# Patient Record
Sex: FEMALE | Race: WHITE | NOT HISPANIC OR LATINO | URBAN - METROPOLITAN AREA
[De-identification: names, ages, dates, MRNs, and addresses within clinical notes are randomized per-mention and may not be internally consistent; named-entity substitution may affect disease eponyms.]

---

## 2018-07-07 ENCOUNTER — ON CAMPUS - OUTPATIENT (AMBULATORY)
Dept: URBAN - METROPOLITAN AREA HOSPITAL 85 | Facility: HOSPITAL | Age: 33
End: 2018-07-07

## 2018-07-07 ENCOUNTER — ON CAMPUS - OUTPATIENT (AMBULATORY)
Dept: URBAN - METROPOLITAN AREA HOSPITAL 85 | Facility: HOSPITAL | Age: 33
End: 2018-07-07
Payer: COMMERCIAL

## 2018-07-07 DIAGNOSIS — R63.4 ABNORMAL WEIGHT LOSS: ICD-10-CM

## 2018-07-07 DIAGNOSIS — K29.70 GASTRITIS, UNSPECIFIED, WITHOUT BLEEDING: ICD-10-CM

## 2018-07-07 DIAGNOSIS — K92.0 HEMATEMESIS: ICD-10-CM

## 2018-07-07 DIAGNOSIS — R10.12 LEFT UPPER QUADRANT PAIN: ICD-10-CM

## 2018-07-07 DIAGNOSIS — R11.2 NAUSEA WITH VOMITING, UNSPECIFIED: ICD-10-CM

## 2018-07-07 DIAGNOSIS — D64.89 OTHER SPECIFIED ANEMIAS: ICD-10-CM

## 2018-07-07 PROCEDURE — 99204 OFFICE O/P NEW MOD 45 MIN: CPT | Performed by: NURSE PRACTITIONER

## 2018-07-07 PROCEDURE — 43239 EGD BIOPSY SINGLE/MULTIPLE: CPT | Performed by: INTERNAL MEDICINE

## 2018-07-08 PROCEDURE — 99213 OFFICE O/P EST LOW 20 MIN: CPT | Performed by: NURSE PRACTITIONER

## 2018-07-23 ENCOUNTER — OFFICE (AMBULATORY)
Dept: URBAN - METROPOLITAN AREA CLINIC 64 | Facility: CLINIC | Age: 33
End: 2018-07-23

## 2018-07-23 VITALS
HEART RATE: 78 BPM | WEIGHT: 181 LBS | SYSTOLIC BLOOD PRESSURE: 125 MMHG | HEIGHT: 70 IN | DIASTOLIC BLOOD PRESSURE: 76 MMHG

## 2018-07-23 DIAGNOSIS — R10.12 LEFT UPPER QUADRANT PAIN: ICD-10-CM

## 2018-07-23 DIAGNOSIS — R11.0 NAUSEA: ICD-10-CM

## 2018-07-23 DIAGNOSIS — R10.13 EPIGASTRIC PAIN: ICD-10-CM

## 2018-07-23 DIAGNOSIS — K21.9 GASTRO-ESOPHAGEAL REFLUX DISEASE WITHOUT ESOPHAGITIS: ICD-10-CM

## 2018-07-23 DIAGNOSIS — R07.81 PLEURODYNIA: ICD-10-CM

## 2018-07-23 PROCEDURE — 99214 OFFICE O/P EST MOD 30 MIN: CPT | Performed by: NURSE PRACTITIONER

## 2018-07-23 RX ORDER — DICLOFENAC 10 MG/G
GEL TOPICAL
Qty: 120 | Refills: 1 | Status: COMPLETED
Start: 2018-07-23 | End: 2018-08-28

## 2018-07-23 RX ORDER — ONDANSETRON HYDROCHLORIDE 4 MG/1
16 TABLET, ORALLY DISINTEGRATING ORAL
Qty: 60 | Refills: 0 | Status: ACTIVE
Start: 2018-07-23

## 2018-08-28 ENCOUNTER — OFFICE (AMBULATORY)
Dept: URBAN - METROPOLITAN AREA CLINIC 64 | Facility: CLINIC | Age: 33
End: 2018-08-28

## 2018-08-28 VITALS
SYSTOLIC BLOOD PRESSURE: 111 MMHG | HEIGHT: 70 IN | HEART RATE: 72 BPM | WEIGHT: 173 LBS | DIASTOLIC BLOOD PRESSURE: 70 MMHG

## 2018-08-28 DIAGNOSIS — R10.12 LEFT UPPER QUADRANT PAIN: ICD-10-CM

## 2018-08-28 PROCEDURE — 99214 OFFICE O/P EST MOD 30 MIN: CPT | Performed by: INTERNAL MEDICINE

## 2018-12-14 ENCOUNTER — OFFICE (AMBULATORY)
Dept: URBAN - METROPOLITAN AREA CLINIC 64 | Facility: CLINIC | Age: 33
End: 2018-12-14

## 2018-12-14 VITALS
SYSTOLIC BLOOD PRESSURE: 112 MMHG | HEIGHT: 70 IN | DIASTOLIC BLOOD PRESSURE: 71 MMHG | WEIGHT: 188 LBS | HEART RATE: 75 BPM

## 2018-12-14 DIAGNOSIS — R11.2 NAUSEA WITH VOMITING, UNSPECIFIED: ICD-10-CM

## 2018-12-14 DIAGNOSIS — R10.13 EPIGASTRIC PAIN: ICD-10-CM

## 2018-12-14 PROCEDURE — 99214 OFFICE O/P EST MOD 30 MIN: CPT | Performed by: INTERNAL MEDICINE

## 2019-01-09 ENCOUNTER — OFFICE (AMBULATORY)
Dept: URBAN - METROPOLITAN AREA CLINIC 64 | Facility: CLINIC | Age: 34
End: 2019-01-09

## 2019-01-09 VITALS
WEIGHT: 169 LBS | HEIGHT: 70 IN | HEART RATE: 88 BPM | DIASTOLIC BLOOD PRESSURE: 81 MMHG | SYSTOLIC BLOOD PRESSURE: 116 MMHG

## 2019-01-09 DIAGNOSIS — R11.2 NAUSEA WITH VOMITING, UNSPECIFIED: ICD-10-CM

## 2019-01-09 DIAGNOSIS — R10.12 LEFT UPPER QUADRANT PAIN: ICD-10-CM

## 2019-01-09 PROCEDURE — 99214 OFFICE O/P EST MOD 30 MIN: CPT | Performed by: INTERNAL MEDICINE

## 2019-01-09 RX ORDER — METOCLOPRAMIDE HYDROCHLORIDE 5 MG/1
10 TABLET ORAL
Qty: 60 | Refills: 2 | Status: COMPLETED
Start: 2019-01-09 | End: 2019-02-12

## 2019-02-12 ENCOUNTER — ON CAMPUS - OUTPATIENT (AMBULATORY)
Dept: URBAN - METROPOLITAN AREA HOSPITAL 2 | Facility: HOSPITAL | Age: 34
End: 2019-02-12

## 2019-02-12 VITALS
DIASTOLIC BLOOD PRESSURE: 58 MMHG | RESPIRATION RATE: 18 BRPM | DIASTOLIC BLOOD PRESSURE: 82 MMHG | SYSTOLIC BLOOD PRESSURE: 132 MMHG | HEIGHT: 70 IN | SYSTOLIC BLOOD PRESSURE: 105 MMHG | WEIGHT: 161.2 LBS | DIASTOLIC BLOOD PRESSURE: 67 MMHG | DIASTOLIC BLOOD PRESSURE: 76 MMHG | SYSTOLIC BLOOD PRESSURE: 112 MMHG | DIASTOLIC BLOOD PRESSURE: 66 MMHG | DIASTOLIC BLOOD PRESSURE: 73 MMHG | HEART RATE: 63 BPM | TEMPERATURE: 98 F | DIASTOLIC BLOOD PRESSURE: 62 MMHG | SYSTOLIC BLOOD PRESSURE: 113 MMHG | RESPIRATION RATE: 14 BRPM | RESPIRATION RATE: 16 BRPM | DIASTOLIC BLOOD PRESSURE: 68 MMHG | SYSTOLIC BLOOD PRESSURE: 117 MMHG | HEART RATE: 61 BPM | SYSTOLIC BLOOD PRESSURE: 107 MMHG | OXYGEN SATURATION: 99 % | HEART RATE: 60 BPM | SYSTOLIC BLOOD PRESSURE: 121 MMHG | OXYGEN SATURATION: 100 % | HEART RATE: 58 BPM

## 2019-02-12 DIAGNOSIS — R10.12 LEFT UPPER QUADRANT PAIN: ICD-10-CM

## 2019-02-12 DIAGNOSIS — R11.2 NAUSEA WITH VOMITING, UNSPECIFIED: ICD-10-CM

## 2019-02-12 DIAGNOSIS — R19.7 DIARRHEA, UNSPECIFIED: ICD-10-CM

## 2019-02-12 PROCEDURE — 45378 DIAGNOSTIC COLONOSCOPY: CPT | Performed by: INTERNAL MEDICINE

## 2019-02-21 ENCOUNTER — OFFICE (AMBULATORY)
Dept: URBAN - METROPOLITAN AREA CLINIC 64 | Facility: CLINIC | Age: 34
End: 2019-02-21

## 2019-02-21 VITALS
WEIGHT: 178 LBS | HEART RATE: 62 BPM | HEIGHT: 70 IN | SYSTOLIC BLOOD PRESSURE: 119 MMHG | DIASTOLIC BLOOD PRESSURE: 61 MMHG

## 2019-02-21 DIAGNOSIS — R11.2 NAUSEA WITH VOMITING, UNSPECIFIED: ICD-10-CM

## 2019-02-21 DIAGNOSIS — R10.12 LEFT UPPER QUADRANT PAIN: ICD-10-CM

## 2019-02-21 PROCEDURE — 99212 OFFICE O/P EST SF 10 MIN: CPT | Performed by: INTERNAL MEDICINE

## 2021-11-22 ENCOUNTER — HOSPITAL ENCOUNTER (OUTPATIENT)
Facility: SURGERY CENTER | Age: 36
Setting detail: HOSPITAL OUTPATIENT SURGERY
End: 2021-11-22
Attending: SPECIALIST | Admitting: SPECIALIST

## 2021-11-22 ENCOUNTER — TRANSCRIBE ORDERS (OUTPATIENT)
Dept: LAB | Facility: SURGERY CENTER | Age: 36
End: 2021-11-22

## 2021-11-22 DIAGNOSIS — Z01.818 OTHER SPECIFIED PRE-OPERATIVE EXAMINATION: Primary | ICD-10-CM

## 2021-11-24 ENCOUNTER — OFFICE VISIT (OUTPATIENT)
Dept: FAMILY MEDICINE CLINIC | Facility: CLINIC | Age: 36
End: 2021-11-24

## 2021-11-24 VITALS
TEMPERATURE: 97.8 F | OXYGEN SATURATION: 99 % | HEART RATE: 71 BPM | HEIGHT: 70 IN | RESPIRATION RATE: 16 BRPM | BODY MASS INDEX: 29.18 KG/M2 | WEIGHT: 203.8 LBS | DIASTOLIC BLOOD PRESSURE: 74 MMHG | SYSTOLIC BLOOD PRESSURE: 117 MMHG

## 2021-11-24 DIAGNOSIS — S46.012S TRAUMATIC INCOMPLETE TEAR OF LEFT ROTATOR CUFF, SEQUELA: ICD-10-CM

## 2021-11-24 DIAGNOSIS — F43.10 PTSD (POST-TRAUMATIC STRESS DISORDER): ICD-10-CM

## 2021-11-24 DIAGNOSIS — E53.8 B12 DEFICIENCY: ICD-10-CM

## 2021-11-24 DIAGNOSIS — E16.2 HYPOGLYCEMIA: Primary | ICD-10-CM

## 2021-11-24 DIAGNOSIS — N80.9 ENDOMETRIOSIS: ICD-10-CM

## 2021-11-24 DIAGNOSIS — F41.9 ANXIETY: ICD-10-CM

## 2021-11-24 DIAGNOSIS — K21.9 GASTROESOPHAGEAL REFLUX DISEASE, UNSPECIFIED WHETHER ESOPHAGITIS PRESENT: ICD-10-CM

## 2021-11-24 DIAGNOSIS — R23.3 EASY BRUISING: ICD-10-CM

## 2021-11-24 DIAGNOSIS — F41.8 MIXED ANXIETY DEPRESSIVE DISORDER: ICD-10-CM

## 2021-11-24 DIAGNOSIS — D50.8 OTHER IRON DEFICIENCY ANEMIA: ICD-10-CM

## 2021-11-24 DIAGNOSIS — M75.102 ROTATOR CUFF SYNDROME OF LEFT SHOULDER: ICD-10-CM

## 2021-11-24 DIAGNOSIS — G43.109 MIGRAINE WITH AURA AND WITHOUT STATUS MIGRAINOSUS, NOT INTRACTABLE: ICD-10-CM

## 2021-11-24 PROBLEM — G47.9 SLEEP DISTURBANCE: Status: ACTIVE | Noted: 2021-08-12

## 2021-11-24 PROBLEM — Z86.79 HISTORY OF CONGESTIVE HEART DISEASE: Status: ACTIVE | Noted: 2021-11-24

## 2021-11-24 PROBLEM — F41.1 GENERALIZED ANXIETY DISORDER: Status: ACTIVE | Noted: 2021-11-24

## 2021-11-24 PROBLEM — G47.11 IDIOPATHIC HYPERSOMNIA: Status: ACTIVE | Noted: 2021-08-12

## 2021-11-24 PROBLEM — R03.0 ELEVATED BLOOD PRESSURE READING: Status: ACTIVE | Noted: 2021-08-13

## 2021-11-24 PROBLEM — J45.909 ASTHMA: Status: ACTIVE | Noted: 2021-11-24

## 2021-11-24 PROBLEM — G44.40 MEDICATION OVERUSE HEADACHE: Status: ACTIVE | Noted: 2021-05-04

## 2021-11-24 PROCEDURE — 99214 OFFICE O/P EST MOD 30 MIN: CPT | Performed by: FAMILY MEDICINE

## 2021-11-24 RX ORDER — ARMODAFINIL 250 MG/1
250 TABLET ORAL DAILY PRN
Qty: 90 TABLET | Refills: 1 | Status: CANCELLED | OUTPATIENT
Start: 2021-11-24

## 2021-11-24 RX ORDER — RIZATRIPTAN BENZOATE 10 MG/1
TABLET ORAL
Qty: 27 TABLET | Refills: 1 | Status: CANCELLED | OUTPATIENT
Start: 2021-11-24

## 2021-11-24 RX ORDER — ONDANSETRON HYDROCHLORIDE 8 MG/1
1 TABLET, FILM COATED ORAL EVERY 8 HOURS PRN
COMMUNITY
End: 2021-12-02 | Stop reason: SDUPTHER

## 2021-11-24 RX ORDER — PHENYLEPHRINE HCL 10 MG/1
10 TABLET, FILM COATED ORAL EVERY MORNING
COMMUNITY
End: 2021-12-02 | Stop reason: SDUPTHER

## 2021-11-24 RX ORDER — ZONISAMIDE 100 MG/1
2 CAPSULE ORAL NIGHTLY
COMMUNITY
Start: 2021-11-22 | End: 2021-12-02 | Stop reason: SDUPTHER

## 2021-11-24 RX ORDER — LORAZEPAM 1 MG/1
1 TABLET ORAL 2 TIMES DAILY PRN
Qty: 60 TABLET | Refills: 2 | Status: CANCELLED | OUTPATIENT
Start: 2021-11-24

## 2021-11-24 RX ORDER — BUPROPION HYDROCHLORIDE 300 MG/1
300 TABLET ORAL EVERY MORNING
Qty: 90 TABLET | Refills: 1 | Status: SHIPPED | OUTPATIENT
Start: 2021-11-24 | End: 2021-12-02 | Stop reason: SDUPTHER

## 2021-11-24 RX ORDER — HYDROXYZINE 50 MG/1
50-100 TABLET, FILM COATED ORAL NIGHTLY PRN
Qty: 180 TABLET | Refills: 0 | Status: ON HOLD | OUTPATIENT
Start: 2021-11-24 | End: 2021-11-30

## 2021-11-24 RX ORDER — NICOTINE POLACRILEX 4 MG/1
1 GUM, CHEWING ORAL EVERY MORNING
COMMUNITY
Start: 2021-11-06 | End: 2021-11-24 | Stop reason: SDUPTHER

## 2021-11-24 RX ORDER — CETIRIZINE HYDROCHLORIDE 10 MG/1
10 TABLET ORAL DAILY
Qty: 90 TABLET | Refills: 1 | Status: CANCELLED | OUTPATIENT
Start: 2021-11-24

## 2021-11-24 RX ORDER — DOXYCYCLINE HYCLATE 50 MG/1
1 CAPSULE, GELATIN COATED ORAL
Qty: 90 TABLET | Refills: 1 | Status: SHIPPED | OUTPATIENT
Start: 2021-11-24 | End: 2021-12-02 | Stop reason: SDUPTHER

## 2021-11-24 RX ORDER — PHENYLEPHRINE HCL 10 MG/1
10 TABLET, FILM COATED ORAL EVERY MORNING
Qty: 90 TABLET | Refills: 1 | Status: CANCELLED | OUTPATIENT
Start: 2021-11-24

## 2021-11-24 RX ORDER — ONDANSETRON HYDROCHLORIDE 8 MG/1
8 TABLET, FILM COATED ORAL EVERY 8 HOURS PRN
Qty: 90 TABLET | Refills: 0 | Status: CANCELLED | OUTPATIENT
Start: 2021-11-24

## 2021-11-24 RX ORDER — LOSARTAN POTASSIUM 50 MG/1
1 TABLET ORAL EVERY MORNING
COMMUNITY
Start: 2021-11-10 | End: 2021-11-24 | Stop reason: SDUPTHER

## 2021-11-24 RX ORDER — LAMOTRIGINE 200 MG/1
200 TABLET ORAL 2 TIMES DAILY
Qty: 180 TABLET | Refills: 1 | Status: SHIPPED | OUTPATIENT
Start: 2021-11-24 | End: 2021-12-02 | Stop reason: SDUPTHER

## 2021-11-24 RX ORDER — LEVOMEFOLATE/ALGAL OIL 7.5-90.314
1 CAPSULE ORAL DAILY
Qty: 90 CAPSULE | Refills: 1 | Status: CANCELLED | OUTPATIENT
Start: 2021-11-24

## 2021-11-24 RX ORDER — HYDROXYZINE 50 MG/1
1-2 TABLET, FILM COATED ORAL NIGHTLY PRN
COMMUNITY
End: 2021-11-24 | Stop reason: SDUPTHER

## 2021-11-24 RX ORDER — CLOTRIMAZOLE 1 %
1 CREAM (GRAM) TOPICAL 2 TIMES DAILY PRN
COMMUNITY
Start: 2021-10-14 | End: 2022-06-15

## 2021-11-24 RX ORDER — ERENUMAB-AOOE 140 MG/ML
1 INJECTION, SOLUTION SUBCUTANEOUS
COMMUNITY
Start: 2021-11-06 | End: 2021-12-02 | Stop reason: SDUPTHER

## 2021-11-24 RX ORDER — ERENUMAB-AOOE 140 MG/ML
1 INJECTION, SOLUTION SUBCUTANEOUS
Qty: 3 ML | Refills: 1 | Status: CANCELLED | OUTPATIENT
Start: 2021-11-24

## 2021-11-24 RX ORDER — RIZATRIPTAN BENZOATE 10 MG/1
TABLET ORAL
COMMUNITY
Start: 2021-11-21 | End: 2021-12-02 | Stop reason: SDUPTHER

## 2021-11-24 RX ORDER — ARMODAFINIL 250 MG/1
1 TABLET ORAL DAILY PRN
COMMUNITY
Start: 2021-09-10 | End: 2021-12-02 | Stop reason: SDUPTHER

## 2021-11-24 RX ORDER — SUCRALFATE 1 G/1
1 TABLET ORAL 3 TIMES DAILY
COMMUNITY
Start: 2021-11-21 | End: 2021-12-02 | Stop reason: SDUPTHER

## 2021-11-24 RX ORDER — MULTIPLE VITAMINS W/ MINERALS TAB 9MG-400MCG
TAB ORAL
COMMUNITY

## 2021-11-24 RX ORDER — CETIRIZINE HYDROCHLORIDE 10 MG/1
10 TABLET ORAL DAILY
COMMUNITY
End: 2022-06-15

## 2021-11-24 RX ORDER — LOSARTAN POTASSIUM 50 MG/1
50 TABLET ORAL EVERY MORNING
Qty: 90 TABLET | Refills: 1 | Status: SHIPPED | OUTPATIENT
Start: 2021-11-24 | End: 2021-12-02 | Stop reason: SDUPTHER

## 2021-11-24 RX ORDER — LAMOTRIGINE 200 MG/1
1 TABLET ORAL 2 TIMES DAILY
COMMUNITY
Start: 2021-11-07 | End: 2021-11-24 | Stop reason: SDUPTHER

## 2021-11-24 RX ORDER — ZONISAMIDE 100 MG/1
200 CAPSULE ORAL NIGHTLY
Qty: 60 CAPSULE | Refills: 2 | Status: CANCELLED | OUTPATIENT
Start: 2021-11-24

## 2021-11-24 RX ORDER — SUCRALFATE 1 G/1
1 TABLET ORAL 3 TIMES DAILY
Qty: 270 TABLET | Refills: 1 | Status: CANCELLED | OUTPATIENT
Start: 2021-11-24

## 2021-11-24 RX ORDER — BUPROPION HYDROCHLORIDE 300 MG/1
1 TABLET ORAL EVERY MORNING
COMMUNITY
Start: 2021-11-07 | End: 2021-11-24 | Stop reason: SDUPTHER

## 2021-11-24 RX ORDER — DOXYCYCLINE HYCLATE 50 MG/1
1 CAPSULE, GELATIN COATED ORAL
COMMUNITY
Start: 2021-11-10 | End: 2021-11-24 | Stop reason: SDUPTHER

## 2021-11-24 RX ORDER — CLOTRIMAZOLE 1 %
1 CREAM (GRAM) TOPICAL 2 TIMES DAILY PRN
Qty: 14 G | Refills: 1 | Status: CANCELLED | OUTPATIENT
Start: 2021-11-24

## 2021-11-24 RX ORDER — NICOTINE POLACRILEX 4 MG/1
20 GUM, CHEWING ORAL EVERY MORNING
Qty: 90 EACH | Refills: 1 | Status: SHIPPED | OUTPATIENT
Start: 2021-11-24 | End: 2021-12-02 | Stop reason: SDUPTHER

## 2021-11-24 RX ORDER — LORAZEPAM 1 MG/1
TABLET ORAL
Status: ON HOLD | COMMUNITY
Start: 2021-07-13 | End: 2021-11-30

## 2021-11-24 RX ORDER — NAPROXEN SODIUM 220 MG
2 TABLET ORAL 2 TIMES DAILY
COMMUNITY
End: 2022-02-09 | Stop reason: ALTCHOICE

## 2021-11-27 ENCOUNTER — LAB (OUTPATIENT)
Dept: LAB | Facility: SURGERY CENTER | Age: 36
End: 2021-11-27

## 2021-11-27 ENCOUNTER — APPOINTMENT (OUTPATIENT)
Dept: LAB | Facility: SURGERY CENTER | Age: 36
End: 2021-11-27

## 2021-11-27 DIAGNOSIS — Z01.818 OTHER SPECIFIED PRE-OPERATIVE EXAMINATION: ICD-10-CM

## 2021-11-27 LAB — SARS-COV-2 ORF1AB RESP QL NAA+PROBE: NOT DETECTED

## 2021-11-27 PROCEDURE — C9803 HOPD COVID-19 SPEC COLLECT: HCPCS

## 2021-11-27 PROCEDURE — U0004 COV-19 TEST NON-CDC HGH THRU: HCPCS | Performed by: SPECIALIST

## 2021-11-29 ENCOUNTER — ANESTHESIA EVENT (OUTPATIENT)
Dept: SURGERY | Facility: SURGERY CENTER | Age: 36
End: 2021-11-29

## 2021-11-30 ENCOUNTER — HOSPITAL ENCOUNTER (OUTPATIENT)
Facility: SURGERY CENTER | Age: 36
Setting detail: HOSPITAL OUTPATIENT SURGERY
Discharge: HOME | End: 2021-11-30
Attending: SPECIALIST | Admitting: SPECIALIST

## 2021-11-30 ENCOUNTER — ANESTHESIA (OUTPATIENT)
Dept: SURGERY | Facility: SURGERY CENTER | Age: 36
End: 2021-11-30

## 2021-11-30 VITALS
OXYGEN SATURATION: 95 % | HEIGHT: 70 IN | SYSTOLIC BLOOD PRESSURE: 117 MMHG | HEART RATE: 61 BPM | TEMPERATURE: 98 F | RESPIRATION RATE: 16 BRPM | BODY MASS INDEX: 28.95 KG/M2 | WEIGHT: 202.2 LBS | DIASTOLIC BLOOD PRESSURE: 73 MMHG

## 2021-11-30 LAB
B-HCG UR QL: NEGATIVE
EXPIRATION DATE: NORMAL
INTERNAL NEGATIVE CONTROL: NORMAL
INTERNAL POSITIVE CONTROL: NORMAL
Lab: NORMAL

## 2021-11-30 PROCEDURE — C1713 ANCHOR/SCREW BN/BN,TIS/BN: HCPCS | Performed by: SPECIALIST

## 2021-11-30 PROCEDURE — 81025 URINE PREGNANCY TEST: CPT | Performed by: SPECIALIST

## 2021-11-30 PROCEDURE — 0 CEFAZOLIN SODIUM-DEXTROSE 2-3 GM-%(50ML) RECONSTITUTED SOLUTION: Performed by: ANESTHESIOLOGY

## 2021-11-30 PROCEDURE — 25010000002 SUCCINYLCHOLINE PER 20 MG: Performed by: ANESTHESIOLOGY

## 2021-11-30 PROCEDURE — 25010000002 ONDANSETRON PER 1 MG: Performed by: ANESTHESIOLOGY

## 2021-11-30 PROCEDURE — 25010000002 EPINEPHRINE PER 0.1 MG: Performed by: SPECIALIST

## 2021-11-30 PROCEDURE — 0RNK4ZZ RELEASE LEFT SHOULDER JOINT, PERCUTANEOUS ENDOSCOPIC APPROACH: ICD-10-PCS | Performed by: SPECIALIST

## 2021-11-30 PROCEDURE — 0LM24ZZ REATTACHMENT OF LEFT SHOULDER TENDON, PERCUTANEOUS ENDOSCOPIC APPROACH: ICD-10-PCS | Performed by: SPECIALIST

## 2021-11-30 PROCEDURE — 25010000002 FENTANYL CITRATE (PF) 50 MCG/ML SOLUTION: Performed by: ANESTHESIOLOGY

## 2021-11-30 PROCEDURE — 29827 SHO ARTHRS SRG RT8TR CUF RPR: CPT | Performed by: SPECIALIST

## 2021-11-30 PROCEDURE — 25010000002 FENTANYL CITRATE (PF) 250 MCG/5ML SOLUTION: Performed by: ANESTHESIOLOGY

## 2021-11-30 PROCEDURE — 25010000002 PROPOFOL 10 MG/ML EMULSION: Performed by: ANESTHESIOLOGY

## 2021-11-30 PROCEDURE — 25010000002 ROPIVACAINE PER 1 MG

## 2021-11-30 PROCEDURE — 25010000002 MIDAZOLAM PER 1 MG: Performed by: ANESTHESIOLOGY

## 2021-11-30 PROCEDURE — 25010000002 DEXAMETHASONE SODIUM PHOSPHATE 20 MG/5ML SOLUTION: Performed by: ANESTHESIOLOGY

## 2021-11-30 PROCEDURE — 25010000002 PROMETHAZINE PER 50 MG: Performed by: ANESTHESIOLOGY

## 2021-11-30 PROCEDURE — 29826 SHO ARTHRS SRG DECOMPRESSION: CPT | Performed by: SPECIALIST

## 2021-11-30 DEVICE — SUT/ANCH HEALIX ADV BR W/DYNACORD 4.5MM: Type: IMPLANTABLE DEVICE | Site: SHOULDER | Status: FUNCTIONAL

## 2021-11-30 DEVICE — HEALIX ADVANCE SP BIOCOMPOSITE ANCHOR TCP/PLLA ABSORBABLE ANCHOR 4.9MM
Type: IMPLANTABLE DEVICE | Site: SHOULDER | Status: FUNCTIONAL
Brand: HEALIX ADVANCE

## 2021-11-30 RX ORDER — BUPIVACAINE HYDROCHLORIDE 5 MG/ML
INJECTION, SOLUTION EPIDURAL; INTRACAUDAL
Status: COMPLETED | OUTPATIENT
Start: 2021-11-30 | End: 2021-11-30

## 2021-11-30 RX ORDER — MIDAZOLAM HYDROCHLORIDE 1 MG/ML
1 INJECTION INTRAMUSCULAR; INTRAVENOUS
Status: COMPLETED | OUTPATIENT
Start: 2021-11-30 | End: 2021-11-30

## 2021-11-30 RX ORDER — DIPHENHYDRAMINE HCL 25 MG
25 TABLET ORAL
Status: DISCONTINUED | OUTPATIENT
Start: 2021-11-30 | End: 2021-11-30 | Stop reason: HOSPADM

## 2021-11-30 RX ORDER — CEFAZOLIN SODIUM 2 G/50ML
SOLUTION INTRAVENOUS AS NEEDED
Status: DISCONTINUED | OUTPATIENT
Start: 2021-11-30 | End: 2021-11-30 | Stop reason: SURG

## 2021-11-30 RX ORDER — LIDOCAINE HYDROCHLORIDE 10 MG/ML
0.5 INJECTION, SOLUTION INFILTRATION; PERINEURAL ONCE AS NEEDED
Status: DISCONTINUED | OUTPATIENT
Start: 2021-11-30 | End: 2021-11-30 | Stop reason: HOSPADM

## 2021-11-30 RX ORDER — DIPHENHYDRAMINE HYDROCHLORIDE 50 MG/ML
12.5 INJECTION INTRAMUSCULAR; INTRAVENOUS
Status: DISCONTINUED | OUTPATIENT
Start: 2021-11-30 | End: 2021-11-30 | Stop reason: HOSPADM

## 2021-11-30 RX ORDER — EPINEPHRINE 1 MG/ML
INJECTION, SOLUTION, CONCENTRATE INTRAVENOUS AS NEEDED
Status: DISCONTINUED | OUTPATIENT
Start: 2021-11-30 | End: 2021-11-30 | Stop reason: HOSPADM

## 2021-11-30 RX ORDER — DEXAMETHASONE SODIUM PHOSPHATE 4 MG/ML
INJECTION, SOLUTION INTRA-ARTICULAR; INTRALESIONAL; INTRAMUSCULAR; INTRAVENOUS; SOFT TISSUE
Status: COMPLETED | OUTPATIENT
Start: 2021-11-30 | End: 2021-11-30

## 2021-11-30 RX ORDER — ONDANSETRON 2 MG/ML
INJECTION INTRAMUSCULAR; INTRAVENOUS AS NEEDED
Status: DISCONTINUED | OUTPATIENT
Start: 2021-11-30 | End: 2021-11-30 | Stop reason: SURG

## 2021-11-30 RX ORDER — FENTANYL CITRATE 50 UG/ML
50 INJECTION, SOLUTION INTRAMUSCULAR; INTRAVENOUS
Status: DISCONTINUED | OUTPATIENT
Start: 2021-11-30 | End: 2021-11-30 | Stop reason: HOSPADM

## 2021-11-30 RX ORDER — NALOXONE HCL 0.4 MG/ML
0.2 VIAL (ML) INJECTION AS NEEDED
Status: DISCONTINUED | OUTPATIENT
Start: 2021-11-30 | End: 2021-11-30 | Stop reason: HOSPADM

## 2021-11-30 RX ORDER — SODIUM CHLORIDE, SODIUM LACTATE, POTASSIUM CHLORIDE, CALCIUM CHLORIDE 600; 310; 30; 20 MG/100ML; MG/100ML; MG/100ML; MG/100ML
9 INJECTION, SOLUTION INTRAVENOUS CONTINUOUS
Status: DISCONTINUED | OUTPATIENT
Start: 2021-11-30 | End: 2021-11-30 | Stop reason: HOSPADM

## 2021-11-30 RX ORDER — PROPOFOL 10 MG/ML
VIAL (ML) INTRAVENOUS AS NEEDED
Status: DISCONTINUED | OUTPATIENT
Start: 2021-11-30 | End: 2021-11-30 | Stop reason: SURG

## 2021-11-30 RX ORDER — SODIUM CHLORIDE 0.9 % (FLUSH) 0.9 %
10 SYRINGE (ML) INJECTION AS NEEDED
Status: DISCONTINUED | OUTPATIENT
Start: 2021-11-30 | End: 2021-11-30 | Stop reason: HOSPADM

## 2021-11-30 RX ORDER — SUCCINYLCHOLINE CHLORIDE 20 MG/ML
INJECTION INTRAMUSCULAR; INTRAVENOUS AS NEEDED
Status: DISCONTINUED | OUTPATIENT
Start: 2021-11-30 | End: 2021-11-30 | Stop reason: SURG

## 2021-11-30 RX ORDER — FLUMAZENIL 0.1 MG/ML
0.2 INJECTION INTRAVENOUS AS NEEDED
Status: DISCONTINUED | OUTPATIENT
Start: 2021-11-30 | End: 2021-11-30 | Stop reason: HOSPADM

## 2021-11-30 RX ORDER — ROPIVACAINE HYDROCHLORIDE 2 MG/ML
INJECTION, SOLUTION EPIDURAL; INFILTRATION; PERINEURAL
Status: COMPLETED | OUTPATIENT
Start: 2021-11-30 | End: 2021-11-30

## 2021-11-30 RX ORDER — SODIUM CHLORIDE, SODIUM LACTATE, POTASSIUM CHLORIDE, CALCIUM CHLORIDE 600; 310; 30; 20 MG/100ML; MG/100ML; MG/100ML; MG/100ML
1000 INJECTION, SOLUTION INTRAVENOUS CONTINUOUS
Status: DISCONTINUED | OUTPATIENT
Start: 2021-11-30 | End: 2021-11-30 | Stop reason: HOSPADM

## 2021-11-30 RX ORDER — FENTANYL CITRATE 50 UG/ML
INJECTION, SOLUTION INTRAMUSCULAR; INTRAVENOUS AS NEEDED
Status: DISCONTINUED | OUTPATIENT
Start: 2021-11-30 | End: 2021-11-30 | Stop reason: SURG

## 2021-11-30 RX ORDER — SODIUM CHLORIDE, SODIUM LACTATE, POTASSIUM CHLORIDE, AND CALCIUM CHLORIDE .6; .31; .03; .02 G/100ML; G/100ML; G/100ML; G/100ML
IRRIGANT IRRIGATION AS NEEDED
Status: DISCONTINUED | OUTPATIENT
Start: 2021-11-30 | End: 2021-11-30 | Stop reason: HOSPADM

## 2021-11-30 RX ORDER — PROMETHAZINE HYDROCHLORIDE 25 MG/ML
INJECTION, SOLUTION INTRAMUSCULAR; INTRAVENOUS AS NEEDED
Status: DISCONTINUED | OUTPATIENT
Start: 2021-11-30 | End: 2021-11-30 | Stop reason: SURG

## 2021-11-30 RX ADMIN — PROMETHAZINE HYDROCHLORIDE 7.5 MG: 25 INJECTION INTRAMUSCULAR; INTRAVENOUS at 07:57

## 2021-11-30 RX ADMIN — MIDAZOLAM 1 MG: 1 INJECTION INTRAMUSCULAR; INTRAVENOUS at 07:08

## 2021-11-30 RX ADMIN — ONDANSETRON 4 MG: 2 INJECTION INTRAMUSCULAR; INTRAVENOUS at 09:10

## 2021-11-30 RX ADMIN — DEXAMETHASONE SODIUM PHOSPHATE 6 MG: 4 INJECTION, SOLUTION INTRA-ARTICULAR; INTRALESIONAL; INTRAMUSCULAR; INTRAVENOUS; SOFT TISSUE at 08:13

## 2021-11-30 RX ADMIN — CEFAZOLIN SODIUM 2 G: 2 SOLUTION INTRAVENOUS at 08:03

## 2021-11-30 RX ADMIN — PROPOFOL 40 MG: 10 INJECTION, EMULSION INTRAVENOUS at 07:57

## 2021-11-30 RX ADMIN — FENTANYL CITRATE 50 MCG: 50 INJECTION, SOLUTION INTRAMUSCULAR; INTRAVENOUS at 07:54

## 2021-11-30 RX ADMIN — PROPOFOL 160 MG: 10 INJECTION, EMULSION INTRAVENOUS at 07:56

## 2021-11-30 RX ADMIN — SUCCINYLCHOLINE CHLORIDE 100 MG: 20 INJECTION, SOLUTION INTRAMUSCULAR; INTRAVENOUS at 07:57

## 2021-11-30 RX ADMIN — SODIUM CHLORIDE, SODIUM LACTATE, POTASSIUM CHLORIDE, AND CALCIUM CHLORIDE: .6; .31; .03; .02 INJECTION, SOLUTION INTRAVENOUS at 07:51

## 2021-11-30 RX ADMIN — ROPIVACAINE HYDROCHLORIDE 15 ML: 2 INJECTION, SOLUTION EPIDURAL; INFILTRATION; PERINEURAL at 07:13

## 2021-11-30 RX ADMIN — FENTANYL CITRATE 50 MCG: 50 INJECTION, SOLUTION INTRAMUSCULAR; INTRAVENOUS at 09:11

## 2021-11-30 RX ADMIN — FAMOTIDINE 20 MG: 10 INJECTION INTRAVENOUS at 07:08

## 2021-11-30 RX ADMIN — DEXAMETHASONE SODIUM PHOSPHATE 2 MG: 4 INJECTION, SOLUTION INTRA-ARTICULAR; INTRALESIONAL; INTRAMUSCULAR; INTRAVENOUS; SOFT TISSUE at 07:13

## 2021-11-30 RX ADMIN — PROPOFOL 50 MG: 10 INJECTION, EMULSION INTRAVENOUS at 09:13

## 2021-11-30 RX ADMIN — BUPIVACAINE HYDROCHLORIDE 15 ML: 5 INJECTION, SOLUTION EPIDURAL; INTRACAUDAL; PERINEURAL at 07:13

## 2021-11-30 RX ADMIN — MIDAZOLAM 1 MG: 1 INJECTION INTRAMUSCULAR; INTRAVENOUS at 07:07

## 2021-11-30 RX ADMIN — FENTANYL CITRATE 50 MCG: 50 INJECTION, SOLUTION INTRAMUSCULAR; INTRAVENOUS at 07:07

## 2021-11-30 NOTE — ANESTHESIA PROCEDURE NOTES
Peripheral Block    Pre-sedation assessment completed: 11/30/2021 7:08 AM    Patient reassessed immediately prior to procedure    Patient location during procedure: holding area  Start time: 11/30/2021 7:10 AM  Stop time: 11/30/2021 7:13 AM  Reason for block: at surgeon's request and post-op pain management  Performed by  Anesthesiologist: Rohan Hinton DO  Preanesthetic Checklist  Completed: patient identified, IV checked, site marked, risks and benefits discussed, surgical consent, monitors and equipment checked, pre-op evaluation and timeout performed  Prep:  Pt Position: sitting  Sterile barriers:gloves, mask, cap and washed/disinfected hands  Prep: ChloraPrep  Patient monitoring: blood pressure monitoring, continuous pulse oximetry and EKG  Procedure    Sedation: yes  Performed under: local infiltration  Guidance:ultrasound guided    ULTRASOUND INTERPRETATION. Using ultrasound guidance a gauge needle was placed in close proximity to the brachial plexus nerve, at which point, under ultrasound guidance anesthetic was injected in the area of the nerve and spread of the anesthesia was seen on ultrasound in close proximity thereto.  There were no abnormalities seen on ultrasound; a digital image was taken; and the patient tolerated the procedure with no complications. Needle gauge: 30. Images:still images obtained, printed/placed on chart    Laterality:left  Block Type:interscalene  Injection Technique:single-shot  Needle Type:echogenic  Needle Gauge:22 G  Resistance on Injection: none    Medications Used: ropivacaine (NAROPIN) 0.2% injection, 15 mL  bupivacaine PF (MARCAINE) injection 0.5%, 15 mL  dexamethasone sodium phosphate injection 20 mg/5 mL, 2 mg  Med administered at 11/30/2021 7:13 AM      Medications  Comment:Ultrasound Interpretation:  Using ultrasound guidance the needle was placed in close proximity to the brachial plexus and anesthetic was injected in the area of the nerve and spread of the  anesthetic was seen on ultrasound in close proximity thereto.  There were no abnormalities seen on ultrasound; a digital image was taken; and the patient tolerated the procedure with no complications.      Post Assessment  Injection Assessment: negative aspiration for heme, no paresthesia on injection and incremental injection  Patient Tolerance:comfortable throughout block  Complications:no  Additional Notes  Ultrasound guidance was used to both view and verify local anesthetic placement and disbursement. In addition, it was used to evaluate the respective anatomy to determine needle approach and placement.

## 2021-11-30 NOTE — ANESTHESIA POSTPROCEDURE EVALUATION
"Patient: Amelie Miranda    Procedure Summary     Date: 11/30/21 Room / Location: [facility]    Anesthesia Start: 0751 Anesthesia Stop: 0933    Procedure: LEFT SHOULDER ARTHROSCOPY WITH ROTATOR CUFF REPAIR AND SUBACROMIAL DECOMPRESSION- SLAP (Left Shoulder) Diagnosis:       Rotator cuff syndrome of left shoulder      (Rotator cuff syndrome of left shoulder [M75.102])    Surgeons: Rianna Jarvis MD Provider: Rohan Hinton DO    Anesthesia Type: general with block ASA Status: 2          Anesthesia Type: general with block    Vitals  Vitals Value Taken Time   /87 11/30/21 1000   Temp 36.7 °C (98 °F) 11/30/21 1000   Pulse 66 11/30/21 1000   Resp 16 11/30/21 1000   SpO2 95 % 11/30/21 1000           Post Anesthesia Care and Evaluation    Patient location during evaluation: bedside  Patient participation: complete - patient participated  Level of consciousness: awake and alert  Pain management: adequate  Airway patency: patent  Anesthetic complications: No anesthetic complications    Cardiovascular status: acceptable  Respiratory status: acceptable  Hydration status: acceptable    Comments: /75 (BP Location: Right arm, Patient Position: Lying)   Pulse 59   Temp 36.7 °C (98 °F) (Temporal)   Resp 16   Ht 177.8 cm (70\")   Wt 91.7 kg (202 lb 3.2 oz)   LMP  (Within Weeks)   SpO2 95%   BMI 29.01 kg/m²           "

## 2021-11-30 NOTE — ANESTHESIA PREPROCEDURE EVALUATION
Anesthesia Evaluation     Patient summary reviewed   no history of anesthetic complications:  NPO Solid Status: > 8 hours  NPO Liquid Status: > 2 hours           Airway   Mallampati: II  TM distance: >3 FB  Neck ROM: full  No difficulty expected  Dental - normal exam     Pulmonary     breath sounds clear to auscultation  (+) a smoker Former, asthma,  (-) shortness of breath, recent URISleep apnea: STOPBANG 2.  Cardiovascular   Exercise tolerance: good (4-7 METS)    Rhythm: regular  Rate: normal    (+) hypertension,   (-) past MI, dysrhythmias, angina      Neuro/Psych  (+) headaches (migraines), psychiatric history Anxiety and PTSD,     (-) seizures, CVA  GI/Hepatic/Renal/Endo    (+)  GERD,    (-) no renal disease, diabetes    Musculoskeletal     (-) neck stiffness  Abdominal    Substance History      OB/GYN          Other        ROS/Med Hx Other: COVID hx x2                  Anesthesia Plan    ASA 2     general with block   (+ISB USGSS for POPC)  intravenous induction     Anesthetic plan, all risks, benefits, and alternatives have been provided, discussed and informed consent has been obtained with: patient.    Plan discussed with CRNA.

## 2021-11-30 NOTE — ANESTHESIA PROCEDURE NOTES
Airway  Urgency: elective    Date/Time: 11/30/2021 7:58 AM  End Time:11/30/2021 8:00 AM  Airway not difficult    General Information and Staff    Patient location during procedure: OR  Anesthesiologist: Rohan Hinton DO    Indications and Patient Condition  Indications for airway management: airway protection    Preoxygenated: yes  MILS maintained throughout  Mask difficulty assessment: 1 - vent by mask    Final Airway Details  Final airway type: endotracheal airway      Successful airway: ETT  Cuffed: yes   Successful intubation technique: direct laryngoscopy  Endotracheal tube insertion site: oral  Blade: Josue  Blade size: 2  ETT size (mm): 7.0  Cormack-Lehane Classification: grade I - full view of glottis  Placement verified by: chest auscultation and capnometry   Cuff volume (mL): 8  Measured from: lips  Number of attempts at approach: 1  Assessment: lips, teeth, and gum same as pre-op and atraumatic intubation    Additional Comments  Cuff to seal.

## 2021-12-02 DIAGNOSIS — G47.26 SHIFT WORK SLEEP DISORDER: Primary | ICD-10-CM

## 2021-12-02 RX ORDER — BUPROPION HYDROCHLORIDE 300 MG/1
300 TABLET ORAL EVERY MORNING
Qty: 90 TABLET | Refills: 1 | Status: SHIPPED | OUTPATIENT
Start: 2021-12-02 | End: 2022-08-09 | Stop reason: SDUPTHER

## 2021-12-02 RX ORDER — PHENYLEPHRINE HCL 10 MG/1
10 TABLET, FILM COATED ORAL EVERY MORNING
Qty: 90 TABLET | Refills: 1 | Status: SHIPPED | OUTPATIENT
Start: 2021-12-02 | End: 2022-03-23

## 2021-12-02 RX ORDER — ZONISAMIDE 100 MG/1
200 CAPSULE ORAL NIGHTLY
Qty: 180 CAPSULE | Refills: 0 | Status: SHIPPED | OUTPATIENT
Start: 2021-12-02

## 2021-12-02 RX ORDER — LAMOTRIGINE 200 MG/1
200 TABLET ORAL 2 TIMES DAILY
Qty: 180 TABLET | Refills: 1 | Status: SHIPPED | OUTPATIENT
Start: 2021-12-02 | End: 2022-10-05 | Stop reason: SDUPTHER

## 2021-12-02 RX ORDER — SUCRALFATE 1 G/1
1 TABLET ORAL 3 TIMES DAILY
Qty: 270 TABLET | Refills: 0 | Status: SHIPPED | OUTPATIENT
Start: 2021-12-02 | End: 2022-06-15

## 2021-12-02 RX ORDER — LEVOMEFOLATE/ALGAL OIL 7.5-90.314
1 CAPSULE ORAL DAILY
Qty: 90 CAPSULE | Refills: 0 | Status: SHIPPED | OUTPATIENT
Start: 2021-12-02 | End: 2023-02-24

## 2021-12-02 RX ORDER — ONDANSETRON HYDROCHLORIDE 8 MG/1
8 TABLET, FILM COATED ORAL EVERY 8 HOURS PRN
Qty: 90 TABLET | Refills: 1 | Status: SHIPPED | OUTPATIENT
Start: 2021-12-02 | End: 2022-10-16

## 2021-12-02 RX ORDER — LOSARTAN POTASSIUM 50 MG/1
50 TABLET ORAL EVERY MORNING
Qty: 90 TABLET | Refills: 1 | Status: SHIPPED | OUTPATIENT
Start: 2021-12-02 | End: 2022-07-10

## 2021-12-02 RX ORDER — ARMODAFINIL 250 MG/1
250 TABLET ORAL DAILY PRN
Qty: 90 TABLET | Refills: 1 | Status: SHIPPED | OUTPATIENT
Start: 2021-12-02 | End: 2022-10-05

## 2021-12-02 RX ORDER — ERENUMAB-AOOE 140 MG/ML
1 INJECTION, SOLUTION SUBCUTANEOUS
Qty: 3 ML | Refills: 0 | Status: SHIPPED | OUTPATIENT
Start: 2021-12-02 | End: 2022-10-05 | Stop reason: SDUPTHER

## 2021-12-02 RX ORDER — RIZATRIPTAN BENZOATE 10 MG/1
TABLET ORAL
Qty: 12 TABLET | Refills: 0 | Status: SHIPPED | OUTPATIENT
Start: 2021-12-02 | End: 2022-10-05 | Stop reason: SDUPTHER

## 2021-12-02 RX ORDER — DOXYCYCLINE HYCLATE 50 MG/1
1 CAPSULE, GELATIN COATED ORAL
Qty: 90 TABLET | Refills: 1 | Status: SHIPPED | OUTPATIENT
Start: 2021-12-02 | End: 2022-08-09 | Stop reason: SDUPTHER

## 2021-12-02 RX ORDER — NICOTINE POLACRILEX 4 MG/1
20 GUM, CHEWING ORAL EVERY MORNING
Qty: 90 EACH | Refills: 0 | Status: SHIPPED | OUTPATIENT
Start: 2021-12-02 | End: 2022-03-23

## 2021-12-02 NOTE — TELEPHONE ENCOUNTER
MEDICATIONS NOW HAVE TO BE SENT TO [facility] IN [place] AND NOT [facility]IL ORDER [facility].

## 2021-12-02 NOTE — TELEPHONE ENCOUNTER
Rx Refill Note  Requested Prescriptions     Pending Prescriptions Disp Refills   • Aimovig 140 MG/ML prefilled syringe 3 mL 1     Sig: Inject 1 mL under the skin into the appropriate area as directed Every 30 (Thirty) Days.   • Armodafinil 250 MG tablet 90 tablet 1     Sig: Take 1 tablet by mouth Daily As Needed (SHIFT WORK DISORDER).   • buPROPion XL (WELLBUTRIN XL) 300 MG 24 hr tablet 90 tablet 1     Sig: Take 1 tablet by mouth Every Morning.   • ferrous gluconate (FERGON) 324 MG tablet 90 tablet 1     Sig: Take 1 tablet by mouth Daily With Breakfast.   • lamoTRIgine (LaMICtal) 200 MG tablet 180 tablet 1     Sig: Take 1 tablet by mouth 2 (Two) Times a Day.   • l-methylfolate-algae (DEPLIN) 7.5-90.314 MG capsule capsule 90 capsule 0     Sig: Take 1 capsule by mouth Daily.   • losartan (COZAAR) 50 MG tablet 90 tablet 1     Sig: Take 1 tablet by mouth Every Morning.   • metoprolol tartrate (LOPRESSOR) 25 MG tablet 180 tablet 1     Sig: Take 1 tablet by mouth 2 (Two) Times a Day.   • Omeprazole 20 MG tablet delayed-release 90 each 1     Sig: Take 20 mg by mouth Every Morning.   • ondansetron (ZOFRAN) 8 MG tablet 90 tablet 1     Sig: Take 1 tablet by mouth Every 8 (Eight) Hours As Needed for Nausea or Vomiting.   • phenylephrine (SUDAFED PE) 10 MG tablet 90 tablet 1     Sig: Take 1 tablet by mouth Every Morning.   • rizatriptan (MAXALT) 10 MG tablet 12 tablet 3     Si tab po at onset of headache. If not resolved in 2 hours may repeat. Do not exceed 2 tabs in 24 hours.   • sucralfate (CARAFATE) 1 g tablet 270 tablet 1     Sig: Take 1 tablet by mouth 3 (Three) Times a Day.   • zonisamide (ZONEGRAN) 100 MG capsule 180 capsule 1     Sig: Take 2 capsules by mouth Every Night.      Last office visit with prescribing clinician: 2021      Next office visit with prescribing clinician: 3/23/2022     Office Visit with Fadia Tovar MD (2021)  POC Urine Pregnancy (2021 07:04)  COVID PRE-OP / PRE-PROCEDURE  SCREENING ORDER (NO ISOLATION) - Swab, Nasopharynx (11/27/2021 08:19)  POC Glycosylated Hemoglobin (Hb A1C) (11/24/2021 11:34)  COMPREHENSIVE METABOLIC PANEL (10/11/2021 13:01)  LIPID PANEL (10/11/2021 13:01)  CBC AND DIFFERENTIAL (10/11/2021 13:01)  VITAMIN B12 (10/11/2021 13:01)         Jefferson Mccollum CMA  12/02/21, 13:03 EST

## 2021-12-03 ENCOUNTER — TELEPHONE (OUTPATIENT)
Dept: ONCOLOGY | Facility: CLINIC | Age: 36
End: 2021-12-03

## 2022-01-05 NOTE — PROGRESS NOTES
HEMATOLOGY ONCOLOGY OUTPATIENT CONSULTATION       Patient name: Amelie Miranda  : 1985  MRN: [id number]  Primary Care Physician: Fadia Tovar MD  Referring Physician: Fadia Tovar MD  Reason For Consult:     Chief Complaint   Patient presents with   • Appointment     Easy bruising         HPI:   History of Present Illness:  Amelie Miranda is 36 y.o. female who presented to our office on 22 for consultation regarding easy bruising, B12 deficiency.    Patient has a known history of endometriosis, GERD, hypertension hyperglycemia who was seen by primary care physician and referred to us for easy bruisability, B12 deficiency.  Patient has had extensive bruising with even minor trauma or spontaneous bruising.  Patient has history of endometriosis and has had heavy menstrual bleeding secondary to that which has improved since her procedure.  She has started to have worsening menstruation now.  Patient has had a rotator cuff surgery.  No known problems with hemorrhage post procedure.  No known postpartum hemorrhage.  She has had a  section.    10/11/2021 -CBC with hemoglobin 12.4, hematocrit 37.8, WC 5.8, platelets 223.  Coagulation parameters checked prior to surgery were normal with PT and INR PTT.  Vitamin B12 level above 1000.            Subjective:  • 22.  Patient presents for initial consultation .  She has not had any big bruise recently but has had some bruises that are now healing.  She denies any constitutional symptoms no fever, fatigue or weight loss.    The following portions of the patient's history were reviewed and updated as appropriate: allergies, current medications, past family history, past medical history, past social history, past surgical history and problem list.    Past Medical History:   Diagnosis Date   • Allergic    • Anemia    • Anxiety    • B12 deficiency    • COVID     x 2    • Depression    • Dercum's disease    • Endometriosis    • Gastritis    • GERD  (gastroesophageal reflux disease)    • Hypertension    • Hypoglycemia    • Migraines    • PTSD (post-traumatic stress disorder)        Past Surgical History:   Procedure Laterality Date   • CARPAL TUNNEL RELEASE Right    •  SECTION      x 2    • CHOLECYSTECTOMY     • KNEE SURGERY Right     x 3    • KNEE SURGERY Left     x 1    • SHOULDER ACROMIOPLASTY WITH ROTATOR CUFF REPAIR Left 2021    Procedure: LEFT SHOULDER ARTHROSCOPY WITH ROTATOR CUFF REPAIR AND SUBACROMIAL DECOMPRESSION- SLAP;  Surgeon: Rianna Jarvis MD;  [place] [facility] [place]  Service: Orthopedics;  Laterality: Left;         Current Outpatient Medications:   •  Aimovig 140 MG/ML prefilled syringe, Inject 1 mL under the skin into the appropriate area as directed Every 30 (Thirty) Days., Disp: 3 mL, Rfl: 0  •  Armodafinil 250 MG tablet, Take 1 tablet by mouth Daily As Needed (SHIFT WORK DISORDER)., Disp: 90 tablet, Rfl: 1  •  buPROPion XL (WELLBUTRIN XL) 300 MG 24 hr tablet, Take 1 tablet by mouth Every Morning., Disp: 90 tablet, Rfl: 1  •  cetirizine (zyrTEC) 10 MG tablet, Take 10 mg by mouth Daily., Disp: , Rfl:   •  Cholecalciferol 125 MCG (5000 UT) tablet, Take 1 tablet by mouth Daily., Disp: 90 tablet, Rfl: 1  •  clotrimazole (LOTRIMIN) 1 % cream, Apply 1 application topically to the appropriate area as directed 2 (Two) Times a Day As Needed., Disp: , Rfl:   •  cyanocobalamin (VITAMIN B-12) 1000 MCG tablet, Take 1 tablet by mouth Daily., Disp: 90 tablet, Rfl: 1  •  ferrous gluconate (FERGON) 324 MG tablet, Take 1 tablet by mouth Daily With Breakfast., Disp: 90 tablet, Rfl: 1  •  l-methylfolate-algae (DEPLIN) 7.5-90.314 MG capsule capsule, Take 1 capsule by mouth Daily., Disp: 90 capsule, Rfl: 0  •  lamoTRIgine (LaMICtal) 200 MG tablet, Take 1 tablet by mouth 2 (Two) Times a Day., Disp: 180 tablet, Rfl: 1  •  losartan (COZAAR) 50 MG tablet, Take 1 tablet by mouth Every Morning., Disp: 90 tablet, Rfl: 1  •  metoprolol tartrate  (LOPRESSOR) 25 MG tablet, Take 1 tablet by mouth 2 (Two) Times a Day., Disp: 180 tablet, Rfl: 0  •  multivitamin with minerals (MULTIVITAMIN ADULT PO), 2 gummies po q am - smarty pants , Disp: , Rfl:   •  naproxen sodium (ALEVE) 220 MG tablet, Take 2 tablets by mouth 2 (Two) Times a Day., Disp: , Rfl:   •  Omeprazole 20 MG tablet delayed-release, Take 20 mg by mouth Every Morning., Disp: 90 each, Rfl: 0  •  ondansetron (ZOFRAN) 8 MG tablet, Take 1 tablet by mouth Every 8 (Eight) Hours As Needed for Nausea or Vomiting., Disp: 90 tablet, Rfl: 1  •  phenylephrine (SUDAFED PE) 10 MG tablet, Take 1 tablet by mouth Every Morning., Disp: 90 tablet, Rfl: 1  •  Probiotic Product (FORTIFY PROBIOTIC WOMENS EX ST PO), Take 1 capsule by mouth Daily., Disp: , Rfl:   •  rizatriptan (MAXALT) 10 MG tablet, 1 tab po at onset of headache. If not resolved in 2 hours may repeat. Do not exceed 2 tabs in 24 hours., Disp: 12 tablet, Rfl: 0  •  sucralfate (CARAFATE) 1 g tablet, Take 1 tablet by mouth 3 (Three) Times a Day., Disp: 270 tablet, Rfl: 0  •  zonisamide (ZONEGRAN) 100 MG capsule, Take 2 capsules by mouth Every Night., Disp: 180 capsule, Rfl: 0    Allergies   Allergen Reactions   • Codeine Hives and Itching   • Latex Rash     Skin gets red and burns, skin starts peeling     • Meloxicam Other (See Comments)     Gastritis         Family History   Problem Relation Age of Onset   • Thyroid disease Mother    • Glaucoma Mother    • Hypertension Mother    • Transient ischemic attack Mother    • Bell's palsy Mother    • Alcohol abuse Father    • ADD / ADHD Sister    • Depression Son    • Pyloric stenosis Son    • Hypertension Maternal Grandmother    • Heart failure Maternal Grandmother    • Stroke Maternal Grandmother    • Diabetes Paternal Grandfather    • Malig Hyperthermia Neg Hx        Cancer-related family history is not on file.    Social History     Tobacco Use   • Smoking status: Former Smoker     Packs/day: 0.50     Years: 17.00  "    Pack years: 8.50     Types: Cigarettes     Start date:      Quit date: 2016     Years since quittin.0   • Smokeless tobacco: Never Used   Vaping Use   • Vaping Use: Never used   Substance Use Topics   • Alcohol use: Yes   • Drug use: Never     Social History     Social History Narrative   • Not on file        ROS:     Review of Systems   Constitutional: Negative for fatigue, fever and unexpected weight change.   HENT: Negative for congestion and nosebleeds.    Eyes: Negative for pain.   Respiratory: Negative for cough and shortness of breath.    Cardiovascular: Negative for chest pain.   Gastrointestinal: Negative for abdominal pain, blood in stool, diarrhea, nausea and vomiting.   Endocrine: Negative for cold intolerance and heat intolerance.   Genitourinary: Negative for difficulty urinating.   Musculoskeletal: Negative for arthralgias.   Skin: Negative for rash.   Neurological: Negative for dizziness and headaches.   Hematological: Bruises/bleeds easily.   Psychiatric/Behavioral: Negative for behavioral problems.               Objective:    Vitals:    22 0903   BP: 117/75   Pulse: 85   Resp: 18   Temp: 96.3 °F (35.7 °C)   Weight: 91.4 kg (201 lb 6.4 oz)   Height: 177.8 cm (70\")   PainSc:   6   PainLoc: Shoulder  Comment: Recent surgery     Body mass index is 28.9 kg/m².  ECOG  (0) Fully active, able to carry on all predisease performance without restriction    Physical Exam:     Physical Exam  Constitutional:       Appearance: Normal appearance. She is obese.   HENT:      Head: Normocephalic and atraumatic.   Eyes:      Pupils: Pupils are equal, round, and reactive to light.   Cardiovascular:      Rate and Rhythm: Normal rate and regular rhythm.      Pulses: Normal pulses.      Heart sounds: No murmur heard.      Pulmonary:      Effort: Pulmonary effort is normal.      Breath sounds: Normal breath sounds.   Abdominal:      General: There is no distension.      Palpations: Abdomen is soft. There " is no mass.      Tenderness: There is no abdominal tenderness.   Musculoskeletal:         General: Normal range of motion.      Cervical back: Normal range of motion.   Skin:     General: Skin is warm.      Findings: Bruising present.   Neurological:      General: No focal deficit present.      Mental Status: She is alert.   Psychiatric:         Mood and Affect: Mood normal.               Lab Results - Last 18 Months   Lab Units 01/06/22  1025   WBC 10*3/mm3 6.30   HEMOGLOBIN g/dL 12.9   HEMATOCRIT % 36.9   PLATELETS 10*3/mm3 278   MCV fL 86.7     No results for input(s): NA, K, CL, CO2, BUN, CREATININE, CALCIUM, BILITOT, ALKPHOS, ALT, AST, GLUCOSE in the last 95312 hours.    Invalid input(s): LABALBU, PROT    Lab Results   Component Value Date    GLUCOSE 93 07/08/2018    BUN 7 12/30/2018    CREATININE 0.7 12/30/2018    BCR 10.0 12/30/2018    K 3.1 (L) 12/30/2018    CO2 25 12/30/2018    CALCIUM 9.0 12/30/2018    ALBUMIN 4.4 12/30/2018    LABIL2 1.8 12/30/2018    AST 20 12/30/2018    ALT 35 12/30/2018       Lab Results - Last 18 Months   Lab Units 01/06/22  1025   INR  <0.93*   APTT seconds 23.6*       Lab Results   Component Value Date    IRON 132 01/06/2022    TIBC 331 01/06/2022    FERRITIN 135.10 01/06/2022       No results found for: FOLATE    No results found for: OCCULTBLD    No results found for: RETICCTPCT    Lab Results   Component Value Date    HKMFTVHZ52 1,054 (H) 01/06/2022     No results found for: SPEP, UPEP  No results found for: LDH, URICACID  No results found for: DENNIS, RF, SEDRATE  No results found for: FIBRINOGEN, HAPTOGLOBIN  Lab Results   Component Value Date    PTT 23.6 (L) 01/06/2022    INR <0.93 (L) 01/06/2022     No results found for:   No results found for: CEA  No components found for: CA-19-9  No results found for: PSA  No results found for: AFPTM, VW8633, HCGTM, SPEP, BEN         Assessment/Plan     Patient is a 36-year-old female with history of vitamin B12 deficiency, easy  bruising    Easy bruising  With having an extensive hemorrhage post procedure, no extensive bleeding history I do not think there is any underlying bleeding disorder   I would like to rule out underlying bleeding disorder however by obtaining PT PTT INR test.  I will also check for platelet function assay with a PFA-100.  If above work-up is negative this rules out underlying bleeding disorder.    Vitamin B12 deficiency  Patient does not have anemia has history of vitamin B12 deficiency.  She continues on replacement.  Recent CBC with normal hemoglobin.      If above workup is negative, will recheck CBC, iron studies B12 week prior to follow up.         Thank you very much for providing the opportunity to participate in this patient’s care. Please do not hesitate to call if there are any other questions    I have reviewed and confirmed the accuracy of the patient's history: Chief complaint, HPI, ROS, Subjective and Past Family Social History as entered by the MA/LESLIE/RN.     Yudith Smith MD 01/09/22

## 2022-01-06 ENCOUNTER — CONSULT (OUTPATIENT)
Dept: ONCOLOGY | Facility: CLINIC | Age: 37
End: 2022-01-06

## 2022-01-06 VITALS
BODY MASS INDEX: 28.83 KG/M2 | HEART RATE: 85 BPM | DIASTOLIC BLOOD PRESSURE: 75 MMHG | TEMPERATURE: 96.3 F | HEIGHT: 70 IN | RESPIRATION RATE: 18 BRPM | WEIGHT: 201.4 LBS | SYSTOLIC BLOOD PRESSURE: 117 MMHG

## 2022-01-06 DIAGNOSIS — R23.3 EASY BRUISING: Primary | ICD-10-CM

## 2022-01-06 DIAGNOSIS — E53.8 B12 DEFICIENCY: ICD-10-CM

## 2022-01-06 LAB
APTT PPP: 23.6 SECONDS (ref 24–31)
BASOPHILS # BLD AUTO: 0.1 10*3/MM3 (ref 0–0.2)
BASOPHILS NFR BLD AUTO: 1.9 % (ref 0–1.5)
CLOSURE TME COLL+ADP BLD: NORMAL S
CLOSURE TME COLL+EPINEP BLD: 118 SECONDS (ref 64–160)
DEPRECATED RDW RBC AUTO: 42 FL (ref 37–54)
EOSINOPHIL # BLD AUTO: 0.2 10*3/MM3 (ref 0–0.4)
EOSINOPHIL NFR BLD AUTO: 2.5 % (ref 0.3–6.2)
ERYTHROCYTE [DISTWIDTH] IN BLOOD BY AUTOMATED COUNT: 13.3 % (ref 12.3–15.4)
FERRITIN SERPL-MCNC: 135.1 NG/ML (ref 13–150)
HCT VFR BLD AUTO: 36.9 % (ref 34–46.6)
HGB BLD-MCNC: 12.9 G/DL (ref 12–15.9)
INR PPP: <0.93 (ref 0.93–1.1)
IRON 24H UR-MRATE: 132 MCG/DL (ref 37–145)
IRON SATN MFR SERPL: 40 % (ref 20–50)
LYMPHOCYTES # BLD AUTO: 1.9 10*3/MM3 (ref 0.7–3.1)
LYMPHOCYTES NFR BLD AUTO: 29.5 % (ref 19.6–45.3)
MCH RBC QN AUTO: 30.2 PG (ref 26.6–33)
MCHC RBC AUTO-ENTMCNC: 34.9 G/DL (ref 31.5–35.7)
MCV RBC AUTO: 86.7 FL (ref 79–97)
MONOCYTES # BLD AUTO: 0.3 10*3/MM3 (ref 0.1–0.9)
MONOCYTES NFR BLD AUTO: 4.6 % (ref 5–12)
NEUTROPHILS NFR BLD AUTO: 3.9 10*3/MM3 (ref 1.7–7)
NEUTROPHILS NFR BLD AUTO: 61.5 % (ref 42.7–76)
NRBC BLD AUTO-RTO: 0 /100 WBC (ref 0–0.2)
PLATELET # BLD AUTO: 278 10*3/MM3 (ref 140–450)
PMV BLD AUTO: 7.6 FL (ref 6–12)
PROTHROMBIN TIME: 10.3 SECONDS (ref 9.6–11.7)
RBC # BLD AUTO: 4.26 10*6/MM3 (ref 3.77–5.28)
TIBC SERPL-MCNC: 331 MCG/DL (ref 298–536)
TRANSFERRIN SERPL-MCNC: 222 MG/DL (ref 200–360)
VIT B12 BLD-MCNC: 1054 PG/ML (ref 211–946)
WBC NRBC COR # BLD: 6.3 10*3/MM3 (ref 3.4–10.8)

## 2022-01-06 PROCEDURE — 82728 ASSAY OF FERRITIN: CPT | Performed by: INTERNAL MEDICINE

## 2022-01-06 PROCEDURE — 82607 VITAMIN B-12: CPT | Performed by: INTERNAL MEDICINE

## 2022-01-06 PROCEDURE — 85576 BLOOD PLATELET AGGREGATION: CPT | Performed by: INTERNAL MEDICINE

## 2022-01-06 PROCEDURE — 85730 THROMBOPLASTIN TIME PARTIAL: CPT | Performed by: INTERNAL MEDICINE

## 2022-01-06 PROCEDURE — 99204 OFFICE O/P NEW MOD 45 MIN: CPT | Performed by: INTERNAL MEDICINE

## 2022-01-06 PROCEDURE — 85610 PROTHROMBIN TIME: CPT | Performed by: INTERNAL MEDICINE

## 2022-01-06 PROCEDURE — 84466 ASSAY OF TRANSFERRIN: CPT | Performed by: INTERNAL MEDICINE

## 2022-01-06 PROCEDURE — 83540 ASSAY OF IRON: CPT | Performed by: INTERNAL MEDICINE

## 2022-01-06 PROCEDURE — 85025 COMPLETE CBC W/AUTO DIFF WBC: CPT | Performed by: INTERNAL MEDICINE

## 2022-02-09 ENCOUNTER — OFFICE VISIT (OUTPATIENT)
Dept: FAMILY MEDICINE CLINIC | Facility: CLINIC | Age: 37
End: 2022-02-09

## 2022-02-09 VITALS
HEART RATE: 70 BPM | DIASTOLIC BLOOD PRESSURE: 82 MMHG | TEMPERATURE: 97.5 F | HEIGHT: 70 IN | SYSTOLIC BLOOD PRESSURE: 121 MMHG | OXYGEN SATURATION: 100 % | RESPIRATION RATE: 16 BRPM | BODY MASS INDEX: 28.81 KG/M2 | WEIGHT: 201.21 LBS

## 2022-02-09 DIAGNOSIS — L98.9 SKIN LESION OF RIGHT LEG: Primary | ICD-10-CM
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
## 2022-02-09 DIAGNOSIS — M25.512 ACUTE PAIN OF LEFT SHOULDER: ICD-10-CM

## 2022-02-09 DIAGNOSIS — M25.552 LEFT HIP PAIN: ICD-10-CM

## 2022-02-09 DIAGNOSIS — M54.50 ACUTE MIDLINE LOW BACK PAIN WITHOUT SCIATICA: ICD-10-CM
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
## 2022-02-09 PROCEDURE — 99214 OFFICE O/P EST MOD 30 MIN: CPT | Performed by: FAMILY MEDICINE

## 2022-02-09 RX ORDER — NAPROXEN 500 MG/1
1 TABLET ORAL 2 TIMES DAILY WITH MEALS
COMMUNITY
Start: 2022-01-08 | End: 2022-03-23

## 2022-02-09 NOTE — PROGRESS NOTES
"    Subjective   Amelie Miranda is a 36 y.o. female.     Chief Complaint   Patient presents with   • Skin Lesion on (R) Anterior Shin     Been present for quite a bit of time; Patient states that it is itchy and is growing. Does look \"waxy\".    • Fall     Fell on ice yesterday. She is hurting from her neck (sore) down to her sciatica area on her left side.        History of Present Illness     Pt is blonde, fairskinned, mother has hx skin cancer  Pt reports right Ant tibial lesion which she oticed inlast summer and has gotten bigger    C/o left  Neck , left shoulder ,left flank , left hip pain after falling on ice at her home yesterday.  She just had rototor cuff sugery in November per dr. Palacios  She has been going to physical therapy  Post rotator cuff repair  Pt c/o left shoulder pain, flank pain and hip pain    The following portions of the patient's history were reviewed and updated as appropriate: allergies, current medications, past family history, past medical history, past social history, past surgical history and problem list.    Past Medical History:   Diagnosis Date   • Allergic    • Anemia    • Anxiety    • B12 deficiency    • COVID     x 2    • Depression    • Dercum's disease    • Endometriosis    • Gastritis    • GERD (gastroesophageal reflux disease)    • Hypertension    • Hypoglycemia    • Migraines    • PTSD (post-traumatic stress disorder)        Past Surgical History:   Procedure Laterality Date   • CARPAL TUNNEL RELEASE Right    •  SECTION      x 2    • CHOLECYSTECTOMY     • KNEE SURGERY Right     x 3    • KNEE SURGERY Left     x 1    • SHOULDER ACROMIOPLASTY WITH ROTATOR CUFF REPAIR Left 2021    Procedure: LEFT SHOULDER ARTHROSCOPY WITH ROTATOR CUFF REPAIR AND SUBACROMIAL DECOMPRESSION- SLAP;  Surgeon: Rianna Jarvis MD;  [place] [facility] [place];  Service: Orthopedics;  Laterality: Left;       Family History   Problem Relation Age of Onset   • Thyroid disease Mother    • " Glaucoma Mother    • Hypertension Mother    • Transient ischemic attack Mother    • Bell's palsy Mother    • Alcohol abuse Father    • ADD / ADHD Sister    • Depression Son    • Pyloric stenosis Son    • Hypertension Maternal Grandmother    • Heart failure Maternal Grandmother    • Stroke Maternal Grandmother    • Diabetes Paternal Grandfather    • Malig Hyperthermia Neg Hx        Review of Systems   Constitutional: Negative for fatigue, unexpected weight gain and unexpected weight loss.   HENT: Negative for congestion, sinus pressure and sore throat.         Normal smell/taste   Eyes: Negative for blurred vision and visual disturbance.   Respiratory: Negative for cough, shortness of breath and wheezing.    Cardiovascular: Negative for chest pain, palpitations and leg swelling.   Gastrointestinal: Negative for abdominal pain, constipation, diarrhea, nausea, vomiting, GERD and indigestion.   Musculoskeletal: Positive for arthralgias (left shoulder, left hip , lower back) and back pain. Negative for gait problem, joint swelling and neck pain.   Skin: Positive for skin lesions (right leg). Negative for rash and wound.   Allergic/Immunologic: Negative for environmental allergies.   Neurological: Negative for dizziness, tremors, syncope, weakness, light-headedness, headache and memory problem.   Psychiatric/Behavioral: Negative for sleep disturbance, depressed mood and stress. The patient is not nervous/anxious.        Objective   Physical Exam  Vitals and nursing note reviewed.   Constitutional:       General: She is not in acute distress.     Appearance: She is well-developed. She is obese. She is not diaphoretic.   HENT:      Head: Normocephalic and atraumatic.      Right Ear: External ear normal.      Left Ear: External ear normal.      Nose: Nose normal.   Eyes:      Conjunctiva/sclera: Conjunctivae normal.      Pupils: Pupils are equal, round, and reactive to light.   Neck:      Thyroid: No thyromegaly.    Cardiovascular:      Rate and Rhythm: Normal rate and regular rhythm.      Heart sounds: Normal heart sounds. No murmur heard.  No friction rub. No gallop.    Pulmonary:      Effort: Pulmonary effort is normal.      Breath sounds: Normal breath sounds. No wheezing.   Abdominal:      General: Bowel sounds are normal.      Palpations: Abdomen is soft.      Tenderness: There is no abdominal tenderness.   Musculoskeletal:         General: No tenderness (left shoulder, lower back) or deformity. Normal range of motion.      Cervical back: Normal range of motion and neck supple.   Lymphadenopathy:      Cervical: No cervical adenopathy.   Skin:     General: Skin is warm and dry.      Capillary Refill: Capillary refill takes less than 2 seconds.      Findings: No rash.   Neurological:      Mental Status: She is alert and oriented to person, place, and time.      Cranial Nerves: No cranial nerve deficit.   Psychiatric:         Behavior: Behavior normal.         Thought Content: Thought content normal.         Judgment: Judgment normal.         Vitals:    02/09/22 0956   BP: 121/82   Pulse: 70   Resp: 16   Temp: 97.5 °F (36.4 °C)   SpO2: 100%     Body mass index is 28.87 kg/m².    No results found for: HGBA1C, POCGLU, LDL, CBC, CMP, TSH  Results for orders placed or performed in visit on 01/06/22   CBC Auto Differential    Specimen: Arm, Right; Blood   Result Value Ref Range    WBC 6.30 3.40 - 10.80 10*3/mm3    RBC 4.26 3.77 - 5.28 10*6/mm3    Hemoglobin 12.9 12.0 - 15.9 g/dL    Hematocrit 36.9 34.0 - 46.6 %    MCV 86.7 79.0 - 97.0 fL    MCH 30.2 26.6 - 33.0 pg    MCHC 34.9 31.5 - 35.7 g/dL    RDW 13.3 12.3 - 15.4 %    RDW-SD 42.0 37.0 - 54.0 fl    MPV 7.6 6.0 - 12.0 fL    Platelets 278 140 - 450 10*3/mm3    Neutrophil % 61.5 42.7 - 76.0 %    Lymphocyte % 29.5 19.6 - 45.3 %    Monocyte % 4.6 (L) 5.0 - 12.0 %    Eosinophil % 2.5 0.3 - 6.2 %    Basophil % 1.9 (H) 0.0 - 1.5 %    Neutrophils, Absolute 3.90 1.70 - 7.00 10*3/mm3     Lymphocytes, Absolute 1.90 0.70 - 3.10 10*3/mm3    Monocytes, Absolute 0.30 0.10 - 0.90 10*3/mm3    Eosinophils, Absolute 0.20 0.00 - 0.40 10*3/mm3    Basophils, Absolute 0.10 0.00 - 0.20 10*3/mm3    nRBC 0.0 0.0 - 0.2 /100 WBC   Iron Profile    Specimen: Arm, Right; Blood   Result Value Ref Range    Iron 132 37 - 145 mcg/dL    Iron Saturation 40 20 - 50 %    Transferrin 222 200 - 360 mg/dL    TIBC 331 298 - 536 mcg/dL   aPTT    Specimen: Arm, Right; Blood   Result Value Ref Range    PTT 23.6 (L) 24.0 - 31.0 seconds   Protime-INR    Specimen: Arm, Right; Blood   Result Value Ref Range    Protime 10.3 9.6 - 11.7 Seconds    INR <0.93 (L) 0.93 - 1.10   Platelet Function Test    Specimen: Arm, Right; Blood   Result Value Ref Range    Collagen/Epinephrine 118 64 - 160 Seconds    Collagen/ADP     Ferritin    Specimen: Arm, Right; Blood   Result Value Ref Range    Ferritin 135.10 13.00 - 150.00 ng/mL   Vitamin B12    Specimen: Arm, Right; Blood   Result Value Ref Range    Vitamin B-12 1,054 (H) 211 - 946 pg/mL   Results for orders placed or performed during the hospital encounter of 11/30/21   POC Urine Pregnancy    Specimen: Urine   Result Value Ref Range    HCG, Urine, QL Negative Negative    Lot Number [id number]     Internal Positive Control Passed Positive, Passed    Internal Negative Control Passed Negative, Passed    Expiration Date 1/31/23    Results for orders placed or performed in visit on 11/27/21   COVID-19,APTIMA PANTHER(HITESH),[facility], NP/OP SWAB IN UTM/VTM/[facility] [employer] MEDIA,24 HR TAT - Swab, Nasopharynx    Specimen: Nasopharynx; Swab   Result Value Ref Range    COVID19 Not Detected Not Detected - Ref. Range       Assessment/Plan   Diagnoses and all orders for this visit:    1. Skin lesion of right leg (Primary)  -     Ambulatory Referral to Dermatology    2. Acute pain of left shoulder  -     XR Shoulder 1 View Left; Future    3. Fall in home, initial encounter  -     XR Shoulder 1 View Left; Future  -      Ambulatory Referral to Physical Therapy Evaluate and treat    4. Acute midline low back pain without sciatica  -     Ambulatory Referral to Physical Therapy Evaluate and treat    5. Left hip pain  -     XR Hip With or Without Pelvis 2 - 3 View Left; Future      Fu xrays  Refer to PT   Refer dermatology  Call ortho about left shoulder  cok to use topical meds and tylemol for pain  Apply ice  To tender areas

## 2022-02-25 ENCOUNTER — TELEPHONE (OUTPATIENT)
Dept: FAMILY MEDICINE CLINIC | Facility: CLINIC | Age: 37
End: 2022-02-25

## 2022-02-25 NOTE — TELEPHONE ENCOUNTER
Spoke with [facility] pharmacy in [place],  they informed me that PT picked up an old script with the wrong dosage from a different pharmacy for once daily dosing. Pt was advised and notified. They are going to fill BID dosing for her to . She will only receive 30 day supply due to dose increase. She will be able to receive 90 days after this script is complete.

## 2022-03-14 ENCOUNTER — OFFICE (AMBULATORY)
Dept: URBAN - METROPOLITAN AREA CLINIC 64 | Facility: CLINIC | Age: 37
End: 2022-03-14

## 2022-03-14 VITALS — HEIGHT: 70 IN | WEIGHT: 201 LBS

## 2022-03-14 DIAGNOSIS — K21.9 GASTRO-ESOPHAGEAL REFLUX DISEASE WITHOUT ESOPHAGITIS: ICD-10-CM

## 2022-03-14 PROCEDURE — 99213 OFFICE O/P EST LOW 20 MIN: CPT | Performed by: INTERNAL MEDICINE

## 2022-03-14 RX ORDER — OMEPRAZOLE 20 MG/1
CAPSULE, DELAYED RELEASE ORAL
Qty: 180 | Refills: 4 | Status: ACTIVE

## 2022-03-23 ENCOUNTER — OFFICE VISIT (OUTPATIENT)
Dept: FAMILY MEDICINE CLINIC | Facility: CLINIC | Age: 37
End: 2022-03-23

## 2022-03-23 ENCOUNTER — TELEPHONE (OUTPATIENT)
Dept: FAMILY MEDICINE CLINIC | Facility: CLINIC | Age: 37
End: 2022-03-23

## 2022-03-23 VITALS
HEART RATE: 86 BPM | HEIGHT: 70 IN | WEIGHT: 206 LBS | RESPIRATION RATE: 18 BRPM | DIASTOLIC BLOOD PRESSURE: 83 MMHG | BODY MASS INDEX: 29.49 KG/M2 | TEMPERATURE: 97.5 F | OXYGEN SATURATION: 100 % | SYSTOLIC BLOOD PRESSURE: 133 MMHG

## 2022-03-23 DIAGNOSIS — M26.629 TMJ SYNDROME: ICD-10-CM

## 2022-03-23 DIAGNOSIS — F43.10 PTSD (POST-TRAUMATIC STRESS DISORDER): ICD-10-CM

## 2022-03-23 DIAGNOSIS — F32.89 OTHER DEPRESSION: ICD-10-CM

## 2022-03-23 DIAGNOSIS — F41.9 ANXIETY: Primary | ICD-10-CM

## 2022-03-23 DIAGNOSIS — E88.2 DERCUM'S DISEASE: ICD-10-CM

## 2022-03-23 DIAGNOSIS — G43.109 MIGRAINE WITH AURA AND WITHOUT STATUS MIGRAINOSUS, NOT INTRACTABLE: ICD-10-CM

## 2022-03-23 DIAGNOSIS — F43.9 STRESS AT HOME: ICD-10-CM

## 2022-03-23 DIAGNOSIS — R23.3 EASY BRUISING: ICD-10-CM

## 2022-03-23 PROCEDURE — 99214 OFFICE O/P EST MOD 30 MIN: CPT | Performed by: FAMILY MEDICINE

## 2022-03-23 RX ORDER — PRAZOSIN HYDROCHLORIDE 1 MG/1
1 CAPSULE ORAL NIGHTLY
Qty: 30 CAPSULE | Refills: 1 | Status: SHIPPED | OUTPATIENT
Start: 2022-03-23 | End: 2022-04-19

## 2022-03-23 RX ORDER — LORAZEPAM 1 MG/1
1 TABLET ORAL EVERY 8 HOURS PRN
Qty: 60 TABLET | Refills: 0 | Status: SHIPPED | OUTPATIENT
Start: 2022-03-23 | End: 2022-05-23 | Stop reason: SDUPTHER

## 2022-03-23 RX ORDER — HYDROXYZINE HYDROCHLORIDE 10 MG/1
10 TABLET, FILM COATED ORAL EVERY 6 HOURS PRN
Qty: 90 TABLET | Refills: 0 | Status: SHIPPED | OUTPATIENT
Start: 2022-03-23 | End: 2022-08-09 | Stop reason: SDUPTHER

## 2022-03-23 RX ORDER — HYDROXYZINE HYDROCHLORIDE 10 MG/1
10 TABLET, FILM COATED ORAL EVERY 6 HOURS PRN
Qty: 90 TABLET | Refills: 0 | Status: SHIPPED | OUTPATIENT
Start: 2022-03-23 | End: 2022-03-23 | Stop reason: SDUPTHER

## 2022-03-23 NOTE — TELEPHONE ENCOUNTER
Caller: Amelie Miranda    Relationship to patient: Self    Best call back number: [phone]    Patient is needing: PATIENT STATES THAT SHE SPOKE TO HER THERAPIST AND THE THERAPIST SAID THE MEDICATION THAT SHE AND DR. AC TALKED ABOUT THIS MORNING DURING PATIENTS APPOINTMENT.    [facility]/pharmacy #[id number] - [place], [place] - [address] [place] [address] AT [place] [address] - [phone] [place] [phone] [phone]

## 2022-04-19 RX ORDER — PRAZOSIN HYDROCHLORIDE 1 MG/1
CAPSULE ORAL
Qty: 90 CAPSULE | Refills: 0 | Status: SHIPPED | OUTPATIENT
Start: 2022-04-19 | End: 2022-08-09 | Stop reason: SDUPTHER

## 2022-04-19 NOTE — TELEPHONE ENCOUNTER
Rx Refill Note    PATIENT SEES DR. AC.     Requested Prescriptions     Pending Prescriptions Disp Refills   • prazosin (MINIPRESS) 1 MG capsule [Pharmacy Med Name: PRAZOSIN 1 MG CAPSULE] 30 capsule 1     Sig: TAKE 1 CAPSULE BY MOUTH EVERY DAY AT NIGHT      Last office visit with prescribing clinician: 3/23/2022      Next office visit with prescribing clinician: 5/4/2022     CBC & Differential (01/06/2022 10:25)  COMPREHENSIVE METABOLIC PANEL (10/11/2021 13:01)         Aissatou Solorzano LPN  04/19/22, 10:53 EDT

## 2022-04-25 NOTE — TELEPHONE ENCOUNTER
SPOKE WITH PATIENT.  SHE RECENTLY HAD ROTATOR CUFF SURGERY.  REQUESTING APPT BE SCHEDULED OUT.  PATIENT V/U OF DATE AND TIME.   negative

## 2022-05-06 ENCOUNTER — OFFICE VISIT (OUTPATIENT)
Dept: FAMILY MEDICINE CLINIC | Facility: CLINIC | Age: 37
End: 2022-05-06

## 2022-05-06 VITALS
BODY MASS INDEX: 29.35 KG/M2 | TEMPERATURE: 98 F | OXYGEN SATURATION: 100 % | SYSTOLIC BLOOD PRESSURE: 120 MMHG | HEIGHT: 70 IN | WEIGHT: 205 LBS | HEART RATE: 73 BPM | RESPIRATION RATE: 18 BRPM | DIASTOLIC BLOOD PRESSURE: 81 MMHG

## 2022-05-06 DIAGNOSIS — F43.10 PTSD (POST-TRAUMATIC STRESS DISORDER): ICD-10-CM

## 2022-05-06 DIAGNOSIS — Z91.09 ENVIRONMENTAL ALLERGIES: ICD-10-CM

## 2022-05-06 DIAGNOSIS — F41.9 ANXIETY: Primary | ICD-10-CM

## 2022-05-06 DIAGNOSIS — F32.89 OTHER DEPRESSION: ICD-10-CM

## 2022-05-06 PROCEDURE — 99214 OFFICE O/P EST MOD 30 MIN: CPT | Performed by: REGISTERED NURSE

## 2022-05-06 RX ORDER — FLUTICASONE PROPIONATE 50 MCG
2 SPRAY, SUSPENSION (ML) NASAL DAILY
Qty: 16 G | Refills: 3 | Status: SHIPPED | OUTPATIENT
Start: 2022-05-06 | End: 2022-06-02
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
## 2022-05-10 ENCOUNTER — OFFICE (AMBULATORY)
Dept: URBAN - METROPOLITAN AREA CLINIC 64 | Facility: CLINIC | Age: 37
End: 2022-05-10

## 2022-05-10 VITALS
WEIGHT: 204 LBS | HEIGHT: 70 IN | HEART RATE: 90 BPM | SYSTOLIC BLOOD PRESSURE: 140 MMHG | DIASTOLIC BLOOD PRESSURE: 90 MMHG

## 2022-05-10 DIAGNOSIS — K21.9 GASTRO-ESOPHAGEAL REFLUX DISEASE WITHOUT ESOPHAGITIS: ICD-10-CM

## 2022-05-10 DIAGNOSIS — K30 FUNCTIONAL DYSPEPSIA: ICD-10-CM

## 2022-05-10 PROCEDURE — 99213 OFFICE O/P EST LOW 20 MIN: CPT | Performed by: INTERNAL MEDICINE

## 2022-05-20 NOTE — ADDENDUM NOTE
Addended by: JAME POWELL on: 5/20/2022 03:56 PM     Modules accepted: Level of Service     Include Z78.9 (Other Specified Conditions Influencing Health Status) As An Associated Diagnosis?: No

## 2022-05-23 ENCOUNTER — OFFICE VISIT (OUTPATIENT)
Dept: FAMILY MEDICINE CLINIC | Facility: CLINIC | Age: 37
End: 2022-05-23

## 2022-05-23 VITALS
WEIGHT: 203 LBS | HEART RATE: 65 BPM | HEIGHT: 70 IN | RESPIRATION RATE: 18 BRPM | TEMPERATURE: 98.4 F | BODY MASS INDEX: 29.06 KG/M2 | DIASTOLIC BLOOD PRESSURE: 86 MMHG | SYSTOLIC BLOOD PRESSURE: 144 MMHG | OXYGEN SATURATION: 95 %

## 2022-05-23 DIAGNOSIS — M32.9 EXACERBATION OF SYSTEMIC LUPUS: ICD-10-CM

## 2022-05-23 DIAGNOSIS — E88.2 DERCUM'S DISEASE: ICD-10-CM

## 2022-05-23 DIAGNOSIS — F43.10 PTSD (POST-TRAUMATIC STRESS DISORDER): Primary | ICD-10-CM

## 2022-05-23 DIAGNOSIS — F41.9 ANXIETY: ICD-10-CM

## 2022-05-23 PROCEDURE — 99213 OFFICE O/P EST LOW 20 MIN: CPT | Performed by: REGISTERED NURSE

## 2022-05-23 RX ORDER — LORAZEPAM 1 MG/1
1 TABLET ORAL EVERY 8 HOURS PRN
Qty: 60 TABLET | Refills: 0 | Status: SHIPPED | OUTPATIENT
Start: 2022-05-23 | End: 2022-08-09 | Stop reason: SDUPTHER

## 2022-05-23 RX ORDER — LORAZEPAM 1 MG/1
1 TABLET ORAL EVERY 8 HOURS PRN
Qty: 60 TABLET | Refills: 0 | Status: SHIPPED | OUTPATIENT
Start: 2022-05-23 | End: 2022-05-23

## 2022-05-23 RX ORDER — METHYLPREDNISOLONE 4 MG/1
TABLET ORAL
Qty: 21 TABLET | Refills: 0 | Status: SHIPPED | OUTPATIENT
Start: 2022-05-23 | End: 2022-05-29

## 2022-05-23 NOTE — PROGRESS NOTES
"Chief Complaint  Nausea, Vomiting, Joint Pain, and Fatigue    Subjective    History of Present Illness {CC  Problem List  Visit  Diagnosis   Encounters  Notes  Medications  Labs  Result Review Imaging  Media :23}     Amelie Miranda presents to [facility] for Nausea, Vomiting, Joint Pain, and Fatigue.  Patient is a 37-year-old female who presents to the clinic today with concerns of nausea, vomiting, joint pain, and fatigue x1 week.  Patient has a history of anxiety, PTSD, lupus, and Dercum's disease.  Patient denies any chest pain, shortness of breath, or any fevers.  Patient denies any exposure to COVID, flu, or any other contagious illnesses.    Regarding nausea, vomiting, joint pain, and fatigue, patient shares that this feels like an exacerbation of her lupus.  She has \"butterfly rash\" on her face.  She shares that her exacerbations typically only last a couple days but this has lasted for a week.  Patient has requested steroids to help off that side effects of this exacerbation.  Patient shares that she does need a refill of her anxiety medication today and I will happily send that over for her.  Patient has Zofran at home that she is taken for the nausea and vomiting.    Patient has no other concerns at this time.  Patient will call with any further issues or concerns that she has she shares.     Review of Systems   Constitutional: Positive for fatigue. Negative for activity change, chills and fever.   HENT: Negative.  Negative for congestion, dental problem, ear pain, hearing loss, rhinorrhea, sinus pain, sore throat, tinnitus and trouble swallowing.    Eyes: Negative.  Negative for pain and visual disturbance.   Respiratory: Negative.  Negative for cough, chest tightness, shortness of breath and wheezing.    Cardiovascular: Negative.  Negative for chest pain, palpitations and leg swelling.   Gastrointestinal: Positive for nausea and vomiting. Negative for abdominal " "pain and diarrhea.   Endocrine: Negative.  Negative for polydipsia, polyphagia and polyuria.   Genitourinary: Negative.  Negative for difficulty urinating, dysuria, frequency and urgency.   Musculoskeletal: Positive for arthralgias. Negative for back pain and myalgias.   Skin: Negative.  Negative for color change, pallor, rash and wound.   Allergic/Immunologic: Negative.  Negative for environmental allergies.   Neurological: Negative.  Negative for dizziness, speech difficulty, weakness, light-headedness, numbness and headaches.   Hematological: Negative.    Psychiatric/Behavioral: Negative.  Negative for confusion, decreased concentration, self-injury and suicidal ideas. The patient is not nervous/anxious.    All other systems reviewed and are negative.       Objective     Vital Signs:   /86   Pulse 65   Temp 98.4 °F (36.9 °C)   Resp 18   Ht 177.8 cm (70\")   Wt 92.1 kg (203 lb)   SpO2 95%   BMI 29.13 kg/m²     Past Medical History:   Diagnosis Date   • Allergic    • Anemia    • Anxiety    • B12 deficiency    • COVID     x 2    • Depression    • Dercum's disease    • Endometriosis    • Gastritis    • GERD (gastroesophageal reflux disease)    • Hypertension    • Hypoglycemia    • Migraines    • PTSD (post-traumatic stress disorder)       Past Surgical History:   Procedure Laterality Date   • CARPAL TUNNEL RELEASE Right    •  SECTION      x 2    • CHOLECYSTECTOMY     • KNEE SURGERY Right     x 3    • KNEE SURGERY Left     x 1    • SHOULDER ACROMIOPLASTY WITH ROTATOR CUFF REPAIR Left 2021    Procedure: LEFT SHOULDER ARTHROSCOPY WITH ROTATOR CUFF REPAIR AND SUBACROMIAL DECOMPRESSION- SLAP;  Surgeon: Rianna Jarvis MD;  Location: [facility] [facility] OR;  Service: Orthopedics;  Laterality: Left;      Family History   Problem Relation Age of Onset   • Thyroid disease Mother    • Glaucoma Mother    • Hypertension Mother    • Transient ischemic attack Mother    • Bell's palsy Mother    • Alcohol abuse " Father    • ADD / ADHD Sister    • Depression Son    • Pyloric stenosis Son    • Hypertension Maternal Grandmother    • Heart failure Maternal Grandmother    • Stroke Maternal Grandmother    • Diabetes Paternal Grandfather    • Malig Hyperthermia Neg Hx         Physical Exam  Vitals and nursing note reviewed.   Constitutional:       Appearance: Normal appearance. She is normal weight.   HENT:      Head: Normocephalic and atraumatic.   Cardiovascular:      Rate and Rhythm: Normal rate and regular rhythm.      Pulses: Normal pulses.      Heart sounds: Normal heart sounds. No murmur heard.    No friction rub. No gallop.   Pulmonary:      Effort: Pulmonary effort is normal. No respiratory distress.      Breath sounds: Normal breath sounds. No stridor. No wheezing, rhonchi or rales.   Chest:      Chest wall: No tenderness.   Abdominal:      General: Abdomen is flat. Bowel sounds are normal. There is no distension.      Palpations: Abdomen is soft. There is no mass.      Tenderness: There is no abdominal tenderness. There is no right CVA tenderness, left CVA tenderness, guarding or rebound.      Hernia: No hernia is present.   Skin:     General: Skin is warm and dry.      Capillary Refill: Capillary refill takes less than 2 seconds.      Coloration: Skin is not jaundiced or pale.   Neurological:      General: No focal deficit present.      Mental Status: She is alert and oriented to person, place, and time. Mental status is at baseline.      Motor: No weakness.      Coordination: Coordination normal.      Gait: Gait normal.   Psychiatric:         Mood and Affect: Mood normal.         Behavior: Behavior normal.         Thought Content: Thought content normal.         Judgment: Judgment normal.          Result Review  Data Reviewed:{ Labs  Result Review  Imaging  Med Tab  Media :23}   I have reviewed this patient's chart.  I have reviewed previous labs, medications, and previous encounters.           Assessment and Plan  {CC Problem List  Visit Diagnosis  ROS  Review (Popup)  [facility]  BestPractice  Medications  SmartSets  SnapShot Encounters  Media :23}   Diagnoses and all orders for this visit:    1. PTSD (post-traumatic stress disorder) (Primary)  -     Discontinue: LORazepam (ATIVAN) 1 MG tablet; Take 1 tablet by mouth Every 8 (Eight) Hours As Needed for Anxiety.  Dispense: 60 tablet; Refill: 0  -     LORazepam (ATIVAN) 1 MG tablet; Take 1 tablet by mouth Every 8 (Eight) Hours As Needed for Anxiety.  Dispense: 60 tablet; Refill: 0    2. Anxiety  -     Discontinue: LORazepam (ATIVAN) 1 MG tablet; Take 1 tablet by mouth Every 8 (Eight) Hours As Needed for Anxiety.  Dispense: 60 tablet; Refill: 0  -     LORazepam (ATIVAN) 1 MG tablet; Take 1 tablet by mouth Every 8 (Eight) Hours As Needed for Anxiety.  Dispense: 60 tablet; Refill: 0    3. Dercum's disease  -     methylPREDNISolone (MEDROL) 4 MG dose pack; Take 6 tablets by mouth Daily for 1 day, THEN 5 tablets Daily for 1 day, THEN 4 tablets Daily for 1 day, THEN 3 tablets Daily for 1 day, THEN 2 tablets Daily for 1 day, THEN 1 tablet Daily for 1 day. Take as directed on package instructions.  Dispense: 21 tablet; Refill: 0    4. Exacerbation of systemic lupus (HCC)  -     methylPREDNISolone (MEDROL) 4 MG dose pack; Take 6 tablets by mouth Daily for 1 day, THEN 5 tablets Daily for 1 day, THEN 4 tablets Daily for 1 day, THEN 3 tablets Daily for 1 day, THEN 2 tablets Daily for 1 day, THEN 1 tablet Daily for 1 day. Take as directed on package instructions.  Dispense: 21 tablet; Refill: 0    -Medrol Dosepak sent to pharmacy.  -Patient will call if no improvement is seen.  -Patient to follow-up at next routinely scheduled visit.      I spent 20 minutes caring for Amelie on this date of service. This time includes time spent by me in the following activities:preparing for the visit, reviewing tests, performing a medically appropriate examination and/or  evaluation , counseling and educating the patient/family/caregiver, ordering medications, tests, or procedures, documenting information in the medical record and care coordination.    Follow Up {Instructions Charge Capture  Follow-up Communications :23}     Patient was given instructions and counseling regarding her condition or for health maintenance advice. Please see specific information pulled into the AVS (placed there by myself) if appropriate.    Return for Next scheduled follow up.    MDM  Number of Diagnoses or Management Options  Anxiety: established, worsening  Dercum's disease: established, worsening  Exacerbation of systemic lupus (HCC): established, worsening  PTSD (post-traumatic stress disorder): established, worsening     Amount and/or Complexity of Data Reviewed  Clinical lab tests: reviewed  Tests in the radiology section of CPT®: reviewed  Tests in the medicine section of CPT®: reviewed  Discussion of test results with the performing providers: no  Decide to obtain previous medical records or to obtain history from someone other than the patient: no  Obtain history from someone other than the patient: no  Review and summarize past medical records: yes  Discuss the patient with other providers: no  Independent visualization of images, tracings, or specimens: no    Risk of Complications, Morbidity, and/or Mortality  Presenting problems: high  Diagnostic procedures: low  Management options: high    Critical Care  Total time providing critical care: < 30 minutes    Patient Progress  Patient progress: stable       EDSON Cali, FNP-BC          Answers for HPI/ROS submitted by the patient on 5/21/2022  Please describe your symptoms.: Believe stemming from a lupus flare: nausea, vomiting, joint pain, neuropathy, fatigue  Have you had these symptoms before?: Yes  How long have you been having these symptoms?: 5-7 days  Please list any medications you are currently taking for this  condition.: Zofran, aleve, Phenergan  Please describe any probable cause for these symptoms. : Stress and anxiety  What is the primary reason for your visit?: Other

## 2022-06-02 DIAGNOSIS — Z91.09 ENVIRONMENTAL ALLERGIES: ICD-10-CM

## 2022-06-02 RX ORDER — FLUTICASONE PROPIONATE 50 MCG
SPRAY, SUSPENSION (ML) NASAL
Qty: 16 ML | Refills: 3 | Status: SHIPPED | OUTPATIENT
Start: 2022-06-02 | End: 2022-10-07

## 2022-06-09 RX ORDER — NICOTINE POLACRILEX 4 MG/1
1 GUM, CHEWING ORAL 2 TIMES DAILY
COMMUNITY
Start: 2022-05-16 | End: 2023-01-24

## 2022-06-09 RX ORDER — CYCLOSPORINE 0.5 MG/ML
EMULSION OPHTHALMIC EVERY 12 HOURS
COMMUNITY
Start: 2022-02-21 | End: 2022-06-15

## 2022-06-09 RX ORDER — NAPROXEN SODIUM 220 MG
TABLET ORAL
COMMUNITY
End: 2023-01-24

## 2022-06-09 RX ORDER — OMEPRAZOLE 20 MG/1
20 CAPSULE, DELAYED RELEASE ORAL 2 TIMES DAILY
COMMUNITY
Start: 2022-04-19 | End: 2022-06-15

## 2022-06-09 RX ORDER — CETIRIZINE HYDROCHLORIDE 10 MG/1
1 TABLET ORAL DAILY
COMMUNITY
End: 2022-06-15

## 2022-06-09 RX ORDER — BUPROPION HYDROCHLORIDE 300 MG/1
1 TABLET ORAL DAILY
COMMUNITY
End: 2022-06-15 | Stop reason: SDUPTHER

## 2022-06-09 RX ORDER — LORAZEPAM 1 MG/1
1 CAPSULE, EXTENDED RELEASE ORAL DAILY
COMMUNITY
End: 2022-06-15 | Stop reason: SDUPTHER

## 2022-06-09 RX ORDER — LAMOTRIGINE 200 MG/1
1 TABLET ORAL EVERY 12 HOURS
COMMUNITY
End: 2022-06-15 | Stop reason: SDUPTHER

## 2022-06-09 RX ORDER — CLOBETASOL PROPIONATE 0.5 MG/G
CREAM TOPICAL
COMMUNITY
Start: 2022-03-25 | End: 2022-06-15

## 2022-06-09 RX ORDER — ZONISAMIDE 100 MG/1
2 CAPSULE ORAL EVERY 24 HOURS
COMMUNITY
End: 2022-06-15 | Stop reason: SDUPTHER

## 2022-06-09 RX ORDER — LOSARTAN POTASSIUM 50 MG/1
2 TABLET ORAL
COMMUNITY
End: 2022-06-15 | Stop reason: SDUPTHER

## 2022-06-09 RX ORDER — ONDANSETRON 8 MG/1
1 TABLET, ORALLY DISINTEGRATING ORAL EVERY 8 HOURS
COMMUNITY
End: 2022-06-15 | Stop reason: SDUPTHER

## 2022-06-15 ENCOUNTER — OFFICE VISIT (OUTPATIENT)
Dept: ENDOCRINOLOGY | Facility: CLINIC | Age: 37
End: 2022-06-15

## 2022-06-15 VITALS
SYSTOLIC BLOOD PRESSURE: 110 MMHG | BODY MASS INDEX: 28.2 KG/M2 | HEIGHT: 70 IN | WEIGHT: 197 LBS | DIASTOLIC BLOOD PRESSURE: 70 MMHG

## 2022-06-15 DIAGNOSIS — E55.9 VITAMIN D DEFICIENCY: ICD-10-CM

## 2022-06-15 DIAGNOSIS — E16.2 HYPOGLYCEMIA: Primary | ICD-10-CM

## 2022-06-15 PROCEDURE — 99203 OFFICE O/P NEW LOW 30 MIN: CPT | Performed by: INTERNAL MEDICINE

## 2022-06-15 RX ORDER — MIGLITOL 25 MG/1
TABLET, COATED ORAL
Qty: 90 TABLET | Refills: 1 | Status: SHIPPED | OUTPATIENT
Start: 2022-06-15 | End: 2022-07-08

## 2022-06-26 NOTE — PROGRESS NOTES
[facility]    Referring Provider: Fadia Tovar MD  Reason for Consultation: Hypoglycemia evaluation & management.    Patient Care Team:  Damon Valera APRN as PCP - General (Nurse Practitioner)    Chief complaint Hypoglycemia (New Patient)      Subjective .     History of present illness:    This is a  37 y.o. female with reactive hypoglycemia since 2019.  Evaluated by Dr. Peacock @ [facility] in the past.  Episodes of near syncope ~ 3x/week, mid morning.  Walks @ work. Also does yard work.  Menarche @ agge 14y. Regular menses. LMP 3weeks ago. [id number].No gestational DM.  Taking vit D.    Review of Systems  Review of Systems   Constitutional: Negative for unexpected weight gain.   HENT: Negative for trouble swallowing.    Eyes: Negative for blurred vision.   Respiratory: Negative for shortness of breath.         Snoring   Cardiovascular: Negative for leg swelling.   Gastrointestinal: Positive for nausea. Negative for constipation and diarrhea.   Endocrine: Positive for polyuria.   Neurological: Positive for tremors, numbness and headache. Negative for dizziness.   Psychiatric/Behavioral:        Irritability       History  Past Medical History:   Diagnosis Date   • Allergic    • Anemia    • Anxiety    • B12 deficiency    • COVID     x 2    • Depression    • Dercum's disease    • Endometriosis    • Gastritis    • GERD (gastroesophageal reflux disease)    • Hypertension    • Hypoglycemia    • Migraines    • PTSD (post-traumatic stress disorder)      Past Surgical History:   Procedure Laterality Date   • CARPAL TUNNEL RELEASE Right    •  SECTION      x 2    • CHOLECYSTECTOMY     • KNEE SURGERY Right     x 3    • KNEE SURGERY Left     x 1    • SHOULDER ACROMIOPLASTY WITH ROTATOR CUFF REPAIR Left 2021    Procedure: LEFT SHOULDER ARTHROSCOPY WITH ROTATOR CUFF REPAIR AND SUBACROMIAL DECOMPRESSION- SLAP;  Surgeon: Rianna Jarvis MD;  Location: [facility] [place] [facility];  Service:  Orthopedics;  Laterality: Left;     Family History   Problem Relation Age of Onset   • Thyroid disease Mother    • Glaucoma Mother    • Hypertension Mother    • Transient ischemic attack Mother    • Bell's palsy Mother    • Alcohol abuse Father    • ADD / ADHD Sister    • Depression Son    • Pyloric stenosis Son    • Hypertension Maternal Grandmother    • Heart failure Maternal Grandmother    • Stroke Maternal Grandmother    • Diabetes Paternal Grandfather    • Malig Hyperthermia Neg Hx      Social History     Tobacco Use   • Smoking status: Former Smoker     Packs/day: 0.50     Years: 17.00     Pack years: 8.50     Types: Cigarettes     Start date:      Quit date: 2016     Years since quittin.4   • Smokeless tobacco: Never Used   Vaping Use   • Vaping Use: Never used   Substance Use Topics   • Alcohol use: Yes   • Drug use: Never        Allergies:  Codeine, Latex, and Meloxicam    Objective     Vital Signs       Vitals:    06/15/22 1359   BP: 110/70         Physical Exam:     General Appearance:    Alert, cooperative, in no acute distress   Head:    Normocephalic, without obvious abnormality, atraumatic   Eyes:            Lids and lashes normal, conjunctivae and sclerae normal, no   icterus, no pallor, corneas clear, PERRLA   Throat:   No oral lesions,  oral mucosa moist   Neck:   No adenopathy, supple,  no thyromegaly, no carotid bruit   Lungs:     Clear     Heart:    Regular rhythm and normal rate   Chest Wall:    No abnormalities observed   Abdomen:     Normal bowel sounds, soft                 Extremities:   Moves all extremities well, no edema               Pulses:   Pulses palpable and equal bilaterally   Skin:   Dry   Neurologic:  DTR 1+       Results Review  I have reviewed the patient's new clinical results, labs & imaging.    Lab Results (last 24 hours)     ** No results found for the last 24 hours. **        TSH 1.28; cr 1.0, K 4.3, gl 88, ALT 18 on 10/11/2021.    Assessment & Plan    1. Reactive  hypoglycemia  2. Vitamin D Deficiency    Continue exercise & diet.  Continue vit D supplements.  Avoid simple sugars. Use protein for snacks.  Start miglitol 25 mg one tab as you start eating breakfast.   Info on vit D given to pt.    I discussed the patients findings and my recommendations with patient    Mary yCr MD  06/26/22  15:45 EDT

## 2022-06-30 ENCOUNTER — CLINICAL SUPPORT (OUTPATIENT)
Dept: FAMILY MEDICINE CLINIC | Facility: CLINIC | Age: 37
End: 2022-06-30

## 2022-06-30 DIAGNOSIS — E16.2 HYPOGLYCEMIA: ICD-10-CM

## 2022-06-30 DIAGNOSIS — R23.3 EASY BRUISING: ICD-10-CM

## 2022-06-30 DIAGNOSIS — E55.9 VITAMIN D DEFICIENCY: ICD-10-CM

## 2022-06-30 DIAGNOSIS — E53.8 B12 DEFICIENCY: ICD-10-CM

## 2022-06-30 LAB
DEPRECATED RDW RBC AUTO: 39.8 FL (ref 37–54)
EOSINOPHIL # BLD MANUAL: 0.13 10*3/MM3 (ref 0–0.4)
EOSINOPHIL NFR BLD MANUAL: 2 % (ref 0.3–6.2)
ERYTHROCYTE [DISTWIDTH] IN BLOOD BY AUTOMATED COUNT: 13.2 % (ref 12.3–15.4)
FERRITIN SERPL-MCNC: 79.6 NG/ML (ref 13–150)
HCT VFR BLD AUTO: 36.3 % (ref 34–46.6)
HGB BLD-MCNC: 12.4 G/DL (ref 12–15.9)
IRON 24H UR-MRATE: 87 MCG/DL (ref 37–145)
IRON SATN MFR SERPL: 31 % (ref 20–50)
LYMPHOCYTES # BLD MANUAL: 2.44 10*3/MM3 (ref 0.7–3.1)
LYMPHOCYTES NFR BLD MANUAL: 3 % (ref 5–12)
MCH RBC QN AUTO: 29.7 PG (ref 26.6–33)
MCHC RBC AUTO-ENTMCNC: 34 G/DL (ref 31.5–35.7)
MCV RBC AUTO: 87.1 FL (ref 79–97)
MONOCYTES # BLD: 0.2 10*3/MM3 (ref 0.1–0.9)
NEUTROPHILS # BLD AUTO: 3.83 10*3/MM3 (ref 1.7–7)
NEUTROPHILS NFR BLD MANUAL: 58 % (ref 42.7–76)
PLAT MORPH BLD: NORMAL
PLATELET # BLD AUTO: 282 10*3/MM3 (ref 140–450)
PMV BLD AUTO: 8.3 FL (ref 6–12)
RBC # BLD AUTO: 4.17 10*6/MM3 (ref 3.77–5.28)
RBC MORPH BLD: NORMAL
SCAN SLIDE: NORMAL
TIBC SERPL-MCNC: 279 MCG/DL (ref 298–536)
TRANSFERRIN SERPL-MCNC: 187 MG/DL (ref 200–360)
VARIANT LYMPHS NFR BLD MANUAL: 37 % (ref 19.6–45.3)
WBC MORPH BLD: NORMAL
WBC NRBC COR # BLD: 6.6 10*3/MM3 (ref 3.4–10.8)

## 2022-06-30 PROCEDURE — 85025 COMPLETE CBC W/AUTO DIFF WBC: CPT | Performed by: INTERNAL MEDICINE

## 2022-06-30 PROCEDURE — 84466 ASSAY OF TRANSFERRIN: CPT | Performed by: INTERNAL MEDICINE

## 2022-06-30 PROCEDURE — 82728 ASSAY OF FERRITIN: CPT | Performed by: INTERNAL MEDICINE

## 2022-06-30 PROCEDURE — 82306 VITAMIN D 25 HYDROXY: CPT | Performed by: INTERNAL MEDICINE

## 2022-06-30 PROCEDURE — 83540 ASSAY OF IRON: CPT | Performed by: INTERNAL MEDICINE

## 2022-06-30 PROCEDURE — 36415 COLL VENOUS BLD VENIPUNCTURE: CPT | Performed by: NURSE PRACTITIONER

## 2022-06-30 PROCEDURE — 85007 BL SMEAR W/DIFF WBC COUNT: CPT | Performed by: INTERNAL MEDICINE

## 2022-06-30 PROCEDURE — 82607 VITAMIN B-12: CPT | Performed by: INTERNAL MEDICINE

## 2022-07-01 LAB
25(OH)D3 SERPL-MCNC: 59.5 NG/ML (ref 30–100)
VIT B12 BLD-MCNC: 720 PG/ML (ref 211–946)

## 2022-07-01 NOTE — PROGRESS NOTES
HEMATOLOGY ONCOLOGY OUTPATIENT FOLLOW UP       Patient name: Amelie Miranda  : 1985  MRN: [id number]  Primary Care Physician: Damon Valera APRN  Referring Physician: Damon Valera, *  Reason For Consult:     Chief Complaint   Patient presents with   • Follow-up     Vitamin B12 deficiency       HPI:   History of Present Illness:  Amelie Miranda is 37 y.o. female who presented to our office on 22 for consultation regarding easy bruising, B12 deficiency.    Patient has a known history of endometriosis, GERD, hypertension hyperglycemia who was seen by primary care physician and referred to us for easy bruisability, B12 deficiency.  Patient has had extensive bruising with even minor trauma or spontaneous bruising.  Patient has history of endometriosis and has had heavy menstrual bleeding secondary to that which has improved since her procedure.  She has started to have worsening menstruation now.  Patient has had a rotator cuff surgery.  No known problems with hemorrhage post procedure.  No known postpartum hemorrhage.  She has had a  section.    10/11/2021 -CBC with hemoglobin 12.4, hematocrit 37.8, WC 5.8, platelets 223.  Coagulation parameters checked prior to surgery were normal with PT and INR PTT.  Vitamin B12 level above 1000.     was normal.         Subjective:  • Continues to have easy bruising, no other new symptoms.    The following portions of the patient's history were reviewed and updated as appropriate: allergies, current medications, past family history, past medical history, past social history, past surgical history and problem list.    Past Medical History:   Diagnosis Date   • Allergic    • Anemia    • Anxiety    • B12 deficiency    • COVID     x 2    • Depression    • Dercum's disease    • Endometriosis    • Gastritis    • GERD (gastroesophageal reflux disease)    • Hypertension    • Hypoglycemia    • Migraines    • PTSD (post-traumatic stress  disorder)        Past Surgical History:   Procedure Laterality Date   • CARPAL TUNNEL RELEASE Right    •  SECTION      x 2    • CHOLECYSTECTOMY     • KNEE SURGERY Right     x 3    • KNEE SURGERY Left     x 1    • SHOULDER ACROMIOPLASTY WITH ROTATOR CUFF REPAIR Left 2021    Procedure: LEFT SHOULDER ARTHROSCOPY WITH ROTATOR CUFF REPAIR AND SUBACROMIAL DECOMPRESSION- SLAP;  Surgeon: Rianna Jarvis MD;  [place] [facility] [place];  Service: Orthopedics;  Laterality: Left;         Current Outpatient Medications:   •  Aimovig 140 MG/ML prefilled syringe, Inject 1 mL under the skin into the appropriate area as directed Every 30 (Thirty) Days., Disp: 3 mL, Rfl: 0  •  Armodafinil 250 MG tablet, Take 1 tablet by mouth Daily As Needed (SHIFT WORK DISORDER)., Disp: 90 tablet, Rfl: 1  •  buPROPion XL (WELLBUTRIN XL) 300 MG 24 hr tablet, Take 1 tablet by mouth Every Morning., Disp: 90 tablet, Rfl: 1  •  Cholecalciferol 125 MCG (5000 UT) tablet, Take 1 tablet by mouth Daily., Disp: 90 tablet, Rfl: 1  •  cyanocobalamin (VITAMIN B-12) 1000 MCG tablet, Take 1 tablet by mouth Daily., Disp: 90 tablet, Rfl: 1  •  ferrous gluconate (FERGON) 324 MG tablet, Take 1 tablet by mouth Daily With Breakfast., Disp: 90 tablet, Rfl: 1  •  fluticasone (FLONASE) 50 MCG/ACT nasal spray, SPRAY 2 SPRAYS INTO THE NOSTRIL AS DIRECTED BY PROVIDER DAILY., Disp: 16 mL, Rfl: 3  •  hydrOXYzine (ATARAX) 10 MG tablet, Take 1 tablet by mouth Every 6 (Six) Hours As Needed for Itching or Anxiety., Disp: 90 tablet, Rfl: 0  •  l-methylfolate-algae (DEPLIN) 7.5-90.314 MG capsule capsule, Take 1 capsule by mouth Daily., Disp: 90 capsule, Rfl: 0  •  lamoTRIgine (LaMICtal) 200 MG tablet, Take 1 tablet by mouth 2 (Two) Times a Day., Disp: 180 tablet, Rfl: 1  •  LORazepam (ATIVAN) 1 MG tablet, Take 1 tablet by mouth Every 8 (Eight) Hours As Needed for Anxiety., Disp: 60 tablet, Rfl: 0  •  losartan (COZAAR) 50 MG tablet, Take 1 tablet by mouth Every Morning.,  Disp: 90 tablet, Rfl: 1  •  metoprolol tartrate (LOPRESSOR) 25 MG tablet, TAKE 1 TABLET BY MOUTH TWICE A DAY, Disp: 180 tablet, Rfl: 1  •  miglitol (GLYSET) 25 MG tablet, Take one tab as you start eating breakfast, Disp: 90 tablet, Rfl: 1  •  multivitamin with minerals tablet tablet, 2 gummies po q am - smarty pants , Disp: , Rfl:   •  naproxen sodium (ALEVE) 220 MG tablet, , Disp: , Rfl:   •  Omega-3 Fatty Acids (Fish Oil) 600 MG capsule, , Disp: , Rfl:   •  Omeprazole 20 MG tablet delayed-release, Take 1 tablet by mouth Daily., Disp: , Rfl:   •  ondansetron (ZOFRAN) 8 MG tablet, Take 1 tablet by mouth Every 8 (Eight) Hours As Needed for Nausea or Vomiting., Disp: 90 tablet, Rfl: 1  •  prazosin (MINIPRESS) 1 MG capsule, TAKE 1 CAPSULE BY MOUTH EVERY DAY AT NIGHT, Disp: 90 capsule, Rfl: 0  •  Probiotic Product (FORTIFY PROBIOTIC WOMENS EX ST PO), Take 1 capsule by mouth Daily., Disp: , Rfl:   •  rizatriptan (MAXALT) 10 MG tablet, 1 tab po at onset of headache. If not resolved in 2 hours may repeat. Do not exceed 2 tabs in 24 hours., Disp: 12 tablet, Rfl: 0  •  zonisamide (ZONEGRAN) 100 MG capsule, Take 2 capsules by mouth Every Night., Disp: 180 capsule, Rfl: 0    Allergies   Allergen Reactions   • Codeine Hives and Itching   • Latex Rash     Skin gets red and burns, skin starts peeling     • Meloxicam Other (See Comments)     Gastritis         Family History   Problem Relation Age of Onset   • Thyroid disease Mother    • Glaucoma Mother    • Hypertension Mother    • Transient ischemic attack Mother    • Bell's palsy Mother    • Alcohol abuse Father    • ADD / ADHD Sister    • Depression Son    • Pyloric stenosis Son    • Hypertension Maternal Grandmother    • Heart failure Maternal Grandmother    • Stroke Maternal Grandmother    • Diabetes Paternal Grandfather    • Malig Hyperthermia Neg Hx        Cancer-related family history is not on file.    Social History     Tobacco Use   • Smoking status: Former Smoker      "Packs/day: 0.50     Years: 17.00     Pack years: 8.50     Types: Cigarettes     Start date:      Quit date: 2016     Years since quittin.5   • Smokeless tobacco: Never Used   Vaping Use   • Vaping Use: Never used   Substance Use Topics   • Alcohol use: Yes   • Drug use: Never     Social History     Social History Narrative   • Not on file        Objective:    Vitals:    22 0951   BP: 104/70   Pulse: 77   Resp: 18   Temp: 97.8 °F (36.6 °C)   SpO2: 96%   Weight: 90.6 kg (199 lb 12.8 oz)   Height: 177.8 cm (70\")   PainSc:   3   PainLoc: Generalized     Body mass index is 28.67 kg/m².  ECOG  (0) Fully active, able to carry on all predisease performance without restriction    Physical Exam:     Physical Exam  Constitutional:       Appearance: Normal appearance. She is obese.   HENT:      Head: Normocephalic and atraumatic.   Eyes:      Pupils: Pupils are equal, round, and reactive to light.   Cardiovascular:      Rate and Rhythm: Normal rate and regular rhythm.      Pulses: Normal pulses.      Heart sounds: No murmur heard.  Pulmonary:      Effort: Pulmonary effort is normal.      Breath sounds: Normal breath sounds.   Abdominal:      General: There is no distension.      Palpations: Abdomen is soft. There is no mass.      Tenderness: There is no abdominal tenderness.   Musculoskeletal:         General: Normal range of motion.      Cervical back: Normal range of motion.   Skin:     General: Skin is warm.      Findings: Bruising present.   Neurological:      General: No focal deficit present.      Mental Status: She is alert.   Psychiatric:         Mood and Affect: Mood normal.           Lab Results - Last 18 Months   Lab Units 22  0855 22  1025   WBC 10*3/mm3 6.60 6.30   HEMOGLOBIN g/dL 12.4 12.9   HEMATOCRIT % 36.3 36.9   PLATELETS 10*3/mm3 282 278   MCV fL 87.1 86.7     No results for input(s): NA, K, CL, CO2, BUN, CREATININE, CALCIUM, BILITOT, ALKPHOS, ALT, AST, GLUCOSE in the last 81742 " hours.    Invalid input(s): LABALBU, PROT    Lab Results   Component Value Date    GLUCOSE 93 07/08/2018    BUN 7 12/30/2018    CREATININE 0.7 12/30/2018    BCR 10.0 12/30/2018    K 3.1 (L) 12/30/2018    CO2 25 12/30/2018    CALCIUM 9.0 12/30/2018    ALBUMIN 4.4 12/30/2018    LABIL2 1.8 12/30/2018    AST 20 12/30/2018    ALT 35 12/30/2018       Lab Results - Last 18 Months   Lab Units 01/06/22  1025   INR  <0.93*   APTT seconds 23.6*       Lab Results   Component Value Date    IRON 87 06/30/2022    TIBC 279 (L) 06/30/2022    FERRITIN 79.60 06/30/2022       No results found for: FOLATE    No results found for: OCCULTBLD    No results found for: RETICCTPCT    Lab Results   Component Value Date    WCYYWWOU76 720 06/30/2022     No results found for: SPEP, UPEP  No results found for: LDH, URICACID  No results found for: DENNIS, RF, SEDRATE  No results found for: FIBRINOGEN, HAPTOGLOBIN  Lab Results   Component Value Date    PTT 23.6 (L) 01/06/2022    INR <0.93 (L) 01/06/2022     No results found for:   No results found for: CEA  No components found for: CA-19-9  No results found for: PSA  No results found for: AFPTM, JB1302, HCGTM, SPEP, BEN         Assessment & Plan     Patient is a 37-year-old female with history of vitamin B12 deficiency, easy bruising    Easy bruising  With having an extensive hemorrhage post procedure, no extensive bleeding history I do not think there is any underlying bleeding disorder   ruled out underlying bleeding disorder PT PTT INR  was unremarkable.    Vitamin B12 deficiency  Patient does not have anemia has history of vitamin B12 deficiency.  She continues on replacement.  Recent CBC with normal hemoglobin.      Follow up PRN no active hematology monitoring needed       Thank you very much for providing the opportunity to participate in this patient’s care. Please do not hesitate to call if there are any other questions    I have reviewed and confirmed the accuracy of the patient's  history: Chief complaint, HPI, ROS, Subjective and Past Family Social History as entered by the MA/LPN/RN.     Yudith Smith MD 07/07/22

## 2022-07-07 ENCOUNTER — OFFICE VISIT (OUTPATIENT)
Dept: ONCOLOGY | Facility: CLINIC | Age: 37
End: 2022-07-07

## 2022-07-07 VITALS
RESPIRATION RATE: 18 BRPM | HEART RATE: 77 BPM | SYSTOLIC BLOOD PRESSURE: 104 MMHG | BODY MASS INDEX: 28.6 KG/M2 | WEIGHT: 199.8 LBS | DIASTOLIC BLOOD PRESSURE: 70 MMHG | TEMPERATURE: 97.8 F | HEIGHT: 70 IN | OXYGEN SATURATION: 96 %

## 2022-07-07 DIAGNOSIS — E53.8 B12 DEFICIENCY: Primary | ICD-10-CM

## 2022-07-07 PROCEDURE — 99213 OFFICE O/P EST LOW 20 MIN: CPT | Performed by: INTERNAL MEDICINE

## 2022-07-08 DIAGNOSIS — E16.2 HYPOGLYCEMIA: ICD-10-CM

## 2022-07-08 RX ORDER — MIGLITOL 25 MG/1
TABLET, COATED ORAL
Qty: 30 TABLET | Refills: 5 | Status: SHIPPED | OUTPATIENT
Start: 2022-07-08 | End: 2023-01-16

## 2022-07-08 NOTE — TELEPHONE ENCOUNTER
Rx Refill Note    PROTOCOLS NOT MET     Requested Prescriptions     Pending Prescriptions Disp Refills   • losartan (COZAAR) 50 MG tablet [Pharmacy Med Name: LOSARTAN POTASSIUM 50 MG TAB] 90 tablet 1     Sig: TAKE 1 TABLET BY MOUTH EVERY DAY IN THE MORNING      Last office visit with prescribing clinician: 3/23/2022      Next office visit with prescribing clinician: Visit date not found            Aissatou Solorzano LPN  07/08/22, 17:21 EDT

## 2022-07-10 RX ORDER — LOSARTAN POTASSIUM 50 MG/1
TABLET ORAL
Qty: 90 TABLET | Refills: 1 | Status: SHIPPED | OUTPATIENT
Start: 2022-07-10 | End: 2023-01-04

## 2022-08-09 ENCOUNTER — OFFICE VISIT (OUTPATIENT)
Dept: FAMILY MEDICINE CLINIC | Facility: CLINIC | Age: 37
End: 2022-08-09

## 2022-08-09 VITALS
WEIGHT: 199.4 LBS | BODY MASS INDEX: 28.55 KG/M2 | HEART RATE: 58 BPM | TEMPERATURE: 98.5 F | DIASTOLIC BLOOD PRESSURE: 69 MMHG | OXYGEN SATURATION: 100 % | SYSTOLIC BLOOD PRESSURE: 107 MMHG | HEIGHT: 70 IN

## 2022-08-09 DIAGNOSIS — R53.83 FATIGUE, UNSPECIFIED TYPE: ICD-10-CM

## 2022-08-09 DIAGNOSIS — D50.9 IRON DEFICIENCY ANEMIA, UNSPECIFIED IRON DEFICIENCY ANEMIA TYPE: ICD-10-CM

## 2022-08-09 DIAGNOSIS — R40.0 DAYTIME SOMNOLENCE: ICD-10-CM

## 2022-08-09 DIAGNOSIS — R41.840 DIFFICULTY CONCENTRATING: ICD-10-CM

## 2022-08-09 DIAGNOSIS — L29.9 ITCHING: ICD-10-CM

## 2022-08-09 DIAGNOSIS — F43.10 PTSD (POST-TRAUMATIC STRESS DISORDER): ICD-10-CM

## 2022-08-09 DIAGNOSIS — F41.9 ANXIETY: Primary | ICD-10-CM

## 2022-08-09 PROCEDURE — 99214 OFFICE O/P EST MOD 30 MIN: CPT | Performed by: REGISTERED NURSE

## 2022-08-09 RX ORDER — HYDROXYZINE HYDROCHLORIDE 10 MG/1
10 TABLET, FILM COATED ORAL EVERY 6 HOURS PRN
Qty: 90 TABLET | Refills: 2 | Status: SHIPPED | OUTPATIENT
Start: 2022-08-09 | End: 2022-10-10 | Stop reason: DRUGHIGH

## 2022-08-09 RX ORDER — DOXYCYCLINE HYCLATE 50 MG/1
1 CAPSULE, GELATIN COATED ORAL
Qty: 90 TABLET | Refills: 1 | Status: SHIPPED | OUTPATIENT
Start: 2022-08-09 | End: 2023-01-30

## 2022-08-09 RX ORDER — BUPROPION HYDROCHLORIDE 300 MG/1
300 TABLET ORAL EVERY MORNING
Qty: 90 TABLET | Refills: 1 | Status: SHIPPED | OUTPATIENT
Start: 2022-08-09 | End: 2022-10-05 | Stop reason: SDUPTHER

## 2022-08-09 RX ORDER — ARMODAFINIL 150 MG/1
150 TABLET ORAL DAILY
Qty: 30 TABLET | Refills: 0 | Status: SHIPPED | OUTPATIENT
Start: 2022-08-09 | End: 2022-10-05 | Stop reason: SDUPTHER

## 2022-08-09 RX ORDER — PRAZOSIN HYDROCHLORIDE 1 MG/1
1 CAPSULE ORAL NIGHTLY
Qty: 90 CAPSULE | Refills: 1 | Status: SHIPPED | OUTPATIENT
Start: 2022-08-09 | End: 2022-10-05 | Stop reason: SDUPTHER

## 2022-08-09 RX ORDER — LORAZEPAM 1 MG/1
1 TABLET ORAL EVERY 8 HOURS PRN
Qty: 60 TABLET | Refills: 0 | Status: SHIPPED | OUTPATIENT
Start: 2022-08-09 | End: 2022-10-05 | Stop reason: SDUPTHER

## 2022-08-09 NOTE — PROGRESS NOTES
"Chief Complaint  Fatigue, Anxiety, PTSD, Memory Loss (Having difficulty focusing), and Follow-up    Subjective    History of Present Illness {CC  Problem List  Visit  Diagnosis   Encounters  Notes  Medications  Labs  Result Review Imaging  Media :23}     Amelie Miranda presents to [facility] for Fatigue, Anxiety, PTSD, Memory Loss (Having difficulty focusing), and Follow-up.    Patient is a 37-year-old female who presents to the clinic today to follow-up on her anxiety, PTSD, fatigue, and concentration difficulties x6 months.  Patient denies any chest pain, shortness of breath, or any fevers.  Patient denies any known exposure to COVID, flu, or any other contagious illnesses.    Patient is following up again today in regards to her anxiety and PTSD.  Patient shares that her anxiety is decently controlled with lorazepam but she had run out of her lorazepam and experienced several episodes of really intense anxiety.  Patient is also due for refill of her prazosin to help with her PTSD.  Patient takes Atarax as needed to help with anxiety as well and increased itching with her anxiety.    Patient shares that she has been having some issues with focusing difficulty and \"brain fog\".  We have checked her vitamin levels and blood work to see if there is anything that could account for this brain fog.  It appears to be related to her anxiety and PTSD.  Patient is working on coping skills and hopefully will be able to work through brain fog with her anxiety and PTSD.  Patient has requested to restart Nuvigil which she had previously taken when working abnormal shifts.  Patient has started working swing shifts where she will work a day shift and evening shift and sometimes the night shift at her new employment with the [employer].  Patient would like to get to her normal routine schedule were no visual would not be needed.  But due to brain fog and difficulty with " "schedules she is requesting Nuvigil at this time.       Review of Systems   Constitutional: Positive for fatigue. Negative for activity change, chills and fever.   HENT: Negative.  Negative for congestion, dental problem, ear pain, hearing loss, rhinorrhea, sinus pain, sore throat, tinnitus and trouble swallowing.    Eyes: Negative.  Negative for pain and visual disturbance.   Respiratory: Negative.  Negative for cough, chest tightness, shortness of breath and wheezing.    Cardiovascular: Negative.  Negative for chest pain, palpitations and leg swelling.   Gastrointestinal: Negative.  Negative for abdominal pain, diarrhea, nausea and vomiting.   Endocrine: Negative.  Negative for polydipsia, polyphagia and polyuria.   Genitourinary: Negative.  Negative for difficulty urinating, dysuria, frequency and urgency.   Musculoskeletal: Negative.  Negative for arthralgias, back pain and myalgias.   Skin: Negative.  Negative for color change, pallor, rash and wound.   Allergic/Immunologic: Negative.  Negative for environmental allergies.   Neurological: Negative.  Negative for dizziness, speech difficulty, weakness, light-headedness, numbness and headaches.   Hematological: Negative.    Psychiatric/Behavioral: Negative for confusion, decreased concentration, self-injury and suicidal ideas. The patient is nervous/anxious.    All other systems reviewed and are negative.       Objective     Vital Signs:   /69 (BP Location: Right arm, Patient Position: Sitting, Cuff Size: Large Adult)   Pulse 58   Temp 98.5 °F (36.9 °C) (Temporal)   Ht 177.8 cm (70\")   Wt 90.4 kg (199 lb 6.4 oz)   SpO2 100%   BMI 28.61 kg/m²   Current Outpatient Medications on File Prior to Visit   Medication Sig Dispense Refill   • Aimovig 140 MG/ML prefilled syringe Inject 1 mL under the skin into the appropriate area as directed Every 30 (Thirty) Days. 3 mL 0   • Armodafinil 250 MG tablet Take 1 tablet by mouth Daily As Needed (SHIFT WORK " DISORDER). 90 tablet 1   • Cholecalciferol 125 MCG (5000 UT) tablet Take 1 tablet by mouth Daily. 90 tablet 1   • cyanocobalamin (VITAMIN B-12) 1000 MCG tablet Take 1 tablet by mouth Daily. 90 tablet 1   • fluticasone (FLONASE) 50 MCG/ACT nasal spray SPRAY 2 SPRAYS INTO THE NOSTRIL AS DIRECTED BY PROVIDER DAILY. 16 mL 3   • l-methylfolate-algae (DEPLIN) 7.5-90.314 MG capsule capsule Take 1 capsule by mouth Daily. 90 capsule 0   • lamoTRIgine (LaMICtal) 200 MG tablet Take 1 tablet by mouth 2 (Two) Times a Day. 180 tablet 1   • losartan (COZAAR) 50 MG tablet TAKE 1 TABLET BY MOUTH EVERY DAY IN THE MORNING 90 tablet 1   • metoprolol tartrate (LOPRESSOR) 25 MG tablet TAKE 1 TABLET BY MOUTH TWICE A  tablet 1   • miglitol (GLYSET) 25 MG tablet TAKE 1 TABLET BY MOUTH EVERY DAY AS YOU START EATING BREAKFAST 30 tablet 5   • multivitamin with minerals tablet tablet 2 gummies po q am - smarty pants      • naproxen sodium (ALEVE) 220 MG tablet      • Omega-3 Fatty Acids (Fish Oil) 600 MG capsule      • Omeprazole 20 MG tablet delayed-release Take 1 tablet by mouth Daily.     • ondansetron (ZOFRAN) 8 MG tablet Take 1 tablet by mouth Every 8 (Eight) Hours As Needed for Nausea or Vomiting. 90 tablet 1   • Probiotic Product (FORTIFY PROBIOTIC WOMENS EX ST PO) Take 1 capsule by mouth Daily.     • rizatriptan (MAXALT) 10 MG tablet 1 tab po at onset of headache. If not resolved in 2 hours may repeat. Do not exceed 2 tabs in 24 hours. 12 tablet 0   • zonisamide (ZONEGRAN) 100 MG capsule Take 2 capsules by mouth Every Night. 180 capsule 0   • [DISCONTINUED] buPROPion XL (WELLBUTRIN XL) 300 MG 24 hr tablet Take 1 tablet by mouth Every Morning. 90 tablet 1   • [DISCONTINUED] ferrous gluconate (FERGON) 324 MG tablet Take 1 tablet by mouth Daily With Breakfast. 90 tablet 1   • [DISCONTINUED] hydrOXYzine (ATARAX) 10 MG tablet Take 1 tablet by mouth Every 6 (Six) Hours As Needed for Itching or Anxiety. 90 tablet 0   • [DISCONTINUED]  LORazepam (ATIVAN) 1 MG tablet Take 1 tablet by mouth Every 8 (Eight) Hours As Needed for Anxiety. 60 tablet 0   • [DISCONTINUED] prazosin (MINIPRESS) 1 MG capsule TAKE 1 CAPSULE BY MOUTH EVERY DAY AT NIGHT 90 capsule 0     No current facility-administered medications on file prior to visit.        Past Medical History:   Diagnosis Date   • Allergic    • Anemia    • Anxiety    • B12 deficiency    • COVID     x 2    • Depression    • Dercum's disease    • Endometriosis    • Gastritis    • GERD (gastroesophageal reflux disease)    • Hypertension    • Hypoglycemia    • Migraines    • PTSD (post-traumatic stress disorder)       Past Surgical History:   Procedure Laterality Date   • CARPAL TUNNEL RELEASE Right    •  SECTION      x 2    • CHOLECYSTECTOMY     • KNEE SURGERY Right     x 3    • KNEE SURGERY Left     x 1    • SHOULDER ACROMIOPLASTY WITH ROTATOR CUFF REPAIR Left 2021    Procedure: LEFT SHOULDER ARTHROSCOPY WITH ROTATOR CUFF REPAIR AND SUBACROMIAL DECOMPRESSION- SLAP;  Surgeon: Rianna Jarvis MD;  Location: [facility] MAIN OR;  Service: Orthopedics;  Laterality: Left;      Family History   Problem Relation Age of Onset   • Thyroid disease Mother    • Glaucoma Mother    • Hypertension Mother    • Transient ischemic attack Mother    • Bell's palsy Mother    • Alcohol abuse Father    • ADD / ADHD Sister    • Depression Son    • Pyloric stenosis Son    • Hypertension Maternal Grandmother    • Heart failure Maternal Grandmother    • Stroke Maternal Grandmother    • Diabetes Paternal Grandfather    • Malig Hyperthermia Neg Hx       Social History     Socioeconomic History   • Marital status:    Tobacco Use   • Smoking status: Former Smoker     Packs/day: 0.50     Years: 17.00     Pack years: 8.50     Types: Cigarettes     Start date:      Quit date: 2016     Years since quittin.6   • Smokeless tobacco: Never Used   Vaping Use   • Vaping Use: Never used   Substance and Sexual Activity   •  Alcohol use: Yes   • Drug use: Never   • Sexual activity: Yes     Partners: Male         Clinical Support on 06/30/2022   Component Date Value Ref Range Status   • Ferritin 06/30/2022 79.60  13.00 - 150.00 ng/mL Final   • Iron 06/30/2022 87  37 - 145 mcg/dL Final   • Iron Saturation 06/30/2022 31  20 - 50 % Final   • Transferrin 06/30/2022 187 (A) 200 - 360 mg/dL Final   • TIBC 06/30/2022 279 (A) 298 - 536 mcg/dL Final   • Vitamin B-12 06/30/2022 720  211 - 946 pg/mL Final   • 25 Hydroxy, Vitamin D 06/30/2022 59.5  30.0 - 100.0 ng/ml Final   • WBC 06/30/2022 6.60  3.40 - 10.80 10*3/mm3 Final   • RBC 06/30/2022 4.17  3.77 - 5.28 10*6/mm3 Final   • Hemoglobin 06/30/2022 12.4  12.0 - 15.9 g/dL Final   • Hematocrit 06/30/2022 36.3  34.0 - 46.6 % Final   • MCV 06/30/2022 87.1  79.0 - 97.0 fL Final   • MCH 06/30/2022 29.7  26.6 - 33.0 pg Final   • MCHC 06/30/2022 34.0  31.5 - 35.7 g/dL Final   • RDW 06/30/2022 13.2  12.3 - 15.4 % Final   • RDW-SD 06/30/2022 39.8  37.0 - 54.0 fl Final   • MPV 06/30/2022 8.3  6.0 - 12.0 fL Final   • Platelets 06/30/2022 282  140 - 450 10*3/mm3 Final   • Scan Slide 06/30/2022    Final    See Manual Differential Results   • Neutrophil % 06/30/2022 58.0  42.7 - 76.0 % Final   • Lymphocyte % 06/30/2022 37.0  19.6 - 45.3 % Final   • Monocyte % 06/30/2022 3.0 (A) 5.0 - 12.0 % Final   • Eosinophil % 06/30/2022 2.0  0.3 - 6.2 % Final   • Neutrophils Absolute 06/30/2022 3.83  1.70 - 7.00 10*3/mm3 Final   • Lymphocytes Absolute 06/30/2022 2.44  0.70 - 3.10 10*3/mm3 Final   • Monocytes Absolute 06/30/2022 0.20  0.10 - 0.90 10*3/mm3 Final   • Eosinophils Absolute 06/30/2022 0.13  0.00 - 0.40 10*3/mm3 Final   • RBC Morphology 06/30/2022 Normal  Normal Final   • WBC Morphology 06/30/2022 Normal  Normal Final   • Platelet Morphology 06/30/2022 Normal  Normal Final         Physical Exam  Vitals and nursing note reviewed.   Constitutional:       Appearance: Normal appearance. She is normal weight.   HENT:       Head: Normocephalic and atraumatic.   Cardiovascular:      Rate and Rhythm: Normal rate and regular rhythm.      Pulses: Normal pulses.      Heart sounds: Normal heart sounds. No murmur heard.    No friction rub. No gallop.   Pulmonary:      Effort: Pulmonary effort is normal. No respiratory distress.      Breath sounds: Normal breath sounds. No stridor. No wheezing, rhonchi or rales.   Chest:      Chest wall: No tenderness.   Abdominal:      General: Abdomen is flat. Bowel sounds are normal. There is no distension.      Palpations: Abdomen is soft. There is no mass.      Tenderness: There is no abdominal tenderness. There is no right CVA tenderness, left CVA tenderness, guarding or rebound.      Hernia: No hernia is present.   Skin:     General: Skin is warm and dry.      Capillary Refill: Capillary refill takes less than 2 seconds.      Coloration: Skin is not jaundiced or pale.   Neurological:      General: No focal deficit present.      Mental Status: She is alert and oriented to person, place, and time. Mental status is at baseline.      Motor: No weakness.      Coordination: Coordination normal.      Gait: Gait normal.   Psychiatric:         Mood and Affect: Mood normal.         Behavior: Behavior normal.         Thought Content: Thought content normal.         Judgment: Judgment normal.          Result Review  Data Reviewed:{ Labs  Result Review  Imaging  Med Tab  Media :23}   I have reviewed this patient's chart.  I have reviewed previous labs, previous imaging, previous medications, and previous encounters with notes that were available in this patient's chart.           Assessment and Plan {CC Problem List  Visit Diagnosis  ROS  Review (Popup)  [facility] [place]  Quality  BestPractice  Medications  SmartSets  SnapShot Encounters  Media :23}   Diagnoses and all orders for this visit:    1. Anxiety (Primary)  -     LORazepam (ATIVAN) 1 MG tablet; Take 1 tablet by mouth Every 8 (Eight)  Hours As Needed for Anxiety.  Dispense: 60 tablet; Refill: 0  -     hydrOXYzine (ATARAX) 10 MG tablet; Take 1 tablet by mouth Every 6 (Six) Hours As Needed for Itching or Anxiety.  Dispense: 90 tablet; Refill: 2    2. Fatigue, unspecified type    3. Difficulty concentrating    4. PTSD (post-traumatic stress disorder)  -     LORazepam (ATIVAN) 1 MG tablet; Take 1 tablet by mouth Every 8 (Eight) Hours As Needed for Anxiety.  Dispense: 60 tablet; Refill: 0  -     prazosin (MINIPRESS) 1 MG capsule; Take 1 capsule by mouth Every Night.  Dispense: 90 capsule; Refill: 1    5. Daytime somnolence  -     armodafinil (Nuvigil) 150 MG tablet; Take 1 tablet by mouth Daily.  Dispense: 30 tablet; Refill: 0    6. Iron deficiency anemia, unspecified iron deficiency anemia type  -     ferrous gluconate (FERGON) 324 MG tablet; Take 1 tablet by mouth Daily With Breakfast.  Dispense: 90 tablet; Refill: 1    7. Itching  -     hydrOXYzine (ATARAX) 10 MG tablet; Take 1 tablet by mouth Every 6 (Six) Hours As Needed for Itching or Anxiety.  Dispense: 90 tablet; Refill: 2    Other orders  -     buPROPion XL (WELLBUTRIN XL) 300 MG 24 hr tablet; Take 1 tablet by mouth Every Morning.  Dispense: 90 tablet; Refill: 1      -Refilled medications at patient request.  Restart Nuvigil at patient request for daytime somnolence.  -Follow-up in 3 months or sooner if needed.  -Continue following up with therapy.      I spent 30 minutes caring for Amelie on this date of service. This time includes time spent by me in the following activities:preparing for the visit, reviewing tests, obtaining and/or reviewing a separately obtained history, performing a medically appropriate examination and/or evaluation , counseling and educating the patient/family/caregiver, ordering medications, tests, or procedures, referring and communicating with other health care professionals , documenting information in the medical record, independently interpreting results and  communicating that information with the patient/family/caregiver and care coordination.    Follow Up {Instructions Charge Capture  Follow-up Communications :23}     Patient was given instructions and counseling regarding her condition or for health maintenance advice. Please see specific information pulled into the AVS (placed there by myself) if appropriate.    Return in about 3 months (around 11/9/2022) for Recheck.    MDM  Number of Diagnoses or Management Options  Anxiety: established, improving  Daytime somnolence: established, worsening  Difficulty concentrating: established, worsening  Fatigue, unspecified type: established, worsening  Iron deficiency anemia, unspecified iron deficiency anemia type: established, improving  Itching: established, worsening  PTSD (post-traumatic stress disorder): established, worsening     Amount and/or Complexity of Data Reviewed  Clinical lab tests: reviewed  Tests in the radiology section of CPT®: reviewed  Tests in the medicine section of CPT®: reviewed  Discussion of test results with the performing providers: no  Decide to obtain previous medical records or to obtain history from someone other than the patient: no  Obtain history from someone other than the patient: no  Review and summarize past medical records: yes  Discuss the patient with other providers: no  Independent visualization of images, tracings, or specimens: no    Risk of Complications, Morbidity, and/or Mortality  Presenting problems: high  Diagnostic procedures: low  Management options: high    Critical Care  Total time providing critical care: 30-74 minutes    Patient Progress  Patient progress: stable       EDSON Cali, FNP-BC

## 2022-10-05 ENCOUNTER — TELEPHONE (OUTPATIENT)
Dept: FAMILY MEDICINE CLINIC | Facility: CLINIC | Age: 37
End: 2022-10-05

## 2022-10-05 DIAGNOSIS — G47.26 SHIFT WORK SLEEP DISORDER: ICD-10-CM

## 2022-10-05 DIAGNOSIS — F41.9 ANXIETY: ICD-10-CM

## 2022-10-05 DIAGNOSIS — F43.10 PTSD (POST-TRAUMATIC STRESS DISORDER): ICD-10-CM

## 2022-10-05 DIAGNOSIS — R40.0 DAYTIME SOMNOLENCE: ICD-10-CM

## 2022-10-05 DIAGNOSIS — G43.109 MIGRAINE WITH AURA AND WITHOUT STATUS MIGRAINOSUS, NOT INTRACTABLE: Primary | ICD-10-CM

## 2022-10-05 DIAGNOSIS — F41.8 MIXED ANXIETY DEPRESSIVE DISORDER: ICD-10-CM

## 2022-10-05 RX ORDER — ARMODAFINIL 150 MG/1
150 TABLET ORAL DAILY
Qty: 30 TABLET | Refills: 2 | Status: SHIPPED | OUTPATIENT
Start: 2022-10-05

## 2022-10-07 DIAGNOSIS — Z91.09 ENVIRONMENTAL ALLERGIES: ICD-10-CM

## 2022-10-07 RX ORDER — RIZATRIPTAN BENZOATE 10 MG/1
TABLET ORAL
Qty: 9 TABLET | Refills: 3 | Status: SHIPPED | OUTPATIENT
Start: 2022-10-07 | End: 2023-02-24

## 2022-10-07 RX ORDER — PRAZOSIN HYDROCHLORIDE 1 MG/1
1 CAPSULE ORAL NIGHTLY
Qty: 90 CAPSULE | Refills: 1 | Status: SHIPPED | OUTPATIENT
Start: 2022-10-07 | End: 2023-02-24

## 2022-10-07 RX ORDER — FLUTICASONE PROPIONATE 50 MCG
SPRAY, SUSPENSION (ML) NASAL
Qty: 16 ML | Refills: 3 | Status: SHIPPED | OUTPATIENT
Start: 2022-10-07

## 2022-10-07 RX ORDER — ERENUMAB-AOOE 140 MG/ML
1 INJECTION, SOLUTION SUBCUTANEOUS
Qty: 3 ML | Refills: 0 | Status: SHIPPED | OUTPATIENT
Start: 2022-10-07 | End: 2023-01-03

## 2022-10-07 RX ORDER — LAMOTRIGINE 200 MG/1
200 TABLET ORAL 2 TIMES DAILY
Qty: 180 TABLET | Refills: 1 | Status: SHIPPED | OUTPATIENT
Start: 2022-10-07 | End: 2022-12-06

## 2022-10-07 RX ORDER — LORAZEPAM 1 MG/1
TABLET ORAL
Qty: 60 TABLET | Refills: 0 | OUTPATIENT
Start: 2022-10-07

## 2022-10-07 RX ORDER — LORAZEPAM 1 MG/1
1 TABLET ORAL EVERY 8 HOURS PRN
Qty: 60 TABLET | Refills: 0 | Status: SHIPPED | OUTPATIENT
Start: 2022-10-07

## 2022-10-07 RX ORDER — BUPROPION HYDROCHLORIDE 300 MG/1
300 TABLET ORAL EVERY MORNING
Qty: 90 TABLET | Refills: 1 | Status: SHIPPED | OUTPATIENT
Start: 2022-10-07 | End: 2022-10-10

## 2022-10-07 NOTE — TELEPHONE ENCOUNTER
Rx Refill Note  Requested Prescriptions     Pending Prescriptions Disp Refills   • fluticasone (FLONASE) 50 MCG/ACT nasal spray [Pharmacy Med Name: FLUTICASONE PROP 50 MCG SPRAY] 16 mL 3     Sig: SPRAY 2 SPRAYS INTO THE NOSTRIL AS DIRECTED BY PROVIDER DAILY.      Last office visit with prescribing clinician: 8/9/2022      Next office visit with prescribing clinician: 10/10/2022            Shara Mathews MA  10/07/22, 15:18 EDT

## 2022-10-10 ENCOUNTER — OFFICE VISIT (OUTPATIENT)
Dept: FAMILY MEDICINE CLINIC | Facility: CLINIC | Age: 37
End: 2022-10-10

## 2022-10-10 VITALS
OXYGEN SATURATION: 100 % | HEIGHT: 70 IN | WEIGHT: 189 LBS | RESPIRATION RATE: 18 BRPM | TEMPERATURE: 97.5 F | SYSTOLIC BLOOD PRESSURE: 130 MMHG | DIASTOLIC BLOOD PRESSURE: 90 MMHG | BODY MASS INDEX: 27.06 KG/M2 | HEART RATE: 60 BPM

## 2022-10-10 DIAGNOSIS — R11.2 NAUSEA AND VOMITING, UNSPECIFIED VOMITING TYPE: ICD-10-CM

## 2022-10-10 DIAGNOSIS — F41.1 GENERALIZED ANXIETY DISORDER: Primary | ICD-10-CM

## 2022-10-10 DIAGNOSIS — F32.89 OTHER DEPRESSION: ICD-10-CM

## 2022-10-10 DIAGNOSIS — M25.551 ACUTE RIGHT HIP PAIN: ICD-10-CM

## 2022-10-10 DIAGNOSIS — R10.31 RIGHT GROIN PAIN: ICD-10-CM

## 2022-10-10 DIAGNOSIS — M62.838 MUSCLE SPASM: ICD-10-CM

## 2022-10-10 PROCEDURE — 99214 OFFICE O/P EST MOD 30 MIN: CPT | Performed by: REGISTERED NURSE

## 2022-10-10 RX ORDER — DULOXETIN HYDROCHLORIDE 30 MG/1
CAPSULE, DELAYED RELEASE ORAL
Qty: 49 CAPSULE | Refills: 0 | Status: SHIPPED | OUTPATIENT
Start: 2022-10-10 | End: 2022-10-31

## 2022-10-10 RX ORDER — TIZANIDINE 4 MG/1
4 TABLET ORAL NIGHTLY PRN
Qty: 30 TABLET | Refills: 5 | Status: SHIPPED | OUTPATIENT
Start: 2022-10-10 | End: 2022-11-01

## 2022-10-10 RX ORDER — HYDROXYZINE HYDROCHLORIDE 25 MG/1
25 TABLET, FILM COATED ORAL 3 TIMES DAILY PRN
Qty: 60 TABLET | Refills: 5 | Status: SHIPPED | OUTPATIENT
Start: 2022-10-10

## 2022-10-10 NOTE — PROGRESS NOTES
Chief Complaint  Follow-up and Anxiety (Patient is here to go over medications, and also states she is in pain, in the groin area. )    Subjective    History of Present Illness {CC  Problem List  Visit  Diagnosis   Encounters  Notes  Medications  Labs  Result Review Imaging  Media :23}     Amelie Henderson presents to [facility] for Follow-up and Anxiety (Patient is here to go over medications, and also states she is in pain, in the groin area. ).    History of Present Illness  Patient is a 37-year-old female who presents to the clinic today to follow-up on generalized anxiety that is worsening, depression, and new acute right hip pain that is radiating into her right groin and is causing nausea and vomiting x1 week.  Patient denies any chest pain, shortness of breath, or any fevers.  Patient denies any known exposure to COVID, flu, or any other contagious illnesses.    Patient shares that her anxiety has been worsening.  We discussed doing a GeneSight swab.  Patient is agreeable to this.  We discussed trialing Cymbalta to help with her depression and anxiety and to also help with some pain.    Patient voices new and acute pain in her right hip that is radiating to her right groin.  She shares that this pain has gotten so severe that she is having difficulty walking and is leading to nausea and vomiting.  Imaging ordered for patient today.       Review of Systems   Constitutional: Negative.  Negative for activity change, chills, fatigue and fever.   HENT: Negative.  Negative for congestion, dental problem, ear pain, hearing loss, rhinorrhea, sinus pain, sore throat, tinnitus and trouble swallowing.    Eyes: Negative.  Negative for pain and visual disturbance.   Respiratory: Negative.  Negative for cough, chest tightness, shortness of breath and wheezing.    Cardiovascular: Negative.  Negative for chest pain, palpitations and leg swelling.   Gastrointestinal: Positive for nausea.  "Negative for abdominal pain, diarrhea and vomiting.   Endocrine: Negative.  Negative for polydipsia, polyphagia and polyuria.   Genitourinary: Negative.  Negative for difficulty urinating, dysuria, frequency and urgency.   Musculoskeletal: Positive for arthralgias (right hip). Negative for back pain and myalgias.   Skin: Negative.  Negative for color change, pallor, rash and wound.   Allergic/Immunologic: Negative.  Negative for environmental allergies.   Neurological: Negative.  Negative for dizziness, speech difficulty, weakness, light-headedness, numbness and headaches.   Hematological: Negative.    Psychiatric/Behavioral: Negative for confusion, decreased concentration, self-injury and suicidal ideas. The patient is nervous/anxious.    All other systems reviewed and are negative.       Objective     Vital Signs:   /90 (BP Location: Right arm, Patient Position: Sitting, Cuff Size: Adult)   Pulse 60   Temp 97.5 °F (36.4 °C) (Temporal)   Resp 18   Ht 177.8 cm (70\")   Wt 85.7 kg (189 lb)   SpO2 100%   BMI 27.12 kg/m²   Current Outpatient Medications on File Prior to Visit   Medication Sig Dispense Refill   • Aimovig 140 MG/ML prefilled syringe Inject 1 mL under the skin into the appropriate area as directed Every 30 (Thirty) Days. 3 mL 0   • armodafinil (Nuvigil) 150 MG tablet Take 1 tablet by mouth Daily. 30 tablet 2   • Cholecalciferol 125 MCG (5000 UT) tablet Take 1 tablet by mouth Daily. 90 tablet 1   • cyanocobalamin (VITAMIN B-12) 1000 MCG tablet Take 1 tablet by mouth Daily. 90 tablet 1   • ferrous gluconate (FERGON) 324 MG tablet Take 1 tablet by mouth Daily With Breakfast. 90 tablet 1   • fluticasone (FLONASE) 50 MCG/ACT nasal spray SPRAY 2 SPRAYS INTO THE NOSTRIL AS DIRECTED BY PROVIDER DAILY. 16 mL 3   • l-methylfolate-algae (DEPLIN) 7.5-90.314 MG capsule capsule Take 1 capsule by mouth Daily. 90 capsule 0   • lamoTRIgine (LaMICtal) 200 MG tablet Take 1 tablet by mouth 2 (Two) Times a Day. " 180 tablet 1   • LORazepam (ATIVAN) 1 MG tablet Take 1 tablet by mouth Every 8 (Eight) Hours As Needed for Anxiety. 60 tablet 0   • losartan (COZAAR) 50 MG tablet TAKE 1 TABLET BY MOUTH EVERY DAY IN THE MORNING 90 tablet 1   • metoprolol tartrate (LOPRESSOR) 25 MG tablet TAKE 1 TABLET BY MOUTH TWICE A  tablet 1   • miglitol (GLYSET) 25 MG tablet TAKE 1 TABLET BY MOUTH EVERY DAY AS YOU START EATING BREAKFAST 30 tablet 5   • multivitamin with minerals tablet tablet 2 gummies po q am - smarty pants      • naproxen sodium (ALEVE) 220 MG tablet      • Omeprazole 20 MG tablet delayed-release Take 1 tablet by mouth Daily.     • prazosin (MINIPRESS) 1 MG capsule Take 1 capsule by mouth Every Night. 90 capsule 1   • Probiotic Product (FORTIFY PROBIOTIC WOMENS EX ST PO) Take 1 capsule by mouth Daily.     • rizatriptan (MAXALT) 10 MG tablet 1 tab po at onset of headache. If not resolved in 2 hours may repeat. Do not exceed 2 tabs in 24 hours. 9 tablet 3   • zonisamide (ZONEGRAN) 100 MG capsule Take 2 capsules by mouth Every Night. 180 capsule 0   • [DISCONTINUED] ondansetron (ZOFRAN) 8 MG tablet Take 1 tablet by mouth Every 8 (Eight) Hours As Needed for Nausea or Vomiting. 90 tablet 1     No current facility-administered medications on file prior to visit.        Past Medical History:   Diagnosis Date   • Allergic    • Anemia    • Anxiety    • B12 deficiency    • COVID     x 2    • Depression    • Dercum's disease    • Endometriosis    • Gastritis    • GERD (gastroesophageal reflux disease)    • Hypertension    • Hypoglycemia    • Migraines    • PTSD (post-traumatic stress disorder)       Past Surgical History:   Procedure Laterality Date   • CARPAL TUNNEL RELEASE Right    •  SECTION      x 2    • CHOLECYSTECTOMY     • KNEE SURGERY Right     x 3    • KNEE SURGERY Left     x 1    • SHOULDER ACROMIOPLASTY WITH ROTATOR CUFF REPAIR Left 2021    Procedure: LEFT SHOULDER ARTHROSCOPY WITH ROTATOR CUFF REPAIR AND  SUBACROMIAL DECOMPRESSION- SLAP;  Surgeon: Rianna Jarvis MD;  Location: [facility] [place];  Service: Orthopedics;  Laterality: Left;      Family History   Problem Relation Age of Onset   • Thyroid disease Mother    • Glaucoma Mother    • Hypertension Mother    • Transient ischemic attack Mother    • Bell's palsy Mother    • Alcohol abuse Father    • ADD / ADHD Sister    • Depression Son    • Pyloric stenosis Son    • Hypertension Maternal Grandmother    • Heart failure Maternal Grandmother    • Stroke Maternal Grandmother    • Diabetes Paternal Grandfather    • Malig Hyperthermia Neg Hx       Social History     Socioeconomic History   • Marital status:    Tobacco Use   • Smoking status: Former     Packs/day: 0.50     Years: 17.00     Pack years: 8.50     Types: Cigarettes     Start date:      Quit date:      Years since quittin.7   • Smokeless tobacco: Never   Vaping Use   • Vaping Use: Never used   Substance and Sexual Activity   • Alcohol use: Yes   • Drug use: Never   • Sexual activity: Yes     Partners: Male         No visits with results within 3 Month(s) from this visit.   Latest known visit with results is:   Clinical Support on 2022   Component Date Value Ref Range Status   • Ferritin 2022 79.60  13.00 - 150.00 ng/mL Final   • Iron 2022 87  37 - 145 mcg/dL Final   • Iron Saturation 2022 31  20 - 50 % Final   • Transferrin 2022 187 (L)  200 - 360 mg/dL Final   • TIBC 2022 279 (L)  298 - 536 mcg/dL Final   • Vitamin B-12 2022 720  211 - 946 pg/mL Final   • 25 Hydroxy, Vitamin D 2022 59.5  30.0 - 100.0 ng/ml Final   • WBC 2022 6.60  3.40 - 10.80 10*3/mm3 Final   • RBC 2022 4.17  3.77 - 5.28 10*6/mm3 Final   • Hemoglobin 2022 12.4  12.0 - 15.9 g/dL Final   • Hematocrit 2022 36.3  34.0 - 46.6 % Final   • MCV 2022 87.1  79.0 - 97.0 fL Final   • MCH 2022 29.7  26.6 - 33.0 pg Final   • MCHC 2022 34.0  31.5 -  35.7 g/dL Final   • RDW 06/30/2022 13.2  12.3 - 15.4 % Final   • RDW-SD 06/30/2022 39.8  37.0 - 54.0 fl Final   • MPV 06/30/2022 8.3  6.0 - 12.0 fL Final   • Platelets 06/30/2022 282  140 - 450 10*3/mm3 Final   • Scan Slide 06/30/2022    Final    See Manual Differential Results   • Neutrophil % 06/30/2022 58.0  42.7 - 76.0 % Final   • Lymphocyte % 06/30/2022 37.0  19.6 - 45.3 % Final   • Monocyte % 06/30/2022 3.0 (L)  5.0 - 12.0 % Final   • Eosinophil % 06/30/2022 2.0  0.3 - 6.2 % Final   • Neutrophils Absolute 06/30/2022 3.83  1.70 - 7.00 10*3/mm3 Final   • Lymphocytes Absolute 06/30/2022 2.44  0.70 - 3.10 10*3/mm3 Final   • Monocytes Absolute 06/30/2022 0.20  0.10 - 0.90 10*3/mm3 Final   • Eosinophils Absolute 06/30/2022 0.13  0.00 - 0.40 10*3/mm3 Final   • RBC Morphology 06/30/2022 Normal  Normal Final   • WBC Morphology 06/30/2022 Normal  Normal Final   • Platelet Morphology 06/30/2022 Normal  Normal Final         Physical Exam  Vitals and nursing note reviewed.   Constitutional:       Appearance: Normal appearance. She is normal weight.   HENT:      Head: Normocephalic and atraumatic.   Cardiovascular:      Rate and Rhythm: Normal rate and regular rhythm.      Pulses: Normal pulses.      Heart sounds: Normal heart sounds. No murmur heard.    No friction rub. No gallop.   Pulmonary:      Effort: Pulmonary effort is normal. No respiratory distress.      Breath sounds: Normal breath sounds. No stridor. No wheezing, rhonchi or rales.   Chest:      Chest wall: No tenderness.   Abdominal:      General: Abdomen is flat. Bowel sounds are normal. There is no distension.      Palpations: Abdomen is soft. There is no mass.      Tenderness: There is no abdominal tenderness. There is no right CVA tenderness, left CVA tenderness, guarding or rebound.      Hernia: No hernia is present.   Musculoskeletal:      Right hip: Tenderness present. Decreased range of motion. Decreased strength.      Left hip: Normal.   Skin:      General: Skin is warm and dry.      Capillary Refill: Capillary refill takes less than 2 seconds.      Coloration: Skin is not jaundiced or pale.   Neurological:      General: No focal deficit present.      Mental Status: She is alert and oriented to person, place, and time. Mental status is at baseline.      Motor: No weakness.      Coordination: Coordination normal.      Gait: Gait normal.   Psychiatric:         Mood and Affect: Mood normal.         Behavior: Behavior normal.         Thought Content: Thought content normal.         Judgment: Judgment normal.          Result Review  Data Reviewed:{ Labs  Result Review  Imaging  Med Tab  Media :23}                     Assessment and Plan {CC Problem List  Visit Diagnosis  ROS  Review (Popup)  Health Maintenance  Quality  BestPractice  Medications  SmartSets  SnapShot Encounters  Media :23}   Diagnoses and all orders for this visit:    1. Generalized anxiety disorder (Primary)  -     hydrOXYzine (ATARAX) 25 MG tablet; Take 1 tablet by mouth 3 (Three) Times a Day As Needed for Itching.  Dispense: 60 tablet; Refill: 5  -     DULoxetine (CYMBALTA) 30 MG capsule; Take 1 capsule by mouth Daily for 7 days, THEN 2 capsules Daily for 21 days.  Dispense: 49 capsule; Refill: 0  -     GeneSight - Swab,    2. Other depression  -     DULoxetine (CYMBALTA) 30 MG capsule; Take 1 capsule by mouth Daily for 7 days, THEN 2 capsules Daily for 21 days.  Dispense: 49 capsule; Refill: 0  -     GeneSight - Swab,    3. Muscle spasm  -     tiZANidine (ZANAFLEX) 4 MG tablet; Take 1 tablet by mouth At Night As Needed for Muscle Spasms.  Dispense: 30 tablet; Refill: 5  -     Ambulatory Referral to Orthopedic Surgery  -     diclofenac (VOLTAREN) 50 MG EC tablet; Take 1 tablet by mouth 2 (Two) Times a Day As Needed (pain).  Dispense: 60 tablet; Refill: 3  -     XR Hip With or Without Pelvis 2 - 3 View Right; Future  -     MRI Hip Right Without Contrast; Future    4. Nausea and  vomiting, unspecified vomiting type  -     ondansetron (Zofran) 4 MG tablet; Take 1 tablet by mouth Every 6 (Six) Hours As Needed for Nausea or Vomiting.  Dispense: 60 tablet; Refill: 1    5. Acute right hip pain  -     Ambulatory Referral to Orthopedic Surgery  -     diclofenac (VOLTAREN) 50 MG EC tablet; Take 1 tablet by mouth 2 (Two) Times a Day As Needed (pain).  Dispense: 60 tablet; Refill: 3  -     XR Hip With or Without Pelvis 2 - 3 View Right; Future  -     MRI Hip Right Without Contrast; Future    6. Right groin pain  -     Ambulatory Referral to Orthopedic Surgery  -     diclofenac (VOLTAREN) 50 MG EC tablet; Take 1 tablet by mouth 2 (Two) Times a Day As Needed (pain).  Dispense: 60 tablet; Refill: 3  -     XR Hip With or Without Pelvis 2 - 3 View Right; Future  -     MRI Hip Right Without Contrast; Future      -Right hip x-rays soon as possible.  -Right hip MRI after x-rays done.  -Cymbalta to help with depression.  -GeneSight testing for specific medications that would work best for patient's anxiety and depression.  -Atarax to help with patient's anxiety at night.  -Follow-up in 2 weeks if not improving.  ER red flags discussed with patient.    I spent 30 minutes caring for Amelie on this date of service. This time includes time spent by me in the following activities:preparing for the visit, reviewing tests, obtaining and/or reviewing a separately obtained history, performing a medically appropriate examination and/or evaluation , counseling and educating the patient/family/caregiver, ordering medications, tests, or procedures, referring and communicating with other health care professionals , documenting information in the medical record, independently interpreting results and communicating that information with the patient/family/caregiver and care coordination.    Follow Up {Instructions Charge Capture  Follow-up Communications :23}     Patient was given instructions and counseling regarding her  condition or for health maintenance advice. Please see specific information pulled into the AVS (placed there by myself) if appropriate.    Return in about 2 weeks (around 10/24/2022) for Recheck.    MDM  Number of Diagnoses or Management Options  Acute right hip pain: new, needed workup  Generalized anxiety disorder: established, worsening  Muscle spasm: new, needed workup  Nausea and vomiting, unspecified vomiting type: new, needed workup  Other depression: established, worsening  Right groin pain: new, needed workup     Amount and/or Complexity of Data Reviewed  Clinical lab tests: reviewed  Tests in the radiology section of CPT®: ordered and reviewed  Tests in the medicine section of CPT®: reviewed  Discussion of test results with the performing providers: no  Decide to obtain previous medical records or to obtain history from someone other than the patient: no  Obtain history from someone other than the patient: no  Review and summarize past medical records: yes  Discuss the patient with other providers: no  Independent visualization of images, tracings, or specimens: no    Risk of Complications, Morbidity, and/or Mortality  Presenting problems: high  Diagnostic procedures: low  Management options: high    Critical Care  Total time providing critical care: 30-74 minutes    Patient Progress  Patient progress: stable       BMI is >= 25 and <30. (Overweight) The following options were offered after discussion;: nutrition counseling/recommendations       EDSON Cali, FNP-BC

## 2022-10-14 ENCOUNTER — TELEPHONE (OUTPATIENT)
Dept: FAMILY MEDICINE CLINIC | Facility: CLINIC | Age: 37
End: 2022-10-14

## 2022-10-14 NOTE — TELEPHONE ENCOUNTER
Caller: Amelie Henderson    Relationship: Self    Best call back number: [phone]      What was the call regarding: PATIENT IS CALLING TO LET JAME POWELL KNOW HER GROIN PAIN IS NOT GETTING ANY BETTER. PATIENT STATES SHE IS CONSTANTLY FEELING NAUSEOUS AND SHE IS HAVING PAIN AND CRAMPING. PATIENT WAS TOLD TO CALL BACK IF THIS WERE TO HAPPEN SO SHE IS WANTING TO KNOW WHAT HER NEXT STEPS ARE.    PLEASE ADVISE       Do you require a callback: YES

## 2022-10-16 RX ORDER — ONDANSETRON 4 MG/1
4 TABLET, FILM COATED ORAL EVERY 6 HOURS PRN
Qty: 60 TABLET | Refills: 1 | Status: SHIPPED | OUTPATIENT
Start: 2022-10-16 | End: 2022-12-06 | Stop reason: SDUPTHER

## 2022-10-17 ENCOUNTER — HOSPITAL ENCOUNTER (EMERGENCY)
Facility: HOSPITAL | Age: 37
Discharge: HOME | End: 2022-10-17
Attending: EMERGENCY MEDICINE | Admitting: EMERGENCY MEDICINE

## 2022-10-17 ENCOUNTER — APPOINTMENT (OUTPATIENT)
Dept: GENERAL RADIOLOGY | Facility: HOSPITAL | Age: 37
End: 2022-10-17

## 2022-10-17 VITALS
HEART RATE: 68 BPM | WEIGHT: 188.05 LBS | SYSTOLIC BLOOD PRESSURE: 128 MMHG | OXYGEN SATURATION: 98 % | BODY MASS INDEX: 26.92 KG/M2 | HEIGHT: 70 IN | DIASTOLIC BLOOD PRESSURE: 70 MMHG | RESPIRATION RATE: 16 BRPM | TEMPERATURE: 98.2 F

## 2022-10-17 DIAGNOSIS — S76.211A INGUINAL STRAIN, RIGHT, INITIAL ENCOUNTER: Primary | ICD-10-CM

## 2022-10-17 PROCEDURE — 99283 EMERGENCY DEPT VISIT LOW MDM: CPT

## 2022-10-17 PROCEDURE — 72170 X-RAY EXAM OF PELVIS: CPT

## 2022-10-17 PROCEDURE — 63710000001 ONDANSETRON ODT 4 MG TABLET DISPERSIBLE: Performed by: EMERGENCY MEDICINE

## 2022-10-17 PROCEDURE — 25010000002 MORPHINE PER 10 MG: Performed by: EMERGENCY MEDICINE

## 2022-10-17 PROCEDURE — 96372 THER/PROPH/DIAG INJ SC/IM: CPT

## 2022-10-17 RX ORDER — ONDANSETRON 4 MG/1
4 TABLET, ORALLY DISINTEGRATING ORAL ONCE
Status: COMPLETED | OUTPATIENT
Start: 2022-10-17 | End: 2022-10-17

## 2022-10-17 RX ORDER — MORPHINE SULFATE 2 MG/ML
2 INJECTION, SOLUTION INTRAMUSCULAR; INTRAVENOUS ONCE
Status: COMPLETED | OUTPATIENT
Start: 2022-10-17 | End: 2022-10-17

## 2022-10-17 RX ORDER — TRAMADOL HYDROCHLORIDE 50 MG/1
50 TABLET ORAL EVERY 6 HOURS PRN
Qty: 12 TABLET | Refills: 0 | Status: SHIPPED | OUTPATIENT
Start: 2022-10-17 | End: 2023-01-24

## 2022-10-17 RX ADMIN — MORPHINE SULFATE 2 MG: 2 INJECTION, SOLUTION INTRAMUSCULAR; INTRAVENOUS at 17:56

## 2022-10-17 RX ADMIN — ONDANSETRON 4 MG: 4 TABLET, ORALLY DISINTEGRATING ORAL at 17:56

## 2022-10-17 NOTE — ED NOTES
Pt reports a fall the 5th October and has had groin pain since. Pt reports she's seen her PCP and he scheduled an MRI for the end of October. Pt reports the pain has gotten much worse as time has gone on.

## 2022-10-17 NOTE — ED PROVIDER NOTES
Subjective   History of Present Illness  Patient is a 37-year-old female who states she slipped and fell approximately 3 weeks ago.  She has pain in her right groin since that time.  She states she is scheduled for an MRI scan and the next 2 weeks but has continued pain at this time.  The pain is moderate and constant.        Review of Systems  Negative chest pain shortness of breath abdominal pain back pain numbness tingling weakness bowel or bladder abnormality  Past Medical History:   Diagnosis Date   • Allergic    • Anemia    • Anxiety    • B12 deficiency    • COVID     x 2    • Depression    • Dercum's disease    • Endometriosis    • Gastritis    • GERD (gastroesophageal reflux disease)    • Hypertension    • Hypoglycemia    • Migraines    • PTSD (post-traumatic stress disorder)        Allergies   Allergen Reactions   • Codeine Hives and Itching   • Latex Rash     Skin gets red and burns, skin starts peeling     • Meloxicam Other (See Comments)     Gastritis         Past Surgical History:   Procedure Laterality Date   • CARPAL TUNNEL RELEASE Right    •  SECTION      x 2    • CHOLECYSTECTOMY     • KNEE SURGERY Right     x 3    • KNEE SURGERY Left     x 1    • SHOULDER ACROMIOPLASTY WITH ROTATOR CUFF REPAIR Left 2021    Procedure: LEFT SHOULDER ARTHROSCOPY WITH ROTATOR CUFF REPAIR AND SUBACROMIAL DECOMPRESSION- SLAP;  Surgeon: Rianna Jarvis MD;  [place] [facility] [place]  Service: Orthopedics;  Laterality: Left;       Family History   Problem Relation Age of Onset   • Thyroid disease Mother    • Glaucoma Mother    • Hypertension Mother    • Transient ischemic attack Mother    • Bell's palsy Mother    • Alcohol abuse Father    • ADD / ADHD Sister    • Depression Son    • Pyloric stenosis Son    • Hypertension Maternal Grandmother    • Heart failure Maternal Grandmother    • Stroke Maternal Grandmother    • Diabetes Paternal Grandfather    • Malig Hyperthermia Neg Hx        Social History      Socioeconomic History   • Marital status:    Tobacco Use   • Smoking status: Former     Packs/day: 0.50     Years: 17.00     Pack years: 8.50     Types: Cigarettes     Start date:      Quit date: 2016     Years since quittin.7   • Smokeless tobacco: Never   Vaping Use   • Vaping Use: Never used   Substance and Sexual Activity   • Alcohol use: Yes   • Drug use: Never   • Sexual activity: Yes     Partners: Male           Objective   Physical Exam  Lungs are clear.  Heart has regular rhythm.  Chest nontender.  Abdomen soft nontender.  Back has no spinous tenderness.  Pelvic region shows pain to palpation of her symphysis pubis and bilateral anterior hips.  She has range of motion with pain.  Has 2+ pulses and is neurovascular intact.  Procedures           ED Course      XR Pelvis 1 or 2 View    Result Date: 10/17/2022  No definitive radiographic evidence of acute displaced fracture.  Electronically Signed By-Jamel Pollard MD On:10/17/2022 5:14 PM This report was finalized on [id number] by  Jamel Pollard MD.                                         MDM  Number of Diagnoses or Management Options  Diagnosis management comments: Patient has findings consistent with groin strain.  She will be given morphine IM.  She will be discharged with a prescription for Ultram.  She will see MD for recheck.       Amount and/or Complexity of Data Reviewed  Tests in the radiology section of CPT®: reviewed    Risk of Complications, Morbidity, and/or Mortality  Presenting problems: moderate  Diagnostic procedures: moderate  Management options: moderate    Patient Progress  Patient progress: stable      Final diagnoses:   Inguinal strain, right, initial encounter       ED Disposition  ED Disposition     ED Disposition   Discharge    Condition   Stable    Comment   --             No follow-up provider specified.       Medication List      New Prescriptions    traMADol 50 MG tablet  Commonly known as: ULTRAM  Take 1  tablet by mouth Every 6 (Six) Hours As Needed for Severe Pain.           Where to Get Your Medications      These medications were sent to [facility]/pharmacy #[id number] - [place], [place] - [address] AT [place] [address] - [phone]  - [phone] [address] [place] [address]    Phone: [phone]   · traMADol 50 MG tablet          Glen Richter MD  10/17/22 0846

## 2022-10-20 ENCOUNTER — APPOINTMENT (OUTPATIENT)
Dept: CT IMAGING | Facility: HOSPITAL | Age: 37
End: 2022-10-20

## 2022-10-20 ENCOUNTER — HOSPITAL ENCOUNTER (EMERGENCY)
Facility: HOSPITAL | Age: 37
Discharge: HOME | End: 2022-10-20
Attending: EMERGENCY MEDICINE | Admitting: EMERGENCY MEDICINE

## 2022-10-20 VITALS
OXYGEN SATURATION: 99 % | WEIGHT: 189.15 LBS | HEART RATE: 62 BPM | RESPIRATION RATE: 18 BRPM | BODY MASS INDEX: 26.48 KG/M2 | DIASTOLIC BLOOD PRESSURE: 74 MMHG | HEIGHT: 71 IN | TEMPERATURE: 98.5 F | SYSTOLIC BLOOD PRESSURE: 122 MMHG

## 2022-10-20 DIAGNOSIS — R10.31 RIGHT LOWER QUADRANT PAIN: Primary | ICD-10-CM

## 2022-10-20 LAB
ALBUMIN SERPL-MCNC: 4.9 G/DL (ref 3.5–5.2)
ALBUMIN/GLOB SERPL: 2 G/DL
ALP SERPL-CCNC: 93 U/L (ref 39–117)
ALT SERPL W P-5'-P-CCNC: 15 U/L (ref 1–33)
ANION GAP SERPL CALCULATED.3IONS-SCNC: 12 MMOL/L (ref 5–15)
AST SERPL-CCNC: 16 U/L (ref 1–32)
B-HCG UR QL: NEGATIVE
BASOPHILS # BLD AUTO: 0 10*3/MM3 (ref 0–0.2)
BASOPHILS NFR BLD AUTO: 0.5 % (ref 0–1.5)
BILIRUB SERPL-MCNC: 0.2 MG/DL (ref 0–1.2)
BILIRUB UR QL STRIP: NEGATIVE
BUN SERPL-MCNC: 12 MG/DL (ref 6–20)
BUN/CREAT SERPL: 13 (ref 7–25)
CALCIUM SPEC-SCNC: 9.3 MG/DL (ref 8.6–10.5)
CHLORIDE SERPL-SCNC: 102 MMOL/L (ref 98–107)
CLARITY UR: CLEAR
CO2 SERPL-SCNC: 23 MMOL/L (ref 22–29)
COLOR UR: YELLOW
CREAT SERPL-MCNC: 0.92 MG/DL (ref 0.57–1)
CRP SERPL-MCNC: 0.4 MG/DL (ref 0–0.5)
DEPRECATED RDW RBC AUTO: 45.5 FL (ref 37–54)
EGFRCR SERPLBLD CKD-EPI 2021: 81.9 ML/MIN/1.73
EOSINOPHIL # BLD AUTO: 0.2 10*3/MM3 (ref 0–0.4)
EOSINOPHIL NFR BLD AUTO: 2.3 % (ref 0.3–6.2)
ERYTHROCYTE [DISTWIDTH] IN BLOOD BY AUTOMATED COUNT: 14.6 % (ref 12.3–15.4)
GLOBULIN UR ELPH-MCNC: 2.5 GM/DL
GLUCOSE SERPL-MCNC: 99 MG/DL (ref 65–99)
GLUCOSE UR STRIP-MCNC: NEGATIVE MG/DL
HCT VFR BLD AUTO: 42.6 % (ref 34–46.6)
HGB BLD-MCNC: 13.9 G/DL (ref 12–15.9)
HGB UR QL STRIP.AUTO: NEGATIVE
HOLD SPECIMEN: NORMAL
KETONES UR QL STRIP: NEGATIVE
LEUKOCYTE ESTERASE UR QL STRIP.AUTO: NEGATIVE
LYMPHOCYTES # BLD AUTO: 2 10*3/MM3 (ref 0.7–3.1)
LYMPHOCYTES NFR BLD AUTO: 23.7 % (ref 19.6–45.3)
MCH RBC QN AUTO: 29.8 PG (ref 26.6–33)
MCHC RBC AUTO-ENTMCNC: 32.8 G/DL (ref 31.5–35.7)
MCV RBC AUTO: 90.9 FL (ref 79–97)
MONOCYTES # BLD AUTO: 0.5 10*3/MM3 (ref 0.1–0.9)
MONOCYTES NFR BLD AUTO: 5.9 % (ref 5–12)
NEUTROPHILS NFR BLD AUTO: 5.6 10*3/MM3 (ref 1.7–7)
NEUTROPHILS NFR BLD AUTO: 67.6 % (ref 42.7–76)
NITRITE UR QL STRIP: NEGATIVE
NRBC BLD AUTO-RTO: 0 /100 WBC (ref 0–0.2)
PH UR STRIP.AUTO: 8 [PH] (ref 5–8)
PLATELET # BLD AUTO: 289 10*3/MM3 (ref 140–450)
PMV BLD AUTO: 7.3 FL (ref 6–12)
POTASSIUM SERPL-SCNC: 4 MMOL/L (ref 3.5–5.2)
PROT SERPL-MCNC: 7.4 G/DL (ref 6–8.5)
PROT UR QL STRIP: NEGATIVE
RBC # BLD AUTO: 4.68 10*6/MM3 (ref 3.77–5.28)
SODIUM SERPL-SCNC: 137 MMOL/L (ref 136–145)
SP GR UR STRIP: 1.01 (ref 1–1.03)
UROBILINOGEN UR QL STRIP: NORMAL
WBC NRBC COR # BLD: 8.4 10*3/MM3 (ref 3.4–10.8)

## 2022-10-20 PROCEDURE — 74176 CT ABD & PELVIS W/O CONTRAST: CPT

## 2022-10-20 PROCEDURE — 99283 EMERGENCY DEPT VISIT LOW MDM: CPT

## 2022-10-20 PROCEDURE — 25010000002 ONDANSETRON PER 1 MG: Performed by: EMERGENCY MEDICINE

## 2022-10-20 PROCEDURE — 80053 COMPREHEN METABOLIC PANEL: CPT | Performed by: EMERGENCY MEDICINE

## 2022-10-20 PROCEDURE — 85025 COMPLETE CBC W/AUTO DIFF WBC: CPT | Performed by: EMERGENCY MEDICINE

## 2022-10-20 PROCEDURE — 96374 THER/PROPH/DIAG INJ IV PUSH: CPT

## 2022-10-20 PROCEDURE — 86140 C-REACTIVE PROTEIN: CPT | Performed by: EMERGENCY MEDICINE

## 2022-10-20 PROCEDURE — 81003 URINALYSIS AUTO W/O SCOPE: CPT | Performed by: EMERGENCY MEDICINE

## 2022-10-20 PROCEDURE — 25010000002 HYDROMORPHONE 1 MG/ML SOLUTION: Performed by: EMERGENCY MEDICINE

## 2022-10-20 PROCEDURE — 96376 TX/PRO/DX INJ SAME DRUG ADON: CPT

## 2022-10-20 PROCEDURE — 81025 URINE PREGNANCY TEST: CPT | Performed by: EMERGENCY MEDICINE

## 2022-10-20 PROCEDURE — 96375 TX/PRO/DX INJ NEW DRUG ADDON: CPT

## 2022-10-20 RX ORDER — HYDROCODONE BITARTRATE AND ACETAMINOPHEN 5; 325 MG/1; MG/1
1 TABLET ORAL EVERY 6 HOURS PRN
Qty: 12 TABLET | Refills: 0 | Status: SHIPPED | OUTPATIENT
Start: 2022-10-20 | End: 2022-10-24

## 2022-10-20 RX ORDER — PROMETHAZINE HYDROCHLORIDE 25 MG/1
25 TABLET ORAL EVERY 6 HOURS PRN
Qty: 12 TABLET | Refills: 0 | Status: SHIPPED | OUTPATIENT
Start: 2022-10-20

## 2022-10-20 RX ORDER — ONDANSETRON 2 MG/ML
4 INJECTION INTRAMUSCULAR; INTRAVENOUS ONCE
Status: COMPLETED | OUTPATIENT
Start: 2022-10-20 | End: 2022-10-20

## 2022-10-20 RX ORDER — DICLOFENAC SODIUM AND MISOPROSTOL 50; 200 MG/1; UG/1
1 TABLET, DELAYED RELEASE ORAL 2 TIMES DAILY
COMMUNITY
End: 2023-02-24

## 2022-10-20 RX ORDER — MULTIVIT WITH MINERALS/LUTEIN
250 TABLET ORAL DAILY
COMMUNITY

## 2022-10-20 RX ORDER — B-COMPLEX WITH VITAMIN C
TABLET ORAL
COMMUNITY

## 2022-10-20 RX ADMIN — HYDROMORPHONE HYDROCHLORIDE 0.5 MG: 1 INJECTION, SOLUTION INTRAMUSCULAR; INTRAVENOUS; SUBCUTANEOUS at 16:17

## 2022-10-20 RX ADMIN — ONDANSETRON 4 MG: 2 INJECTION INTRAMUSCULAR; INTRAVENOUS at 18:22

## 2022-10-20 RX ADMIN — ONDANSETRON 4 MG: 2 INJECTION INTRAMUSCULAR; INTRAVENOUS at 16:17

## 2022-10-20 RX ADMIN — HYDROMORPHONE HYDROCHLORIDE 0.5 MG: 1 INJECTION, SOLUTION INTRAMUSCULAR; INTRAVENOUS; SUBCUTANEOUS at 18:22

## 2022-10-20 NOTE — DISCHARGE INSTRUCTIONS
Follow-up with primary provider, may use ice or heat to sore area.  Return new or worsening symptoms

## 2022-10-20 NOTE — ED PROVIDER NOTES
Subjective   History of Present Illness  2-year-old female presents with right lower abdominal pain.  She has some pain that raise towards her leg.  She states she initially had slipped kicking out her right leg.  She states she was caught and did not hit the ground.  She states that she fell again 2 days later.  This occurred early this month.  She states she has had some low-grade fever for the last week and a half.  She has had nausea vomiting she states she vomited a couple times yesterday she started to feel bloated.  She reports he has had about a 10 pound weight loss with decreased appetite.  She has used different nonsteroidal anti-inflammatory most recently was switched to diclofenac.  She states she is not getting relief.  She reports no dysuria.  She has had no diarrhea.  She states the pain is constant moderately severe feels like constant endometritis.  Review of Systems   Constitutional: Positive for appetite change, fever and unexpected weight change.   HENT: Negative for congestion and sore throat.    Eyes: Negative for pain.   Respiratory: Negative for cough and shortness of breath.    Cardiovascular: Negative for chest pain and leg swelling.   Gastrointestinal: Positive for abdominal pain, nausea and vomiting. Negative for diarrhea.   Genitourinary: Negative for dysuria and urgency.   Musculoskeletal: Positive for back pain.   Skin: Negative for rash.   Neurological: Negative for dizziness, weakness and headaches.   Psychiatric/Behavioral: Negative for confusion.       Past Medical History:   Diagnosis Date   • Allergic    • Anemia    • Anxiety    • B12 deficiency    • COVID     x 2    • Depression    • Dercum's disease    • Endometriosis    • Gastritis    • GERD (gastroesophageal reflux disease)    • Hypertension    • Hypoglycemia    • Migraines    • PTSD (post-traumatic stress disorder)        Allergies   Allergen Reactions   • Codeine Hives and Itching   • Latex Rash     Skin gets red and burns,  skin starts peeling     • Meloxicam Other (See Comments)     Gastritis         Past Surgical History:   Procedure Laterality Date   • CARPAL TUNNEL RELEASE Right    •  SECTION      x 2    • CHOLECYSTECTOMY     • KNEE SURGERY Right     x 3    • KNEE SURGERY Left     x 1    • SHOULDER ACROMIOPLASTY WITH ROTATOR CUFF REPAIR Left 2021    Procedure: LEFT SHOULDER ARTHROSCOPY WITH ROTATOR CUFF REPAIR AND SUBACROMIAL DECOMPRESSION- SLAP;  Surgeon: Rianna Jarvis MD;  Location: [facility] [place] MAIN OR;  Service: Orthopedics;  Laterality: Left;       Family History   Problem Relation Age of Onset   • Thyroid disease Mother    • Glaucoma Mother    • Hypertension Mother    • Transient ischemic attack Mother    • Bell's palsy Mother    • Alcohol abuse Father    • ADD / ADHD Sister    • Depression Son    • Pyloric stenosis Son    • Hypertension Maternal Grandmother    • Heart failure Maternal Grandmother    • Stroke Maternal Grandmother    • Diabetes Paternal Grandfather    • Malig Hyperthermia Neg Hx        Social History     Socioeconomic History   • Marital status:    Tobacco Use   • Smoking status: Former     Packs/day: 0.50     Years: 17.00     Pack years: 8.50     Types: Cigarettes     Start date:      Quit date: 2016     Years since quittin.8   • Smokeless tobacco: Never   Vaping Use   • Vaping Use: Never used   Substance and Sexual Activity   • Alcohol use: Yes   • Drug use: Never   • Sexual activity: Yes     Partners: Male     Prior to Admission medications    Medication Sig Start Date End Date Taking? Authorizing Provider   Aimovig 140 MG/ML prefilled syringe Inject 1 mL under the skin into the appropriate area as directed Every 30 (Thirty) Days. 10/7/22  Yes Damon Valera APRN   armodafinil (Nuvigil) 150 MG tablet Take 1 tablet by mouth Daily.  Patient taking differently: Take 250 mg by mouth Daily. 10/5/22  Yes Damon Valera APRN   Cholecalciferol 125 MCG (5000 UT) tablet Take  1 tablet by mouth Daily. 11/24/21  Yes Fadia Tovar MD   diclofenac (VOLTAREN) 50 MG EC tablet Take 1 tablet by mouth 2 (Two) Times a Day As Needed (pain). 10/16/22  Yes Damon Valera APRN   diclofenac-miSOPROStol (ARTHROTEC 50) 50-0.2 MG EC tablet Take 1 tablet by mouth 2 (Two) Times a Day.   Yes Provider, MD Ethel   DULoxetine (CYMBALTA) 30 MG capsule Take 1 capsule by mouth Daily for 7 days, THEN 2 capsules Daily for 21 days. 10/10/22 11/7/22 Yes Damon Valera APRN   ferrous gluconate (FERGON) 324 MG tablet Take 1 tablet by mouth Daily With Breakfast. 8/9/22  Yes Damon Valera APRN   fluticasone (FLONASE) 50 MCG/ACT nasal spray SPRAY 2 SPRAYS INTO THE NOSTRIL AS DIRECTED BY PROVIDER DAILY. 10/7/22  Yes Damon Valera APRN   hydrOXYzine (ATARAX) 25 MG tablet Take 1 tablet by mouth 3 (Three) Times a Day As Needed for Itching. 10/10/22  Yes Damon Valera APRN   l-methylfolate-algae (DEPLIN) 7.5-90.314 MG capsule capsule Take 1 capsule by mouth Daily. 12/2/21  Yes Fadia Tovar MD   lamoTRIgine (LaMICtal) 200 MG tablet Take 1 tablet by mouth 2 (Two) Times a Day. 10/7/22  Yes Damon Valera APRN   LORazepam (ATIVAN) 1 MG tablet Take 1 tablet by mouth Every 8 (Eight) Hours As Needed for Anxiety. 10/7/22  Yes Damon Valera APRN   losartan (COZAAR) 50 MG tablet TAKE 1 TABLET BY MOUTH EVERY DAY IN THE MORNING 7/10/22  Yes Damon Valera APRN   metoprolol tartrate (LOPRESSOR) 25 MG tablet TAKE 1 TABLET BY MOUTH TWICE A DAY 6/6/22  Yes Damon Valera APRN   miglitol (GLYSET) 25 MG tablet TAKE 1 TABLET BY MOUTH EVERY DAY AS YOU START EATING BREAKFAST 7/8/22  Yes Mary Cyr MD   multivitamin with minerals tablet tablet 2 gummies po q am - smarty pants    Yes Provider, Ethel, MD   naproxen sodium (ALEVE) 220 MG tablet    Yes Provider, Historical, MD   Omeprazole 20 MG tablet delayed-release Take 20 mg by mouth 2 (Two) Times a  Day. 5/16/22  Yes Ethel Rey MD   ondansetron (Zofran) 4 MG tablet Take 1 tablet by mouth Every 6 (Six) Hours As Needed for Nausea or Vomiting.  Patient taking differently: Take 2 tablets by mouth Every 6 (Six) Hours As Needed for Nausea or Vomiting. 10/16/22  Yes Damon Valera APRN   prazosin (MINIPRESS) 1 MG capsule Take 1 capsule by mouth Every Night. 10/7/22  Yes Damon Valera APRN   Probiotic Product (FORTIFY PROBIOTIC WOMENS EX ST PO) Take 1 capsule by mouth Daily.   Yes Ethel Rey MD   Rimegepant Sulfate (NURTEC PO) Take  by mouth.   Yes Ethel Rey MD   rizatriptan (MAXALT) 10 MG tablet 1 tab po at onset of headache. If not resolved in 2 hours may repeat. Do not exceed 2 tabs in 24 hours. 10/7/22  Yes Damon Valera APRN   Vitamin B Complex-C capsule Take  by mouth.   Yes Ethel Rey MD   vitamin C (ASCORBIC ACID) 250 MG tablet Take 1 tablet by mouth Daily.   Yes ProviderEthel MD   zonisamide (ZONEGRAN) 100 MG capsule Take 2 capsules by mouth Every Night. 12/2/21  Yes Fadia Tovar MD   cyanocobalamin (VITAMIN B-12) 1000 MCG tablet Take 1 tablet by mouth Daily. 11/24/21   Fadia Tovar MD   tiZANidine (ZANAFLEX) 4 MG tablet Take 1 tablet by mouth At Night As Needed for Muscle Spasms. 10/10/22   Damon Valera APRN   traMADol (ULTRAM) 50 MG tablet Take 1 tablet by mouth Every 6 (Six) Hours As Needed for Severe Pain. 10/17/22   Glen Richter MD           Objective   Physical Exam  37-year-old female awake alert.  Generally well-developed well-nourished.  Pupils equal round at light.  Neck supple chest clear equal breath sounds.  Cardiovascular regular rhythm abdomen is soft with right lower abdominal tenderness.  Extremities without tenderness edema.  Legs are neurovascular intact.  Skin without rash noted.  Neurologic Eris without focal findings noted.  Procedures           ED Course            Results for orders placed  or performed during the hospital encounter of 10/20/22   Comprehensive Metabolic Panel    Specimen: Blood   Result Value Ref Range    Glucose 99 65 - 99 mg/dL    BUN 12 6 - 20 mg/dL    Creatinine 0.92 0.57 - 1.00 mg/dL    Sodium 137 136 - 145 mmol/L    Potassium 4.0 3.5 - 5.2 mmol/L    Chloride 102 98 - 107 mmol/L    CO2 23.0 22.0 - 29.0 mmol/L    Calcium 9.3 8.6 - 10.5 mg/dL    Total Protein 7.4 6.0 - 8.5 g/dL    Albumin 4.90 3.50 - 5.20 g/dL    ALT (SGPT) 15 1 - 33 U/L    AST (SGOT) 16 1 - 32 U/L    Alkaline Phosphatase 93 39 - 117 U/L    Total Bilirubin 0.2 0.0 - 1.2 mg/dL    Globulin 2.5 gm/dL    A/G Ratio 2.0 g/dL    BUN/Creatinine Ratio 13.0 7.0 - 25.0    Anion Gap 12.0 5.0 - 15.0 mmol/L    eGFR 81.9 >60.0 mL/min/1.73   Pregnancy, Urine - Urine, Clean Catch    Specimen: Urine, Clean Catch   Result Value Ref Range    HCG, Urine QL Negative Negative   Urinalysis With Microscopic If Indicated (No Culture) - Urine, Clean Catch    Specimen: Urine, Clean Catch   Result Value Ref Range    Color, UA Yellow Yellow, Straw    Appearance, UA Clear Clear    pH, UA 8.0 5.0 - 8.0    Specific Gravity, UA 1.010 1.005 - 1.030    Glucose, UA Negative Negative    Ketones, UA Negative Negative    Bilirubin, UA Negative Negative    Blood, UA Negative Negative    Protein, UA Negative Negative    Leuk Esterase, UA Negative Negative    Nitrite, UA Negative Negative    Urobilinogen, UA 0.2 E.U./dL 0.2 - 1.0 E.U./dL   CBC Auto Differential    Specimen: Blood   Result Value Ref Range    WBC 8.40 3.40 - 10.80 10*3/mm3    RBC 4.68 3.77 - 5.28 10*6/mm3    Hemoglobin 13.9 12.0 - 15.9 g/dL    Hematocrit 42.6 34.0 - 46.6 %    MCV 90.9 79.0 - 97.0 fL    MCH 29.8 26.6 - 33.0 pg    MCHC 32.8 31.5 - 35.7 g/dL    RDW 14.6 12.3 - 15.4 %    RDW-SD 45.5 37.0 - 54.0 fl    MPV 7.3 6.0 - 12.0 fL    Platelets 289 140 - 450 10*3/mm3    Neutrophil % 67.6 42.7 - 76.0 %    Lymphocyte % 23.7 19.6 - 45.3 %    Monocyte % 5.9 5.0 - 12.0 %    Eosinophil % 2.3 0.3  "- 6.2 %    Basophil % 0.5 0.0 - 1.5 %    Neutrophils, Absolute 5.60 1.70 - 7.00 10*3/mm3    Lymphocytes, Absolute 2.00 0.70 - 3.10 10*3/mm3    Monocytes, Absolute 0.50 0.10 - 0.90 10*3/mm3    Eosinophils, Absolute 0.20 0.00 - 0.40 10*3/mm3    Basophils, Absolute 0.00 0.00 - 0.20 10*3/mm3    nRBC 0.0 0.0 - 0.2 /100 WBC   C-reactive Protein    Specimen: Blood   Result Value Ref Range    C-Reactive Protein 0.40 0.00 - 0.50 mg/dL   Gold Top - SST   Result Value Ref Range    Extra Tube Hold for add-ons.      CT Abdomen Pelvis Without Contrast    Result Date: 10/20/2022  1.     Diffuse bladder wall thickening which may indicate cystitis. 2.     Fluid distended small bowel loops without definite bowel dilatation. This is nonspecific but could be seen with enteritis. 3.     Appendix appears within normal limits.    Electronically Signed By-Jamel Pollard MD On:10/20/2022 6:22 PM This report was finalized on [id number] by  Jamel Pollard MD.    Medications   HYDROmorphone (DILAUDID) injection 0.5 mg (0.5 mg Intravenous Given 10/20/22 1617)   ondansetron (ZOFRAN) injection 4 mg (4 mg Intravenous Given 10/20/22 1617)   HYDROmorphone (DILAUDID) injection 0.5 mg (0.5 mg Intravenous Given 10/20/22 1822)   ondansetron (ZOFRAN) injection 4 mg (4 mg Intravenous Given 10/20/22 1822)     /79   Pulse 60   Temp 98.4 °F (36.9 °C) (Oral)   Resp 17   Ht 179.1 cm (70.5\")   Wt 85.8 kg (189 lb 2.5 oz)   LMP 09/28/2022 (Exact Date)   SpO2 100%   BMI 26.76 kg/m²                                     [facility]  Chart review: Patient has been seen by primary provider for generalized anxiety disorder muscle spasm.  She had x-ray of pelvis which was negative  Comorbidity: As per past history  Differential: Musculoskeletal pain, appendicitis, UTI, ureterolithiasis, pregnancy, ovarian pathology,  My EKG interpretation:   Lab: Negative urine hCG normal CBC comprehensive metabolic panel urinalysis and C-reactive protein  Radiology: I reviewed " CT scan discussed with radiologist.  There appears to be some diffuse bladder wall thickening.  There were some fluid distended small bowel loops without evidence of bowel dilation nonspecific.  The appendix is noted to be normal.  There is no evidence of inflammation.  Discussion/treatment: Patient had IV placed.  Was given Dilaudid and Zofran for pain and nausea.  She reported significant improvement in her pain but then later started to have some increased pain and nausea again.  She was given additional medication.  Patient's findings were discussed with her.  Her results were discussed.  At this time etiology of her pain is not entirely clear however her findings were all reviewed.  Her inspect report was reviewed she was discharged given prescription for Norco and Phenergan.  She was given urology follow-up.  She has follow-up with her prime provider scheduled for Monday.  Advised could use ice or heat to the sore area whichever felt better return new or worsening symptoms  Patient was evaluated using appropriate PPE      Final diagnoses:   Right lower quadrant pain       ED Disposition  ED Disposition     ED Disposition   Discharge    Condition   Stable    Comment   --             Damon Valera, APRN  [address] [id number] [phone]          [facility] UROLOGY - [address] [address]  [phone]             Medication List      New Prescriptions    HYDROcodone-acetaminophen 5-325 MG per tablet  Commonly known as: Norco  Take 1 tablet by mouth Every 6 (Six) Hours As Needed for Moderate Pain.     promethazine 25 MG tablet  Commonly known as: PHENERGAN  Take 1 tablet by mouth Every 6 (Six) Hours As Needed for Nausea or Vomiting.        Changed    armodafinil 150 MG tablet  Commonly known as: Nuvigil  Take 1 tablet by mouth Daily.  What changed: how much to take     ondansetron 4 MG tablet  Commonly known as: Zofran  Take 1 tablet by mouth Every 6 (Six)  Hours As Needed for Nausea or Vomiting.  What changed: how much to take           Where to Get Your Medications      These medications were sent to [facility]/pharmacy #[id number] - [place], [place] - [address] [address] - [phone]  - [phone] [address] [place] [address]    Phone: [phone]   · HYDROcodone-acetaminophen 5-325 MG per tablet  · promethazine 25 MG tablet          Gilberto Graff MD  10/20/22 [id number]

## 2022-10-24 ENCOUNTER — OFFICE VISIT (OUTPATIENT)
Dept: FAMILY MEDICINE CLINIC | Facility: CLINIC | Age: 37
End: 2022-10-24

## 2022-10-24 VITALS
WEIGHT: 188.8 LBS | OXYGEN SATURATION: 96 % | HEART RATE: 93 BPM | SYSTOLIC BLOOD PRESSURE: 141 MMHG | TEMPERATURE: 98.7 F | BODY MASS INDEX: 26.43 KG/M2 | HEIGHT: 71 IN | DIASTOLIC BLOOD PRESSURE: 96 MMHG

## 2022-10-24 DIAGNOSIS — N30.90 CYSTITIS: Primary | ICD-10-CM

## 2022-10-24 DIAGNOSIS — Z87.42 HISTORY OF ENDOMETRIOSIS: ICD-10-CM

## 2022-10-24 DIAGNOSIS — R10.9 ABDOMINAL PAIN, UNSPECIFIED ABDOMINAL LOCATION: ICD-10-CM

## 2022-10-24 PROCEDURE — 99214 OFFICE O/P EST MOD 30 MIN: CPT | Performed by: REGISTERED NURSE

## 2022-10-24 RX ORDER — HYDROCODONE BITARTRATE AND ACETAMINOPHEN 5; 325 MG/1; MG/1
1 TABLET ORAL EVERY 6 HOURS PRN
Qty: 28 TABLET | Refills: 0 | Status: SHIPPED | OUTPATIENT
Start: 2022-10-24 | End: 2022-11-07 | Stop reason: SDUPTHER

## 2022-10-24 RX ORDER — CIPROFLOXACIN 500 MG/1
500 TABLET, FILM COATED ORAL 2 TIMES DAILY
Qty: 20 TABLET | Refills: 0 | Status: SHIPPED | OUTPATIENT
Start: 2022-10-24 | End: 2022-11-03

## 2022-10-30 DIAGNOSIS — F41.1 GENERALIZED ANXIETY DISORDER: ICD-10-CM

## 2022-10-30 DIAGNOSIS — F32.89 OTHER DEPRESSION: ICD-10-CM

## 2022-10-31 ENCOUNTER — HOSPITAL ENCOUNTER (OUTPATIENT)
Dept: MRI IMAGING | Facility: HOSPITAL | Age: 37
Discharge: HOME | End: 2022-10-31
Admitting: REGISTERED NURSE

## 2022-10-31 DIAGNOSIS — M62.838 MUSCLE SPASM: ICD-10-CM

## 2022-10-31 DIAGNOSIS — M25.551 ACUTE RIGHT HIP PAIN: ICD-10-CM

## 2022-10-31 DIAGNOSIS — R10.31 RIGHT GROIN PAIN: ICD-10-CM

## 2022-10-31 PROCEDURE — 73721 MRI JNT OF LWR EXTRE W/O DYE: CPT

## 2022-10-31 RX ORDER — DULOXETIN HYDROCHLORIDE 30 MG/1
CAPSULE, DELAYED RELEASE ORAL
Qty: 49 CAPSULE | Refills: 0 | Status: SHIPPED | OUTPATIENT
Start: 2022-10-31 | End: 2022-11-28

## 2022-11-01 DIAGNOSIS — M62.838 MUSCLE SPASM: ICD-10-CM

## 2022-11-01 RX ORDER — TIZANIDINE 4 MG/1
4 TABLET ORAL NIGHTLY PRN
Qty: 30 TABLET | Refills: 5 | Status: SHIPPED | OUTPATIENT
Start: 2022-11-01

## 2022-11-04 ENCOUNTER — TELEPHONE (OUTPATIENT)
Dept: FAMILY MEDICINE CLINIC | Facility: CLINIC | Age: 37
End: 2022-11-04

## 2022-11-04 NOTE — TELEPHONE ENCOUNTER
HUB TO READ MESSAGE----- Message from EDSON Cali sent at 11/3/2022 11:22 AM EDT -----  Can we please reach out to patient and notify her of the following results:    Her MRI of the right hip does show some irregularities.  He says that she has a bit of impingement which is worse on the right and tendinopathy with some mild interstitial tearing.  We already have the referral into orthopedics and I have sent the  a message hoping to get her scheduled soon.    Thank you.

## 2022-11-07 ENCOUNTER — OFFICE VISIT (OUTPATIENT)
Dept: FAMILY MEDICINE CLINIC | Facility: CLINIC | Age: 37
End: 2022-11-07

## 2022-11-07 ENCOUNTER — APPOINTMENT (OUTPATIENT)
Dept: MRI IMAGING | Facility: HOSPITAL | Age: 37
End: 2022-11-07

## 2022-11-07 VITALS
TEMPERATURE: 98.6 F | DIASTOLIC BLOOD PRESSURE: 73 MMHG | BODY MASS INDEX: 26.96 KG/M2 | SYSTOLIC BLOOD PRESSURE: 120 MMHG | OXYGEN SATURATION: 100 % | HEIGHT: 71 IN | WEIGHT: 192.6 LBS | HEART RATE: 94 BPM

## 2022-11-07 DIAGNOSIS — R10.31 RIGHT GROIN PAIN: ICD-10-CM

## 2022-11-07 DIAGNOSIS — N32.89 BLADDER WALL THICKENING: ICD-10-CM

## 2022-11-07 DIAGNOSIS — M25.551 ACUTE RIGHT HIP PAIN: ICD-10-CM

## 2022-11-07 DIAGNOSIS — N32.89 BLADDER SPASM: ICD-10-CM

## 2022-11-07 DIAGNOSIS — R10.9 ABDOMINAL PAIN, UNSPECIFIED ABDOMINAL LOCATION: ICD-10-CM

## 2022-11-07 DIAGNOSIS — R39.15 URGENCY OF URINATION: Primary | ICD-10-CM

## 2022-11-07 PROCEDURE — 99214 OFFICE O/P EST MOD 30 MIN: CPT | Performed by: REGISTERED NURSE

## 2022-11-07 RX ORDER — OXYBUTYNIN CHLORIDE 5 MG/1
5 TABLET ORAL 3 TIMES DAILY
Qty: 30 TABLET | Refills: 3 | Status: SHIPPED | OUTPATIENT
Start: 2022-11-07 | End: 2022-12-06 | Stop reason: SDUPTHER

## 2022-11-07 RX ORDER — HYDROCODONE BITARTRATE AND ACETAMINOPHEN 5; 325 MG/1; MG/1
1 TABLET ORAL EVERY 6 HOURS PRN
Qty: 28 TABLET | Refills: 0 | Status: SHIPPED | OUTPATIENT
Start: 2022-11-07 | End: 2022-12-06 | Stop reason: SDUPTHER

## 2022-11-07 NOTE — PROGRESS NOTES
Chief Complaint  Imaging Only (FOLLOW UP MRI)    Subjective    History of Present Illness {CC  Problem List  Visit  Diagnosis   Encounters  Notes  Medications  Labs  Result Review Imaging  Media :23}     Amelie Henderson presents to [facility] for Imaging Only (FOLLOW UP MRI).    History of Present Illness  Patient is a 37-year-old female who presents to the clinic today to follow-up on right hip and groin pain greater than 4 weeks.  Patient denies any chest pain, shortness of breath, or any fevers.  Patient denies any known exposure to COVID, flu, or any other contagious illnesses.    In regards to the hip pain, patient shares that it is consistently occurring.  Patient got MRI and significant findings were on the imaging.  Referral was placed last visit to orthopedics.  Patient is already seen established with an orthopedic specialist so I consider referral to outgoing and fax that over to them.    Patient does share that her bladder spasms and frequency and urgency at night have gotten worse.  She did find that she has bladder wall thickening on this most recent scan.  Patient would like to referral to urology to further investigate this.       Review of Systems   Constitutional: Negative.  Negative for activity change, chills, fatigue and fever.   HENT: Negative.  Negative for congestion, dental problem, ear pain, hearing loss, rhinorrhea, sinus pain, sore throat, tinnitus and trouble swallowing.    Eyes: Negative.  Negative for pain and visual disturbance.   Respiratory: Negative.  Negative for cough, chest tightness, shortness of breath and wheezing.    Cardiovascular: Negative.  Negative for chest pain, palpitations and leg swelling.   Gastrointestinal: Negative.  Negative for abdominal pain, diarrhea, nausea and vomiting.   Endocrine: Negative.  Negative for polydipsia, polyphagia and polyuria.   Genitourinary: Positive for difficulty urinating, frequency and urgency.  "Negative for dysuria.   Musculoskeletal: Positive for arthralgias and back pain. Negative for myalgias.   Skin: Negative.  Negative for color change, pallor, rash and wound.   Allergic/Immunologic: Negative.  Negative for environmental allergies.   Neurological: Negative.  Negative for dizziness, speech difficulty, weakness, light-headedness, numbness and headaches.   Hematological: Negative.    Psychiatric/Behavioral: Negative.  Negative for confusion, decreased concentration, self-injury and suicidal ideas. The patient is not nervous/anxious.    All other systems reviewed and are negative.       Objective     Vital Signs:   /73 (BP Location: Right arm, Patient Position: Sitting, Cuff Size: Adult)   Pulse 94   Temp 98.6 °F (37 °C) (Temporal)   Ht 179.1 cm (70.5\")   Wt 87.4 kg (192 lb 9.6 oz)   SpO2 100%   BMI 27.24 kg/m²   Current Outpatient Medications on File Prior to Visit   Medication Sig Dispense Refill   • Aimovig 140 MG/ML prefilled syringe Inject 1 mL under the skin into the appropriate area as directed Every 30 (Thirty) Days. 3 mL 0   • armodafinil (Nuvigil) 150 MG tablet Take 1 tablet by mouth Daily. (Patient taking differently: Take 250 mg by mouth Daily.) 30 tablet 2   • Cholecalciferol 125 MCG (5000 UT) tablet Take 1 tablet by mouth Daily. 90 tablet 1   • cyanocobalamin (VITAMIN B-12) 1000 MCG tablet Take 1 tablet by mouth Daily. 90 tablet 1   • diclofenac (VOLTAREN) 50 MG EC tablet Take 1 tablet by mouth 2 (Two) Times a Day As Needed (pain). 60 tablet 3   • diclofenac-miSOPROStol (ARTHROTEC 50) 50-0.2 MG EC tablet Take 1 tablet by mouth 2 (Two) Times a Day.     • DULoxetine (CYMBALTA) 30 MG capsule TAKE 1 CAPSULE BY MOUTH DAILY FOR 7 DAYS, THEN 2 CAPSULES DAILY FOR 21 DAYS. 49 capsule 0   • ferrous gluconate (FERGON) 324 MG tablet Take 1 tablet by mouth Daily With Breakfast. 90 tablet 1   • fluticasone (FLONASE) 50 MCG/ACT nasal spray SPRAY 2 SPRAYS INTO THE NOSTRIL AS DIRECTED BY PROVIDER " DAILY. 16 mL 3   • hydrOXYzine (ATARAX) 25 MG tablet Take 1 tablet by mouth 3 (Three) Times a Day As Needed for Itching. 60 tablet 5   • l-methylfolate-algae (DEPLIN) 7.5-90.314 MG capsule capsule Take 1 capsule by mouth Daily. 90 capsule 0   • lamoTRIgine (LaMICtal) 200 MG tablet Take 1 tablet by mouth 2 (Two) Times a Day. 180 tablet 1   • LORazepam (ATIVAN) 1 MG tablet Take 1 tablet by mouth Every 8 (Eight) Hours As Needed for Anxiety. 60 tablet 0   • losartan (COZAAR) 50 MG tablet TAKE 1 TABLET BY MOUTH EVERY DAY IN THE MORNING 90 tablet 1   • metoprolol tartrate (LOPRESSOR) 25 MG tablet TAKE 1 TABLET BY MOUTH TWICE A  tablet 1   • miglitol (GLYSET) 25 MG tablet TAKE 1 TABLET BY MOUTH EVERY DAY AS YOU START EATING BREAKFAST 30 tablet 5   • multivitamin with minerals tablet tablet 2 gummies po q am - smarty pants      • naproxen sodium (ALEVE) 220 MG tablet      • Omeprazole 20 MG tablet delayed-release Take 20 mg by mouth 2 (Two) Times a Day.     • ondansetron (Zofran) 4 MG tablet Take 1 tablet by mouth Every 6 (Six) Hours As Needed for Nausea or Vomiting. (Patient taking differently: Take 2 tablets by mouth Every 6 (Six) Hours As Needed for Nausea or Vomiting.) 60 tablet 1   • prazosin (MINIPRESS) 1 MG capsule Take 1 capsule by mouth Every Night. 90 capsule 1   • Probiotic Product (FORTIFY PROBIOTIC WOMENS EX ST PO) Take 1 capsule by mouth Daily.     • promethazine (PHENERGAN) 25 MG tablet Take 1 tablet by mouth Every 6 (Six) Hours As Needed for Nausea or Vomiting. 12 tablet 0   • Rimegepant Sulfate (NURTEC PO) Take  by mouth.     • rizatriptan (MAXALT) 10 MG tablet 1 tab po at onset of headache. If not resolved in 2 hours may repeat. Do not exceed 2 tabs in 24 hours. 9 tablet 3   • tiZANidine (ZANAFLEX) 4 MG tablet TAKE 1 TABLET BY MOUTH AT NIGHT AS NEEDED FOR MUSCLE SPASMS. 30 tablet 5   • traMADol (ULTRAM) 50 MG tablet Take 1 tablet by mouth Every 6 (Six) Hours As Needed for Severe Pain. 12 tablet 0    • Vitamin B Complex-C capsule Take  by mouth.     • vitamin C (ASCORBIC ACID) 250 MG tablet Take 1 tablet by mouth Daily.     • zonisamide (ZONEGRAN) 100 MG capsule Take 2 capsules by mouth Every Night. 180 capsule 0   • [DISCONTINUED] HYDROcodone-acetaminophen (NORCO) 5-325 MG per tablet Take 1 tablet by mouth Every 6 (Six) Hours As Needed for Severe Pain. 28 tablet 0     No current facility-administered medications on file prior to visit.        Past Medical History:   Diagnosis Date   • Allergic    • Anemia    • Anxiety    • B12 deficiency    • COVID     x 2    • Depression    • Dercum's disease    • Endometriosis    • Gastritis    • GERD (gastroesophageal reflux disease)    • Hypertension    • Hypoglycemia    • Migraines    • PTSD (post-traumatic stress disorder)       Past Surgical History:   Procedure Laterality Date   • CARPAL TUNNEL RELEASE Right    •  SECTION      x 2    • CHOLECYSTECTOMY     • KNEE SURGERY Right     x 3    • KNEE SURGERY Left     x 1    • SHOULDER ACROMIOPLASTY WITH ROTATOR CUFF REPAIR Left 2021    Procedure: LEFT SHOULDER ARTHROSCOPY WITH ROTATOR CUFF REPAIR AND SUBACROMIAL DECOMPRESSION- SLAP;  Surgeon: Rianna Jarvis MD;  Location: [facility] [place];  Service: Orthopedics;  Laterality: Left;      Family History   Problem Relation Age of Onset   • Thyroid disease Mother    • Glaucoma Mother    • Hypertension Mother    • Transient ischemic attack Mother    • Bell's palsy Mother    • Alcohol abuse Father    • ADD / ADHD Sister    • Depression Son    • Pyloric stenosis Son    • Hypertension Maternal Grandmother    • Heart failure Maternal Grandmother    • Stroke Maternal Grandmother    • Diabetes Paternal Grandfather    • Malig Hyperthermia Neg Hx       Social History     Socioeconomic History   • Marital status:    Tobacco Use   • Smoking status: Former     Packs/day: 0.50     Years: 17.00     Pack years: 8.50     Types: Cigarettes     Start date:      Quit date:  2016     Years since quittin.8   • Smokeless tobacco: Never   Vaping Use   • Vaping Use: Never used   Substance and Sexual Activity   • Alcohol use: Yes   • Drug use: Never   • Sexual activity: Yes     Partners: Male         Admission on 10/20/2022, Discharged on 10/20/2022   Component Date Value Ref Range Status   • Glucose 10/20/2022 99  65 - 99 mg/dL Final   • BUN 10/20/2022 12  6 - 20 mg/dL Final   • Creatinine 10/20/2022 0.92  0.57 - 1.00 mg/dL Final   • Sodium 10/20/2022 137  136 - 145 mmol/L Final   • Potassium 10/20/2022 4.0  3.5 - 5.2 mmol/L Final   • Chloride 10/20/2022 102  98 - 107 mmol/L Final   • CO2 10/20/2022 23.0  22.0 - 29.0 mmol/L Final   • Calcium 10/20/2022 9.3  8.6 - 10.5 mg/dL Final   • Total Protein 10/20/2022 7.4  6.0 - 8.5 g/dL Final   • Albumin 10/20/2022 4.90  3.50 - 5.20 g/dL Final   • ALT (SGPT) 10/20/2022 15  1 - 33 U/L Final   • AST (SGOT) 10/20/2022 16  1 - 32 U/L Final   • Alkaline Phosphatase 10/20/2022 93  39 - 117 U/L Final   • Total Bilirubin 10/20/2022 0.2  0.0 - 1.2 mg/dL Final   • Globulin 10/20/2022 2.5  gm/dL Final   • A/G Ratio 10/20/2022 2.0  g/dL Final   • BUN/Creatinine Ratio 10/20/2022 13.0  7.0 - 25.0 Final   • Anion Gap 10/20/2022 12.0  5.0 - 15.0 mmol/L Final   • eGFR 10/20/2022 81.9  >60.0 mL/min/1.73 Final    National Kidney Foundation and American Society of Nephrology (ASN) Task Force recommended calculation based on the Chronic Kidney Disease Epidemiology Collaboration (CKD-EPI) equation refit without adjustment for race.   • HCG, Urine QL 10/20/2022 Negative  Negative Final   • Color, UA 10/20/2022 Yellow  Yellow, Straw Final   • Appearance, UA 10/20/2022 Clear  Clear Final   • pH, UA 10/20/2022 8.0  5.0 - 8.0 Final   • Specific Gravity, UA 10/20/2022 1.010  1.005 - 1.030 Final   • Glucose, UA 10/20/2022 Negative  Negative Final   • Ketones, UA 10/20/2022 Negative  Negative Final   • Bilirubin, UA 10/20/2022 Negative  Negative Final   • Blood, UA  10/20/2022 Negative  Negative Final   • Protein, UA 10/20/2022 Negative  Negative Final   • Leuk Esterase, UA 10/20/2022 Negative  Negative Final   • Nitrite, UA 10/20/2022 Negative  Negative Final   • Urobilinogen, UA 10/20/2022 0.2 E.U./dL  0.2 - 1.0 E.U./dL Final   • WBC 10/20/2022 8.40  3.40 - 10.80 10*3/mm3 Final   • RBC 10/20/2022 4.68  3.77 - 5.28 10*6/mm3 Final   • Hemoglobin 10/20/2022 13.9  12.0 - 15.9 g/dL Final   • Hematocrit 10/20/2022 42.6  34.0 - 46.6 % Final   • MCV 10/20/2022 90.9  79.0 - 97.0 fL Final   • MCH 10/20/2022 29.8  26.6 - 33.0 pg Final   • MCHC 10/20/2022 32.8  31.5 - 35.7 g/dL Final   • RDW 10/20/2022 14.6  12.3 - 15.4 % Final   • RDW-SD 10/20/2022 45.5  37.0 - 54.0 fl Final   • MPV 10/20/2022 7.3  6.0 - 12.0 fL Final   • Platelets 10/20/2022 289  140 - 450 10*3/mm3 Final   • Neutrophil % 10/20/2022 67.6  42.7 - 76.0 % Final   • Lymphocyte % 10/20/2022 23.7  19.6 - 45.3 % Final   • Monocyte % 10/20/2022 5.9  5.0 - 12.0 % Final   • Eosinophil % 10/20/2022 2.3  0.3 - 6.2 % Final   • Basophil % 10/20/2022 0.5  0.0 - 1.5 % Final   • Neutrophils, Absolute 10/20/2022 5.60  1.70 - 7.00 10*3/mm3 Final   • Lymphocytes, Absolute 10/20/2022 2.00  0.70 - 3.10 10*3/mm3 Final   • Monocytes, Absolute 10/20/2022 0.50  0.10 - 0.90 10*3/mm3 Final   • Eosinophils, Absolute 10/20/2022 0.20  0.00 - 0.40 10*3/mm3 Final   • Basophils, Absolute 10/20/2022 0.00  0.00 - 0.20 10*3/mm3 Final   • nRBC 10/20/2022 0.0  0.0 - 0.2 /100 WBC Final   • C-Reactive Protein 10/20/2022 0.40  0.00 - 0.50 mg/dL Final   • Extra Tube 10/20/2022 Hold for add-ons.   Final    Auto resulted.         Physical Exam  Vitals and nursing note reviewed.   Constitutional:       Appearance: Normal appearance. She is normal weight.   HENT:      Head: Normocephalic and atraumatic.   Cardiovascular:      Rate and Rhythm: Normal rate.   Pulmonary:      Effort: Pulmonary effort is normal.   Abdominal:      General: Abdomen is flat. Bowel sounds  are normal. There is no distension.      Palpations: Abdomen is soft. There is no mass.      Tenderness: There is no abdominal tenderness. There is no right CVA tenderness, left CVA tenderness, guarding or rebound.      Hernia: No hernia is present.   Musculoskeletal:      Lumbar back: Spasms present. Decreased range of motion.      Right hip: Tenderness present. Decreased range of motion. Decreased strength.   Skin:     General: Skin is warm and dry.      Capillary Refill: Capillary refill takes less than 2 seconds.      Coloration: Skin is not jaundiced or pale.   Neurological:      General: No focal deficit present.      Mental Status: She is alert and oriented to person, place, and time. Mental status is at baseline.      Motor: No weakness.      Coordination: Coordination normal.      Gait: Gait normal.   Psychiatric:         Mood and Affect: Mood normal.         Behavior: Behavior normal.         Thought Content: Thought content normal.         Judgment: Judgment normal.          Result Review  Data Reviewed:{ Labs  Result Review  Imaging  Med Tab  Media :23}   I have reviewed this patient's chart.  I have reviewed previous labs, previous imaging, previous medications, and previous encounters with notes that were available in this patient's chart.             Assessment and Plan {CC Problem List  Visit Diagnosis  ROS  Review (Popup)  [facility] [place]  Quality  BestPractice  Medications  SmartSets  SnapShot Encounters  Media :23}   Diagnoses and all orders for this visit:    1. Urgency of urination (Primary)  -     Ambulatory Referral to Urology  -     oxybutynin (DITROPAN) 5 MG tablet; Take 1 tablet by mouth 3 (Three) Times a Day.  Dispense: 30 tablet; Refill: 3    2. Bladder wall thickening  -     Ambulatory Referral to Urology    3. Bladder spasm  -     oxybutynin (DITROPAN) 5 MG tablet; Take 1 tablet by mouth 3 (Three) Times a Day.  Dispense: 30 tablet; Refill: 3    4. Abdominal pain,  unspecified abdominal location  -     HYDROcodone-acetaminophen (NORCO) 5-325 MG per tablet; Take 1 tablet by mouth Every 6 (Six) Hours As Needed for Severe Pain.  Dispense: 28 tablet; Refill: 0    5. Acute right hip pain    6. Right groin pain      -Oxybutynin sent to the pharmacy to help with bladder spasms and frequency urgency.  -Referral made to urology for worsening urgency, worsening frequency, and bladder wall thickening.  -Follow-up in 4 weeks or sooner if needed.    I spent 30 minutes caring for Amelie on this date of service. This time includes time spent by me in the following activities:preparing for the visit, reviewing tests, obtaining and/or reviewing a separately obtained history, performing a medically appropriate examination and/or evaluation , counseling and educating the patient/family/caregiver, ordering medications, tests, or procedures, referring and communicating with other health care professionals , documenting information in the medical record, independently interpreting results and communicating that information with the patient/family/caregiver and care coordination.    Follow Up {Instructions Charge Capture  Follow-up Communications :23}     Patient was given instructions and counseling regarding her condition or for health maintenance advice. Please see specific information pulled into the AVS (placed there by myself) if appropriate.    Return in about 4 weeks (around 12/5/2022).    MDM  Number of Diagnoses or Management Options  Abdominal pain, unspecified abdominal location: established, worsening  Acute right hip pain: established, worsening  Bladder spasm: established, worsening  Bladder wall thickening: established, worsening  Right groin pain: established, worsening  Urgency of urination: established, worsening     Amount and/or Complexity of Data Reviewed  Clinical lab tests: reviewed  Tests in the radiology section of CPT®: reviewed  Tests in the medicine section of CPT®:  reviewed  Discussion of test results with the performing providers: no  Decide to obtain previous medical records or to obtain history from someone other than the patient: no  Obtain history from someone other than the patient: no  Review and summarize past medical records: yes  Discuss the patient with other providers: no  Independent visualization of images, tracings, or specimens: no    Risk of Complications, Morbidity, and/or Mortality  Presenting problems: high  Diagnostic procedures: low  Management options: high    Critical Care  Total time providing critical care: 30-74 minutes    Patient Progress  Patient progress: stable       BMI is >= 25 and <30. (Overweight) The following options were offered after discussion;: nutrition counseling/recommendations       EDSON Cali, FNP-BC

## 2022-11-25 DIAGNOSIS — F32.89 OTHER DEPRESSION: ICD-10-CM

## 2022-11-25 DIAGNOSIS — F41.1 GENERALIZED ANXIETY DISORDER: ICD-10-CM

## 2022-11-28 RX ORDER — DULOXETIN HYDROCHLORIDE 30 MG/1
CAPSULE, DELAYED RELEASE ORAL
Qty: 49 CAPSULE | Refills: 0 | Status: SHIPPED | OUTPATIENT
Start: 2022-11-28 | End: 2022-12-19

## 2022-12-06 ENCOUNTER — TELEMEDICINE (OUTPATIENT)
Dept: FAMILY MEDICINE CLINIC | Facility: CLINIC | Age: 37
End: 2022-12-06
Payer: COMMERCIAL

## 2022-12-06 ENCOUNTER — TELEPHONE (OUTPATIENT)
Dept: FAMILY MEDICINE CLINIC | Facility: CLINIC | Age: 37
End: 2022-12-06

## 2022-12-06 DIAGNOSIS — F41.8 MIXED ANXIETY DEPRESSIVE DISORDER: ICD-10-CM

## 2022-12-06 DIAGNOSIS — F31.9 BIPOLAR DEPRESSION: Primary | ICD-10-CM

## 2022-12-06 DIAGNOSIS — R11.2 NAUSEA AND VOMITING, UNSPECIFIED VOMITING TYPE: ICD-10-CM

## 2022-12-06 DIAGNOSIS — R39.15 URGENCY OF URINATION: ICD-10-CM

## 2022-12-06 DIAGNOSIS — R10.9 ABDOMINAL PAIN, UNSPECIFIED ABDOMINAL LOCATION: ICD-10-CM

## 2022-12-06 DIAGNOSIS — N32.89 BLADDER SPASM: ICD-10-CM

## 2022-12-06 PROCEDURE — 99214 OFFICE O/P EST MOD 30 MIN: CPT | Performed by: REGISTERED NURSE

## 2022-12-06 RX ORDER — HYDROCODONE BITARTRATE AND ACETAMINOPHEN 5; 325 MG/1; MG/1
1 TABLET ORAL EVERY 6 HOURS PRN
Qty: 28 TABLET | Refills: 0 | Status: SHIPPED | OUTPATIENT
Start: 2022-12-06 | End: 2023-01-16 | Stop reason: SDUPTHER

## 2022-12-06 RX ORDER — CARBAMAZEPINE 100 MG/1
TABLET, EXTENDED RELEASE ORAL
Qty: 60 TABLET | Refills: 3 | Status: SHIPPED | OUTPATIENT
Start: 2022-12-13 | End: 2030-12-31

## 2022-12-06 RX ORDER — OXYBUTYNIN CHLORIDE 5 MG/1
10 TABLET ORAL 2 TIMES DAILY
Qty: 120 TABLET | Refills: 3 | Status: SHIPPED | OUTPATIENT
Start: 2022-12-06 | End: 2023-01-24

## 2022-12-06 RX ORDER — ONDANSETRON 4 MG/1
4 TABLET, FILM COATED ORAL EVERY 6 HOURS PRN
Qty: 120 TABLET | Refills: 1 | Status: SHIPPED | OUTPATIENT
Start: 2022-12-06

## 2022-12-06 RX ORDER — LAMOTRIGINE 200 MG/1
TABLET ORAL
Qty: 180 TABLET | Refills: 1 | Status: SHIPPED | OUTPATIENT
Start: 2022-12-06 | End: 2023-01-24

## 2022-12-06 RX ORDER — CARBAMAZEPINE 100 MG/1
100 TABLET, EXTENDED RELEASE ORAL 2 TIMES DAILY
Qty: 60 TABLET | Refills: 3 | Status: SHIPPED | OUTPATIENT
Start: 2022-12-06 | End: 2022-12-06

## 2022-12-06 NOTE — PATIENT INSTRUCTIONS
Decrease lamictal to 100mg BID for 7 days,  On day eight take lamictal 100mg daily while starting Tegretol 100mg daily x 7 days.  On day 21, stop lamictal. Increase Tegretol to 100mg BID.

## 2022-12-06 NOTE — PROGRESS NOTES
[facility] PRIMARY CARE  Patient: Amelie Henderson   Age: 37 y.o.  Sex: female  :  1985  MRN: [id number]    Amelie Henderson was located at home and I was located at my office for this telemedicine/telephone encounter. We utilized the telephone [employer] video option for the encounter and Amelie Henderson and I were able to hear and see each other simultaneously in real time. I introduced myself and verified Amelie Henderson identity. I explained how the telemedicine visit will occur. I advised Amelie Henderson that technology-related delays and breaches of privacy are potential risks associated with conducting the encounter via telemedicine.    I also advised Amelie Henderson that at any point she may terminate the telemedicine encounter and withdraw her consent for receiving care via telemedicine without affecting her ability to receive future care from us, and that I may also terminate the telemedicine encounter if I determine that an in-person visit is more appropriate for the condition[s] for which treatment is sought.    Having covered these considerations, Amelie Henderson verbally acknowledged them and gave consent for the use of telemedicine in her care.    Start time: 1415  End time 1445    CHIEF COMPLAINT:  Chief Complaint   Patient presents with   • Depression   • Groin Pain        The following portions of the chart were reviewed this encounter and updated as appropriate:    Patient is a 37-year-old female who presents to the clinic today via telehealth for 1 month follow-up on depression, anxiety, and overactive bladder x6 months.  Patient denies any chest pain, shortness of breath, or any fevers.  Patient denies any known exposure to COVID, flu, or any other contagious illnesses.    In regards to depression and anxiety, patient has tried multiple drugs in the past to help with anxiety and depression.  Patient shares that she was started on Lamictal while in the hospital.  She shares that the Lamictal has helped  some but she is interested in switching this to a different medication.  We discussed risk versus benefit and patient will keep on Lamictal and slowly transition to Tegretol.  I have advised patient how to safely and slowly switch over.  I have also included this in the education section of the after visit summary.    Patient also brought up concerns of overactive bladder issues patient shares this been going on longer than 6 months patient has never tried medication for this yet.  We discussed starting oxybutynin and seeing if this is helpful.       There were no vitals taken for this visit.   Allergies   Allergen Reactions   • Codeine Hives and Itching   • Latex Rash     Skin gets red and burns, skin starts peeling     • Meloxicam Other (See Comments)     Gastritis       Past Medical History:   Diagnosis Date   • Allergic    • Anemia    • Anxiety    • B12 deficiency    • COVID     x 2    • Depression    • Dercum's disease    • Endometriosis    • Gastritis    • GERD (gastroesophageal reflux disease)    • Hyperlipidemia 2021    Borderline   • Hypertension    • Hypoglycemia    • Migraines    • PTSD (post-traumatic stress disorder)    • Vitamin D deficiency 2020       REVIEW OF SYSTEMS  Review of Systems   Constitutional: Negative.  Negative for activity change, chills, fatigue and fever.   HENT: Negative.  Negative for congestion, dental problem, ear pain, hearing loss, rhinorrhea, sinus pain, sore throat, tinnitus and trouble swallowing.    Eyes: Negative.  Negative for pain and visual disturbance.   Respiratory: Negative.  Negative for cough, chest tightness, shortness of breath and wheezing.    Cardiovascular: Negative.  Negative for chest pain, palpitations and leg swelling.   Gastrointestinal: Negative.  Negative for abdominal pain, diarrhea, nausea and vomiting.   Endocrine: Negative.  Negative for polydipsia, polyphagia and polyuria.   Genitourinary: Positive for difficulty urinating and pelvic pain. Negative  for dysuria, frequency and urgency.   Musculoskeletal: Negative.  Negative for arthralgias, back pain and myalgias.   Skin: Negative.  Negative for color change, pallor, rash and wound.   Allergic/Immunologic: Negative.  Negative for environmental allergies.   Neurological: Negative.  Negative for dizziness, speech difficulty, weakness, light-headedness, numbness and headaches.   Hematological: Negative.    Psychiatric/Behavioral: Negative for confusion, decreased concentration, self-injury and suicidal ideas. The patient is nervous/anxious.    All other systems reviewed and are negative.             LAB REVIEW  Admission on 10/20/2022, Discharged on 10/20/2022   Component Date Value Ref Range Status   • Glucose 10/20/2022 99  65 - 99 mg/dL Final   • BUN 10/20/2022 12  6 - 20 mg/dL Final   • Creatinine 10/20/2022 0.92  0.57 - 1.00 mg/dL Final   • Sodium 10/20/2022 137  136 - 145 mmol/L Final   • Potassium 10/20/2022 4.0  3.5 - 5.2 mmol/L Final   • Chloride 10/20/2022 102  98 - 107 mmol/L Final   • CO2 10/20/2022 23.0  22.0 - 29.0 mmol/L Final   • Calcium 10/20/2022 9.3  8.6 - 10.5 mg/dL Final   • Total Protein 10/20/2022 7.4  6.0 - 8.5 g/dL Final   • Albumin 10/20/2022 4.90  3.50 - 5.20 g/dL Final   • ALT (SGPT) 10/20/2022 15  1 - 33 U/L Final   • AST (SGOT) 10/20/2022 16  1 - 32 U/L Final   • Alkaline Phosphatase 10/20/2022 93  39 - 117 U/L Final   • Total Bilirubin 10/20/2022 0.2  0.0 - 1.2 mg/dL Final   • Globulin 10/20/2022 2.5  gm/dL Final   • A/G Ratio 10/20/2022 2.0  g/dL Final   • BUN/Creatinine Ratio 10/20/2022 13.0  7.0 - 25.0 Final   • Anion Gap 10/20/2022 12.0  5.0 - 15.0 mmol/L Final   • eGFR 10/20/2022 81.9  >60.0 mL/min/1.73 Final    National Kidney Foundation and American Society of Nephrology (ASN) Task Force recommended calculation based on the Chronic Kidney Disease Epidemiology Collaboration (CKD-EPI) equation refit without adjustment for race.   • HCG, Urine QL 10/20/2022 Negative  Negative  Final   • Color, UA 10/20/2022 Yellow  Yellow, Straw Final   • Appearance, UA 10/20/2022 Clear  Clear Final   • pH, UA 10/20/2022 8.0  5.0 - 8.0 Final   • Specific Gravity, UA 10/20/2022 1.010  1.005 - 1.030 Final   • Glucose, UA 10/20/2022 Negative  Negative Final   • Ketones, UA 10/20/2022 Negative  Negative Final   • Bilirubin, UA 10/20/2022 Negative  Negative Final   • Blood, UA 10/20/2022 Negative  Negative Final   • Protein, UA 10/20/2022 Negative  Negative Final   • Leuk Esterase, UA 10/20/2022 Negative  Negative Final   • Nitrite, UA 10/20/2022 Negative  Negative Final   • Urobilinogen, UA 10/20/2022 0.2 E.U./dL  0.2 - 1.0 E.U./dL Final   • WBC 10/20/2022 8.40  3.40 - 10.80 10*3/mm3 Final   • RBC 10/20/2022 4.68  3.77 - 5.28 10*6/mm3 Final   • Hemoglobin 10/20/2022 13.9  12.0 - 15.9 g/dL Final   • Hematocrit 10/20/2022 42.6  34.0 - 46.6 % Final   • MCV 10/20/2022 90.9  79.0 - 97.0 fL Final   • MCH 10/20/2022 29.8  26.6 - 33.0 pg Final   • MCHC 10/20/2022 32.8  31.5 - 35.7 g/dL Final   • RDW 10/20/2022 14.6  12.3 - 15.4 % Final   • RDW-SD 10/20/2022 45.5  37.0 - 54.0 fl Final   • MPV 10/20/2022 7.3  6.0 - 12.0 fL Final   • Platelets 10/20/2022 289  140 - 450 10*3/mm3 Final   • Neutrophil % 10/20/2022 67.6  42.7 - 76.0 % Final   • Lymphocyte % 10/20/2022 23.7  19.6 - 45.3 % Final   • Monocyte % 10/20/2022 5.9  5.0 - 12.0 % Final   • Eosinophil % 10/20/2022 2.3  0.3 - 6.2 % Final   • Basophil % 10/20/2022 0.5  0.0 - 1.5 % Final   • Neutrophils, Absolute 10/20/2022 5.60  1.70 - 7.00 10*3/mm3 Final   • Lymphocytes, Absolute 10/20/2022 2.00  0.70 - 3.10 10*3/mm3 Final   • Monocytes, Absolute 10/20/2022 0.50  0.10 - 0.90 10*3/mm3 Final   • Eosinophils, Absolute 10/20/2022 0.20  0.00 - 0.40 10*3/mm3 Final   • Basophils, Absolute 10/20/2022 0.00  0.00 - 0.20 10*3/mm3 Final   • nRBC 10/20/2022 0.0  0.0 - 0.2 /100 WBC Final   • C-Reactive Protein 10/20/2022 0.40  0.00 - 0.50 mg/dL Final   • Extra Tube 10/20/2022 Hold  for add-ons.   Final    Auto resulted.           Diagnoses and all orders for this visit:    1. Bipolar depression (HCC) (Primary)  -     Discontinue: carBAMazepine XR (TEGretol-XR) 100 MG 12 hr tablet; Take 1 tablet by mouth 2 (Two) Times a Day.  Dispense: 60 tablet; Refill: 3  -     Carbamazepine level, total; Future  -     carBAMazepine XR (TEGretol-XR) 100 MG 12 hr tablet; Take 1 tablet by mouth Daily for 7 days, THEN 2 tablets Daily for 30 days.  Dispense: 60 tablet; Refill: 3    2. Urgency of urination  -     Discontinue: oxybutynin (DITROPAN) 5 MG tablet; Take 2 tablets by mouth 2 (Two) Times a Day.  Dispense: 120 tablet; Refill: 3    3. Bladder spasm  -     Discontinue: oxybutynin (DITROPAN) 5 MG tablet; Take 2 tablets by mouth 2 (Two) Times a Day.  Dispense: 120 tablet; Refill: 3    4. Abdominal pain, unspecified abdominal location  -     Discontinue: HYDROcodone-acetaminophen (NORCO) 5-325 MG per tablet; Take 1 tablet by mouth Every 6 (Six) Hours As Needed for Severe Pain.  Dispense: 28 tablet; Refill: 0    5. Nausea and vomiting, unspecified vomiting type  -     ondansetron (Zofran) 4 MG tablet; Take 1 tablet by mouth Every 6 (Six) Hours As Needed for Nausea or Vomiting.  Dispense: 120 tablet; Refill: 1    6. Mixed anxiety depressive disorder  -     Discontinue: lamoTRIgine (LaMICtal) 200 MG tablet; Take 0.5 tablets by mouth 2 (Two) Times a Day for 7 days, THEN 0.5 tablets Daily for 7 days.  Dispense: 180 tablet; Refill: 1        -Patient has requested to switch from lamictal to tegretol for mood stabilizer for bipolar depression after receiving [employer] testing results.   -The following approach to changing medication is recommended after investigating on UpToDate and Lexicomp:  Decrease lamictal to 100mg BID for 7 days,  On day eight take lamictal 100mg daily while starting Tegretol 100mg daily x 7 days.  On day 21, stop lamictal. Increase Tegretol to 100mg BID.  -ER red flags discussed. Risks and  benefits discussed.   -CMP and CBC every month to monitor safety. Tegretol level if any adverse event occurs.  -Follow up in 2 weeks.    1. Bipolar depression (HCC)    2. Urgency of urination    3. Bladder spasm    4. Abdominal pain, unspecified abdominal location    5. Nausea and vomiting, unspecified vomiting type    6. Mixed anxiety depressive disorder         MDM  Number of Diagnoses or Management Options  Abdominal pain, unspecified abdominal location: established, improving  Bipolar depression (HCC): established, improving  Bladder spasm: established, worsening  Mixed anxiety depressive disorder: established, improving  Nausea and vomiting, unspecified vomiting type: established, improving  Urgency of urination: established, worsening     Amount and/or Complexity of Data Reviewed  Clinical lab tests: reviewed and ordered  Tests in the radiology section of CPT®: reviewed  Tests in the medicine section of CPT®: reviewed  Discussion of test results with the performing providers: no  Decide to obtain previous medical records or to obtain history from someone other than the patient: no  Obtain history from someone other than the patient: no  Review and summarize past medical records: yes  Discuss the patient with other providers: no  Independent visualization of images, tracings, or specimens: no    Risk of Complications, Morbidity, and/or Mortality  Presenting problems: moderate  Diagnostic procedures: low  Management options: moderate    Patient Progress  Patient progress: stable      EDSON Cali, FNP-BC  1/31/2023 13:16 EST

## 2022-12-06 NOTE — TELEPHONE ENCOUNTER
Script clarification needed for [facility] on the Lamotrigine 200mg. Is patient weaning off medication?  Going from BID TO QD but with a qty of 180?     Please advise. Thank you!

## 2022-12-07 NOTE — TELEPHONE ENCOUNTER
Script is not needing to be filled. I meant to sign order as a no print so that it was documented that she was weaning off.

## 2022-12-19 DIAGNOSIS — F41.1 GENERALIZED ANXIETY DISORDER: ICD-10-CM

## 2022-12-19 DIAGNOSIS — F32.89 OTHER DEPRESSION: ICD-10-CM

## 2022-12-19 RX ORDER — DULOXETIN HYDROCHLORIDE 30 MG/1
CAPSULE, DELAYED RELEASE ORAL
Qty: 49 CAPSULE | Refills: 0 | Status: SHIPPED | OUTPATIENT
Start: 2022-12-19 | End: 2023-01-20

## 2022-12-20 ENCOUNTER — OFFICE (AMBULATORY)
Dept: URBAN - METROPOLITAN AREA CLINIC 64 | Facility: CLINIC | Age: 37
End: 2022-12-20
Payer: OTHER GOVERNMENT

## 2022-12-20 ENCOUNTER — TRANSCRIBE ORDERS (OUTPATIENT)
Dept: ADMINISTRATIVE | Facility: HOSPITAL | Age: 37
End: 2022-12-20

## 2022-12-20 VITALS
DIASTOLIC BLOOD PRESSURE: 81 MMHG | WEIGHT: 196 LBS | HEART RATE: 71 BPM | SYSTOLIC BLOOD PRESSURE: 131 MMHG | HEIGHT: 70 IN

## 2022-12-20 DIAGNOSIS — R93.3 IMAGING OF GASTROINTESTINAL TRACT ABNORMAL: Primary | ICD-10-CM

## 2022-12-20 DIAGNOSIS — R11.2 NAUSEA AND VOMITING, UNSPECIFIED VOMITING TYPE: ICD-10-CM

## 2022-12-20 DIAGNOSIS — R11.2 NAUSEA WITH VOMITING, UNSPECIFIED: ICD-10-CM

## 2022-12-20 DIAGNOSIS — R93.3 ABNORMAL FINDINGS ON DIAGNOSTIC IMAGING OF OTHER PARTS OF DI: ICD-10-CM

## 2022-12-20 PROCEDURE — 99214 OFFICE O/P EST MOD 30 MIN: CPT | Performed by: INTERNAL MEDICINE

## 2022-12-30 ENCOUNTER — HOSPITAL ENCOUNTER (OUTPATIENT)
Dept: GENERAL RADIOLOGY | Facility: HOSPITAL | Age: 37
Discharge: HOME | End: 2022-12-30
Admitting: INTERNAL MEDICINE

## 2022-12-30 DIAGNOSIS — R93.3 IMAGING OF GASTROINTESTINAL TRACT ABNORMAL: ICD-10-CM

## 2022-12-30 DIAGNOSIS — R11.2 NAUSEA AND VOMITING, UNSPECIFIED VOMITING TYPE: ICD-10-CM

## 2022-12-30 PROCEDURE — 74250 X-RAY XM SM INT 1CNTRST STD: CPT

## 2022-12-30 RX ADMIN — BARIUM SULFATE 240 ML: 960 POWDER, FOR SUSPENSION ORAL at 10:11

## 2022-12-31 DIAGNOSIS — R39.15 URGENCY OF URINATION: ICD-10-CM

## 2022-12-31 DIAGNOSIS — F31.9 BIPOLAR DEPRESSION: ICD-10-CM

## 2022-12-31 DIAGNOSIS — N32.89 BLADDER SPASM: ICD-10-CM

## 2022-12-31 DIAGNOSIS — G43.109 MIGRAINE WITH AURA AND WITHOUT STATUS MIGRAINOSUS, NOT INTRACTABLE: ICD-10-CM

## 2023-01-03 RX ORDER — OXYBUTYNIN CHLORIDE 5 MG/1
TABLET ORAL
Qty: 120 TABLET | Refills: 3 | OUTPATIENT
Start: 2023-01-03

## 2023-01-03 RX ORDER — CARBAMAZEPINE 100 MG/1
TABLET, EXTENDED RELEASE ORAL
Qty: 60 TABLET | Refills: 3 | OUTPATIENT
Start: 2023-01-03

## 2023-01-03 RX ORDER — ERENUMAB-AOOE 140 MG/ML
INJECTION, SOLUTION SUBCUTANEOUS
Qty: 3 ML | Refills: 3 | Status: SHIPPED | OUTPATIENT
Start: 2023-01-03

## 2023-01-03 NOTE — TELEPHONE ENCOUNTER
Hub is instructed to read the documentation below to patient'    Rx request for Oxybutynin 5 mg was last sent to [facility] pharmacy in [place] on 12/6/22 for 120 tablets with 3 refills. Pt needs to contact pharmacy to get prescription refilled.    Rx request for carBAMazepine  mg was last sent to [facility] pharmacy in [place] on 12/6/22 for 60 tablets 3 refill. Pt needs to contact pharmacy to get prescription refilled.    Rx Refill Note    PROTOCOLS NOT MET     Requested Prescriptions     Pending Prescriptions Disp Refills   • Aimovig 140 MG/ML auto-injector [Pharmacy Med Name: AIMOVIG 140 MG/ML AUTOINJECTOR] 3 mL 0     Sig: INJECT 1ML SUB-Q EVERY 30 DAYS     Refused Prescriptions Disp Refills   • carBAMazepine XR (TEGretol  XR) 100 MG 12 hr tablet [Pharmacy Med Name: CARBAMAZEPINE  MG TABLET] 60 tablet 3     Sig: TAKE 1 TABLET BY MOUTH TWICE A DAY     Refused By: SHARA VÁSQUEZ     Reason for Refusal: Patient has requested refill too soon   • oxybutynin (DITROPAN) 5 MG tablet [Pharmacy Med Name: OXYBUTYNIN 5 MG TABLET] 120 tablet 3     Sig: TAKE 2 TABLETS BY MOUTH TWICE A DAY     Refused By: SHARA VÁSQUEZ     Reason for Refusal: Patient has requested refill too soon      Last office visit with prescribing clinician: 11/7/2022      Next office visit with prescribing clinician: Visit date not found            Shara Vásquez MA  01/03/23, 14:09 EST

## 2023-01-04 RX ORDER — LOSARTAN POTASSIUM 50 MG/1
TABLET ORAL
Qty: 90 TABLET | Refills: 1 | Status: SHIPPED | OUTPATIENT
Start: 2023-01-04

## 2023-01-06 ENCOUNTER — HOSPITAL ENCOUNTER (OUTPATIENT)
Dept: NUCLEAR MEDICINE | Facility: HOSPITAL | Age: 38
Discharge: HOME | End: 2023-01-06
Payer: OTHER GOVERNMENT

## 2023-01-06 DIAGNOSIS — R93.3 IMAGING OF GASTROINTESTINAL TRACT ABNORMAL: ICD-10-CM

## 2023-01-06 DIAGNOSIS — R11.2 NAUSEA AND VOMITING, UNSPECIFIED VOMITING TYPE: ICD-10-CM

## 2023-01-06 PROCEDURE — 0 TECHNETIUM SULFUR COLLOID: Performed by: INTERNAL MEDICINE

## 2023-01-06 PROCEDURE — A9541 TC99M SULFUR COLLOID: HCPCS | Performed by: INTERNAL MEDICINE

## 2023-01-06 PROCEDURE — 78264 GASTRIC EMPTYING IMG STUDY: CPT

## 2023-01-06 RX ADMIN — TECHNETIUM TC 99M SULFUR COLLOID 1 DOSE: KIT at 07:47

## 2023-01-15 DIAGNOSIS — E16.2 HYPOGLYCEMIA: ICD-10-CM

## 2023-01-16 DIAGNOSIS — R10.9 ABDOMINAL PAIN, UNSPECIFIED ABDOMINAL LOCATION: ICD-10-CM

## 2023-01-16 RX ORDER — MIGLITOL 25 MG/1
TABLET, COATED ORAL
Qty: 90 TABLET | Refills: 1 | Status: SHIPPED | OUTPATIENT
Start: 2023-01-16

## 2023-01-17 NOTE — TELEPHONE ENCOUNTER
Rx Refill Note    PROTOCOLS NOT MET     Requested Prescriptions     Pending Prescriptions Disp Refills   • HYDROcodone-acetaminophen (NORCO) 5-325 MG per tablet 28 tablet 0     Sig: Take 1 tablet by mouth Every 6 (Six) Hours As Needed for Severe Pain.      Last office visit with prescribing clinician: 11/7/2022      Next office visit with prescribing clinician: 2/7/2023     LUCIEN PATEL - INSPECT/[facility]/1-17-23 (01/17/2023)         Shara Mathews MA  01/17/23, 09:12 EST

## 2023-01-18 RX ORDER — HYDROCODONE BITARTRATE AND ACETAMINOPHEN 5; 325 MG/1; MG/1
1 TABLET ORAL EVERY 6 HOURS PRN
Qty: 28 TABLET | Refills: 0 | Status: SHIPPED | OUTPATIENT
Start: 2023-01-18 | End: 2023-02-14 | Stop reason: SDUPTHER

## 2023-01-19 RX ORDER — ELAGOLIX 150 MG/1
TABLET, FILM COATED ORAL
COMMUNITY
Start: 2023-01-09 | End: 2023-02-24

## 2023-01-19 RX ORDER — OMEPRAZOLE 20 MG/1
20 CAPSULE, DELAYED RELEASE ORAL 2 TIMES DAILY
COMMUNITY
Start: 2022-12-11

## 2023-01-20 DIAGNOSIS — F41.1 GENERALIZED ANXIETY DISORDER: ICD-10-CM

## 2023-01-20 DIAGNOSIS — F32.89 OTHER DEPRESSION: ICD-10-CM

## 2023-01-20 RX ORDER — DULOXETIN HYDROCHLORIDE 30 MG/1
CAPSULE, DELAYED RELEASE ORAL
Qty: 49 CAPSULE | Refills: 0 | Status: SHIPPED | OUTPATIENT
Start: 2023-01-20 | End: 2023-02-14

## 2023-01-24 ENCOUNTER — OFFICE VISIT (OUTPATIENT)
Dept: ENDOCRINOLOGY | Facility: CLINIC | Age: 38
End: 2023-01-24
Payer: OTHER GOVERNMENT

## 2023-01-24 VITALS
SYSTOLIC BLOOD PRESSURE: 140 MMHG | BODY MASS INDEX: 28.5 KG/M2 | WEIGHT: 203.6 LBS | HEART RATE: 90 BPM | HEIGHT: 71 IN | DIASTOLIC BLOOD PRESSURE: 80 MMHG

## 2023-01-24 DIAGNOSIS — E16.2 HYPOGLYCEMIA: Primary | ICD-10-CM

## 2023-01-24 PROCEDURE — 99213 OFFICE O/P EST LOW 20 MIN: CPT | Performed by: INTERNAL MEDICINE

## 2023-01-24 RX ORDER — BENZONATATE 100 MG/1
CAPSULE ORAL AS NEEDED
COMMUNITY
Start: 2022-11-29 | End: 2023-02-24

## 2023-01-24 RX ORDER — BUPROPION HYDROCHLORIDE 300 MG/1
TABLET ORAL
COMMUNITY
Start: 2022-11-05 | End: 2023-01-24

## 2023-01-24 RX ORDER — LEVOMEFOLATE CALCIUM 7.5 MG TABLET
TABLET DAILY
COMMUNITY

## 2023-01-24 RX ORDER — ONDANSETRON HYDROCHLORIDE 8 MG/1
TABLET, FILM COATED ORAL
COMMUNITY
Start: 2022-11-29

## 2023-01-24 NOTE — PROGRESS NOTES
[facility]        Patient Care Team:  Damon Valera APRN as PCP - General (Nurse Practitioner)    Chief Complaint:    Chief Complaint   Patient presents with   • Hypoglycemia   • dercum's disease         Subjective     Here for hypoglycemia f/u  Originally referred for hypoglycemic episodes  Seen in June & started on miglitol  States she handles the hypoglycemia with diet  Did not notice any changes since taking miglitol  Exercise program: not much due to torn R hamstring  Taking vit D  LMP Dec 23    Interval History:     Patient Complaints: main concern is tremors & wants treatment  Patient Denies: palpitations  History taken from: patient &     Review of Systems:   Review of Systems   HENT: Negative for trouble swallowing.    Eyes: Negative for blurred vision.   Gastrointestinal: Negative for nausea.   Endocrine: Negative for polyuria.   Neurological: Positive for tremors. Negative for syncope and headache.   Psychiatric/Behavioral: Positive for stress.     Gained 6 lb since last visit  Objective     Vital Signs  Heart Rate:  [90] 90  BP: (140)/(80) 140/80    Physical Exam:     General Appearance:    Alert, distressed for lack of improvement re tremors   Head:    Normocephalic, without obvious abnormality, atraumatic   Eyes:       Mouth:  Neck:       No periorbital edema. No lid lag    No lesions    No goiter   Lungs:     Clear     Heart:    Regular rhythm and normal rate   Abdomen:     Normal bowel sounds, soft                 Extremities:   Coarse hand tremors, no edema                Pulses:   Pulses palpable and equal bilaterally   Skin:   No bruising or rash   Neurologic:  DTR 1+          Results Review:    I have reviewed the patient's new clinical results, labs & imaging.    Medication Review:   Prior to Admission medications    Medication Sig Start Date End Date Taking? Authorizing Provider   Aimovig 140 MG/ML auto-injector INJECT 1ML SUB-Q EVERY 30 DAYS 1/3/23  Yes  Damon Valera APRN   armodafinil (Nuvigil) 150 MG tablet Take 1 tablet by mouth Daily.  Patient taking differently: Take 250 mg by mouth Daily. 10/5/22  Yes Damon Valera APRN   benzonatate (TESSALON) 100 MG capsule As Needed. 11/29/22  Yes Ethel Rey MD   carBAMazepine XR (TEGretol-XR) 100 MG 12 hr tablet Take 1 tablet by mouth Daily for 7 days, THEN 2 tablets Daily for 30 days. 12/13/22 12/31/30 Yes Damon Valera APRN   Cholecalciferol 125 MCG (5000 UT) tablet Take 1 tablet by mouth Daily. 11/24/21  Yes Fadia Tovar MD   diclofenac (VOLTAREN) 50 MG EC tablet Take 1 tablet by mouth 2 (Two) Times a Day As Needed (pain). 10/16/22  Yes Damon Valera APRN   diclofenac-miSOPROStol (ARTHROTEC 50) 50-0.2 MG EC tablet Take 1 tablet by mouth 2 (Two) Times a Day.   Yes ProviderEthel MD   DULoxetine (CYMBALTA) 30 MG capsule TAKE 1 CAPSULE BY MOUTH DAILY FOR 7 DAYS, THEN 2 CAPSULES DAILY FOR 21 DAYS. 1/20/23 2/17/23 Yes Damon Valera APRN   ferrous gluconate (FERGON) 324 MG tablet Take 1 tablet by mouth Daily With Breakfast. 8/9/22  Yes Damon Valera APRN   fluticasone (FLONASE) 50 MCG/ACT nasal spray SPRAY 2 SPRAYS INTO THE NOSTRIL AS DIRECTED BY PROVIDER DAILY. 10/7/22  Yes Damon Valera APRN   HYDROcodone-acetaminophen (NORCO) 5-325 MG per tablet Take 1 tablet by mouth Every 6 (Six) Hours As Needed for Severe Pain. 1/18/23  Yes Damon Valera APRN   hydrOXYzine (ATARAX) 25 MG tablet Take 1 tablet by mouth 3 (Three) Times a Day As Needed for Itching. 10/10/22  Yes Damon Valera APRN   l-methylfolate 7.5 MG tablet tablet Take  by mouth Daily.   Yes ProviderEthel MD   l-methylfolate-algae (DEPLIN) 7.5-90.314 MG capsule capsule Take 1 capsule by mouth Daily. 12/2/21  Yes Fadia Tovar MD   LORazepam (ATIVAN) 1 MG tablet Take 1 tablet by mouth Every 8 (Eight) Hours As Needed for Anxiety. 10/7/22  Yes Soto,  EDSON Gant   losartan (COZAAR) 50 MG tablet TAKE 1 TABLET BY MOUTH EVERY DAY IN THE MORNING 1/4/23  Yes Damon Valera APRN   metoprolol tartrate (LOPRESSOR) 25 MG tablet TAKE 1 TABLET BY MOUTH TWICE A DAY 12/12/22  Yes Damon Valera APRN   miglitol (GLYSET) 25 MG tablet TAKE 1 TABLET BY MOUTH EVERY DAY AS YOU START EATING BREAKFAST 1/16/23  Yes Mary Cyr MD   MILK THISTLE PO Take  by mouth.   Yes Provider, MD Ethel   Mirabegron ER (MYRBETRIQ) 50 MG tablet sustained-release 24 hour 24 hr tablet  1/23/23  Yes ProviderEthel MD   multivitamin with minerals tablet tablet 2 gummies po q am - smarty pants    Yes Provider, MD Ethel   NON FORMULARY Ashwaganda   Yes Provider, MD Ethel   OIL OF OREGANO PO Take  by mouth.   Yes ProviderEthel MD   omeprazole (priLOSEC) 20 MG capsule Take 20 mg by mouth 2 (Two) Times a Day. 12/11/22  Yes ProviderEthel MD   ondansetron (Zofran) 4 MG tablet Take 1 tablet by mouth Every 6 (Six) Hours As Needed for Nausea or Vomiting. 12/6/22  Yes Damon Valera APRN   ondansetron (ZOFRAN) 8 MG tablet  11/29/22  Yes ProviderEthel MD   Orilissa 150 MG tablet TAKE 1 TABLET BY MOUTH EVERY DAY FOR 90 DAYS *PA SENT 1/9/23  Yes ProviderEthel MD   prazosin (MINIPRESS) 1 MG capsule Take 1 capsule by mouth Every Night. 10/7/22  Yes Damon Valera APRN   Probiotic Product (FORTIFY PROBIOTIC WOMENS EX ST PO) Take 1 capsule by mouth Daily.   Yes ProviderEthel MD   promethazine (PHENERGAN) 25 MG tablet Take 1 tablet by mouth Every 6 (Six) Hours As Needed for Nausea or Vomiting. 10/20/22  Yes Gilberto Graff MD   rizatriptan (MAXALT) 10 MG tablet 1 tab po at onset of headache. If not resolved in 2 hours may repeat. Do not exceed 2 tabs in 24 hours. 10/7/22  Yes Damon Valera APRN   tiZANidine (ZANAFLEX) 4 MG tablet TAKE 1 TABLET BY MOUTH AT NIGHT AS NEEDED FOR MUSCLE SPASMS. 11/1/22  Yes  Damon Valera APRN   Vitamin B Complex-C capsule Take  by mouth.   Yes Ethel Rey MD   vitamin C (ASCORBIC ACID) 250 MG tablet Take 1 tablet by mouth Daily.   Yes Ethel Rey MD   zonisamide (ZONEGRAN) 100 MG capsule Take 2 capsules by mouth Every Night. 12/2/21  Yes Fadia Tovar MD   cyanocobalamin (VITAMIN B-12) 1000 MCG tablet Take 1 tablet by mouth Daily. 11/24/21 1/24/23 Yes Fadia Tovar MD   buPROPion XL (WELLBUTRIN XL) 300 MG 24 hr tablet  11/5/22 1/24/23  Ethel Rey MD   lamoTRIgine (LaMICtal) 200 MG tablet Take 0.5 tablets by mouth 2 (Two) Times a Day for 7 days, THEN 0.5 tablets Daily for 7 days. 12/6/22 1/24/23  Damon Valera APRN   naproxen sodium (ALEVE) 220 MG tablet   1/24/23  Ethel Rey MD   Omeprazole 20 MG tablet delayed-release Take 20 mg by mouth 2 (Two) Times a Day. 5/16/22 1/24/23  Ethel Rey MD   oxybutynin (DITROPAN) 5 MG tablet Take 2 tablets by mouth 2 (Two) Times a Day. 12/6/22 1/24/23  Damon Valera APRN   Rimegepant Sulfate (NURTEC PO) Take  by mouth.  1/24/23  Ethel Rey MD   traMADol (ULTRAM) 50 MG tablet Take 1 tablet by mouth Every 6 (Six) Hours As Needed for Severe Pain. 10/17/22 1/24/23  Glen Richter MD       Lab Results (most recent)     None         Lab Results   Component Value Date    GLUCOSE 99 10/20/2022    BUN 12 10/20/2022    CREATININE 0.92 10/20/2022    BCR 13.0 10/20/2022    K 4.0 10/20/2022    CO2 23.0 10/20/2022    CALCIUM 9.3 10/20/2022    ALBUMIN 4.90 10/20/2022    LABIL2 1.8 12/30/2018    AST 16 10/20/2022    ALT 15 10/20/2022       Assessment & Plan     Diagnoses and all orders for this visit:    1. Hypoglycemia (Primary)    Hypoglycemia stable.    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  Continue diet.  Continue miglitol & vit D supplements.  Increase exercise as able.  Return prn.        Mary Cyr MD  01/24/23  13:01  EST

## 2023-01-24 NOTE — PATIENT INSTRUCTIONS
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  Continue miglitol & vit D supplements.  Increase exercise as able.  Return prn.

## 2023-01-30 DIAGNOSIS — D50.9 IRON DEFICIENCY ANEMIA, UNSPECIFIED IRON DEFICIENCY ANEMIA TYPE: ICD-10-CM

## 2023-01-30 RX ORDER — DOXYCYCLINE HYCLATE 50 MG/1
CAPSULE, GELATIN COATED ORAL
Qty: 90 TABLET | Refills: 1 | Status: SHIPPED | OUTPATIENT
Start: 2023-01-30

## 2023-02-06 ENCOUNTER — TELEPHONE (OUTPATIENT)
Dept: FAMILY MEDICINE CLINIC | Facility: CLINIC | Age: 38
End: 2023-02-06
Payer: OTHER GOVERNMENT

## 2023-02-06 NOTE — TELEPHONE ENCOUNTER
Zhao is instructed to read the documentation below to patient    Called and LVM with patient. Kenney has had something unexpected come up in his schedule tomorrow and unfortunately will not be in office. Patient can reschedule with Kenney at his next available appointment, or they can reschedule with Dr De La Cruz next week who is filling in with [facility]. Another option would be for them to be schedule with Mumtaz Lauren at the [facility] office. We apologize for the inconvenience..      Thanks

## 2023-02-07 ENCOUNTER — TELEPHONE (OUTPATIENT)
Dept: FAMILY MEDICINE CLINIC | Facility: CLINIC | Age: 38
End: 2023-02-07
Payer: OTHER GOVERNMENT

## 2023-02-07 NOTE — TELEPHONE ENCOUNTER
Hub staff attempted to follow warm transfer process and was unsuccessful     Caller: Amelie Henderson    Relationship to patient: Self    Best call back number: [phone]    Patient is needing:APPOINTMENT FOR MED CHANGE AND MED REFILLS, WILL BE OUT SOON. NO APPTS TILL 3/8/23

## 2023-02-07 NOTE — TELEPHONE ENCOUNTER
HUB to read description below.    Reached out to patient in regards to today's appointment. Her service was choppy. Was getting about every 3rd word. Patient was informed about appointment being canceled. She will call back when she gets better service to reschedule. Please schedule for next available.     Thank you!

## 2023-02-14 DIAGNOSIS — F41.1 GENERALIZED ANXIETY DISORDER: ICD-10-CM

## 2023-02-14 DIAGNOSIS — F32.89 OTHER DEPRESSION: ICD-10-CM

## 2023-02-14 DIAGNOSIS — R10.9 ABDOMINAL PAIN, UNSPECIFIED ABDOMINAL LOCATION: ICD-10-CM

## 2023-02-14 RX ORDER — DULOXETIN HYDROCHLORIDE 30 MG/1
CAPSULE, DELAYED RELEASE ORAL
Qty: 49 CAPSULE | Refills: 0 | Status: SHIPPED | OUTPATIENT
Start: 2023-02-14 | End: 2023-02-24 | Stop reason: SDUPTHER

## 2023-02-15 RX ORDER — HYDROCODONE BITARTRATE AND ACETAMINOPHEN 5; 325 MG/1; MG/1
1 TABLET ORAL EVERY 6 HOURS PRN
Qty: 60 TABLET | Refills: 0 | Status: SHIPPED | OUTPATIENT
Start: 2023-02-15

## 2023-02-15 NOTE — TELEPHONE ENCOUNTER
Rx Refill Note    PROTOCOLS NOT MET     Requested Prescriptions     Pending Prescriptions Disp Refills   • HYDROcodone-acetaminophen (NORCO) 5-325 MG per tablet 28 tablet 0     Sig: Take 1 tablet by mouth Every 6 (Six) Hours As Needed for Severe Pain.      Last office visit with prescribing clinician: 11/7/2022      Next office visit with prescribing clinician: 2/21/2023     LUCIEN PATEL - INSPECT/[facility]/2-15-23 (02/15/2023)         Shara Mathews MA  02/15/23, 08:23 EST

## 2023-02-24 ENCOUNTER — OFFICE VISIT (OUTPATIENT)
Dept: FAMILY MEDICINE CLINIC | Facility: CLINIC | Age: 38
End: 2023-02-24
Payer: OTHER GOVERNMENT

## 2023-02-24 VITALS
OXYGEN SATURATION: 100 % | TEMPERATURE: 97.6 F | RESPIRATION RATE: 18 BRPM | WEIGHT: 205.6 LBS | DIASTOLIC BLOOD PRESSURE: 75 MMHG | BODY MASS INDEX: 28.78 KG/M2 | SYSTOLIC BLOOD PRESSURE: 127 MMHG | HEART RATE: 79 BPM | HEIGHT: 71 IN

## 2023-02-24 DIAGNOSIS — G43.109 MIGRAINE WITH AURA AND WITHOUT STATUS MIGRAINOSUS, NOT INTRACTABLE: ICD-10-CM

## 2023-02-24 DIAGNOSIS — F31.9 BIPOLAR DEPRESSION: Primary | ICD-10-CM

## 2023-02-24 DIAGNOSIS — G89.29 CHRONIC RIGHT HIP PAIN: ICD-10-CM

## 2023-02-24 DIAGNOSIS — F41.1 GENERALIZED ANXIETY DISORDER: ICD-10-CM

## 2023-02-24 DIAGNOSIS — M25.551 CHRONIC RIGHT HIP PAIN: ICD-10-CM

## 2023-02-24 PROCEDURE — 99214 OFFICE O/P EST MOD 30 MIN: CPT | Performed by: FAMILY MEDICINE

## 2023-02-24 RX ORDER — DULOXETIN HYDROCHLORIDE 30 MG/1
CAPSULE, DELAYED RELEASE ORAL
Qty: 30 CAPSULE | Refills: 2 | Status: SHIPPED | OUTPATIENT
Start: 2023-02-24 | End: 2023-03-09

## 2023-02-24 RX ORDER — DULOXETIN HYDROCHLORIDE 60 MG/1
CAPSULE, DELAYED RELEASE ORAL
Qty: 30 CAPSULE | Refills: 2 | Status: SHIPPED | OUTPATIENT
Start: 2023-02-24

## 2023-02-24 RX ORDER — RIZATRIPTAN BENZOATE 10 MG/1
TABLET ORAL
Qty: 9 TABLET | Refills: 3 | Status: SHIPPED | OUTPATIENT
Start: 2023-02-24

## 2023-02-24 RX ORDER — HYDROCODONE BITARTRATE AND ACETAMINOPHEN 10; 325 MG/1; MG/1
0.5 TABLET ORAL EVERY 6 HOURS PRN
Qty: 14 TABLET | Refills: 0 | Status: SHIPPED | OUTPATIENT
Start: 2023-02-24 | End: 2023-03-12 | Stop reason: SDUPTHER

## 2023-02-24 NOTE — PROGRESS NOTES
Chief Complaint   Patient presents with   • Medication Problem     Wants to switch Cymbalta to something   • Med Refill     Hydrocodone       Subjective   Amelie Henderson is a 37 y.o. female.  She is presenting for a f/u visit.  Last visit with her PCP 12/6/2022 (telehealth) for medication changes related to bipolar disorder (switched from Lamictal to Tegretol).  She reports the medication change to Tegretol is working well.  However, she is still having a lot of anxiety and would like to discuss the duloxetine.  It helped for awhile, but has since stopped working.      Since her last visit, patient has seen endocrinology for hypoglycemia, urology for urge incontinence, and [facility] for ongoing hip pain.  MRI of the lumbar spine is pending for 2/27/23.  Patient is chronically prescribed hydrocodone apap 5/325 mg for chronic back/hip pain with a fill of 28 tablets every 5-6 weeks since being started on this medication in October.  Inspect reviewed.  She does not follow with pain management.  She requests a change in the formulation of hydrocodone apap from 5 mg to 10 mg due to availability at the pharmacy.    Patient would also like to readdress her migraine medications.  Currently prescribed Aimovig + zonisamide + rizatriptan.  Migraines still continue.  Migraines worse just prior to when the next Aimovig injection is due.  She always has a headache.  She uses rizatriptan for abortive management, and it will lessen headache but not resolve symptoms.  She reports Nurtec has not helped in the past.  She could like to try Ubrevy.  She has not tried Botox.    Headache  Headache pattern:  Some headache always there, and the pain level varies  Duration:  More than 3 years  Providers seen:  Primary care provider and neurologist  Lifetime total:  20+  Laterality:  Unable to describe (varies in location (including base of the neck))  Location:  Neck (variable, most recently in the neck)  Changes in vision:  Blurred  vision  Other vision changes:  Photophobia  Unilateral symptoms:  None  Abortive medication timing of administration:  When the pain is much worse than usual  Abortive medications tried:  Rizatriptan, sumatriptan, Excedrin migraine/tension, ibuprofen and acetaminophen (Rizaptriptan - helps but does not resolve more severe headaches)  Other abortive medications:  Rebound headaches with OTCs, Nurtec ODT did not help  Preventative medications tried:  Zonisamide and erenumab  Other preventative medications:  Aimovig shots - headaches worsen just prior to next injection     I have reviewed relevant past medical, family, social and surgical history for this patient.  Medications review is done by myself, with patient.    Past Medical History :  Active Ambulatory Problems     Diagnosis Date Noted   • Acid reflux 08/12/2021   • Asthma 11/24/2021   • B12 deficiency 11/24/2021   • Easy bruising 08/22/2019   • Generalized anxiety disorder 11/24/2021   • Mixed anxiety depressive disorder 04/23/2013   • Elevated blood pressure reading 08/13/2021   • History of congestive heart disease 11/24/2021   • Idiopathic hypersomnia 08/12/2021   • Medication overuse headache 05/04/2021   • Migraine with aura and without status migrainosus, not intractable 05/04/2021   • Sleep disturbance 08/12/2021   • Hypoglycemia 01/01/2019   • Vitamin D deficiency 06/15/2022     Resolved Ambulatory Problems     Diagnosis Date Noted   • No Resolved Ambulatory Problems     Past Medical History:   Diagnosis Date   • Allergic    • Anemia    • Anxiety    • COVID    • Depression    • Dercum's disease    • Endometriosis    • Gastritis    • GERD (gastroesophageal reflux disease)    • Hyperlipidemia 2021   • Hypertension    • Migraines    • PTSD (post-traumatic stress disorder)        Medication List:    Current Outpatient Medications:   •  Aimovig 140 MG/ML auto-injector, INJECT 1ML SUB-Q EVERY 30 DAYS, Disp: 3 mL, Rfl: 3  •  carBAMazepine XR (TEGretol-XR) 100  MG 12 hr tablet, Take 1 tablet by mouth Daily for 7 days, THEN 2 tablets Daily for 30 days., Disp: 60 tablet, Rfl: 3  •  Cholecalciferol 125 MCG (5000 UT) tablet, Take 1 tablet by mouth Daily., Disp: 90 tablet, Rfl: 1  •  diclofenac (VOLTAREN) 50 MG EC tablet, Take 1 tablet by mouth 2 (Two) Times a Day As Needed (pain)., Disp: 60 tablet, Rfl: 3  •  DULoxetine (CYMBALTA) 30 MG capsule, TAKE 1 CAPSULE BY MOUTH DAILY FOR 7 DAYS, THEN 2 CAPSULES DAILY FOR 21 DAYS., Disp: 49 capsule, Rfl: 0  •  ferrous gluconate (FERGON) 324 MG tablet, TAKE 1 TABLET BY MOUTH EVERY DAY WITH BREAKFAST, Disp: 90 tablet, Rfl: 1  •  fluticasone (FLONASE) 50 MCG/ACT nasal spray, SPRAY 2 SPRAYS INTO THE NOSTRIL AS DIRECTED BY PROVIDER DAILY., Disp: 16 mL, Rfl: 3  •  hydrOXYzine (ATARAX) 25 MG tablet, Take 1 tablet by mouth 3 (Three) Times a Day As Needed for Itching. (Patient taking differently: Take 25 mg by mouth Every Night.), Disp: 60 tablet, Rfl: 5  •  l-methylfolate 7.5 MG tablet tablet, Take  by mouth Daily., Disp: , Rfl:   •  LORazepam (ATIVAN) 1 MG tablet, Take 1 tablet by mouth Every 8 (Eight) Hours As Needed for Anxiety., Disp: 60 tablet, Rfl: 0  •  losartan (COZAAR) 50 MG tablet, TAKE 1 TABLET BY MOUTH EVERY DAY IN THE MORNING, Disp: 90 tablet, Rfl: 1  •  metoprolol tartrate (LOPRESSOR) 25 MG tablet, TAKE 1 TABLET BY MOUTH TWICE A DAY, Disp: 180 tablet, Rfl: 1  •  miglitol (GLYSET) 25 MG tablet, TAKE 1 TABLET BY MOUTH EVERY DAY AS YOU START EATING BREAKFAST, Disp: 90 tablet, Rfl: 1  •  MILK THISTLE PO, Take  by mouth., Disp: , Rfl:   •  MIRABEGRON ER PO, Take 100 mg by mouth Daily., Disp: , Rfl:   •  multivitamin with minerals tablet tablet, 2 gummies po q am - smarty pants , Disp: , Rfl:   •  NON FORMULARY, Ashwaganda, Disp: , Rfl:   •  OIL OF OREGANO PO, Take  by mouth., Disp: , Rfl:   •  omeprazole (priLOSEC) 20 MG capsule, Take 20 mg by mouth 2 (Two) Times a Day., Disp: , Rfl:   •  ondansetron (Zofran) 4 MG tablet, Take 1 tablet  by mouth Every 6 (Six) Hours As Needed for Nausea or Vomiting., Disp: 120 tablet, Rfl: 1  •  ondansetron (ZOFRAN) 8 MG tablet, , Disp: , Rfl:   •  PHOSPHATIDYLSERINE PO, Take 300 mg by mouth Daily., Disp: , Rfl:   •  Probiotic Product (FORTIFY PROBIOTIC WOMENS EX ST PO), Take 1 capsule by mouth Daily., Disp: , Rfl:   •  promethazine (PHENERGAN) 25 MG tablet, Take 1 tablet by mouth Every 6 (Six) Hours As Needed for Nausea or Vomiting., Disp: 12 tablet, Rfl: 0  •  rizatriptan (MAXALT) 10 MG tablet, 1 tab po at onset of headache. If not resolved in 2 hours may repeat. Do not exceed 2 tabs in 24 hours., Disp: 9 tablet, Rfl: 3  •  tiZANidine (ZANAFLEX) 4 MG tablet, TAKE 1 TABLET BY MOUTH AT NIGHT AS NEEDED FOR MUSCLE SPASMS., Disp: 30 tablet, Rfl: 5  •  Vitamin B Complex-C capsule, Take  by mouth., Disp: , Rfl:   •  vitamin C (ASCORBIC ACID) 250 MG tablet, Take 1 tablet by mouth Daily., Disp: , Rfl:   •  zonisamide (ZONEGRAN) 100 MG capsule, Take 2 capsules by mouth Every Night., Disp: 180 capsule, Rfl: 0  •  armodafinil (Nuvigil) 150 MG tablet, Take 1 tablet by mouth Daily. (Patient taking differently: Take 250 mg by mouth Daily.), Disp: 30 tablet, Rfl: 2  •  HYDROcodone-acetaminophen (NORCO) 5-325 MG per tablet, Take 1 tablet by mouth Every 6 (Six) Hours As Needed for Severe Pain., Disp: 60 tablet, Rfl: 0    Allergies   Allergen Reactions   • Codeine Hives and Itching   • Latex Rash     Skin gets red and burns, skin starts peeling     • Meloxicam Other (See Comments)     Gastritis         Social History     Tobacco Use   • Smoking status: Former     Packs/day: 0.50     Years: 17.00     Pack years: 8.50     Types: Cigarettes     Start date: 1999     Quit date: 2016     Years since quittin.1     Passive exposure: Past   • Smokeless tobacco: Never   Substance Use Topics   • Alcohol use: Yes     Comment: Occassion     Review of Systems   Eyes: Positive for photophobia.   Neurological: Positive for headache.  "Negative for seizures, syncope and facial asymmetry.   Psychiatric/Behavioral: Positive for agitation, depressed mood and stress. Negative for hallucinations, self-injury and suicidal ideas (denies SI and HI). The patient is nervous/anxious.          Objective   Vitals:    02/24/23 0910   BP: 127/75   BP Location: Left arm   Patient Position: Sitting   Cuff Size: Large Adult   Pulse: 79   Resp: 18   Temp: 97.6 °F (36.4 °C)   TempSrc: Temporal   SpO2: 100%   Weight: 93.3 kg (205 lb 9.6 oz)   Height: 179.1 cm (70.5\")     Body mass index is 29.08 kg/m².    Physical Exam  Constitutional:       General: She is not in acute distress.     Appearance: Normal appearance. She is well-developed.   HENT:      Head: Normocephalic and atraumatic.   Eyes:      General: No scleral icterus.        Right eye: No discharge.         Left eye: No discharge.      Extraocular Movements: Extraocular movements intact.      Conjunctiva/sclera: Conjunctivae normal.   Cardiovascular:      Rate and Rhythm: Normal rate and regular rhythm.      Heart sounds: Normal heart sounds. No murmur heard.  Pulmonary:      Effort: Pulmonary effort is normal.      Breath sounds: Normal breath sounds.   Musculoskeletal:      Cervical back: Normal range of motion and neck supple.   Skin:     General: Skin is warm.      Capillary Refill: Capillary refill takes less than 2 seconds.      Findings: No rash.   Neurological:      General: No focal deficit present.      Mental Status: She is alert.      Cranial Nerves: No cranial nerve deficit.   Psychiatric:         Attention and Perception: Attention normal.         Mood and Affect: Mood is anxious.         Speech: Speech normal. Speech is not rapid and pressured or slurred.         Behavior: Behavior is not agitated or aggressive. Behavior is cooperative.         Thought Content: Thought content does not include homicidal or suicidal ideation.         Cognition and Memory: Cognition and memory normal. "         Imaging: MRI R Hip - October 2022  IMPRESSION: Bilateral ischiofemoral impingement, greater on the right, and associated with bilateral chronic hamstring origin tendinopathy and  mild interstitial tearing.      Assessment & Plan     Diagnoses and all orders for this visit:    1. Bipolar depression (HCC) (Primary)  -     DULoxetine (CYMBALTA) 60 MG capsule; Take on 60 mg capsule with one 30 mg capsules once daily for total daily dosage of 90 mg.  Dispense: 30 capsule; Refill: 2  -     DULoxetine (CYMBALTA) 30 MG capsule; Take one 30 mg capsule with one 60 mg capsules once daily for total daily dosage of 90 mg.  Dispense: 30 capsule; Refill: 2    2. Generalized anxiety disorder  -     DULoxetine (CYMBALTA) 30 MG capsule; Take one 30 mg capsule with one 60 mg capsules once daily for total daily dosage of 90 mg.  Dispense: 30 capsule; Refill: 2    3. Migraine with aura and without status migrainosus, not intractable  -     ubrogepant 100 MG tablet; Take 100 mg tablet at onset of migraine headache.  May repeat dosage x 1 in 2 hours if needed.  Dispense: 16 tablet; Refill: 2    4. Chronic right hip pain  -     HYDROcodone-acetaminophen (NORCO)  MG per tablet; Take 0.5 tablets by mouth Every 6 (Six) Hours As Needed for Moderate Pain.  Dispense: 14 tablet; Refill: 0      On review of her [employer] testing, it indicates that higher dosages of duloxetine may be needed for adequate effect.  As she is on 60 mg daily, I will increase this to 90 mg daily, as this may also help some of her chronic pain.  If this fails to reduce anxiety/depressive symptoms, consider change to desvenlafaxine.    Rx for Ubrevy written and PA was approved.  F/U with neurology as recommended.    Formulation in hydrocodone apap written.  She will take 1/2 tablet PRN.    Return for F/U 4-6 weeks for medication recheck..       Patient was given instructions and counseling regarding his/her condition or for health maintenance advice. Please  see specific information pulled into the AVS if appropriate.     I wore protective equipment throughout this patient encounter to include mask. Hand hygiene was performed before donning protective equipment and after removal when leaving the room.

## 2023-02-24 NOTE — TELEPHONE ENCOUNTER
Rx Refill Note  Requested Prescriptions     Pending Prescriptions Disp Refills   • rizatriptan (MAXALT) 10 MG tablet [Pharmacy Med Name: RIZATRIPTAN 10 MG TABLET] 9 tablet 3     Sig: TAKE 1 TABLET AT THE ONSET OF HEADACHE MAY REPEAT IN 2 HOURS MAX OF 2 TABS IN 24 HOURS      Last office visit with prescribing clinician: 11/7/2022   Last telemedicine visit with prescribing clinician: 4/7/2023   Next office visit with prescribing clinician: 4/7/2023                         Would you like a call back once the refill request has been completed: [] Yes [] No    If the office needs to give you a call back, can they leave a voicemail: [] Yes [] No    Shara Mathews MA  02/24/23, 15:26 EST

## 2023-03-01 ENCOUNTER — TRANSCRIBE ORDERS (OUTPATIENT)
Dept: ADMINISTRATIVE | Facility: HOSPITAL | Age: 38
End: 2023-03-01
Payer: OTHER GOVERNMENT

## 2023-03-01 DIAGNOSIS — M99.84 OTHER BIOMECHANICAL LESIONS OF SACRAL REGION: Primary | ICD-10-CM

## 2023-03-07 ENCOUNTER — HOSPITAL ENCOUNTER (OUTPATIENT)
Dept: NUCLEAR MEDICINE | Facility: HOSPITAL | Age: 38
Discharge: HOME | End: 2023-03-07
Payer: OTHER GOVERNMENT

## 2023-03-07 DIAGNOSIS — M99.84 OTHER BIOMECHANICAL LESIONS OF SACRAL REGION: ICD-10-CM

## 2023-03-07 PROCEDURE — 78315 BONE IMAGING 3 PHASE: CPT

## 2023-03-07 PROCEDURE — 0 TECHNETIUM MEDRONATE KIT: Performed by: PAIN MEDICINE

## 2023-03-07 PROCEDURE — A9503 TC99M MEDRONATE: HCPCS | Performed by: PAIN MEDICINE

## 2023-03-07 RX ORDER — TC 99M MEDRONATE 20 MG/10ML
27.7 INJECTION, POWDER, LYOPHILIZED, FOR SOLUTION INTRAVENOUS
Status: COMPLETED | OUTPATIENT
Start: 2023-03-07 | End: 2023-03-07

## 2023-03-07 RX ADMIN — TC 99M MEDRONATE 27.7 MILLICURIE: 20 INJECTION, POWDER, LYOPHILIZED, FOR SOLUTION INTRAVENOUS at 09:39

## 2023-03-09 DIAGNOSIS — F31.9 BIPOLAR DEPRESSION: ICD-10-CM

## 2023-03-09 DIAGNOSIS — F41.1 GENERALIZED ANXIETY DISORDER: ICD-10-CM

## 2023-03-09 RX ORDER — DULOXETIN HYDROCHLORIDE 30 MG/1
CAPSULE, DELAYED RELEASE ORAL
Qty: 49 CAPSULE | Refills: 1 | Status: SHIPPED | OUTPATIENT
Start: 2023-03-09 | End: 2023-04-03

## 2023-03-12 DIAGNOSIS — R10.31 RIGHT GROIN PAIN: ICD-10-CM

## 2023-03-12 DIAGNOSIS — M25.551 ACUTE RIGHT HIP PAIN: ICD-10-CM

## 2023-03-12 DIAGNOSIS — M25.551 CHRONIC RIGHT HIP PAIN: ICD-10-CM

## 2023-03-12 DIAGNOSIS — M62.838 MUSCLE SPASM: ICD-10-CM

## 2023-03-12 DIAGNOSIS — G89.29 CHRONIC RIGHT HIP PAIN: ICD-10-CM

## 2023-03-13 RX ORDER — HYDROCODONE BITARTRATE AND ACETAMINOPHEN 10; 325 MG/1; MG/1
0.5 TABLET ORAL EVERY 6 HOURS PRN
Qty: 14 TABLET | Refills: 0 | Status: SHIPPED | OUTPATIENT
Start: 2023-03-13

## 2023-03-13 NOTE — TELEPHONE ENCOUNTER
Rx Refill Note    PROTOCOLS NOT MET     Requested Prescriptions     Pending Prescriptions Disp Refills   • HYDROcodone-acetaminophen (NORCO)  MG per tablet 14 tablet 0     Sig: Take 0.5 tablets by mouth Every 6 (Six) Hours As Needed for Moderate Pain.      Last office visit with prescribing clinician: 11/7/2022      Next office visit with prescribing clinician: 4/7/2023       INSPECT - SCAN - INSPECT/ [facility] MAYDA PRITCHARD/ 03.13.2023 (03/13/2023)    CONTROLLED SUBSTANCE AGREEMENT - SCAN - CONTROLLED SUBSTANCE AGREEMENT/05/23/22 (05/23/2022)    NO UDS ON FILE.          Aissatou Solorzano LPN  03/13/23, 09:51 EDT

## 2023-04-02 DIAGNOSIS — F31.9 BIPOLAR DEPRESSION: ICD-10-CM

## 2023-04-02 DIAGNOSIS — F41.1 GENERALIZED ANXIETY DISORDER: ICD-10-CM

## 2023-04-03 RX ORDER — DULOXETIN HYDROCHLORIDE 30 MG/1
CAPSULE, DELAYED RELEASE ORAL
Qty: 49 CAPSULE | Refills: 1 | Status: SHIPPED | OUTPATIENT
Start: 2023-04-03

## 2023-04-13 DIAGNOSIS — M62.838 MUSCLE SPASM: ICD-10-CM

## 2023-04-13 RX ORDER — TIZANIDINE 4 MG/1
4 TABLET ORAL NIGHTLY PRN
Qty: 90 TABLET | Refills: 0 | Status: SHIPPED | OUTPATIENT
Start: 2023-04-13

## 2023-04-14 DIAGNOSIS — G89.29 CHRONIC RIGHT HIP PAIN: ICD-10-CM

## 2023-04-14 DIAGNOSIS — N32.81 OVERACTIVE BLADDER: Primary | ICD-10-CM

## 2023-04-14 DIAGNOSIS — M62.838 MUSCLE SPASM: ICD-10-CM

## 2023-04-14 DIAGNOSIS — M25.551 CHRONIC RIGHT HIP PAIN: ICD-10-CM

## 2023-04-14 RX ORDER — TIZANIDINE 4 MG/1
4 TABLET ORAL NIGHTLY PRN
Qty: 90 TABLET | Refills: 0 | OUTPATIENT
Start: 2023-04-14

## 2023-04-14 NOTE — TELEPHONE ENCOUNTER
Hub is instructed to read the documentation below to patient    Tizanidine was sent to pharmacy yesterday on 4/13 to [facility] in [place]. Pt needs to contact pharmacy for  status. Hydrocodone has been routed to the provider for review.    Rx Refill Note    PROTOCOLS NOT MET     Requested Prescriptions     Pending Prescriptions Disp Refills   • HYDROcodone-acetaminophen (NORCO)  MG per tablet 14 tablet 0     Sig: Take 0.5 tablets by mouth Every 6 (Six) Hours As Needed for Moderate Pain.     Refused Prescriptions Disp Refills   • tiZANidine (ZANAFLEX) 4 MG tablet 90 tablet 0     Sig: Take 1 tablet by mouth At Night As Needed for Muscle Spasms.     Refused By: SHARA VÁSQUEZ     Reason for Refusal: Request already responded to by other means (e.g. phone or fax)      Last office visit with prescribing clinician: 11/7/2022      Next office visit with prescribing clinician: 5/17/2023     INSPECT - SCAN - [facility] (04/14/2023)         Shara Vásquez MA  04/14/23, 10:20 EDT

## 2023-04-15 RX ORDER — HYDROCODONE BITARTRATE AND ACETAMINOPHEN 10; 325 MG/1; MG/1
0.5 TABLET ORAL EVERY 6 HOURS PRN
Qty: 60 TABLET | Refills: 0 | Status: SHIPPED | OUTPATIENT
Start: 2023-04-15

## 2023-04-17 ENCOUNTER — TELEPHONE (OUTPATIENT)
Dept: FAMILY MEDICINE CLINIC | Facility: CLINIC | Age: 38
End: 2023-04-17

## 2023-04-17 NOTE — TELEPHONE ENCOUNTER
PATIENT'S  CALLED AND STATES A PRE AUTH IS NEEDED FOR     Mirabegron ER (MYRBETRIQ) 50 MG tablet sustained-release 24 hour 24 hr tablet    SHE IS OUT OF MEDICATION    [employer] HAS A KEY CODE TO USE OR A PHONE NUMBER    KEY CODE [id number]  A PRE AUTH FORM HAS ALREADY BEEN STARTED IN SYSTEM    PHONE NUMBER FOR PRE AUTH [phone]     CALL BACK NUMBER [phone]

## 2023-05-08 DIAGNOSIS — F41.1 GENERALIZED ANXIETY DISORDER: ICD-10-CM

## 2023-05-08 RX ORDER — HYDROXYZINE HYDROCHLORIDE 25 MG/1
25 TABLET, FILM COATED ORAL 3 TIMES DAILY PRN
Qty: 60 TABLET | Refills: 5 | Status: SHIPPED | OUTPATIENT
Start: 2023-05-08

## 2023-05-17 ENCOUNTER — OFFICE VISIT (OUTPATIENT)
Dept: FAMILY MEDICINE CLINIC | Facility: CLINIC | Age: 38
End: 2023-05-17
Payer: OTHER GOVERNMENT

## 2023-05-17 VITALS
HEIGHT: 71 IN | HEART RATE: 93 BPM | TEMPERATURE: 98.3 F | SYSTOLIC BLOOD PRESSURE: 133 MMHG | WEIGHT: 214.2 LBS | OXYGEN SATURATION: 97 % | DIASTOLIC BLOOD PRESSURE: 91 MMHG | BODY MASS INDEX: 29.99 KG/M2

## 2023-05-17 DIAGNOSIS — F51.01 PRIMARY INSOMNIA: ICD-10-CM

## 2023-05-17 DIAGNOSIS — Z13.6 SCREENING FOR ISCHEMIC HEART DISEASE: ICD-10-CM

## 2023-05-17 DIAGNOSIS — F41.9 ANXIETY: ICD-10-CM

## 2023-05-17 DIAGNOSIS — Z13.29 SCREENING FOR ENDOCRINE DISORDER: ICD-10-CM

## 2023-05-17 DIAGNOSIS — R52 PAIN MANAGEMENT: ICD-10-CM

## 2023-05-17 DIAGNOSIS — Z79.891 NARCOTIC USE AGREEMENT EXISTS: ICD-10-CM

## 2023-05-17 DIAGNOSIS — Z13.1 SCREENING FOR DIABETES MELLITUS: Primary | ICD-10-CM

## 2023-05-17 DIAGNOSIS — F41.8 MIXED ANXIETY DEPRESSIVE DISORDER: ICD-10-CM

## 2023-05-17 LAB
POC AMPHETAMINES: NEGATIVE
POC BARBITURATES: NEGATIVE
POC BENZODIAZEPHINES: NEGATIVE
POC COCAINE: NEGATIVE
POC METHADONE: NEGATIVE
POC METHAMPHETAMINE SCREEN URINE: NEGATIVE
POC OPIATES: NEGATIVE
POC OXYCODONE: NEGATIVE
POC PHENCYCLIDINE: NEGATIVE
POC PROPOXYPHENE: NEGATIVE
POC THC: NEGATIVE
POC TRICYCLIC ANTIDEPRESSANTS: NEGATIVE

## 2023-05-17 PROCEDURE — 84443 ASSAY THYROID STIM HORMONE: CPT | Performed by: REGISTERED NURSE

## 2023-05-17 PROCEDURE — 83036 HEMOGLOBIN GLYCOSYLATED A1C: CPT | Performed by: REGISTERED NURSE

## 2023-05-17 PROCEDURE — 80053 COMPREHEN METABOLIC PANEL: CPT | Performed by: REGISTERED NURSE

## 2023-05-17 PROCEDURE — 85025 COMPLETE CBC W/AUTO DIFF WBC: CPT | Performed by: REGISTERED NURSE

## 2023-05-17 RX ORDER — LEVOMILNACIPRAN HYDROCHLORIDE 20 MG/1
CAPSULE, EXTENDED RELEASE ORAL
Qty: 62 CAPSULE | Refills: 1 | Status: SHIPPED | OUTPATIENT
Start: 2023-05-17 | End: 2023-06-18

## 2023-05-17 RX ORDER — TRAZODONE HYDROCHLORIDE 50 MG/1
50 TABLET ORAL NIGHTLY
Qty: 30 TABLET | Refills: 5 | Status: SHIPPED | OUTPATIENT
Start: 2023-05-17 | End: 2023-06-09

## 2023-05-17 RX ORDER — OXYCODONE HYDROCHLORIDE 5 MG/1
5 TABLET ORAL
COMMUNITY
Start: 2023-04-13 | End: 2023-05-17

## 2023-05-17 NOTE — PATIENT INSTRUCTIONS
Initial: 20 mg once daily for 2 days; increase to 40 mg once daily; may then be increased in increments of 40 mg at intervals of 2 or more days; Maintenance: 40 to 120 mg once daily; Maximum: 120 mg/dayInitial: 20 mg once daily for 2 days; increase to 40 mg once daily; may then be increased in increments of 40 mg at intervals of 2 or more days; Maintenance: 40 to 120 mg once daily; Maximum: 120 mg/day

## 2023-05-17 NOTE — PROGRESS NOTES
Chief Complaint  Follow-up (Hysterectomy ) and Medication Problem (Cymbalta. Currently taking 90mg total. Does not feel as if it is appropriate for her and would like to discuss the two different options that are listed on her [employer].)    Patient is a 38-year-old female who presents to the clinic today for 3-month follow-up of pain management, depression with anxiety, and insomnia.  Patient denies any chest pain, shortness of breath, or any fevers.  Patient denies any known exposure to COVID, flu, or any other contagious illnesses.    In regards to pain management, patient is currently taking hydrocodone to manage her chronic back pain.  Patient shares that she is doing well with this and has no concerns with her pain medication at this time.    In regards to depression with anxiety, patient's current antidepressant are not as effective as they could be she shares.  Patient would like to try another medication.  According to [employer] Fetzima is an acceptable option that should work well with her chemistry.  Education provided today and patient is going to try this.    In regards to insomnia, patient has tried over-the-counter medications with no success.  We discussed options and patient would like to try trazodone today.  Benefits versus risks discussed with patient and patient has no concerns or questions in regards to her insomnia at this time.    Subjective    History of Present Illness {CC  Problem List  Visit  Diagnosis   Encounters  Notes  Medications  Labs  Result Review Imaging  Media :23}     Amelie Henderson presents to [facility] for Follow-up (Hysterectomy ) and Medication Problem (Cymbalta. Currently taking 90mg total. Does not feel as if it is appropriate for her and would like to discuss the two different options that are listed on her [employer].).     FETZIMA® (levomilnacipran)     Review of Systems   Constitutional: Negative.  Negative for activity change,  appetite change, chills, diaphoresis, fatigue, fever and unexpected weight change.   HENT: Negative.  Negative for congestion, dental problem, drooling, ear discharge, ear pain, facial swelling, hearing loss, mouth sores, nosebleeds, postnasal drip, rhinorrhea, sinus pressure, sinus pain, sneezing, sore throat, tinnitus, trouble swallowing and voice change.    Eyes: Negative.  Negative for photophobia, pain, discharge, redness, itching and visual disturbance.   Respiratory: Negative.  Negative for apnea, cough, choking, chest tightness, shortness of breath, wheezing and stridor.    Cardiovascular: Negative.  Negative for chest pain, palpitations and leg swelling.   Gastrointestinal: Negative.  Negative for abdominal distention, abdominal pain, anal bleeding, blood in stool, constipation, diarrhea, nausea, rectal pain and vomiting.   Endocrine: Negative.  Negative for cold intolerance, heat intolerance, polydipsia, polyphagia and polyuria.   Genitourinary: Negative.  Negative for decreased urine volume, difficulty urinating, dysuria, enuresis, flank pain, frequency and urgency.   Musculoskeletal: Negative.  Negative for arthralgias, back pain, gait problem, joint swelling, myalgias, neck pain and neck stiffness.   Skin: Negative.  Negative for color change, pallor, rash and wound.   Allergic/Immunologic: Negative.  Negative for environmental allergies, food allergies and immunocompromised state.   Neurological: Negative.  Negative for dizziness, tremors, seizures, syncope, facial asymmetry, speech difficulty, weakness, light-headedness, numbness and headaches.   Hematological: Negative.  Negative for adenopathy. Does not bruise/bleed easily.   Psychiatric/Behavioral:  Positive for sleep disturbance. Negative for agitation, behavioral problems, confusion, decreased concentration, dysphoric mood, hallucinations, self-injury and suicidal ideas. The patient is nervous/anxious. The patient is not hyperactive.    All other  "systems reviewed and are negative.     Objective     Vital Signs:   /91 (BP Location: Left arm, Patient Position: Sitting, Cuff Size: Large Adult)   Pulse 93   Temp 98.3 °F (36.8 °C) (Oral)   Ht 179.1 cm (70.5\")   Wt 97.2 kg (214 lb 3.2 oz)   SpO2 97%   BMI 30.30 kg/m²   Current Outpatient Medications on File Prior to Visit   Medication Sig Dispense Refill    Aimovig 140 MG/ML auto-injector INJECT 1ML SUB-Q EVERY 30 DAYS 3 mL 3    armodafinil (Nuvigil) 150 MG tablet Take 1 tablet by mouth Daily. (Patient taking differently: Take 250 mg by mouth Daily.) 30 tablet 2    carBAMazepine XR (TEGretol-XR) 100 MG 12 hr tablet Take 1 tablet by mouth Daily for 7 days, THEN 2 tablets Daily for 30 days. 60 tablet 3    Cholecalciferol 125 MCG (5000 UT) tablet Take 1 tablet by mouth Daily. 90 tablet 1    diclofenac (VOLTAREN) 50 MG EC tablet TAKE 1 TABLET BY MOUTH TWICE A DAY AS NEEDED FOR PAIN 60 tablet 3    ferrous gluconate (FERGON) 324 MG tablet TAKE 1 TABLET BY MOUTH EVERY DAY WITH BREAKFAST 90 tablet 1    fluticasone (FLONASE) 50 MCG/ACT nasal spray SPRAY 2 SPRAYS INTO THE NOSTRIL AS DIRECTED BY PROVIDER DAILY. 16 mL 3    HYDROcodone-acetaminophen (NORCO)  MG per tablet Take 0.5 tablets by mouth Every 6 (Six) Hours As Needed for Moderate Pain. 60 tablet 0    hydrOXYzine (ATARAX) 25 MG tablet TAKE 1 TABLET BY MOUTH 3 (THREE) TIMES A DAY AS NEEDED FOR ITCHING. 60 tablet 5    l-methylfolate 7.5 MG tablet tablet Take  by mouth Daily.      LORazepam (ATIVAN) 1 MG tablet Take 1 tablet by mouth Every 8 (Eight) Hours As Needed for Anxiety. 60 tablet 0    losartan (COZAAR) 50 MG tablet TAKE 1 TABLET BY MOUTH EVERY DAY IN THE MORNING 90 tablet 1    metoprolol tartrate (LOPRESSOR) 25 MG tablet TAKE 1 TABLET BY MOUTH TWICE A  tablet 1    miglitol (GLYSET) 25 MG tablet TAKE 1 TABLET BY MOUTH EVERY DAY AS YOU START EATING BREAKFAST 90 tablet 1    MILK THISTLE PO Take  by mouth.      multivitamin with minerals " tablet tablet 2 gummies po q am - smarty pants       OIL OF OREGANO PO Take  by mouth.      omeprazole (priLOSEC) 20 MG capsule Take 1 capsule by mouth 2 (Two) Times a Day.      ondansetron (Zofran) 4 MG tablet Take 1 tablet by mouth Every 6 (Six) Hours As Needed for Nausea or Vomiting. 120 tablet 1    ondansetron (ZOFRAN) 8 MG tablet       PHOSPHATIDYLSERINE PO Take 300 mg by mouth Daily.      Probiotic Product (FORTIFY PROBIOTIC WOMENS EX ST PO) Take 1 capsule by mouth Daily.      promethazine (PHENERGAN) 25 MG tablet Take 1 tablet by mouth Every 6 (Six) Hours As Needed for Nausea or Vomiting. 12 tablet 0    rizatriptan (MAXALT) 10 MG tablet TAKE 1 TABLET AT THE ONSET OF HEADACHE MAY REPEAT IN 2 HOURS MAX OF 2 TABS IN 24 HOURS 9 tablet 3    tiZANidine (ZANAFLEX) 4 MG tablet TAKE 1 TABLET BY MOUTH AT NIGHT AS NEEDED FOR MUSCLE SPASMS. 90 tablet 0    Vitamin B Complex-C capsule Take  by mouth.      vitamin C (ASCORBIC ACID) 250 MG tablet Take 1 tablet by mouth Daily.      zonisamide (ZONEGRAN) 100 MG capsule Take 2 capsules by mouth Every Night. 180 capsule 0     No current facility-administered medications on file prior to visit.        Past Medical History:   Diagnosis Date    Allergic     Anemia     Anxiety     B12 deficiency     COVID     x 2     Depression     Dercum's disease     Endometriosis     Gastritis     GERD (gastroesophageal reflux disease)     Hyperlipidemia     Borderline    Hypertension     Hypoglycemia     Migraines     PTSD (post-traumatic stress disorder)     Vitamin D deficiency       Past Surgical History:   Procedure Laterality Date    CARPAL TUNNEL RELEASE Right      SECTION      x 2     CHOLECYSTECTOMY      KNEE SURGERY Right     x 3     KNEE SURGERY Left     x 1     SHOULDER ACROMIOPLASTY WITH ROTATOR CUFF REPAIR Left 2021    Procedure: LEFT SHOULDER ARTHROSCOPY WITH ROTATOR CUFF REPAIR AND SUBACROMIAL DECOMPRESSION- SLAP;  Surgeon: Rianna Jarvis MD;  Location:  [facility];  Service: Orthopedics;  Laterality: Left;      Family History   Problem Relation Age of Onset    Thyroid disease Mother     Glaucoma Mother     Hypertension Mother     Transient ischemic attack Mother     Bell's palsy Mother     Alcohol abuse Father     ADD / ADHD Sister     Depression Son     Pyloric stenosis Son     Hypertension Maternal Grandmother     Heart failure Maternal Grandmother     Stroke Maternal Grandmother     Diabetes Paternal Grandfather     Malig Hyperthermia Neg Hx       Social History     Socioeconomic History    Marital status:    Tobacco Use    Smoking status: Former     Packs/day: 0.50     Years: 17.00     Pack years: 8.50     Types: Cigarettes     Start date: 1999     Quit date: 2016     Years since quittin.4     Passive exposure: Past    Smokeless tobacco: Never   Vaping Use    Vaping Use: Never used   Substance and Sexual Activity    Alcohol use: Yes     Comment: Occassion    Drug use: Never    Sexual activity: Yes     Partners: Male     Birth control/protection: Surgical, Tubal ligation         Office Visit on 2023   Component Date Value Ref Range Status    Glucose 2023 83  65 - 99 mg/dL Final    BUN 2023 12  6 - 20 mg/dL Final    Creatinine 2023 0.84  0.57 - 1.00 mg/dL Final    Sodium 2023 140  136 - 145 mmol/L Final    Potassium 2023 4.2  3.5 - 5.2 mmol/L Final    Chloride 2023 102  98 - 107 mmol/L Final    CO2 2023 24.1  22.0 - 29.0 mmol/L Final    Calcium 2023 9.8  8.6 - 10.5 mg/dL Final    Total Protein 2023 7.2  6.0 - 8.5 g/dL Final    Albumin 2023 4.8  3.5 - 5.2 g/dL Final    ALT (SGPT) 2023 67 (H)  1 - 33 U/L Final    AST (SGOT) 2023 44 (H)  1 - 32 U/L Final    Alkaline Phosphatase 2023 120 (H)  39 - 117 U/L Final    Total Bilirubin 2023 <0.2  0.0 - 1.2 mg/dL Final    Globulin 2023 2.4  gm/dL Final    A/G Ratio 2023 2.0  g/dL Final     BUN/Creatinine Ratio 05/17/2023 14.3  7.0 - 25.0 Final    Anion Gap 05/17/2023 13.9  5.0 - 15.0 mmol/L Final    eGFR 05/17/2023 90.8  >60.0 mL/min/1.73 Final    TSH 05/17/2023 1.230  0.270 - 4.200 uIU/mL Final    Hemoglobin A1C 05/17/2023 5.50  4.80 - 5.60 % Final    Methamphetaine Screen, Urine 05/17/2023 Negative  Negative Final    POC Amphetamines 05/17/2023 Negative  Negative Final    Barbiturates Screen 05/17/2023 Negative  Negative Final    Benzodiazepine Screen 05/17/2023 Negative  Negative Final    Cocaine Screen 05/17/2023 Negative  Negative Final    Methadone Screen 05/17/2023 Negative  Negative Final    Opiate Screen 05/17/2023 Negative  Negative Final    Oxycodone, Screen 05/17/2023 Negative  Negative Final    Phencyclidine (PCP) Screen 05/17/2023 Negative  Negative Final    Propoxyphene Screen 05/17/2023 Negative  Negative Final    THC, Screen 05/17/2023 Negative  Negative Final    Tricyclic Antidepressants Screen 05/17/2023 Negative  Negative Final    WBC 05/17/2023 7.62  3.40 - 10.80 10*3/mm3 Final    RBC 05/17/2023 4.54  3.77 - 5.28 10*6/mm3 Final    Hemoglobin 05/17/2023 13.6  12.0 - 15.9 g/dL Final    Hematocrit 05/17/2023 40.1  34.0 - 46.6 % Final    MCV 05/17/2023 88.3  79.0 - 97.0 fL Final    MCH 05/17/2023 30.0  26.6 - 33.0 pg Final    MCHC 05/17/2023 33.9  31.5 - 35.7 g/dL Final    RDW 05/17/2023 13.0  12.3 - 15.4 % Final    RDW-SD 05/17/2023 41.8  37.0 - 54.0 fl Final    MPV 05/17/2023 9.8  6.0 - 12.0 fL Final    Platelets 05/17/2023 305  140 - 450 10*3/mm3 Final    Neutrophil % 05/17/2023 64.2  42.7 - 76.0 % Final    Lymphocyte % 05/17/2023 24.8  19.6 - 45.3 % Final    Monocyte % 05/17/2023 5.6  5.0 - 12.0 % Final    Eosinophil % 05/17/2023 3.7  0.3 - 6.2 % Final    Basophil % 05/17/2023 0.7  0.0 - 1.5 % Final    Immature Grans % 05/17/2023 1.0 (H)  0.0 - 0.5 % Final    Neutrophils, Absolute 05/17/2023 4.89  1.70 - 7.00 10*3/mm3 Final    Lymphocytes, Absolute 05/17/2023 1.89  0.70 - 3.10  10*3/mm3 Final    Monocytes, Absolute 05/17/2023 0.43  0.10 - 0.90 10*3/mm3 Final    Eosinophils, Absolute 05/17/2023 0.28  0.00 - 0.40 10*3/mm3 Final    Basophils, Absolute 05/17/2023 0.05  0.00 - 0.20 10*3/mm3 Final    Immature Grans, Absolute 05/17/2023 0.08 (H)  0.00 - 0.05 10*3/mm3 Final    nRBC 05/17/2023 0.0  0.0 - 0.2 /100 WBC Final         Physical Exam  Vitals and nursing note reviewed.   Constitutional:       Appearance: Normal appearance. She is normal weight.   HENT:      Head: Normocephalic and atraumatic.   Cardiovascular:      Rate and Rhythm: Normal rate and regular rhythm.      Pulses: Normal pulses.      Heart sounds: Normal heart sounds. No murmur heard.    No friction rub. No gallop.   Pulmonary:      Effort: Pulmonary effort is normal. No respiratory distress.      Breath sounds: Normal breath sounds. No stridor. No wheezing, rhonchi or rales.   Chest:      Chest wall: No tenderness.   Abdominal:      General: Abdomen is flat. Bowel sounds are normal. There is no distension.      Palpations: Abdomen is soft. There is no mass.      Tenderness: There is no abdominal tenderness. There is no right CVA tenderness, left CVA tenderness, guarding or rebound.      Hernia: No hernia is present.   Skin:     General: Skin is warm and dry.      Capillary Refill: Capillary refill takes less than 2 seconds.      Coloration: Skin is not jaundiced or pale.   Neurological:      General: No focal deficit present.      Mental Status: She is alert and oriented to person, place, and time. Mental status is at baseline.      Motor: No weakness.      Coordination: Coordination normal.      Gait: Gait normal.   Psychiatric:         Mood and Affect: Mood normal.         Behavior: Behavior normal.         Thought Content: Thought content normal.         Judgment: Judgment normal.        Result Review  Data Reviewed:{ Labs  Result Review  Imaging  Med Tab  Media :23}   I have reviewed this patient's chart.  I have  reviewed previous labs, previous imaging, previous medications, and previous encounters with notes that were available in this patient's chart.             Assessment and Plan {CC Problem List  Visit Diagnosis  ROS  Review (Popup)  [facility]  BestPractice  Medications  SmartSets  SnapShot Encounters  Media :23}   Diagnoses and all orders for this visit:    1. Screening for diabetes mellitus (Primary)  -     Hemoglobin A1c    2. Screening for endocrine disorder  -     TSH    3. Screening for ischemic heart disease  -     CBC w AUTO Differential  -     Comprehensive metabolic panel    4. Primary insomnia  -     traZODone (DESYREL) 50 MG tablet; Take 1 tablet by mouth Every Night.  Dispense: 30 tablet; Refill: 5    5. Anxiety    6. Mixed anxiety depressive disorder  -     Levomilnacipran HCl ER (Fetzima) 20 MG capsule sustained-release 24 hr; Take 20 mg by mouth Daily for 2 days, THEN 40 mg Daily for 30 days.  Dispense: 62 capsule; Refill: 1    7. Pain management  -     POC Urine Drug Screen, Triage    8. Narcotic use agreement exists  -     POC Urine Drug Screen, Triage      -Fasting labs today.  -Stop Cymbalta and begin Fetzima at patient's request.  She shares that she is thoroughly looked in the side effects and risks involved and is aware and willing to move forward with this.  -Trazodone to help with insomnia.  -Recently had hysterectomy and is recuperating.  -ER red flags discussed with patient.  -Follow-up in 4 weeks or sooner if needed.    I spent 40 minutes caring for Amelie on this date of service. This time includes time spent by me in the following activities:preparing for the visit, reviewing tests, obtaining and/or reviewing a separately obtained history, performing a medically appropriate examination and/or evaluation , counseling and educating the patient/family/caregiver, ordering medications, tests, or procedures, documenting information in the medical record,  independently interpreting results and communicating that information with the patient/family/caregiver, and care coordination.    Follow Up {Instructions Charge Capture  Follow-up Communications :23}     Patient was given instructions and counseling regarding her condition or for health maintenance advice. Please see specific information pulled into the AVS (placed there by myself) if appropriate.    Return in about 4 weeks (around 6/14/2023) for Recheck depression.    MDM     Amount and/or Complexity of Data Reviewed  Clinical lab tests: ordered and reviewed  Tests in the radiology section of CPT®: reviewed  Tests in the medicine section of CPT®: reviewed  Discussion of test results with the performing providers: no  Decide to obtain previous medical records or to obtain history from someone other than the patient: no  Obtain history from someone other than the patient: no  Review and summarize past medical records: yes  Discuss the patient with other providers: no  Independent visualization of images, tracings, or specimens: no    Risk of Complications, Morbidity, and/or Mortality  Presenting problems: moderate  Diagnostic procedures: low  Management options: moderate    Patient Progress  Patient progress: stable       BMI is >= 30 and <35. (Class 1 Obesity). The following options were offered after discussion;: exercise counseling/recommendations and nutrition counseling/recommendations       EDSON Cali, FNP-BC

## 2023-05-18 LAB
ALBUMIN SERPL-MCNC: 4.8 G/DL (ref 3.5–5.2)
ALBUMIN/GLOB SERPL: 2 G/DL
ALP SERPL-CCNC: 120 U/L (ref 39–117)
ALT SERPL W P-5'-P-CCNC: 67 U/L (ref 1–33)
ANION GAP SERPL CALCULATED.3IONS-SCNC: 13.9 MMOL/L (ref 5–15)
AST SERPL-CCNC: 44 U/L (ref 1–32)
BASOPHILS # BLD AUTO: 0.05 10*3/MM3 (ref 0–0.2)
BASOPHILS NFR BLD AUTO: 0.7 % (ref 0–1.5)
BILIRUB SERPL-MCNC: <0.2 MG/DL (ref 0–1.2)
BUN SERPL-MCNC: 12 MG/DL (ref 6–20)
BUN/CREAT SERPL: 14.3 (ref 7–25)
CALCIUM SPEC-SCNC: 9.8 MG/DL (ref 8.6–10.5)
CHLORIDE SERPL-SCNC: 102 MMOL/L (ref 98–107)
CO2 SERPL-SCNC: 24.1 MMOL/L (ref 22–29)
CREAT SERPL-MCNC: 0.84 MG/DL (ref 0.57–1)
DEPRECATED RDW RBC AUTO: 41.8 FL (ref 37–54)
EGFRCR SERPLBLD CKD-EPI 2021: 90.8 ML/MIN/1.73
EOSINOPHIL # BLD AUTO: 0.28 10*3/MM3 (ref 0–0.4)
EOSINOPHIL NFR BLD AUTO: 3.7 % (ref 0.3–6.2)
ERYTHROCYTE [DISTWIDTH] IN BLOOD BY AUTOMATED COUNT: 13 % (ref 12.3–15.4)
GLOBULIN UR ELPH-MCNC: 2.4 GM/DL
GLUCOSE SERPL-MCNC: 83 MG/DL (ref 65–99)
HBA1C MFR BLD: 5.5 % (ref 4.8–5.6)
HCT VFR BLD AUTO: 40.1 % (ref 34–46.6)
HGB BLD-MCNC: 13.6 G/DL (ref 12–15.9)
IMM GRANULOCYTES # BLD AUTO: 0.08 10*3/MM3 (ref 0–0.05)
IMM GRANULOCYTES NFR BLD AUTO: 1 % (ref 0–0.5)
LYMPHOCYTES # BLD AUTO: 1.89 10*3/MM3 (ref 0.7–3.1)
LYMPHOCYTES NFR BLD AUTO: 24.8 % (ref 19.6–45.3)
MCH RBC QN AUTO: 30 PG (ref 26.6–33)
MCHC RBC AUTO-ENTMCNC: 33.9 G/DL (ref 31.5–35.7)
MCV RBC AUTO: 88.3 FL (ref 79–97)
MONOCYTES # BLD AUTO: 0.43 10*3/MM3 (ref 0.1–0.9)
MONOCYTES NFR BLD AUTO: 5.6 % (ref 5–12)
NEUTROPHILS NFR BLD AUTO: 4.89 10*3/MM3 (ref 1.7–7)
NEUTROPHILS NFR BLD AUTO: 64.2 % (ref 42.7–76)
NRBC BLD AUTO-RTO: 0 /100 WBC (ref 0–0.2)
PLATELET # BLD AUTO: 305 10*3/MM3 (ref 140–450)
PMV BLD AUTO: 9.8 FL (ref 6–12)
POTASSIUM SERPL-SCNC: 4.2 MMOL/L (ref 3.5–5.2)
PROT SERPL-MCNC: 7.2 G/DL (ref 6–8.5)
RBC # BLD AUTO: 4.54 10*6/MM3 (ref 3.77–5.28)
SODIUM SERPL-SCNC: 140 MMOL/L (ref 136–145)
TSH SERPL DL<=0.05 MIU/L-ACNC: 1.23 UIU/ML (ref 0.27–4.2)
WBC NRBC COR # BLD: 7.62 10*3/MM3 (ref 3.4–10.8)

## 2023-05-22 DIAGNOSIS — N32.81 OVERACTIVE BLADDER: Primary | ICD-10-CM

## 2023-05-23 ENCOUNTER — TELEPHONE (OUTPATIENT)
Dept: FAMILY MEDICINE CLINIC | Facility: CLINIC | Age: 38
End: 2023-05-23

## 2023-05-23 NOTE — TELEPHONE ENCOUNTER
PATIENT CALLED AND STATES SHE IS ALLERGIC TO CEDAR AND UNKNOWINGLY BURNED SOMETHING WITH CEDAR IN IT. SHE IS HAVING SORE THROAT, HIVES, CONGESTION, CLEAR RUNNY NOSE, OXYGEN LEVEL WENT TO 88. ADVISED TO GO TO ER. SHE STATES SHE WILL GO TO [facility] ER    CALL BACK NUMBER [phone]

## 2023-06-06 DIAGNOSIS — F31.9 BIPOLAR DEPRESSION: ICD-10-CM

## 2023-06-06 RX ORDER — CARBAMAZEPINE 100 MG/1
TABLET, EXTENDED RELEASE ORAL
Qty: 60 TABLET | Refills: 3 | Status: SHIPPED | OUTPATIENT
Start: 2023-06-06

## 2023-06-09 DIAGNOSIS — F51.01 PRIMARY INSOMNIA: ICD-10-CM

## 2023-06-09 RX ORDER — TRAZODONE HYDROCHLORIDE 50 MG/1
TABLET ORAL
Qty: 30 TABLET | Refills: 5 | Status: SHIPPED | OUTPATIENT
Start: 2023-06-09

## 2023-06-19 ENCOUNTER — OFFICE VISIT (OUTPATIENT)
Dept: FAMILY MEDICINE CLINIC | Facility: CLINIC | Age: 38
End: 2023-06-19
Payer: OTHER GOVERNMENT

## 2023-06-19 VITALS
OXYGEN SATURATION: 97 % | HEIGHT: 71 IN | TEMPERATURE: 98.1 F | RESPIRATION RATE: 18 BRPM | DIASTOLIC BLOOD PRESSURE: 70 MMHG | SYSTOLIC BLOOD PRESSURE: 122 MMHG | HEART RATE: 102 BPM | WEIGHT: 217 LBS | BODY MASS INDEX: 30.38 KG/M2

## 2023-06-19 DIAGNOSIS — T78.40XS ALLERGY, SEQUELA: ICD-10-CM

## 2023-06-19 DIAGNOSIS — F41.8 MIXED ANXIETY DEPRESSIVE DISORDER: Primary | ICD-10-CM

## 2023-06-19 DIAGNOSIS — J45.909 ASTHMA, UNSPECIFIED ASTHMA SEVERITY, UNSPECIFIED WHETHER COMPLICATED, UNSPECIFIED WHETHER PERSISTENT: ICD-10-CM

## 2023-06-19 DIAGNOSIS — M25.551 CHRONIC RIGHT HIP PAIN: ICD-10-CM

## 2023-06-19 DIAGNOSIS — M62.838 MUSCLE SPASM: ICD-10-CM

## 2023-06-19 DIAGNOSIS — G89.29 CHRONIC RIGHT HIP PAIN: ICD-10-CM

## 2023-06-19 RX ORDER — HYDROCODONE BITARTRATE AND ACETAMINOPHEN 10; 325 MG/1; MG/1
0.5 TABLET ORAL EVERY 8 HOURS PRN
Qty: 30 TABLET | Refills: 0 | Status: SHIPPED | OUTPATIENT
Start: 2023-06-19

## 2023-06-19 RX ORDER — TIZANIDINE 4 MG/1
4 TABLET ORAL NIGHTLY PRN
Qty: 90 TABLET | Refills: 1 | Status: SHIPPED | OUTPATIENT
Start: 2023-06-19

## 2023-06-19 RX ORDER — ALBUTEROL SULFATE 90 UG/1
2 AEROSOL, METERED RESPIRATORY (INHALATION) EVERY 4 HOURS PRN
Qty: 18 G | Refills: 2 | Status: SHIPPED | OUTPATIENT
Start: 2023-06-19

## 2023-06-19 RX ORDER — LEVOMILNACIPRAN HYDROCHLORIDE 40 MG/1
40 CAPSULE, EXTENDED RELEASE ORAL DAILY
Qty: 90 CAPSULE | Refills: 1 | Status: SHIPPED | OUTPATIENT
Start: 2023-06-19

## 2023-06-19 RX ORDER — EPINEPHRINE 0.3 MG/.3ML
INJECTION SUBCUTANEOUS
COMMUNITY
Start: 2023-05-30

## 2023-06-19 NOTE — PROGRESS NOTES
Chief Complaint  Follow-up    Subjective    History of Present Illness {CC  Problem List  Visit  Diagnosis   Encounters  Notes  Medications  Labs  Result Review Imaging  Media :23}     Amelie Henderson presents to [facility] for Follow-up.      History of Present Illness  Patient is a 38-year-old female who presents to the clinic today for ER follow-up for asthma with allergic reaction x1 week.  Patient denies any chest pain, shortness of breath, or any fevers.  Patient denies any known exposure to COVID, flu, or any other contagious illnesses.    In regards to recent ER visit, patient was seen at [facility] about a week ago due to an allergic reaction to cedar dust.  Patient shares that her  was cutting up [place]edar[place] when she inhaled some of it and had a reaction.  She was taken to the ER and her saturations were dropping significantly low as she was having hard time breathing.  Patient required an EpiPen to get it under control.  Patient has shared that she is having lots of issues with multiple allergens.  She is currently taking Flonase, Allegra, and other over-the-counter allergy medications which have not been helpful.  We discussed patient following up with an allergen specialist to be tested and receive further specific treatment for her allergies.  Patient is agreeable to this approach today.  Requested records from [facility].     Review of Systems   Constitutional: Negative.  Negative for activity change, chills, fatigue and fever.   HENT: Negative.  Negative for congestion, dental problem, ear pain, hearing loss, rhinorrhea, sinus pain, sore throat, tinnitus and trouble swallowing.    Eyes: Negative.  Negative for pain and visual disturbance.   Respiratory: Negative.  Negative for cough, chest tightness, shortness of breath and wheezing.    Cardiovascular: Negative.  Negative for chest pain, palpitations and leg swelling.   Gastrointestinal:  "Negative.  Negative for abdominal pain, diarrhea, nausea and vomiting.   Endocrine: Negative.  Negative for polydipsia, polyphagia and polyuria.   Genitourinary: Negative.  Negative for difficulty urinating, dysuria, frequency and urgency.   Musculoskeletal: Negative.  Negative for arthralgias, back pain and myalgias.   Skin: Negative.  Negative for color change, pallor, rash and wound.   Allergic/Immunologic: Negative.  Negative for environmental allergies.   Neurological: Negative.  Negative for dizziness, speech difficulty, weakness, light-headedness, numbness and headaches.   Hematological: Negative.    Psychiatric/Behavioral: Negative.  Negative for confusion, decreased concentration, self-injury and suicidal ideas. The patient is not nervous/anxious.    All other systems reviewed and are negative.     Objective     Vital Signs:   /70 (BP Location: Left arm, Patient Position: Sitting, Cuff Size: Adult)   Pulse 102   Temp 98.1 °F (36.7 °C) (Oral)   Resp 18   Ht 179.1 cm (70.5\")   Wt 98.4 kg (217 lb)   SpO2 97%   BMI 30.70 kg/m²   Current Outpatient Medications on File Prior to Visit   Medication Sig Dispense Refill    Aimovig 140 MG/ML auto-injector INJECT 1ML SUB-Q EVERY 30 DAYS 3 mL 3    armodafinil (Nuvigil) 150 MG tablet Take 1 tablet by mouth Daily. (Patient taking differently: Take 250 mg by mouth Daily.) 30 tablet 2    carBAMazepine XR (TEGretol  XR) 100 MG 12 hr tablet TAKE 1 TABLET BY MOUTH DAILY FOR 7 DAYS, THEN 2 TABLETS DAILY 60 tablet 3    Cholecalciferol 125 MCG (5000 UT) tablet Take 1 tablet by mouth Daily. 90 tablet 1    diclofenac (VOLTAREN) 50 MG EC tablet TAKE 1 TABLET BY MOUTH TWICE A DAY AS NEEDED FOR PAIN 60 tablet 3    EPINEPHrine (EPIPEN) 0.3 MG/0.3ML solution auto-injector injection INJECT 0.3MG IN THE MUSCLE AS DIRECTED      ferrous gluconate (FERGON) 324 MG tablet TAKE 1 TABLET BY MOUTH EVERY DAY WITH BREAKFAST 90 tablet 1    fluticasone (FLONASE) 50 MCG/ACT nasal spray " SPRAY 2 SPRAYS INTO THE NOSTRIL AS DIRECTED BY PROVIDER DAILY. 16 mL 3    hydrOXYzine (ATARAX) 25 MG tablet TAKE 1 TABLET BY MOUTH 3 (THREE) TIMES A DAY AS NEEDED FOR ITCHING. 60 tablet 5    l-methylfolate 7.5 MG tablet tablet Take  by mouth Daily.      LORazepam (ATIVAN) 1 MG tablet Take 1 tablet by mouth Every 8 (Eight) Hours As Needed for Anxiety. 60 tablet 0    losartan (COZAAR) 50 MG tablet TAKE 1 TABLET BY MOUTH EVERY DAY IN THE MORNING 90 tablet 1    metoprolol tartrate (LOPRESSOR) 25 MG tablet TAKE 1 TABLET BY MOUTH TWICE A  tablet 1    miglitol (GLYSET) 25 MG tablet TAKE 1 TABLET BY MOUTH EVERY DAY AS YOU START EATING BREAKFAST 90 tablet 1    MILK THISTLE PO Take  by mouth.      Mirabegron ER (Myrbetriq) 50 MG tablet sustained-release 24 hour 24 hr tablet Take 50 mg by mouth Daily. 30 tablet 1    multivitamin with minerals tablet tablet 2 gummies po q am - smarty pants       OIL OF OREGANO PO Take  by mouth.      omeprazole (priLOSEC) 20 MG capsule Take 1 capsule by mouth 2 (Two) Times a Day.      ondansetron (Zofran) 4 MG tablet Take 1 tablet by mouth Every 6 (Six) Hours As Needed for Nausea or Vomiting. 120 tablet 1    ondansetron (ZOFRAN) 8 MG tablet       PHOSPHATIDYLSERINE PO Take 300 mg by mouth Daily.      Probiotic Product (FORTIFY PROBIOTIC WOMENS EX ST PO) Take 1 capsule by mouth Daily.      promethazine (PHENERGAN) 25 MG tablet Take 1 tablet by mouth Every 6 (Six) Hours As Needed for Nausea or Vomiting. 12 tablet 0    rizatriptan (MAXALT) 10 MG tablet TAKE 1 TABLET AT THE ONSET OF HEADACHE MAY REPEAT IN 2 HOURS MAX OF 2 TABS IN 24 HOURS 9 tablet 3    traZODone (DESYREL) 50 MG tablet TAKE 1 TABLET BY MOUTH EVERY DAY AT NIGHT 30 tablet 5    Vitamin B Complex-C capsule Take  by mouth.      vitamin C (ASCORBIC ACID) 250 MG tablet Take 1 tablet by mouth Daily.      zonisamide (ZONEGRAN) 100 MG capsule Take 2 capsules by mouth Every Night. 180 capsule 0    [DISCONTINUED]  HYDROcodone-acetaminophen (NORCO)  MG per tablet Take 0.5 tablets by mouth Every 6 (Six) Hours As Needed for Moderate Pain. 60 tablet 0    [DISCONTINUED] tiZANidine (ZANAFLEX) 4 MG tablet TAKE 1 TABLET BY MOUTH AT NIGHT AS NEEDED FOR MUSCLE SPASMS. 90 tablet 0    [DISCONTINUED] Levomilnacipran HCl ER (Fetzima) 20 MG capsule sustained-release 24 hr Take 20 mg by mouth Daily for 2 days, THEN 40 mg Daily for 30 days. 62 capsule 1     No current facility-administered medications on file prior to visit.        Past Medical History:   Diagnosis Date    Allergic     Anemia     Anxiety     B12 deficiency     COVID     x 2     Depression     Dercum's disease     Endometriosis     Gastritis     GERD (gastroesophageal reflux disease)     Hyperlipidemia     Borderline    Hypertension     Hypoglycemia     Migraines     PTSD (post-traumatic stress disorder)     Vitamin D deficiency       Past Surgical History:   Procedure Laterality Date    CARPAL TUNNEL RELEASE Right      SECTION      x 2     CHOLECYSTECTOMY      KNEE SURGERY Right     x 3     KNEE SURGERY Left     x 1     SHOULDER ACROMIOPLASTY WITH ROTATOR CUFF REPAIR Left 2021    Procedure: LEFT SHOULDER ARTHROSCOPY WITH ROTATOR CUFF REPAIR AND SUBACROMIAL DECOMPRESSION- SLAP;  Surgeon: Rianna Jarvis MD;  Location: [facility] [place] MAIN OR;  Service: Orthopedics;  Laterality: Left;      Family History   Problem Relation Age of Onset    Thyroid disease Mother     Glaucoma Mother     Hypertension Mother     Transient ischemic attack Mother     Bell's palsy Mother     Alcohol abuse Father     ADD / ADHD Sister     Depression Son     Pyloric stenosis Son     Hypertension Maternal Grandmother     Heart failure Maternal Grandmother     Stroke Maternal Grandmother     Diabetes Paternal Grandfather     Malig Hyperthermia Neg Hx       Social History     Socioeconomic History    Marital status:    Tobacco Use    Smoking status: Former     Packs/day: 0.50      Years: 17.00     Pack years: 8.50     Types: Cigarettes     Start date: 1999     Quit date: 2016     Years since quittin.4     Passive exposure: Past    Smokeless tobacco: Never   Vaping Use    Vaping Use: Never used   Substance and Sexual Activity    Alcohol use: Yes     Comment: Occassion    Drug use: Never    Sexual activity: Yes     Partners: Male     Birth control/protection: Surgical, Tubal ligation         Office Visit on 2023   Component Date Value Ref Range Status    Glucose 2023 83  65 - 99 mg/dL Final    BUN 2023 12  6 - 20 mg/dL Final    Creatinine 2023 0.84  0.57 - 1.00 mg/dL Final    Sodium 2023 140  136 - 145 mmol/L Final    Potassium 2023 4.2  3.5 - 5.2 mmol/L Final    Chloride 2023 102  98 - 107 mmol/L Final    CO2 2023 24.1  22.0 - 29.0 mmol/L Final    Calcium 2023 9.8  8.6 - 10.5 mg/dL Final    Total Protein 2023 7.2  6.0 - 8.5 g/dL Final    Albumin 2023 4.8  3.5 - 5.2 g/dL Final    ALT (SGPT) 2023 67 (H)  1 - 33 U/L Final    AST (SGOT) 2023 44 (H)  1 - 32 U/L Final    Alkaline Phosphatase 2023 120 (H)  39 - 117 U/L Final    Total Bilirubin 2023 <0.2  0.0 - 1.2 mg/dL Final    Globulin 2023 2.4  gm/dL Final    A/G Ratio 2023 2.0  g/dL Final    BUN/Creatinine Ratio 2023 14.3  7.0 - 25.0 Final    Anion Gap 2023 13.9  5.0 - 15.0 mmol/L Final    eGFR 2023 90.8  >60.0 mL/min/1.73 Final    TSH 2023 1.230  0.270 - 4.200 uIU/mL Final    Hemoglobin A1C 2023 5.50  4.80 - 5.60 % Final    Methamphetaine Screen, Urine 2023 Negative  Negative Final    POC Amphetamines 2023 Negative  Negative Final    Barbiturates Screen 2023 Negative  Negative Final    Benzodiazepine Screen 2023 Negative  Negative Final    Cocaine Screen 2023 Negative  Negative Final    Methadone Screen 2023 Negative  Negative Final    Opiate Screen 2023  Negative  Negative Final    Oxycodone, Screen 05/17/2023 Negative  Negative Final    Phencyclidine (PCP) Screen 05/17/2023 Negative  Negative Final    Propoxyphene Screen 05/17/2023 Negative  Negative Final    THC, Screen 05/17/2023 Negative  Negative Final    Tricyclic Antidepressants Screen 05/17/2023 Negative  Negative Final    WBC 05/17/2023 7.62  3.40 - 10.80 10*3/mm3 Final    RBC 05/17/2023 4.54  3.77 - 5.28 10*6/mm3 Final    Hemoglobin 05/17/2023 13.6  12.0 - 15.9 g/dL Final    Hematocrit 05/17/2023 40.1  34.0 - 46.6 % Final    MCV 05/17/2023 88.3  79.0 - 97.0 fL Final    MCH 05/17/2023 30.0  26.6 - 33.0 pg Final    MCHC 05/17/2023 33.9  31.5 - 35.7 g/dL Final    RDW 05/17/2023 13.0  12.3 - 15.4 % Final    RDW-SD 05/17/2023 41.8  37.0 - 54.0 fl Final    MPV 05/17/2023 9.8  6.0 - 12.0 fL Final    Platelets 05/17/2023 305  140 - 450 10*3/mm3 Final    Neutrophil % 05/17/2023 64.2  42.7 - 76.0 % Final    Lymphocyte % 05/17/2023 24.8  19.6 - 45.3 % Final    Monocyte % 05/17/2023 5.6  5.0 - 12.0 % Final    Eosinophil % 05/17/2023 3.7  0.3 - 6.2 % Final    Basophil % 05/17/2023 0.7  0.0 - 1.5 % Final    Immature Grans % 05/17/2023 1.0 (H)  0.0 - 0.5 % Final    Neutrophils, Absolute 05/17/2023 4.89  1.70 - 7.00 10*3/mm3 Final    Lymphocytes, Absolute 05/17/2023 1.89  0.70 - 3.10 10*3/mm3 Final    Monocytes, Absolute 05/17/2023 0.43  0.10 - 0.90 10*3/mm3 Final    Eosinophils, Absolute 05/17/2023 0.28  0.00 - 0.40 10*3/mm3 Final    Basophils, Absolute 05/17/2023 0.05  0.00 - 0.20 10*3/mm3 Final    Immature Grans, Absolute 05/17/2023 0.08 (H)  0.00 - 0.05 10*3/mm3 Final    nRBC 05/17/2023 0.0  0.0 - 0.2 /100 WBC Final         Physical Exam  Vitals and nursing note reviewed.   Constitutional:       Appearance: Normal appearance. She is normal weight.   HENT:      Head: Normocephalic and atraumatic.   Cardiovascular:      Rate and Rhythm: Normal rate and regular rhythm.      Pulses: Normal pulses.      Heart sounds:  Normal heart sounds. No murmur heard.    No friction rub. No gallop.   Pulmonary:      Effort: Pulmonary effort is normal. No respiratory distress.      Breath sounds: Normal breath sounds. No stridor. No wheezing, rhonchi or rales.   Chest:      Chest wall: No tenderness.   Abdominal:      General: Abdomen is flat. Bowel sounds are normal. There is no distension.      Palpations: Abdomen is soft. There is no mass.      Tenderness: There is no abdominal tenderness. There is no right CVA tenderness, left CVA tenderness, guarding or rebound.      Hernia: No hernia is present.   Skin:     General: Skin is warm and dry.      Capillary Refill: Capillary refill takes less than 2 seconds.      Coloration: Skin is not jaundiced or pale.   Neurological:      General: No focal deficit present.      Mental Status: She is alert and oriented to person, place, and time. Mental status is at baseline.      Motor: No weakness.      Coordination: Coordination normal.      Gait: Gait normal.   Psychiatric:         Mood and Affect: Mood normal.         Behavior: Behavior normal.         Thought Content: Thought content normal.         Judgment: Judgment normal.        Result Review  Data Reviewed:{ Labs  Result Review  Imaging  Med Tab  Media :23}   I have reviewed this patient's chart.  I have reviewed previous labs, previous imaging, previous medications, and previous encounters with notes that were available in this patient's chart.               Assessment and Plan {CC Problem List  Visit Diagnosis  ROS  Review (Popup)  [facility]  Quality  BestPractice  Medications  SmartSets  SnapShot Encounters  Media :23}   Diagnoses and all orders for this visit:    1. Mixed anxiety depressive disorder (Primary)  -     Levomilnacipran HCl ER (Fetzima) 40 MG capsule sustained-release 24 hr; Take 40 mg by mouth Daily.  Dispense: 90 capsule; Refill: 1    2. Allergy, sequela  -     Ambulatory Referral to ENT  (Otolaryngology)    3. Muscle spasm  -     tiZANidine (ZANAFLEX) 4 MG tablet; Take 1 tablet by mouth At Night As Needed for Muscle Spasms.  Dispense: 90 tablet; Refill: 1    4. Chronic right hip pain  -     HYDROcodone-acetaminophen (NORCO)  MG per tablet; Take 0.5 tablets by mouth Every 8 (Eight) Hours As Needed for Moderate Pain.  Dispense: 30 tablet; Refill: 0    5. Asthma, unspecified asthma severity, unspecified whether complicated, unspecified whether persistent  -     albuterol sulfate  (90 Base) MCG/ACT inhaler; Inhale 2 puffs Every 4 (Four) Hours As Needed for Wheezing.  Dispense: 18 g; Refill: 2        -Referral to ear nose and throat/allergy specialist at patient's request further allergy testing.  -ER red flags discussed with patient including risk versus benefit and education provided.  -Follow-up with me in 3 months or sooner.    I spent 40 minutes caring for Amelie on this date of service. This time includes time spent by me in the following activities:preparing for the visit, reviewing tests, obtaining and/or reviewing a separately obtained history, performing a medically appropriate examination and/or evaluation , counseling and educating the patient/family/caregiver, ordering medications, tests, or procedures, referring and communicating with other health care professionals , documenting information in the medical record, independently interpreting results and communicating that information with the patient/family/caregiver, and care coordination.      Follow Up {Instructions Charge Capture  Follow-up Communications :23}     Patient was given instructions and counseling regarding her condition or for health maintenance advice. Please see specific information pulled into the AVS (placed there by myself) if appropriate.    Return in about 3 months (around 9/19/2023) for Next scheduled follow up.    MDM     Amount and/or Complexity of Data Reviewed  Clinical lab tests: reviewed  Tests  in the radiology section of CPT®: reviewed  Tests in the medicine section of CPT®: reviewed  Discussion of test results with the performing providers: no  Decide to obtain previous medical records or to obtain history from someone other than the patient: yes  Obtain history from someone other than the patient: no  Review and summarize past medical records: yes  Independent visualization of images, tracings, or specimens: no    Risk of Complications, Morbidity, and/or Mortality  Presenting problems: moderate  Diagnostic procedures: low  Management options: moderate    Patient Progress  Patient progress: stable               EDSON Cali, FNP-BC

## 2023-07-10 ENCOUNTER — OFFICE (AMBULATORY)
Dept: URBAN - METROPOLITAN AREA CLINIC 64 | Facility: CLINIC | Age: 38
End: 2023-07-10
Payer: OTHER GOVERNMENT

## 2023-07-10 VITALS
DIASTOLIC BLOOD PRESSURE: 88 MMHG | HEIGHT: 70 IN | WEIGHT: 220 LBS | HEART RATE: 77 BPM | SYSTOLIC BLOOD PRESSURE: 116 MMHG

## 2023-07-10 DIAGNOSIS — R11.2 NAUSEA WITH VOMITING, UNSPECIFIED: ICD-10-CM

## 2023-07-10 PROCEDURE — 99213 OFFICE O/P EST LOW 20 MIN: CPT | Performed by: INTERNAL MEDICINE

## 2023-07-24 ENCOUNTER — OFFICE VISIT (OUTPATIENT)
Dept: FAMILY MEDICINE CLINIC | Facility: CLINIC | Age: 38
End: 2023-07-24
Payer: OTHER GOVERNMENT

## 2023-07-24 VITALS
OXYGEN SATURATION: 100 % | SYSTOLIC BLOOD PRESSURE: 118 MMHG | HEIGHT: 70 IN | WEIGHT: 218 LBS | BODY MASS INDEX: 31.21 KG/M2 | TEMPERATURE: 97.1 F | DIASTOLIC BLOOD PRESSURE: 74 MMHG | HEART RATE: 89 BPM

## 2023-07-24 DIAGNOSIS — R21 RASH AND OTHER NONSPECIFIC SKIN ERUPTION: ICD-10-CM

## 2023-07-24 DIAGNOSIS — F41.9 ANXIETY: Primary | ICD-10-CM

## 2023-07-24 DIAGNOSIS — E66.01 MORBID (SEVERE) OBESITY DUE TO EXCESS CALORIES: ICD-10-CM

## 2023-07-24 DIAGNOSIS — R74.8 ELEVATED LIVER ENZYMES: ICD-10-CM

## 2023-07-24 DIAGNOSIS — R11.2 NAUSEA AND VOMITING, UNSPECIFIED VOMITING TYPE: ICD-10-CM

## 2023-07-24 RX ORDER — SEMAGLUTIDE 0.25 MG/.5ML
0.25 INJECTION, SOLUTION SUBCUTANEOUS WEEKLY
Qty: 2 ML | Refills: 1 | Status: SHIPPED | OUTPATIENT
Start: 2023-07-24

## 2023-07-24 RX ORDER — HYDROXYZINE 50 MG/1
TABLET, FILM COATED ORAL
Qty: 120 TABLET | Refills: 3 | Status: SHIPPED | OUTPATIENT
Start: 2023-07-24

## 2023-07-24 RX ORDER — ONDANSETRON 4 MG/1
4 TABLET, FILM COATED ORAL EVERY 6 HOURS PRN
Qty: 120 TABLET | Refills: 1 | Status: SHIPPED | OUTPATIENT
Start: 2023-07-24

## 2023-07-24 RX ORDER — NYSTATIN AND TRIAMCINOLONE ACETONIDE 100000; 1 [USP'U]/G; MG/G
1 OINTMENT TOPICAL 2 TIMES DAILY
Qty: 15 G | Refills: 2 | Status: SHIPPED | OUTPATIENT
Start: 2023-07-24

## 2023-07-24 RX ORDER — HYDROXYZINE PAMOATE 100 MG/1
100 CAPSULE ORAL 3 TIMES DAILY PRN
Qty: 90 CAPSULE | Refills: 1 | Status: CANCELLED | OUTPATIENT
Start: 2023-07-24

## 2023-07-24 NOTE — PROGRESS NOTES
Chief Complaint  Labs Only (Discuss most recent lab results ), Insomnia (Still having issues sleeping. Currently takes 50mg of Trazodone.), Med Refill (Zofran), Referral  (Wants to discuss referral to different dermatologist due to spot on right leg.), and Obesity (Wants to discuss weight loss options )    Subjective    History of Present Illness {CC  Problem List  Visit  Diagnosis   Encounters  Notes  Medications  Labs  Result Review Imaging  Media :23}     Amelie Henderson presents to [facility] for Labs Only (Discuss most recent lab results ), Insomnia (Still having issues sleeping. Currently takes 50mg of Trazodone.), Med Refill (Zofran), Referral  (Wants to discuss referral to different dermatologist due to spot on right leg.), and Obesity (Wants to discuss weight loss options ).      History of Present Illness  Patient is a 38 y.o. female  presents to the clinic today for 1 month follow-up for insomnia, anxiety, and weight management.  Patient denies any chest pain, shortness of breath, or any fevers.  Patient denies any known exposure to COVID, flu, or any other contagious illnesses.    In regards to insomnia, patient started taking trazodone a few months ago to help with rest..  Patient states it helped for the first couple of day but then she stopped noticing the effectiveness.  Patient has issues falling asleep everyday she shares.  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  Her and her  get ready for bed typically around 2200 and she lays in bed until atleast 0000 or 0100.  Patient wakes up at 0700 almost everyday due to her animals.  Patient shares that she is always tired. She can have an eventful day, be lethargic, yet not be able to fall asleep. This week she hasn't been able to take trazodone because she has allergy test.    In regards to anxiety, patient has  been taking hydroxyzine to help with this as well.  Patient shares that it is helpful and also helps with her insomnia some as well.  Patient has no significant concerns about this at this time but would like a medication to bit stronger.  After risk versus benefits discussed, we will keep with hydroxyzine at this time.    Patient also has concerns of increased weight over the last year.  We discussed continuing a low fat and low carb diet.  Patient has tried this and has not been successful at reducing weight.  We discussed adding Wegovy on board to help suppress appetite.  Patient is agreeable with this and I have move forward with sending a prescription to pharmacy for her.  If it versus risks thoroughly discussed with patient.     Review of Systems   Constitutional:  Positive for fatigue. Negative for activity change, chills and fever.   HENT: Negative.  Negative for congestion, dental problem, ear pain, hearing loss, rhinorrhea, sinus pain, sore throat, tinnitus and trouble swallowing.    Eyes: Negative.  Negative for pain and visual disturbance.   Respiratory: Negative.  Negative for cough, chest tightness, shortness of breath and wheezing.    Cardiovascular: Negative.  Negative for chest pain, palpitations and leg swelling.   Gastrointestinal: Negative.  Negative for abdominal pain, diarrhea, nausea and vomiting.   Endocrine: Negative.  Negative for polydipsia, polyphagia and polyuria.   Genitourinary: Negative.  Negative for difficulty urinating, dysuria, frequency and urgency.   Musculoskeletal: Negative.  Negative for arthralgias, back pain and myalgias.   Skin: Negative.  Negative for color change, pallor, rash and wound.   Allergic/Immunologic: Negative.  Negative for environmental allergies.   Neurological: Negative.  Negative for dizziness, speech difficulty, weakness, light-headedness, numbness and headaches.   Hematological: Negative.    Psychiatric/Behavioral:  Positive for sleep disturbance.  "Negative for confusion, decreased concentration, self-injury and suicidal ideas. The patient is nervous/anxious and has insomnia.    All other systems reviewed and are negative.     Objective     Vital Signs:   /74 (BP Location: Left arm, Patient Position: Sitting, Cuff Size: Large Adult)   Pulse 89   Temp 97.1 øF (36.2 øC) (Temporal)   Ht 177.8 cm (70\")   Wt 98.9 kg (218 lb)   SpO2 100%   BMI 31.28 kg/mý   Current Outpatient Medications on File Prior to Visit   Medication Sig Dispense Refill    Aimovig 140 MG/ML auto-injector INJECT 1ML SUB-Q EVERY 30 DAYS 3 mL 3    albuterol sulfate  (90 Base) MCG/ACT inhaler Inhale 2 puffs Every 4 (Four) Hours As Needed for Wheezing. 18 g 2    armodafinil (Nuvigil) 150 MG tablet Take 1 tablet by mouth Daily. (Patient taking differently: Take 250 mg by mouth Daily.) 30 tablet 2    carBAMazepine XR (TEGretol  XR) 100 MG 12 hr tablet TAKE 1 TABLET BY MOUTH DAILY FOR 7 DAYS, THEN 2 TABLETS DAILY 60 tablet 3    Cholecalciferol 125 MCG (5000 UT) tablet Take 1 tablet by mouth Daily. 90 tablet 1    diclofenac (VOLTAREN) 50 MG EC tablet TAKE 1 TABLET BY MOUTH TWICE A DAY AS NEEDED FOR PAIN 60 tablet 3    EPINEPHrine (EPIPEN) 0.3 MG/0.3ML solution auto-injector injection INJECT 0.3MG IN THE MUSCLE AS DIRECTED      ferrous gluconate (FERGON) 324 MG tablet TAKE 1 TABLET BY MOUTH EVERY DAY WITH BREAKFAST 90 tablet 1    fluticasone (FLONASE) 50 MCG/ACT nasal spray SPRAY 2 SPRAYS INTO THE NOSTRIL AS DIRECTED BY PROVIDER DAILY. 16 mL 3    HYDROcodone-acetaminophen (NORCO)  MG per tablet Take 0.5 tablets by mouth Every 8 (Eight) Hours As Needed for Moderate Pain. 30 tablet 0    HYDROcodone-acetaminophen (NORCO) 5-325 MG per tablet Take 1 tablet by mouth Every 12 (Twelve) Hours As Needed for Moderate Pain. 60 tablet 0    l-methylfolate 7.5 MG tablet tablet Take  by mouth Daily.      Levomilnacipran HCl ER (Fetzima) 40 MG capsule sustained-release 24 hr Take 40 mg by mouth " Daily. 90 capsule 1    LORazepam (ATIVAN) 1 MG tablet Take 1 tablet by mouth Every 8 (Eight) Hours As Needed for Anxiety. 60 tablet 0    losartan (COZAAR) 50 MG tablet TAKE 1 TABLET BY MOUTH EVERY DAY IN THE MORNING 90 tablet 1    metoprolol tartrate (LOPRESSOR) 25 MG tablet TAKE 1 TABLET BY MOUTH TWICE A  tablet 1    miglitol (GLYSET) 25 MG tablet TAKE 1 TABLET BY MOUTH EVERY DAY AS YOU START EATING BREAKFAST 90 tablet 1    MILK THISTLE PO Take  by mouth.      Mirabegron ER (Myrbetriq) 50 MG tablet sustained-release 24 hour 24 hr tablet Take 50 mg by mouth Daily. 30 tablet 1    multivitamin with minerals tablet tablet 2 gummies po q am - smarty pants       OIL OF OREGANO PO Take  by mouth.      omeprazole (priLOSEC) 20 MG capsule Take 1 capsule by mouth 2 (Two) Times a Day.      PHOSPHATIDYLSERINE PO Take 300 mg by mouth Daily.      Probiotic Product (FORTIFY PROBIOTIC WOMENS EX ST PO) Take 1 capsule by mouth Daily.      promethazine (PHENERGAN) 25 MG tablet Take 1 tablet by mouth Every 6 (Six) Hours As Needed for Nausea or Vomiting. 12 tablet 0    rizatriptan (MAXALT) 10 MG tablet TAKE 1 TABLET AT THE ONSET OF HEADACHE MAY REPEAT IN 2 HOURS MAX OF 2 TABS IN 24 HOURS 9 tablet 3    tiZANidine (ZANAFLEX) 4 MG tablet Take 1 tablet by mouth At Night As Needed for Muscle Spasms. 90 tablet 1    traZODone (DESYREL) 50 MG tablet TAKE 1 TABLET BY MOUTH EVERY DAY AT NIGHT 30 tablet 5    Ubrelvy 100 MG tablet TAKE 100 MG TABLET AT ONSET OF MIGRAINE HEADACHE. MAY REPEAT DOSAGE X 1 IN 2 HOURS IF NEEDED.      Vitamin B Complex-C capsule Take  by mouth.      vitamin C (ASCORBIC ACID) 250 MG tablet Take 1 tablet by mouth Daily.      zonisamide (ZONEGRAN) 100 MG capsule Take 2 capsules by mouth Every Night. 180 capsule 0    naloxone (NARCAN) 4 MG/0.1ML nasal spray Call 911. Don't prime. [place] in 1 nostril for overdose. Repeat in 2-3 minutes in other nostril if no or minimal breathing/responsiveness. 2 each 0     No current  facility-administered medications on file prior to visit.        Past Medical History:   Diagnosis Date    Allergic     Anemia     Anxiety     B12 deficiency     COVID     x 2     Depression     Dercum's disease     Endometriosis     Gastritis     GERD (gastroesophageal reflux disease)     Hyperlipidemia     Borderline    Hypertension     Hypoglycemia     Migraines     PTSD (post-traumatic stress disorder)     Vitamin D deficiency       Past Surgical History:   Procedure Laterality Date    CARPAL TUNNEL RELEASE Right      SECTION      x 2     CHOLECYSTECTOMY      KNEE SURGERY Right     x 3     KNEE SURGERY Left     x 1     SHOULDER ACROMIOPLASTY WITH ROTATOR CUFF REPAIR Left 2021    Procedure: LEFT SHOULDER ARTHROSCOPY WITH ROTATOR CUFF REPAIR AND SUBACROMIAL DECOMPRESSION- SLAP;  Surgeon: Rianna Jarvis MD;  Location: [facility] MAIN OR;  Service: Orthopedics;  Laterality: Left;      Family History   Problem Relation Age of Onset    Thyroid disease Mother     Glaucoma Mother     Hypertension Mother     Transient ischemic attack Mother     Bell's palsy Mother     Alcohol abuse Father     ADD / ADHD Sister     Depression Son     Pyloric stenosis Son     Hypertension Maternal Grandmother     Heart failure Maternal Grandmother     Stroke Maternal Grandmother     Diabetes Paternal Grandfather     Malig Hyperthermia Neg Hx       Social History     Socioeconomic History    Marital status:    Tobacco Use    Smoking status: Former     Packs/day: 0.50     Years: 17.00     Pack years: 8.50     Types: Cigarettes     Start date: 1999     Quit date: 2016     Years since quittin.6     Passive exposure: Past    Smokeless tobacco: Never   Vaping Use    Vaping Use: Never used   Substance and Sexual Activity    Alcohol use: Yes     Comment: Occassion    Drug use: Never    Sexual activity: Yes     Partners: Male     Birth control/protection: Surgical, Tubal ligation         Clinical Support on  07/10/2023   Component Date Value Ref Range Status    Glucose 07/10/2023 69  65 - 99 mg/dL Final    BUN 07/10/2023 11  6 - 20 mg/dL Final    Creatinine 07/10/2023 0.88  0.57 - 1.00 mg/dL Final    Sodium 07/10/2023 141  136 - 145 mmol/L Final    Potassium 07/10/2023 3.9  3.5 - 5.2 mmol/L Final    Chloride 07/10/2023 103  98 - 107 mmol/L Final    CO2 07/10/2023 25.1  22.0 - 29.0 mmol/L Final    Calcium 07/10/2023 10.2  8.6 - 10.5 mg/dL Final    Total Protein 07/10/2023 7.3  6.0 - 8.5 g/dL Final    Albumin 07/10/2023 4.6  3.5 - 5.2 g/dL Final    ALT (SGPT) 07/10/2023 102 (H)  1 - 33 U/L Final    AST (SGOT) 07/10/2023 59 (H)  1 - 32 U/L Final    Alkaline Phosphatase 07/10/2023 124 (H)  39 - 117 U/L Final    Total Bilirubin 07/10/2023 <0.2  0.0 - 1.2 mg/dL Final    Globulin 07/10/2023 2.7  gm/dL Final    A/G Ratio 07/10/2023 1.7  g/dL Final    BUN/Creatinine Ratio 07/10/2023 12.5  7.0 - 25.0 Final    Anion Gap 07/10/2023 12.9  5.0 - 15.0 mmol/L Final    eGFR 07/10/2023 85.9  >60.0 mL/min/1.73 Final    C3 Complement 07/10/2023 164.0  82.0 - 167.0 mg/dl Final    C4 Complement 07/10/2023 49.0 (H)  14.0 - 44.0 mg/dl Final    Class Description 07/10/2023 Comment   Final        Levels of Specific IgE       Class  Description of Class      ---------------------------  -----  --------------------                     < 0.10         0         Negative             0.10 -    0.31         0/I       Equivocal/Low             0.32 -    0.55         I         Low             0.56 -    1.40         II        Moderate             1.41 -    3.90         III       High             3.91 -   19.00         IV        Very High            19.01 -  100.00         V         Very High                    >100.00         VI        Very High    IgE 07/10/2023 3 (L)  6 - 495 IU/mL Final    D599-[id number] Alpha-Gal 07/10/2023 <0.10  Class 0 kU/L Final    Beef 07/10/2023 <0.10  Class 0 kU/L Final    Pork 07/10/2023 <0.10  Class 0 kU/L Final    Lamb 07/10/2023  <0.10  Class 0 kU/L Final    Tryptase 07/10/2023 2.9  2.2 - 13.2 ug/L Final   Office Visit on 05/17/2023   Component Date Value Ref Range Status    Glucose 05/17/2023 83  65 - 99 mg/dL Final    BUN 05/17/2023 12  6 - 20 mg/dL Final    Creatinine 05/17/2023 0.84  0.57 - 1.00 mg/dL Final    Sodium 05/17/2023 140  136 - 145 mmol/L Final    Potassium 05/17/2023 4.2  3.5 - 5.2 mmol/L Final    Chloride 05/17/2023 102  98 - 107 mmol/L Final    CO2 05/17/2023 24.1  22.0 - 29.0 mmol/L Final    Calcium 05/17/2023 9.8  8.6 - 10.5 mg/dL Final    Total Protein 05/17/2023 7.2  6.0 - 8.5 g/dL Final    Albumin 05/17/2023 4.8  3.5 - 5.2 g/dL Final    ALT (SGPT) 05/17/2023 67 (H)  1 - 33 U/L Final    AST (SGOT) 05/17/2023 44 (H)  1 - 32 U/L Final    Alkaline Phosphatase 05/17/2023 120 (H)  39 - 117 U/L Final    Total Bilirubin 05/17/2023 <0.2  0.0 - 1.2 mg/dL Final    Globulin 05/17/2023 2.4  gm/dL Final    A/G Ratio 05/17/2023 2.0  g/dL Final    BUN/Creatinine Ratio 05/17/2023 14.3  7.0 - 25.0 Final    Anion Gap 05/17/2023 13.9  5.0 - 15.0 mmol/L Final    eGFR 05/17/2023 90.8  >60.0 mL/min/1.73 Final    TSH 05/17/2023 1.230  0.270 - 4.200 uIU/mL Final    Hemoglobin A1C 05/17/2023 5.50  4.80 - 5.60 % Final    Methamphetaine Screen, Urine 05/17/2023 Negative  Negative Final    POC Amphetamines 05/17/2023 Negative  Negative Final    Barbiturates Screen 05/17/2023 Negative  Negative Final    Benzodiazepine Screen 05/17/2023 Negative  Negative Final    Cocaine Screen 05/17/2023 Negative  Negative Final    Methadone Screen 05/17/2023 Negative  Negative Final    Opiate Screen 05/17/2023 Negative  Negative Final    Oxycodone, Screen 05/17/2023 Negative  Negative Final    Phencyclidine (PCP) Screen 05/17/2023 Negative  Negative Final    Propoxyphene Screen 05/17/2023 Negative  Negative Final    THC, Screen 05/17/2023 Negative  Negative Final    Tricyclic Antidepressants Screen 05/17/2023 Negative  Negative Final    WBC 05/17/2023 7.62  3.40  - 10.80 10*3/mm3 Final    RBC 05/17/2023 4.54  3.77 - 5.28 10*6/mm3 Final    Hemoglobin 05/17/2023 13.6  12.0 - 15.9 g/dL Final    Hematocrit 05/17/2023 40.1  34.0 - 46.6 % Final    MCV 05/17/2023 88.3  79.0 - 97.0 fL Final    MCH 05/17/2023 30.0  26.6 - 33.0 pg Final    MCHC 05/17/2023 33.9  31.5 - 35.7 g/dL Final    RDW 05/17/2023 13.0  12.3 - 15.4 % Final    RDW-SD 05/17/2023 41.8  37.0 - 54.0 fl Final    MPV 05/17/2023 9.8  6.0 - 12.0 fL Final    Platelets 05/17/2023 305  140 - 450 10*3/mm3 Final    Neutrophil % 05/17/2023 64.2  42.7 - 76.0 % Final    Lymphocyte % 05/17/2023 24.8  19.6 - 45.3 % Final    Monocyte % 05/17/2023 5.6  5.0 - 12.0 % Final    Eosinophil % 05/17/2023 3.7  0.3 - 6.2 % Final    Basophil % 05/17/2023 0.7  0.0 - 1.5 % Final    Immature Grans % 05/17/2023 1.0 (H)  0.0 - 0.5 % Final    Neutrophils, Absolute 05/17/2023 4.89  1.70 - 7.00 10*3/mm3 Final    Lymphocytes, Absolute 05/17/2023 1.89  0.70 - 3.10 10*3/mm3 Final    Monocytes, Absolute 05/17/2023 0.43  0.10 - 0.90 10*3/mm3 Final    Eosinophils, Absolute 05/17/2023 0.28  0.00 - 0.40 10*3/mm3 Final    Basophils, Absolute 05/17/2023 0.05  0.00 - 0.20 10*3/mm3 Final    Immature Grans, Absolute 05/17/2023 0.08 (H)  0.00 - 0.05 10*3/mm3 Final    nRBC 05/17/2023 0.0  0.0 - 0.2 /100 WBC Final         Physical Exam  Vitals and nursing note reviewed.   Constitutional:       Appearance: Normal appearance. She is normal weight.   HENT:      Head: Normocephalic and atraumatic.   Cardiovascular:      Rate and Rhythm: Normal rate and regular rhythm.      Pulses: Normal pulses.      Heart sounds: Normal heart sounds. No murmur heard.    No friction rub. No gallop.   Pulmonary:      Effort: Pulmonary effort is normal. No respiratory distress.      Breath sounds: Normal breath sounds. No stridor. No wheezing, rhonchi or rales.   Chest:      Chest wall: No tenderness.   Abdominal:      General: Abdomen is flat. Bowel sounds are normal. There is no  distension.      Palpations: Abdomen is soft. There is no mass.      Tenderness: There is no abdominal tenderness. There is no right CVA tenderness, left CVA tenderness, guarding or rebound.      Hernia: No hernia is present.   Skin:     General: Skin is warm and dry.      Capillary Refill: Capillary refill takes less than 2 seconds.      Coloration: Skin is not jaundiced or pale.   Neurological:      General: No focal deficit present.      Mental Status: She is alert and oriented to person, place, and time. Mental status is at baseline.      Motor: No weakness.      Coordination: Coordination normal.      Gait: Gait normal.   Psychiatric:         Mood and Affect: Mood normal.         Behavior: Behavior normal.         Thought Content: Thought content normal.         Judgment: Judgment normal.        Result Review  Data Reviewed:{ Labs  Result Review  Imaging  Med Tab  Media :23}   I have reviewed this patient's chart.  I have reviewed previous labs, previous imaging, previous medications, and previous encounters with notes that were available in this patient's chart.               Assessment and Plan {CC Problem List  Visit Diagnosis  ROS  Review (Popup)  [facility]  Quality  BestPractice  Medications  SmartSets  SnapShot Encounters  Media :23}   Diagnoses and all orders for this visit:    1. Anxiety (Primary)  -     hydrOXYzine (ATARAX) 50 MG tablet; May take 1 tablet by mouth 2 (Two) Times a Day As Needed for Itching. May also take 2 tablets At Night As Needed.  Dispense: 120 tablet; Refill: 3    2. Rash and other nonspecific skin eruption  -     Ambulatory Referral to Dermatology  -     nystatin-triamcinolone (MYCOLOG) 691838-1.1 UNIT/GM-% ointment; Apply 1 application  topically to the appropriate area as directed 2 (Two) Times a Day.  Dispense: 15 g; Refill: 2    3. Elevated liver enzymes  -     US Liver; Future    4. Morbid (severe) obesity due to excess calories  -      Semaglutide-Weight Management (Wegovy) 0.25 MG/0.5ML solution auto-injector; Inject 0.25 mg under the skin into the appropriate area as directed 1 (One) Time Per Week.  Dispense: 2 mL; Refill: 1    5. BMI 31.0-31.9,adult  -     Semaglutide-Weight Management (Wegovy) 0.25 MG/0.5ML solution auto-injector; Inject 0.25 mg under the skin into the appropriate area as directed 1 (One) Time Per Week.  Dispense: 2 mL; Refill: 1    6. Nausea and vomiting, unspecified vomiting type  -     ondansetron (Zofran) 4 MG tablet; Take 1 tablet by mouth Every 6 (Six) Hours As Needed for Nausea or Vomiting.  Dispense: 120 tablet; Refill: 1        -Wegovy started to help with appetite suppression.  Dosing schedule printed on patient discharge teaching.  Increase dosing every 4 weeks.  -ER red flags discussed with patient including risk versus benefit and education provided.  -Follow-up with me 1 month or sooner if needed.    I spent 40 minutes caring for Amelie on this date of service. This time includes time spent by me in the following activities:preparing for the visit, reviewing tests, obtaining and/or reviewing a separately obtained history, performing a medically appropriate examination and/or evaluation , counseling and educating the patient/family/caregiver, ordering medications, tests, or procedures, referring and communicating with other health care professionals , documenting information in the medical record, independently interpreting results and communicating that information with the patient/family/caregiver, and care coordination.    Follow Up {Instructions Charge Capture  Follow-up Communications :23}     Patient was given instructions and counseling regarding her condition or for health maintenance advice. Please see specific information pulled into the AVS (placed there by myself) if appropriate.    Return in about 6 weeks (around 9/4/2023).            EDSON Cali, FNP-BC

## 2023-08-07 ENCOUNTER — HOSPITAL ENCOUNTER (OUTPATIENT)
Dept: ULTRASOUND IMAGING | Facility: HOSPITAL | Age: 38
Discharge: HOME | End: 2023-08-07
Admitting: REGISTERED NURSE
Payer: OTHER GOVERNMENT

## 2023-08-07 DIAGNOSIS — R74.8 ELEVATED LIVER ENZYMES: ICD-10-CM

## 2023-08-07 PROCEDURE — 76705 ECHO EXAM OF ABDOMEN: CPT

## 2023-08-08 NOTE — PATIENT INSTRUCTIONS
Week 1 through week 4 : 0.25 mg once weekly.   Week 5 through week 8: 0.5 mg once weekly.   Week 9 through week 12: 1 mg once weekly.   Week 13 through week 16: 1.7 mg once weekly.   Week 17 and thereafter (maintenance dosage): 2.4 mg once weekly; if not tolerated, may temporarily decrease dosage to 1.7 mg once weekly for up to 4 additional weeks, then increase to 2.4 mg once weekly

## 2023-08-12 DIAGNOSIS — E16.2 HYPOGLYCEMIA: ICD-10-CM

## 2023-08-14 RX ORDER — MIGLITOL 25 MG/1
TABLET, COATED ORAL
Qty: 90 TABLET | Refills: 1 | OUTPATIENT
Start: 2023-08-14

## 2023-08-14 NOTE — TELEPHONE ENCOUNTER
Last visit 1/2023. No current follow up scheduled. It says to return PRN. Should PCP take this med over?

## 2023-08-15 DIAGNOSIS — F41.9 ANXIETY: ICD-10-CM

## 2023-08-15 RX ORDER — HYDROXYZINE 50 MG/1
TABLET, FILM COATED ORAL
Qty: 120 TABLET | Refills: 3 | Status: SHIPPED | OUTPATIENT
Start: 2023-08-15

## 2023-08-25 ENCOUNTER — OFFICE VISIT (OUTPATIENT)
Dept: FAMILY MEDICINE CLINIC | Facility: CLINIC | Age: 38
End: 2023-08-25
Payer: OTHER GOVERNMENT

## 2023-08-25 VITALS
OXYGEN SATURATION: 99 % | DIASTOLIC BLOOD PRESSURE: 90 MMHG | WEIGHT: 218 LBS | TEMPERATURE: 98.2 F | BODY MASS INDEX: 31.21 KG/M2 | SYSTOLIC BLOOD PRESSURE: 122 MMHG | HEIGHT: 70 IN | RESPIRATION RATE: 18 BRPM | HEART RATE: 66 BPM

## 2023-08-25 DIAGNOSIS — D50.9 IRON DEFICIENCY ANEMIA, UNSPECIFIED IRON DEFICIENCY ANEMIA TYPE: ICD-10-CM

## 2023-08-25 DIAGNOSIS — F51.01 PRIMARY INSOMNIA: ICD-10-CM

## 2023-08-25 DIAGNOSIS — R74.8 ELEVATED LIVER ENZYMES: ICD-10-CM

## 2023-08-25 DIAGNOSIS — G43.109 MIGRAINE WITH AURA AND WITHOUT STATUS MIGRAINOSUS, NOT INTRACTABLE: ICD-10-CM

## 2023-08-25 DIAGNOSIS — M62.838 MUSCLE SPASM: ICD-10-CM

## 2023-08-25 DIAGNOSIS — F41.1 GENERALIZED ANXIETY DISORDER: ICD-10-CM

## 2023-08-25 DIAGNOSIS — M25.551 CHRONIC RIGHT HIP PAIN: Primary | ICD-10-CM

## 2023-08-25 DIAGNOSIS — G89.29 CHRONIC RIGHT HIP PAIN: Primary | ICD-10-CM

## 2023-08-25 PROBLEM — F41.9 ANXIETY: Status: ACTIVE | Noted: 2023-08-25

## 2023-08-25 PROCEDURE — 99215 OFFICE O/P EST HI 40 MIN: CPT | Performed by: REGISTERED NURSE

## 2023-08-25 RX ORDER — TIZANIDINE 4 MG/1
4 TABLET ORAL NIGHTLY PRN
Qty: 90 TABLET | Refills: 1 | Status: SHIPPED | OUTPATIENT
Start: 2023-08-25

## 2023-08-25 RX ORDER — DOXYCYCLINE HYCLATE 50 MG/1
CAPSULE, GELATIN COATED ORAL
Qty: 90 TABLET | Refills: 1 | Status: SHIPPED | OUTPATIENT
Start: 2023-08-25

## 2023-08-25 RX ORDER — HYDROCODONE BITARTRATE AND ACETAMINOPHEN 10; 325 MG/1; MG/1
0.5 TABLET ORAL EVERY 8 HOURS PRN
Qty: 30 TABLET | Refills: 0 | Status: SHIPPED | OUTPATIENT
Start: 2023-08-25

## 2023-08-25 NOTE — PROGRESS NOTES
"Chief Complaint  Follow-up (Patient is here to go over her ultra sound results from her liver. )    Subjective    History of Present Illness {CC  Problem List  Visit  Diagnosis   Encounters  Notes  Medications  Labs  Result Review Imaging  Media :23}     Amelie Henderson presents to [facility] for Follow-up (Patient is here to go over her ultra sound results from her liver. ).      History of Present Illness  Patient presents today for liver US results and medication refills. Patient stated she saw the results said \"fatty liver\" but was anxious because the  called to set up an appointment instead of the RN calling her with results. Additionally she wants information on weight loss since her insurance will not cover Wegovy until other options are explored. She also complains of disturbed sleeping patterns. Patient currently takes  trazadone 50 mg tablet at bedtime which helps the patient fall asleep, but not stay asleep. Patient also inquires about more samples of Ubervly for her migraines.      Review of Systems   Constitutional:  Negative for activity change, appetite change and fatigue.   Respiratory:  Negative for cough, chest tightness, shortness of breath and wheezing.    Cardiovascular:  Negative for chest pain, palpitations and leg swelling.   Musculoskeletal:  Positive for back pain.   Neurological:  Negative for dizziness and weakness.   Psychiatric/Behavioral:  Positive for sleep disturbance. Negative for suicidal ideas. The patient is nervous/anxious.       Objective     Vital Signs:   /90 (BP Location: Left arm, Patient Position: Sitting, Cuff Size: Adult)   Pulse 66   Temp 98.2 °F (36.8 °C) (Temporal)   Resp 18   Ht 177.8 cm (70\")   Wt 98.9 kg (218 lb)   SpO2 99%   BMI 31.28 kg/m²   Current Outpatient Medications on File Prior to Visit   Medication Sig Dispense Refill    Aimovig 140 MG/ML auto-injector INJECT 1ML SUB-Q EVERY 30 DAYS 3 mL 3    " albuterol sulfate  (90 Base) MCG/ACT inhaler Inhale 2 puffs Every 4 (Four) Hours As Needed for Wheezing. 18 g 2    carBAMazepine XR (TEGretol  XR) 100 MG 12 hr tablet TAKE 1 TABLET BY MOUTH DAILY FOR 7 DAYS, THEN 2 TABLETS DAILY 60 tablet 3    Cholecalciferol 125 MCG (5000 UT) tablet Take 1 tablet by mouth Daily. 90 tablet 1    diclofenac (VOLTAREN) 50 MG EC tablet TAKE 1 TABLET BY MOUTH TWICE A DAY AS NEEDED FOR PAIN 60 tablet 3    EPINEPHrine (EPIPEN) 0.3 MG/0.3ML solution auto-injector injection INJECT 0.3MG IN THE MUSCLE AS DIRECTED      fluticasone (FLONASE) 50 MCG/ACT nasal spray SPRAY 2 SPRAYS INTO THE NOSTRIL AS DIRECTED BY PROVIDER DAILY. 16 mL 3    HYDROcodone-acetaminophen (NORCO) 5-325 MG per tablet Take 1 tablet by mouth Every 12 (Twelve) Hours As Needed for Moderate Pain. 60 tablet 0    hydrOXYzine (ATARAX) 50 MG tablet MAY TAKE 1 TABLET 2 TIMES A DAY AS NEEDED FOR ITCHING. MAY ALSO TAKE 2 TABLETS AT NIGHT AS NEEDED. 120 tablet 3    Levomilnacipran HCl ER (Fetzima) 40 MG capsule sustained-release 24 hr Take 40 mg by mouth Daily. 90 capsule 1    losartan (COZAAR) 50 MG tablet TAKE 1 TABLET BY MOUTH EVERY DAY IN THE MORNING 90 tablet 1    metoprolol tartrate (LOPRESSOR) 25 MG tablet TAKE 1 TABLET BY MOUTH TWICE A  tablet 1    MILK THISTLE PO Take  by mouth.      Mirabegron ER (Myrbetriq) 50 MG tablet sustained-release 24 hour 24 hr tablet Take 50 mg by mouth Daily. 30 tablet 1    multivitamin with minerals tablet tablet 2 gummies po q am - smarty pants       nystatin-triamcinolone (MYCOLOG) 610841-6.1 UNIT/GM-% ointment Apply 1 application  topically to the appropriate area as directed 2 (Two) Times a Day. 15 g 2    omeprazole (priLOSEC) 20 MG capsule Take 1 capsule by mouth 2 (Two) Times a Day.      ondansetron (Zofran) 4 MG tablet Take 1 tablet by mouth Every 6 (Six) Hours As Needed for Nausea or Vomiting. 120 tablet 1    PHOSPHATIDYLSERINE PO Take 300 mg by mouth Daily.      Probiotic  Product (FORTIFY PROBIOTIC WOMENS EX ST PO) Take 1 capsule by mouth Daily.      promethazine (PHENERGAN) 25 MG tablet Take 1 tablet by mouth Every 6 (Six) Hours As Needed for Nausea or Vomiting. 12 tablet 0    rizatriptan (MAXALT) 10 MG tablet TAKE 1 TABLET AT THE ONSET OF HEADACHE MAY REPEAT IN 2 HOURS MAX OF 2 TABS IN 24 HOURS 9 tablet 3    traZODone (DESYREL) 50 MG tablet TAKE 1 TABLET BY MOUTH EVERY DAY AT NIGHT 30 tablet 5    Vitamin B Complex-C capsule Take  by mouth.      vitamin C (ASCORBIC ACID) 250 MG tablet Take 1 tablet by mouth Daily.      zonisamide (ZONEGRAN) 100 MG capsule Take 2 capsules by mouth Every Night. 180 capsule 0    [DISCONTINUED] tiZANidine (ZANAFLEX) 4 MG tablet Take 1 tablet by mouth At Night As Needed for Muscle Spasms. 90 tablet 1    armodafinil (Nuvigil) 150 MG tablet Take 1 tablet by mouth Daily. (Patient not taking: Reported on 8/25/2023) 30 tablet 2    l-methylfolate 7.5 MG tablet tablet Take  by mouth Daily.      LORazepam (ATIVAN) 1 MG tablet Take 1 tablet by mouth Every 8 (Eight) Hours As Needed for Anxiety. (Patient not taking: Reported on 8/25/2023) 60 tablet 0    miglitol (GLYSET) 25 MG tablet TAKE 1 TABLET BY MOUTH EVERY DAY AS YOU START EATING BREAKFAST (Patient not taking: Reported on 8/25/2023) 90 tablet 1    naloxone (NARCAN) 4 MG/0.1ML nasal spray Call 911. Don't prime. [place] in 1 nostril for overdose. Repeat in 2-3 minutes in other nostril if no or minimal breathing/responsiveness. 2 each 0    OIL OF OREGANO PO Take  by mouth. (Patient not taking: Reported on 8/25/2023)      Semaglutide-Weight Management (Wegovy) 0.25 MG/0.5ML solution auto-injector Inject 0.25 mg under the skin into the appropriate area as directed 1 (One) Time Per Week. (Patient not taking: Reported on 8/25/2023) 2 mL 1    Ubrelvy 100 MG tablet TAKE 100 MG TABLET AT ONSET OF MIGRAINE HEADACHE. MAY REPEAT DOSAGE X 1 IN 2 HOURS IF NEEDED. (Patient not taking: Reported on 8/25/2023)      [DISCONTINUED]  ferrous gluconate (FERGON) 324 MG tablet TAKE 1 TABLET BY MOUTH EVERY DAY WITH BREAKFAST 90 tablet 1    [DISCONTINUED] HYDROcodone-acetaminophen (NORCO)  MG per tablet Take 0.5 tablets by mouth Every 8 (Eight) Hours As Needed for Moderate Pain. (Patient not taking: Reported on 2023) 30 tablet 0     No current facility-administered medications on file prior to visit.        Past Medical History:   Diagnosis Date    Allergic     Anemia     Anxiety     B12 deficiency     COVID     x 2     Depression     Dercum's disease     Endometriosis     Gastritis     GERD (gastroesophageal reflux disease)     Hyperlipidemia     Borderline    Hypertension     Hypoglycemia     Migraines     PTSD (post-traumatic stress disorder)     Vitamin D deficiency       Past Surgical History:   Procedure Laterality Date    CARPAL TUNNEL RELEASE Right      SECTION      x 2     CHOLECYSTECTOMY      KNEE SURGERY Right     x 3     KNEE SURGERY Left     x 1     SHOULDER ACROMIOPLASTY WITH ROTATOR CUFF REPAIR Left 2021    Procedure: LEFT SHOULDER ARTHROSCOPY WITH ROTATOR CUFF REPAIR AND SUBACROMIAL DECOMPRESSION- SLAP;  Surgeon: Rianna Jarvis MD;  Location: [facility] MAIN OR;  Service: Orthopedics;  Laterality: Left;      Family History   Problem Relation Age of Onset    Thyroid disease Mother     Glaucoma Mother     Hypertension Mother     Transient ischemic attack Mother     Bell's palsy Mother     Alcohol abuse Father     ADD / ADHD Sister     Depression Son     Pyloric stenosis Son     Hypertension Maternal Grandmother     Heart failure Maternal Grandmother     Stroke Maternal Grandmother     Diabetes Paternal Grandfather     Malig Hyperthermia Neg Hx       Social History     Socioeconomic History    Marital status:    Tobacco Use    Smoking status: Former     Packs/day: 0.50     Years: 17.00     Pack years: 8.50     Types: Cigarettes     Start date: 1999     Quit date: 2016     Years since  quittin.6     Passive exposure: Past    Smokeless tobacco: Never   Vaping Use    Vaping Use: Never used   Substance and Sexual Activity    Alcohol use: Yes     Comment: Occassion    Drug use: Never    Sexual activity: Yes     Partners: Male     Birth control/protection: Surgical, Tubal ligation         Clinical Support on 07/10/2023   Component Date Value Ref Range Status    Glucose 07/10/2023 69  65 - 99 mg/dL Final    BUN 07/10/2023 11  6 - 20 mg/dL Final    Creatinine 07/10/2023 0.88  0.57 - 1.00 mg/dL Final    Sodium 07/10/2023 141  136 - 145 mmol/L Final    Potassium 07/10/2023 3.9  3.5 - 5.2 mmol/L Final    Chloride 07/10/2023 103  98 - 107 mmol/L Final    CO2 07/10/2023 25.1  22.0 - 29.0 mmol/L Final    Calcium 07/10/2023 10.2  8.6 - 10.5 mg/dL Final    Total Protein 07/10/2023 7.3  6.0 - 8.5 g/dL Final    Albumin 07/10/2023 4.6  3.5 - 5.2 g/dL Final    ALT (SGPT) 07/10/2023 102 (H)  1 - 33 U/L Final    AST (SGOT) 07/10/2023 59 (H)  1 - 32 U/L Final    Alkaline Phosphatase 07/10/2023 124 (H)  39 - 117 U/L Final    Total Bilirubin 07/10/2023 <0.2  0.0 - 1.2 mg/dL Final    Globulin 07/10/2023 2.7  gm/dL Final    A/G Ratio 07/10/2023 1.7  g/dL Final    BUN/Creatinine Ratio 07/10/2023 12.5  7.0 - 25.0 Final    Anion Gap 07/10/2023 12.9  5.0 - 15.0 mmol/L Final    eGFR 07/10/2023 85.9  >60.0 mL/min/1.73 Final    C3 Complement 07/10/2023 164.0  82.0 - 167.0 mg/dl Final    C4 Complement 07/10/2023 49.0 (H)  14.0 - 44.0 mg/dl Final    Class Description 07/10/2023 Comment   Final        Levels of Specific IgE       Class  Description of Class      ---------------------------  -----  --------------------                     < 0.10         0         Negative             0.10 -    0.31         0/I       Equivocal/Low             0.32 -    0.55         I         Low             0.56 -    1.40         II        Moderate             1.41 -    3.90         III       High             3.91 -   19.00         IV        Very  High            19.01 -  100.00         V         Very High                    >100.00         VI        Very High    IgE 07/10/2023 3 (L)  6 - 495 IU/mL Final    [id number] Alpha-Gal 07/10/2023 <0.10  Class 0 kU/L Final    Beef 07/10/2023 <0.10  Class 0 kU/L Final    Pork 07/10/2023 <0.10  Class 0 kU/L Final    Lamb 07/10/2023 <0.10  Class 0 kU/L Final    Tryptase 07/10/2023 2.9  2.2 - 13.2 ug/L Final         Physical Exam  Constitutional:       Appearance: Normal appearance. She is obese.   HENT:      Head: Normocephalic.   Eyes:      Extraocular Movements: Extraocular movements intact.   Cardiovascular:      Rate and Rhythm: Normal rate and regular rhythm.      Pulses: Normal pulses.      Heart sounds: Normal heart sounds.   Pulmonary:      Effort: Pulmonary effort is normal.      Breath sounds: Normal breath sounds.   Musculoskeletal:         General: Normal range of motion.      Cervical back: Normal range of motion and neck supple.   Neurological:      General: No focal deficit present.      Mental Status: She is alert and oriented to person, place, and time. Mental status is at baseline.   Psychiatric:         Mood and Affect: Mood normal.         Behavior: Behavior normal.         Thought Content: Thought content normal.         Judgment: Judgment normal.        Result Review  Data Reviewed:{ Labs  Result Review  Imaging  Med Tab  Media :23}   I have reviewed this patient's chart.  I have reviewed previous labs, previous imaging, previous medications, and previous encounters with notes that were available in this patient's chart.    -Liver US: reassured patient about liver US and went over ways to decrease AST and ALT  -Weight loss: discussed risks of taking phentermine and phentermine based products due to the patient's generalized anxiety. Will write letter about why the options the insurance company wants to take are contraindicated for the patient.   -Ubrelvy samples given to patient.   -              Assessment and Plan {CC Problem List  Visit Diagnosis  ROS  Review (Popup)  Health Maintenance  Quality  BestPractice  Medications  SmartSets  SnapShot Encounters  Media :23}   Diagnoses and all orders for this visit:    1. Muscle spasm (Primary)  -     tiZANidine (ZANAFLEX) 4 MG tablet; Take 1 tablet by mouth At Night As Needed for Muscle Spasms.  Dispense: 90 tablet; Refill: 1    2. Chronic right hip pain  -     HYDROcodone-acetaminophen (NORCO)  MG per tablet; Take 0.5 tablets by mouth Every 8 (Eight) Hours As Needed for Moderate Pain.  Dispense: 30 tablet; Refill: 0    3. Migraine with aura and without status migrainosus, not intractable    4. Generalized anxiety disorder    5. Elevated liver enzymes    6. BMI 31.0-31.9,adult    7. Primary insomnia        --Liver US: reassured patient about liver US and went over ways to decrease AST and ALT  -Weight loss: discussed risks of taking phentermine and phentermine based products due to the patient's generalized anxiety. Will write letter about why the options the insurance company wants to take are contraindicated for the patient.   -Ubrelvy samples given to patient.   -Refill hydrocodone and sent to [facility]  -refill tizanidine and sent to [facility]  -ER red flags discussed with patient including risk versus benefit and education provided.  -Follow-up with me        Follow Up {Instructions Charge Capture  Follow-up Communications :23}     Patient was given instructions and counseling regarding her condition or for health maintenance advice. Please see specific information pulled into the AVS (placed there by myself) if appropriate.    Return in about 3 months (around 11/25/2023).            EDSON Tam Student, [facility]

## 2023-08-25 NOTE — PROGRESS NOTES
Chief Complaint  Follow-up (Patient is here to go over her ultra sound results from her liver. )    Subjective    History of Present Illness {CC  Problem List  Visit  Diagnosis   Encounters  Notes  Medications  Labs  Result Review Imaging  Media :23}     Amelie Henderson presents to [facility] for Follow-up (Patient is here to go over her ultra sound results from her liver. ).      History of Present Illness  Patient is a 38 y.o. female  presents to the clinic today for 3-month follow-up for chronic pain, general anxiety disorder, and frequent muscle spasms.  Patient denies any chest pain, shortness of breath, or any fevers.  Patient denies any known exposure to COVID, flu, or any other contagious illnesses.    In regards to chronic pain, patient is currently taking hydrocodone as needed to manage her pain.  Patient denies any significant concerns with her hydrocodone or her pain at this time.  Patient has seen specialist for this and is not having any significant relief.  We have had multiple conversations in regards to this always reminding patient that the least amount of narcotic better.  Would recommend that patient take over-the-counter NSAIDs first prior to taking narcotic.  Narcotic should be reserved only for severe pain that is not relieved by over-the-counter and other nonnarcotic options.    In regards to generalized anxiety disorder, patient is currently taking Fetzima for underlying depression with anxiety and as needed Atarax to help with moments of high anxiety.  Patient voices that the Fetzima is helping at this time.  Patient voices some relief from Atarax but nothing significant yet.  Patient denies any homicidal or suicidal ideations at this time.    In regards to muscle spasms, patient shares that in the past mild muscle relaxer has helped with the pain.  We discussed adding on tizanidine as needed to help with muscle spasms so that patient needs hydrocodone  "the last.  She is willing to try this and will let me know how it works for her.     Review of Systems   Constitutional: Negative.  Negative for activity change, chills, fatigue and fever.   HENT: Negative.  Negative for congestion, dental problem, ear pain, hearing loss, rhinorrhea, sinus pain, sore throat, tinnitus and trouble swallowing.    Eyes: Negative.  Negative for pain and visual disturbance.   Respiratory: Negative.  Negative for cough, chest tightness, shortness of breath and wheezing.    Cardiovascular: Negative.  Negative for chest pain, palpitations and leg swelling.   Gastrointestinal: Negative.  Negative for abdominal pain, diarrhea, nausea and vomiting.   Endocrine: Negative.  Negative for polydipsia, polyphagia and polyuria.   Genitourinary: Negative.  Negative for difficulty urinating, dysuria, frequency and urgency.   Musculoskeletal:  Positive for back pain and myalgias. Negative for arthralgias.   Skin: Negative.  Negative for color change, pallor, rash and wound.   Allergic/Immunologic: Negative.  Negative for environmental allergies.   Neurological: Negative.  Negative for dizziness, speech difficulty, weakness, light-headedness, numbness and headaches.   Hematological: Negative.    Psychiatric/Behavioral:  Negative for confusion, decreased concentration, self-injury and suicidal ideas. The patient is nervous/anxious.    All other systems reviewed and are negative.     Objective     Vital Signs:   /90 (BP Location: Left arm, Patient Position: Sitting, Cuff Size: Adult)   Pulse 66   Temp 98.2 °F (36.8 °C) (Temporal)   Resp 18   Ht 177.8 cm (70\")   Wt 98.9 kg (218 lb)   SpO2 99%   BMI 31.28 kg/m²   Current Outpatient Medications on File Prior to Visit   Medication Sig Dispense Refill    Aimovig 140 MG/ML auto-injector INJECT 1ML SUB-Q EVERY 30 DAYS 3 mL 3    albuterol sulfate  (90 Base) MCG/ACT inhaler Inhale 2 puffs Every 4 (Four) Hours As Needed for Wheezing. 18 g 2    " Cholecalciferol 125 MCG (5000 UT) tablet Take 1 tablet by mouth Daily. 90 tablet 1    diclofenac (VOLTAREN) 50 MG EC tablet TAKE 1 TABLET BY MOUTH TWICE A DAY AS NEEDED FOR PAIN 60 tablet 3    EPINEPHrine (EPIPEN) 0.3 MG/0.3ML solution auto-injector injection INJECT 0.3MG IN THE MUSCLE AS DIRECTED      fluticasone (FLONASE) 50 MCG/ACT nasal spray SPRAY 2 SPRAYS INTO THE NOSTRIL AS DIRECTED BY PROVIDER DAILY. 16 mL 3    HYDROcodone-acetaminophen (NORCO) 5-325 MG per tablet Take 1 tablet by mouth Every 12 (Twelve) Hours As Needed for Moderate Pain. 60 tablet 0    hydrOXYzine (ATARAX) 50 MG tablet MAY TAKE 1 TABLET 2 TIMES A DAY AS NEEDED FOR ITCHING. MAY ALSO TAKE 2 TABLETS AT NIGHT AS NEEDED. 120 tablet 3    Levomilnacipran HCl ER (Fetzima) 40 MG capsule sustained-release 24 hr Take 40 mg by mouth Daily. 90 capsule 1    losartan (COZAAR) 50 MG tablet TAKE 1 TABLET BY MOUTH EVERY DAY IN THE MORNING 90 tablet 1    metoprolol tartrate (LOPRESSOR) 25 MG tablet TAKE 1 TABLET BY MOUTH TWICE A  tablet 1    MILK THISTLE PO Take  by mouth.      Mirabegron ER (Myrbetriq) 50 MG tablet sustained-release 24 hour 24 hr tablet Take 50 mg by mouth Daily. 30 tablet 1    multivitamin with minerals tablet tablet 2 gummies po q am - smarty pants       nystatin-triamcinolone (MYCOLOG) 950539-2.1 UNIT/GM-% ointment Apply 1 application  topically to the appropriate area as directed 2 (Two) Times a Day. 15 g 2    omeprazole (priLOSEC) 20 MG capsule Take 1 capsule by mouth 2 (Two) Times a Day.      ondansetron (Zofran) 4 MG tablet Take 1 tablet by mouth Every 6 (Six) Hours As Needed for Nausea or Vomiting. 120 tablet 1    PHOSPHATIDYLSERINE PO Take 300 mg by mouth Daily.      Probiotic Product (FORTIFY PROBIOTIC WOMENS EX ST PO) Take 1 capsule by mouth Daily.      promethazine (PHENERGAN) 25 MG tablet Take 1 tablet by mouth Every 6 (Six) Hours As Needed for Nausea or Vomiting. 12 tablet 0    rizatriptan (MAXALT) 10 MG tablet TAKE 1  TABLET AT THE ONSET OF HEADACHE MAY REPEAT IN 2 HOURS MAX OF 2 TABS IN 24 HOURS 9 tablet 3    traZODone (DESYREL) 50 MG tablet TAKE 1 TABLET BY MOUTH EVERY DAY AT NIGHT 30 tablet 5    Vitamin B Complex-C capsule Take  by mouth.      vitamin C (ASCORBIC ACID) 250 MG tablet Take 1 tablet by mouth Daily.      zonisamide (ZONEGRAN) 100 MG capsule Take 2 capsules by mouth Every Night. 180 capsule 0    l-methylfolate 7.5 MG tablet tablet Take  by mouth Daily.      naloxone (NARCAN) 4 MG/0.1ML nasal spray Call 911. Don't prime. [place] in 1 nostril for overdose. Repeat in 2-3 minutes in other nostril if no or minimal breathing/responsiveness. 2 each 0     No current facility-administered medications on file prior to visit.        Past Medical History:   Diagnosis Date    Allergic     Anemia     Anxiety     B12 deficiency     COVID     x 2     Depression     Dercum's disease     Endometriosis     Gastritis     GERD (gastroesophageal reflux disease)     Hyperlipidemia     Borderline    Hypertension     Hypoglycemia     Migraines     PTSD (post-traumatic stress disorder)     Vitamin D deficiency       Past Surgical History:   Procedure Laterality Date    CARPAL TUNNEL RELEASE Right      SECTION      x 2     CHOLECYSTECTOMY      KNEE SURGERY Right     x 3     KNEE SURGERY Left     x 1     SHOULDER ACROMIOPLASTY WITH ROTATOR CUFF REPAIR Left 2021    Procedure: LEFT SHOULDER ARTHROSCOPY WITH ROTATOR CUFF REPAIR AND SUBACROMIAL DECOMPRESSION- SLAP;  Surgeon: Rianna Jarvis MD;  Location: [facility] MAIN OR;  Service: Orthopedics;  Laterality: Left;      Family History   Problem Relation Age of Onset    Thyroid disease Mother     Glaucoma Mother     Hypertension Mother     Transient ischemic attack Mother     Bell's palsy Mother     Alcohol abuse Father     ADD / ADHD Sister     Depression Son     Pyloric stenosis Son     Hypertension Maternal Grandmother     Heart failure Maternal Grandmother     Stroke  Maternal Grandmother     Diabetes Paternal Grandfather     Malig Hyperthermia Neg Hx       Social History     Socioeconomic History    Marital status:    Tobacco Use    Smoking status: Former     Packs/day: 0.50     Years: 17.00     Pack years: 8.50     Types: Cigarettes     Start date: 1999     Quit date: 2016     Years since quittin.7     Passive exposure: Past    Smokeless tobacco: Never   Vaping Use    Vaping Use: Never used   Substance and Sexual Activity    Alcohol use: Yes     Comment: Occassion    Drug use: Never    Sexual activity: Yes     Partners: Male     Birth control/protection: Surgical, Tubal ligation         Clinical Support on 07/10/2023   Component Date Value Ref Range Status    Glucose 07/10/2023 69  65 - 99 mg/dL Final    BUN 07/10/2023 11  6 - 20 mg/dL Final    Creatinine 07/10/2023 0.88  0.57 - 1.00 mg/dL Final    Sodium 07/10/2023 141  136 - 145 mmol/L Final    Potassium 07/10/2023 3.9  3.5 - 5.2 mmol/L Final    Chloride 07/10/2023 103  98 - 107 mmol/L Final    CO2 07/10/2023 25.1  22.0 - 29.0 mmol/L Final    Calcium 07/10/2023 10.2  8.6 - 10.5 mg/dL Final    Total Protein 07/10/2023 7.3  6.0 - 8.5 g/dL Final    Albumin 07/10/2023 4.6  3.5 - 5.2 g/dL Final    ALT (SGPT) 07/10/2023 102 (H)  1 - 33 U/L Final    AST (SGOT) 07/10/2023 59 (H)  1 - 32 U/L Final    Alkaline Phosphatase 07/10/2023 124 (H)  39 - 117 U/L Final    Total Bilirubin 07/10/2023 <0.2  0.0 - 1.2 mg/dL Final    Globulin 07/10/2023 2.7  gm/dL Final    A/G Ratio 07/10/2023 1.7  g/dL Final    BUN/Creatinine Ratio 07/10/2023 12.5  7.0 - 25.0 Final    Anion Gap 07/10/2023 12.9  5.0 - 15.0 mmol/L Final    eGFR 07/10/2023 85.9  >60.0 mL/min/1.73 Final    C3 Complement 07/10/2023 164.0  82.0 - 167.0 mg/dl Final    C4 Complement 07/10/2023 49.0 (H)  14.0 - 44.0 mg/dl Final    Class Description 07/10/2023 Comment   Final        Levels of Specific IgE       Class  Description of Class      ---------------------------   -----  --------------------                     < 0.10         0         Negative             0.10 -    0.31         0/I       Equivocal/Low             0.32 -    0.55         I         Low             0.56 -    1.40         II        Moderate             1.41 -    3.90         III       High             3.91 -   19.00         IV        Very High            19.01 -  100.00         V         Very High                    >100.00         VI        Very High    IgE 07/10/2023 3 (L)  6 - 495 IU/mL Final    [id number] Alpha-Gal 07/10/2023 <0.10  Class 0 kU/L Final    Beef 07/10/2023 <0.10  Class 0 kU/L Final    Pork 07/10/2023 <0.10  Class 0 kU/L Final    Lamb 07/10/2023 <0.10  Class 0 kU/L Final    Tryptase 07/10/2023 2.9  2.2 - 13.2 ug/L Final         Physical Exam  Vitals and nursing note reviewed.   Constitutional:       Appearance: Normal appearance. She is normal weight.   HENT:      Head: Normocephalic and atraumatic.   Cardiovascular:      Rate and Rhythm: Normal rate and regular rhythm.      Pulses: Normal pulses.      Heart sounds: Normal heart sounds. No murmur heard.    No friction rub. No gallop.   Pulmonary:      Effort: Pulmonary effort is normal. No respiratory distress.      Breath sounds: Normal breath sounds. No stridor. No wheezing, rhonchi or rales.   Chest:      Chest wall: No tenderness.   Abdominal:      General: Abdomen is flat. Bowel sounds are normal. There is no distension.      Palpations: Abdomen is soft. There is no mass.      Tenderness: There is no abdominal tenderness. There is no right CVA tenderness, left CVA tenderness, guarding or rebound.      Hernia: No hernia is present.   Skin:     General: Skin is warm and dry.      Capillary Refill: Capillary refill takes less than 2 seconds.      Coloration: Skin is not jaundiced or pale.   Neurological:      General: No focal deficit present.      Mental Status: She is alert and oriented to person, place, and time. Mental status is at baseline.       Motor: No weakness.      Coordination: Coordination normal.      Gait: Gait normal.   Psychiatric:         Mood and Affect: Mood normal.         Behavior: Behavior normal.         Thought Content: Thought content normal.         Judgment: Judgment normal.        Result Review  Data Reviewed:{ Labs  Result Review  Imaging  Med Tab  Media :23}   I have reviewed this patient's chart.  I have reviewed previous labs, previous imaging, previous medications, and previous encounters with notes that were available in this patient's chart.               Assessment and Plan {CC Problem List  Visit Diagnosis  ROS  Review (Popup)  [facility]  BestPractice  Medications  SmartSets  SnapShot Encounters  Media :23}   Diagnoses and all orders for this visit:    1. Chronic right hip pain (Primary)  -     HYDROcodone-acetaminophen (NORCO)  MG per tablet; Take 0.5 tablets by mouth Every 8 (Eight) Hours As Needed for Moderate Pain.  Dispense: 30 tablet; Refill: 0    2. Muscle spasm  -     tiZANidine (ZANAFLEX) 4 MG tablet; Take 1 tablet by mouth At Night As Needed for Muscle Spasms.  Dispense: 90 tablet; Refill: 1    3. Migraine with aura and without status migrainosus, not intractable    4. Generalized anxiety disorder    5. Elevated liver enzymes    6. BMI 31.0-31.9,adult    7. Primary insomnia        -Start Zanaflex to help with muscle spasms.  -Continue hydrocodone to help with back pain.  -Let me know how anxiety is managed by Atarax we may need to change this option.  -ER red flags discussed with patient including risk versus benefit and education provided.  -Follow-up with me in 4 weeks or sooner if needed.    I spent 40 minutes caring for Amelie on this date of service. This time includes time spent by me in the following activities:preparing for the visit, reviewing tests, obtaining and/or reviewing a separately obtained history, performing a medically appropriate examination and/or  evaluation , counseling and educating the patient/family/caregiver, ordering medications, tests, or procedures, referring and communicating with other health care professionals , documenting information in the medical record, independently interpreting results and communicating that information with the patient/family/caregiver, and care coordination.    Follow Up {Instructions Charge Capture  Follow-up Communications :23}     Patient was given instructions and counseling regarding her condition or for health maintenance advice. Please see specific information pulled into the AVS (placed there by myself) if appropriate.    Return in about 3 months (around 11/25/2023).            EDSON Cali, FNP-BC

## 2023-08-28 ENCOUNTER — PATIENT MESSAGE (OUTPATIENT)
Dept: FAMILY MEDICINE CLINIC | Facility: CLINIC | Age: 38
End: 2023-08-28
Payer: OTHER GOVERNMENT

## 2023-08-28 DIAGNOSIS — M79.672 ARCH PAIN OF LEFT FOOT: Primary | ICD-10-CM

## 2023-09-03 DIAGNOSIS — F31.9 BIPOLAR DEPRESSION: ICD-10-CM

## 2023-09-05 RX ORDER — CARBAMAZEPINE 100 MG/1
TABLET, EXTENDED RELEASE ORAL
Qty: 180 TABLET | Refills: 1 | Status: SHIPPED | OUTPATIENT
Start: 2023-09-05 | End: 2023-09-11

## 2023-09-11 ENCOUNTER — OFFICE VISIT (OUTPATIENT)
Dept: FAMILY MEDICINE CLINIC | Facility: CLINIC | Age: 38
End: 2023-09-11
Payer: OTHER GOVERNMENT

## 2023-09-11 DIAGNOSIS — F51.01 PRIMARY INSOMNIA: ICD-10-CM

## 2023-09-11 DIAGNOSIS — F41.9 ANXIETY: Primary | ICD-10-CM

## 2023-09-11 PROCEDURE — 99214 OFFICE O/P EST MOD 30 MIN: CPT | Performed by: REGISTERED NURSE

## 2023-09-11 RX ORDER — BUSPIRONE HYDROCHLORIDE 10 MG/1
10 TABLET ORAL 3 TIMES DAILY
Qty: 90 TABLET | Refills: 1 | Status: SHIPPED | OUTPATIENT
Start: 2023-09-11

## 2023-09-11 RX ORDER — DARIDOREXANT 50 MG/1
50 TABLET, FILM COATED ORAL NIGHTLY
Qty: 30 TABLET | Refills: 2 | Status: SHIPPED | OUTPATIENT
Start: 2023-09-11

## 2023-09-11 RX ORDER — DARIDOREXANT 50 MG/1
TABLET, FILM COATED ORAL
Status: CANCELLED | OUTPATIENT
Start: 2023-09-11

## 2023-09-11 RX ORDER — PROPRANOLOL HYDROCHLORIDE 10 MG/1
10 TABLET ORAL 2 TIMES DAILY PRN
Qty: 60 TABLET | Refills: 1 | Status: SHIPPED | OUTPATIENT
Start: 2023-09-11

## 2023-09-11 NOTE — PROGRESS NOTES
Chief Complaint  Anxiety and Insomnia    Subjective    History of Present Illness {CC  Problem List  Visit  Diagnosis   Encounters  Notes  Medications  Labs  Result Review Imaging  Media :23}     Amelie Henderson presents to [facility] for Anxiety and Insomnia.      History of Present Illness  Patient is a 38 y.o. female  presents to the clinic today for 4-week follow-up for anxiety with worsening insomnia.  Patient denies any chest pain, shortness of breath, or any fevers.  Patient denies any known exposure to COVID, flu, or any other contagious illnesses.    Regards to anxiety, patient shares that the hydroxyzine is mildly helping but is not significantly improving her symptoms.  We discussed options patient is willing to try other medications.  She is taking her daily Fetzima to manage her major depression but it is not covering her anxiety.  I would like patient to try BuSpar or propanolol to manage her anxiety.  We discussed sending both medications over so that she can try these and let me know which works best for her.  Potential side effects discussed at length with her.    In regards to insomnia, patient has tried multiple medications in the past with little success.  Patient has tried trazodone, melatonin, hydroxyzine, Tegretol, and various other medications to help but with little success.  I recommend patient try Quviviq to better manage this.  I will send the prescription to pharmacy for her.     Review of Systems   Constitutional: Negative.  Negative for activity change, chills, fatigue and fever.   HENT: Negative.  Negative for congestion, dental problem, ear pain, hearing loss, rhinorrhea, sinus pain, sore throat, tinnitus and trouble swallowing.    Eyes: Negative.  Negative for pain and visual disturbance.   Respiratory: Negative.  Negative for cough, chest tightness, shortness of breath and wheezing.    Cardiovascular: Negative.  Negative for chest pain,  palpitations and leg swelling.   Gastrointestinal: Negative.  Negative for abdominal pain, diarrhea, nausea and vomiting.   Endocrine: Negative.  Negative for polydipsia, polyphagia and polyuria.   Genitourinary: Negative.  Negative for difficulty urinating, dysuria, frequency and urgency.   Musculoskeletal: Negative.  Negative for arthralgias, back pain and myalgias.   Skin: Negative.  Negative for color change, pallor, rash and wound.   Allergic/Immunologic: Negative.  Negative for environmental allergies.   Neurological: Negative.  Negative for dizziness, speech difficulty, weakness, light-headedness, numbness and headaches.   Hematological: Negative.    Psychiatric/Behavioral:  Positive for sleep disturbance. Negative for confusion, decreased concentration, self-injury and suicidal ideas. The patient is nervous/anxious.    All other systems reviewed and are negative.     Objective     Vital Signs:   There were no vitals taken for this visit.  Current Outpatient Medications on File Prior to Visit   Medication Sig Dispense Refill    Aimovig 140 MG/ML auto-injector INJECT 1ML SUB-Q EVERY 30 DAYS 3 mL 3    albuterol sulfate  (90 Base) MCG/ACT inhaler Inhale 2 puffs Every 4 (Four) Hours As Needed for Wheezing. 18 g 2    armodafinil (Nuvigil) 150 MG tablet Take 1 tablet by mouth Daily. (Patient not taking: Reported on 8/25/2023) 30 tablet 2    Cholecalciferol 125 MCG (5000 UT) tablet Take 1 tablet by mouth Daily. 90 tablet 1    diclofenac (VOLTAREN) 50 MG EC tablet TAKE 1 TABLET BY MOUTH TWICE A DAY AS NEEDED FOR PAIN 60 tablet 3    EPINEPHrine (EPIPEN) 0.3 MG/0.3ML solution auto-injector injection INJECT 0.3MG IN THE MUSCLE AS DIRECTED      ferrous gluconate (FERGON) 324 MG tablet TAKE 1 TABLET BY MOUTH EVERY DAY WITH BREAKFAST 90 tablet 1    fluticasone (FLONASE) 50 MCG/ACT nasal spray SPRAY 2 SPRAYS INTO THE NOSTRIL AS DIRECTED BY PROVIDER DAILY. 16 mL 3    HYDROcodone-acetaminophen (NORCO)  MG per  tablet Take 0.5 tablets by mouth Every 8 (Eight) Hours As Needed for Moderate Pain. 30 tablet 0    HYDROcodone-acetaminophen (NORCO) 5-325 MG per tablet Take 1 tablet by mouth Every 12 (Twelve) Hours As Needed for Moderate Pain. 60 tablet 0    hydrOXYzine (ATARAX) 50 MG tablet MAY TAKE 1 TABLET 2 TIMES A DAY AS NEEDED FOR ITCHING. MAY ALSO TAKE 2 TABLETS AT NIGHT AS NEEDED. 120 tablet 3    l-methylfolate 7.5 MG tablet tablet Take  by mouth Daily.      Levomilnacipran HCl ER (Fetzima) 40 MG capsule sustained-release 24 hr Take 40 mg by mouth Daily. 90 capsule 1    LORazepam (ATIVAN) 1 MG tablet Take 1 tablet by mouth Every 8 (Eight) Hours As Needed for Anxiety. (Patient not taking: Reported on 8/25/2023) 60 tablet 0    losartan (COZAAR) 50 MG tablet TAKE 1 TABLET BY MOUTH EVERY DAY IN THE MORNING 90 tablet 1    metoprolol tartrate (LOPRESSOR) 25 MG tablet TAKE 1 TABLET BY MOUTH TWICE A  tablet 1    miglitol (GLYSET) 25 MG tablet TAKE 1 TABLET BY MOUTH EVERY DAY AS YOU START EATING BREAKFAST (Patient not taking: Reported on 8/25/2023) 90 tablet 1    MILK THISTLE PO Take  by mouth.      Mirabegron ER (Myrbetriq) 50 MG tablet sustained-release 24 hour 24 hr tablet Take 50 mg by mouth Daily. 30 tablet 1    multivitamin with minerals tablet tablet 2 gummies po q am - smarty pants       naloxone (NARCAN) 4 MG/0.1ML nasal spray Call 911. Don't prime. [place] in 1 nostril for overdose. Repeat in 2-3 minutes in other nostril if no or minimal breathing/responsiveness. 2 each 0    nystatin-triamcinolone (MYCOLOG) 699977-8.1 UNIT/GM-% ointment Apply 1 application  topically to the appropriate area as directed 2 (Two) Times a Day. 15 g 2    OIL OF OREGANO PO Take  by mouth. (Patient not taking: Reported on 8/25/2023)      omeprazole (priLOSEC) 20 MG capsule Take 1 capsule by mouth 2 (Two) Times a Day.      ondansetron (Zofran) 4 MG tablet Take 1 tablet by mouth Every 6 (Six) Hours As Needed for Nausea or Vomiting. 120 tablet  1    PHOSPHATIDYLSERINE PO Take 300 mg by mouth Daily.      Probiotic Product (FORTIFY PROBIOTIC WOMENS EX ST PO) Take 1 capsule by mouth Daily.      promethazine (PHENERGAN) 25 MG tablet Take 1 tablet by mouth Every 6 (Six) Hours As Needed for Nausea or Vomiting. 12 tablet 0    rizatriptan (MAXALT) 10 MG tablet TAKE 1 TABLET AT THE ONSET OF HEADACHE MAY REPEAT IN 2 HOURS MAX OF 2 TABS IN 24 HOURS 9 tablet 3    Semaglutide-Weight Management (Wegovy) 0.25 MG/0.5ML solution auto-injector Inject 0.25 mg under the skin into the appropriate area as directed 1 (One) Time Per Week. (Patient not taking: Reported on 2023) 2 mL 1    tiZANidine (ZANAFLEX) 4 MG tablet Take 1 tablet by mouth At Night As Needed for Muscle Spasms. 90 tablet 1    traZODone (DESYREL) 50 MG tablet TAKE 1 TABLET BY MOUTH EVERY DAY AT NIGHT 30 tablet 5    Ubrelvy 100 MG tablet TAKE 100 MG TABLET AT ONSET OF MIGRAINE HEADACHE. MAY REPEAT DOSAGE X 1 IN 2 HOURS IF NEEDED. (Patient not taking: Reported on 2023)      Vitamin B Complex-C capsule Take  by mouth.      vitamin C (ASCORBIC ACID) 250 MG tablet Take 1 tablet by mouth Daily.      zonisamide (ZONEGRAN) 100 MG capsule Take 2 capsules by mouth Every Night. 180 capsule 0    [DISCONTINUED] carBAMazepine XR (TEGretol  XR) 100 MG 12 hr tablet TAKE 1 TABLET BY MOUTH DAILY FOR 7 DAYS, THEN 2 TABLETS DAILY 180 tablet 1     No current facility-administered medications on file prior to visit.        Past Medical History:   Diagnosis Date    Allergic     Anemia     Anxiety     B12 deficiency     COVID     x 2     Depression     Dercum's disease     Endometriosis     Gastritis     GERD (gastroesophageal reflux disease)     Hyperlipidemia     Borderline    Hypertension     Hypoglycemia     Migraines     PTSD (post-traumatic stress disorder)     Vitamin D deficiency       Past Surgical History:   Procedure Laterality Date    CARPAL TUNNEL RELEASE Right      SECTION      x 2      CHOLECYSTECTOMY      KNEE SURGERY Right     x 3     KNEE SURGERY Left     x 1     SHOULDER ACROMIOPLASTY WITH ROTATOR CUFF REPAIR Left 2021    Procedure: LEFT SHOULDER ARTHROSCOPY WITH ROTATOR CUFF REPAIR AND SUBACROMIAL DECOMPRESSION- SLAP;  Surgeon: Rianna Jarvis MD;  [place] [facility] [place]  Service: Orthopedics;  Laterality: Left;      Family History   Problem Relation Age of Onset    Thyroid disease Mother     Glaucoma Mother     Hypertension Mother     Transient ischemic attack Mother     Bell's palsy Mother     Alcohol abuse Father     ADD / ADHD Sister     Depression Son     Pyloric stenosis Son     Hypertension Maternal Grandmother     Heart failure Maternal Grandmother     Stroke Maternal Grandmother     Diabetes Paternal Grandfather     Malig Hyperthermia Neg Hx       Social History     Socioeconomic History    Marital status:    Tobacco Use    Smoking status: Former     Packs/day: 0.50     Years: 17.00     Pack years: 8.50     Types: Cigarettes     Start date: 1999     Quit date: 2016     Years since quittin.6     Passive exposure: Past    Smokeless tobacco: Never   Vaping Use    Vaping Use: Never used   Substance and Sexual Activity    Alcohol use: Yes     Comment: Occassion    Drug use: Never    Sexual activity: Yes     Partners: Male     Birth control/protection: Surgical, Tubal ligation         Clinical Support on 07/10/2023   Component Date Value Ref Range Status    Glucose 07/10/2023 69  65 - 99 mg/dL Final    BUN 07/10/2023 11  6 - 20 mg/dL Final    Creatinine 07/10/2023 0.88  0.57 - 1.00 mg/dL Final    Sodium 07/10/2023 141  136 - 145 mmol/L Final    Potassium 07/10/2023 3.9  3.5 - 5.2 mmol/L Final    Chloride 07/10/2023 103  98 - 107 mmol/L Final    CO2 07/10/2023 25.1  22.0 - 29.0 mmol/L Final    Calcium 07/10/2023 10.2  8.6 - 10.5 mg/dL Final    Total Protein 07/10/2023 7.3  6.0 - 8.5 g/dL Final    Albumin 07/10/2023 4.6  3.5 - 5.2 g/dL Final    ALT (SGPT)  07/10/2023 102 (H)  1 - 33 U/L Final    AST (SGOT) 07/10/2023 59 (H)  1 - 32 U/L Final    Alkaline Phosphatase 07/10/2023 124 (H)  39 - 117 U/L Final    Total Bilirubin 07/10/2023 <0.2  0.0 - 1.2 mg/dL Final    Globulin 07/10/2023 2.7  gm/dL Final    A/G Ratio 07/10/2023 1.7  g/dL Final    BUN/Creatinine Ratio 07/10/2023 12.5  7.0 - 25.0 Final    Anion Gap 07/10/2023 12.9  5.0 - 15.0 mmol/L Final    eGFR 07/10/2023 85.9  >60.0 mL/min/1.73 Final    C3 Complement 07/10/2023 164.0  82.0 - 167.0 mg/dl Final    C4 Complement 07/10/2023 49.0 (H)  14.0 - 44.0 mg/dl Final    Class Description 07/10/2023 Comment   Final        Levels of Specific IgE       Class  Description of Class      ---------------------------  -----  --------------------                     < 0.10         0         Negative             0.10 -    0.31         0/I       Equivocal/Low             0.32 -    0.55         I         Low             0.56 -    1.40         II        Moderate             1.41 -    3.90         III       High             3.91 -   19.00         IV        Very High            19.01 -  100.00         V         Very High                    >100.00         VI        Very High    IgE 07/10/2023 3 (L)  6 - 495 IU/mL Final    P015-HgU Alpha-Gal 07/10/2023 <0.10  Class 0 kU/L Final    Beef 07/10/2023 <0.10  Class 0 kU/L Final    Pork 07/10/2023 <0.10  Class 0 kU/L Final    Lamb 07/10/2023 <0.10  Class 0 kU/L Final    Tryptase 07/10/2023 2.9  2.2 - 13.2 ug/L Final         Physical Exam  Vitals and nursing note reviewed.   Constitutional:       Appearance: Normal appearance. She is normal weight.   HENT:      Head: Normocephalic and atraumatic.   Cardiovascular:      Rate and Rhythm: Normal rate and regular rhythm.      Pulses: Normal pulses.      Heart sounds: Normal heart sounds. No murmur heard.    No friction rub. No gallop.   Pulmonary:      Effort: Pulmonary effort is normal. No respiratory distress.      Breath sounds: Normal breath  sounds. No stridor. No wheezing, rhonchi or rales.   Chest:      Chest wall: No tenderness.   Abdominal:      General: Abdomen is flat. Bowel sounds are normal. There is no distension.      Palpations: Abdomen is soft. There is no mass.      Tenderness: There is no abdominal tenderness. There is no right CVA tenderness, left CVA tenderness, guarding or rebound.      Hernia: No hernia is present.   Skin:     General: Skin is warm and dry.      Capillary Refill: Capillary refill takes less than 2 seconds.      Coloration: Skin is not jaundiced or pale.   Neurological:      General: No focal deficit present.      Mental Status: She is alert and oriented to person, place, and time. Mental status is at baseline.      Motor: No weakness.      Coordination: Coordination normal.      Gait: Gait normal.   Psychiatric:         Mood and Affect: Mood normal.         Behavior: Behavior normal.         Thought Content: Thought content normal.         Judgment: Judgment normal.        Result Review  Data Reviewed:{ Labs  Result Review  Imaging  Med Tab  Media :23}   I have reviewed this patient's chart.  I have reviewed previous labs, previous imaging, previous medications, and previous encounters with notes that were available in this patient's chart.               Assessment and Plan {CC Problem List  Visit Diagnosis  ROS  Review (Popup)  [facility] Maintenance  Quality  BestPractice  Medications  SmartSets  SnapShot Encounters  Media :23}   Diagnoses and all orders for this visit:    1. Anxiety (Primary)  -     propranolol (INDERAL) 10 MG tablet; Take 1 tablet by mouth 2 (Two) Times a Day As Needed (anxiety).  Dispense: 60 tablet; Refill: 1  -     busPIRone (BUSPAR) 10 MG tablet; Take 1 tablet by mouth 3 (Three) Times a Day.  Dispense: 90 tablet; Refill: 1    2. Primary insomnia  -     Daridorexant HCl (Quviviq) 50 MG tablet; Take 1 tablet by mouth Every Night.  Dispense: 30 tablet; Refill: 2        -Start Quviviq  for insomnia.  Advised patient to continue taking her other insomnia medications for the first 2 weeks while in Quviviq to ensure she gets restful sleep and then half the amount on week 3 and then just the Quviviq starting week 4.  -ER red flags discussed with patient including risk versus benefit and education provided.  -Follow-up with me in 4 to 6 weeks after starting Quviviq or sooner if needed.    I spent 30 minutes caring for Amelie on this date of service. This time includes time spent by me in the following activities:preparing for the visit, reviewing tests, obtaining and/or reviewing a separately obtained history, performing a medically appropriate examination and/or evaluation , counseling and educating the patient/family/caregiver, ordering medications, tests, or procedures, referring and communicating with other health care professionals , documenting information in the medical record, independently interpreting results and communicating that information with the patient/family/caregiver, and care coordination.    Follow Up {Instructions Charge Capture  Follow-up Communications :23}     Patient was given instructions and counseling regarding her condition or for health maintenance advice. Please see specific information pulled into the AVS (placed there by myself) if appropriate.    Return in about 6 weeks (around 10/23/2023) for Recheck.            EDSON Cali, FNP-BC

## 2023-10-03 DIAGNOSIS — F41.9 ANXIETY: ICD-10-CM

## 2023-10-03 RX ORDER — BUSPIRONE HYDROCHLORIDE 10 MG/1
TABLET ORAL
Qty: 90 TABLET | Refills: 1 | Status: SHIPPED | OUTPATIENT
Start: 2023-10-03

## 2023-10-03 RX ORDER — PROPRANOLOL HYDROCHLORIDE 10 MG/1
10 TABLET ORAL 2 TIMES DAILY PRN
Qty: 60 TABLET | Refills: 1 | Status: SHIPPED | OUTPATIENT
Start: 2023-10-03

## 2023-10-04 ENCOUNTER — TELEPHONE (OUTPATIENT)
Dept: FAMILY MEDICINE CLINIC | Facility: CLINIC | Age: 38
End: 2023-10-04

## 2023-10-04 NOTE — TELEPHONE ENCOUNTER
Caller: NAVIN TRUJILLO    Relationship: Emergency Contact    Best call back number: [phone]    What medication are you requesting: PAXLOVID    Have you had these symptoms before:    [x] Yes  [] No    Have you been treated for these symptoms before:   [x] Yes  [] No    If a prescription is needed, what is your preferred pharmacy and phone number: [facility] #[id number] - [facility] [place] [address] AT [place] [address] - [phone] [facility] [phone]      Additional notes:    [facility]. [facility] STATES THEY DO NOT HAVE THIS PRESCRIPTION

## 2023-10-05 ENCOUNTER — TELEPHONE (OUTPATIENT)
Dept: FAMILY MEDICINE CLINIC | Facility: CLINIC | Age: 38
End: 2023-10-05

## 2023-10-05 DIAGNOSIS — U07.1 ACUTE COVID-19: Primary | ICD-10-CM

## 2023-10-05 NOTE — TELEPHONE ENCOUNTER
Caller: NAVIN TRUJILLO    Relationship: Emergency Contact    Best call back number: [phone]    What is the best time to reach you: ANY    Who are you requesting to speak with (clinical staff, provider,  specific staff member): CLINICAL    Do you know the name of the person who called: DARRIAN    What was the call regarding:  PHARMACY DOES NOT WANT TO FILL PAXLOVID BECAUSE OF DRUG INTERACTION WITH TEGRETOL THAT SHE NO LONGER TAKES. PHARMACIST WANTS TO CHANGE TO ANOTHER MEDICINE FOR COVID. PLEASE CALL PATIENT ABOUT WHAT MEDICINE TO TAKE

## 2023-10-05 NOTE — TELEPHONE ENCOUNTER
Pharmacy Name: [facility]       Pharmacy representative name: KARLI    Pharmacy representative phone number: [phone]    What medication are you calling in regards to:KARLI IS CALLING IN REGARDS TO PAXLOVID PRESCRIPTION.  SHE STATES THERE IS A NOTED INTERACTION WITH  CARBAMAZEPINE.  SHE IS WANTING TO KNOW IF THE PAXLOVID CAN BE SWITCHED TO MOLNUPIRAVIR    What question does the pharmacy have: MEDICATION INTERACTION WITH PAXLOVID/ SWITCH REQUEST TO MOLNUPIRAVIR.    Who is the provider that prescribed the medication: JOSE FRANCISCO POWELL    PLEASE ADVISE.

## 2023-10-10 NOTE — TELEPHONE ENCOUNTER
HUB IS INSTRUCTED TO READ BELOW MESSAGE       I don't have record of patient being on carbamazipine. I sent over the alternative option, but please reach out to confirm this with her as well as update the chart with the correct dose she is taking.

## 2023-10-25 DIAGNOSIS — F41.9 ANXIETY: ICD-10-CM

## 2023-10-25 RX ORDER — BUSPIRONE HYDROCHLORIDE 10 MG/1
10 TABLET ORAL 3 TIMES DAILY
Qty: 90 TABLET | Refills: 0 | Status: SHIPPED | OUTPATIENT
Start: 2023-10-25 | End: 2023-11-24

## 2023-10-25 NOTE — TELEPHONE ENCOUNTER
[facility]    Rx Refill Note  Requested Prescriptions     Pending Prescriptions Disp Refills    busPIRone (BUSPAR) 10 MG tablet 90 tablet 1     Sig: Take 1 tablet by mouth 3 (Three) Times a Day.      Last office visit with prescribing clinician: Visit date not found   Last telemedicine visit with prescribing clinician: Visit date not found   Next office visit with prescribing clinician: Visit date not found                         Would you like a call back once the refill request has been completed: [] Yes [] No    If the office needs to give you a call back, can they leave a voicemail: [] Yes [] No    Judi Cid Rep  10/25/23, 09:38 EDT

## 2023-10-30 ENCOUNTER — TELEPHONE (OUTPATIENT)
Dept: FAMILY MEDICINE CLINIC | Facility: CLINIC | Age: 38
End: 2023-10-30
Payer: OTHER GOVERNMENT

## 2023-11-02 DIAGNOSIS — M25.551 ACUTE RIGHT HIP PAIN: ICD-10-CM

## 2023-11-02 DIAGNOSIS — M62.838 MUSCLE SPASM: ICD-10-CM

## 2023-11-02 DIAGNOSIS — R10.31 RIGHT GROIN PAIN: ICD-10-CM

## 2023-11-02 DIAGNOSIS — F41.9 ANXIETY: ICD-10-CM

## 2023-11-02 DIAGNOSIS — F51.01 PRIMARY INSOMNIA: ICD-10-CM

## 2023-11-02 DIAGNOSIS — I10 PRIMARY HYPERTENSION: Primary | ICD-10-CM

## 2023-11-02 DIAGNOSIS — G43.109 MIGRAINE WITH AURA AND WITHOUT STATUS MIGRAINOSUS, NOT INTRACTABLE: ICD-10-CM

## 2023-11-02 DIAGNOSIS — N32.81 OVERACTIVE BLADDER: ICD-10-CM

## 2023-11-02 NOTE — TELEPHONE ENCOUNTER
Patient has switched to mail order pharmacy. Patient is not due for propranolol or lisinopril, tazanidine, trazodone. All the rest she is due for .      Rx Refill Note  Requested Prescriptions     Pending Prescriptions Disp Refills    propranolol (INDERAL) 10 MG tablet 60 tablet 1     Sig: Take 1 tablet by mouth 2 (Two) Times a Day As Needed (anxiety).    losartan (COZAAR) 50 MG tablet 90 tablet 1     Sig: Take 1 tablet by mouth Every Morning.    rizatriptan (MAXALT) 10 MG tablet 9 tablet 3     Sig: TAKE 1 TABLET AT THE ONSET OF HEADACHE MAY REPEAT IN 2 HOURS MAX OF 2 TABS IN 24 HOURS    diclofenac (VOLTAREN) 50 MG EC tablet 60 tablet 3     Sig: Take 1 tablet by mouth 2 (Two) Times a Day As Needed. for pain    Mirabegron ER (Myrbetriq) 50 MG tablet sustained-release 24 hour 24 hr tablet 30 tablet 1     Sig: Take 50 mg by mouth Daily.    metoprolol tartrate (LOPRESSOR) 25 MG tablet 180 tablet 1     Sig: Take 1 tablet by mouth 2 (Two) Times a Day.    tiZANidine (ZANAFLEX) 4 MG tablet 90 tablet 1     Sig: Take 1 tablet by mouth At Night As Needed for Muscle Spasms.    traZODone (DESYREL) 50 MG tablet 30 tablet 5     Sig: Take 1 tablet by mouth every night at bedtime.      Last office visit with prescribing clinician: 9/11/2023   Last telemedicine visit with prescribing clinician: Visit date not found   Next office visit with prescribing clinician: 11/13/2023                         Would you like a call back once the refill request has been completed: [] Yes [] No    If the office needs to give you a call back, can they leave a voicemail: [] Yes [] No    Amy Romero MA  11/02/23, 17:14 EDT

## 2023-11-03 RX ORDER — TIZANIDINE 4 MG/1
4 TABLET ORAL NIGHTLY PRN
Qty: 90 TABLET | Refills: 1 | Status: SHIPPED | OUTPATIENT
Start: 2023-11-03

## 2023-11-03 RX ORDER — PROPRANOLOL HYDROCHLORIDE 10 MG/1
10 TABLET ORAL 2 TIMES DAILY PRN
Qty: 180 TABLET | Refills: 1 | Status: SHIPPED | OUTPATIENT
Start: 2023-11-03

## 2023-11-03 RX ORDER — TRAZODONE HYDROCHLORIDE 50 MG/1
50 TABLET ORAL
Qty: 90 TABLET | Refills: 1 | Status: SHIPPED | OUTPATIENT
Start: 2023-11-03

## 2023-11-03 RX ORDER — RIZATRIPTAN BENZOATE 10 MG/1
TABLET ORAL
Qty: 27 TABLET | Refills: 1 | Status: SHIPPED | OUTPATIENT
Start: 2023-11-03

## 2023-11-03 RX ORDER — LOSARTAN POTASSIUM 50 MG/1
50 TABLET ORAL EVERY MORNING
Qty: 90 TABLET | Refills: 1 | Status: SHIPPED | OUTPATIENT
Start: 2023-11-03

## 2023-11-04 ENCOUNTER — TELEMEDICINE (OUTPATIENT)
Dept: FAMILY MEDICINE CLINIC | Facility: TELEHEALTH | Age: 38
End: 2023-11-04
Payer: OTHER GOVERNMENT

## 2023-11-04 VITALS — BODY MASS INDEX: 31.21 KG/M2 | HEIGHT: 70 IN | TEMPERATURE: 98.3 F | WEIGHT: 218 LBS

## 2023-11-04 DIAGNOSIS — J01.40 ACUTE PANSINUSITIS, RECURRENCE NOT SPECIFIED: Primary | ICD-10-CM

## 2023-11-04 PROBLEM — M54.16 LUMBAR RADICULOPATHY: Status: ACTIVE | Noted: 2023-02-14

## 2023-11-04 PROBLEM — R93.89 ABNORMAL MAGNETIC RESONANCE IMAGING STUDY: Status: ACTIVE | Noted: 2023-03-01

## 2023-11-04 PROBLEM — M54.50 CHRONIC LOW BACK PAIN: Status: ACTIVE | Noted: 2023-02-14

## 2023-11-04 PROBLEM — G89.29 CHRONIC LOW BACK PAIN: Status: ACTIVE | Noted: 2023-02-14

## 2023-11-04 PROBLEM — M47.816 ARTHROPATHY OF LUMBAR FACET JOINT: Status: ACTIVE | Noted: 2023-02-14

## 2023-11-04 PROBLEM — M47.816 LUMBAR SPONDYLOSIS: Status: ACTIVE | Noted: 2023-05-17

## 2023-11-04 PROBLEM — T78.40XA ALLERGIC REACTION: Status: ACTIVE | Noted: 2023-11-04

## 2023-11-04 PROBLEM — M76.01 GLUTEAL TENDINITIS OF RIGHT BUTTOCK: Status: ACTIVE | Noted: 2023-02-14

## 2023-11-04 RX ORDER — AMOXICILLIN 875 MG/1
875 TABLET, COATED ORAL 2 TIMES DAILY
Qty: 14 TABLET | Refills: 0 | Status: SHIPPED | OUTPATIENT
Start: 2023-11-04 | End: 2023-11-11

## 2023-11-04 RX ORDER — GUAIFENESIN 600 MG/1
600 TABLET, EXTENDED RELEASE ORAL 2 TIMES DAILY
Qty: 28 TABLET | Refills: 0 | Status: SHIPPED | OUTPATIENT
Start: 2023-11-04 | End: 2023-11-18

## 2023-11-04 NOTE — PROGRESS NOTES
You have chosen to receive care through a telehealth visit.  Do you consent to use a video/audio connection for your medical care today? Yes     HPI  Amelie Henderson is a 38 y.o. female  presents with complaint of sinus problem. She reports that her sinuses are bleeding and have a lot of pressure. She had COVID one month ago and has had a headace since that time. She reports clear but bloody nasal drainage as well as sinus pain and pressure. She also reports nausea, diarrhea and headaches. Additional symptoms that she is having are noted in the ROS portio of this visit. Over the counter she has tried advil sinus, Mucinex and Flonase for her symptoms.    Review of Systems   Constitutional:  Positive for chills and fatigue. Negative for fever.   HENT:  Positive for congestion (clear bloody), postnasal drip, rhinorrhea, sinus pressure, sinus pain and sneezing. Sore throat: irritated from drainage.   Respiratory:  Negative for cough, chest tightness, shortness of breath and wheezing.    Gastrointestinal:  Positive for diarrhea and nausea.   Musculoskeletal:  Myalgias: baseline.   Neurological:  Positive for headaches.       Past Medical History:   Diagnosis Date    Allergic     Anemia     Anxiety     B12 deficiency     COVID     x 2     Depression     Dercum's disease     Endometriosis     Gastritis     GERD (gastroesophageal reflux disease)     Hyperlipidemia 2021    Borderline    Hypertension     Hypoglycemia     Migraines     PTSD (post-traumatic stress disorder)     Vitamin D deficiency 2020       Family History   Problem Relation Age of Onset    Thyroid disease Mother     Glaucoma Mother     Hypertension Mother     Transient ischemic attack Mother     Bell's palsy Mother     Alcohol abuse Father     ADD / ADHD Sister     Depression Son     Pyloric stenosis Son     Hypertension Maternal Grandmother     Heart failure Maternal Grandmother     Stroke Maternal Grandmother     Diabetes Paternal Grandfather     Perry  "Hyperthermia Neg Hx        Social History     Socioeconomic History    Marital status:    Tobacco Use    Smoking status: Former     Packs/day: 0.50     Years: 17.00     Additional pack years: 0.00     Total pack years: 8.50     Types: Cigarettes     Start date: 1999     Quit date: 2016     Years since quittin.8     Passive exposure: Past    Smokeless tobacco: Never   Vaping Use    Vaping Use: Never used   Substance and Sexual Activity    Alcohol use: Yes     Comment: Occassion    Drug use: Never    Sexual activity: Yes     Partners: Male     Birth control/protection: Surgical, Tubal ligation       Amelie Henderson  reports that she quit smoking about 7 years ago. Her smoking use included cigarettes. She started smoking about 24 years ago. She has a 8.50 pack-year smoking history. She has been exposed to tobacco smoke. She has never used smokeless tobacco..     Temp 98.3 °F (36.8 °C)   Ht 177.8 cm (70\")   Wt 98.9 kg (218 lb)   LMP 2023 (Approximate)   BMI 31.28 kg/m²     PHYSICAL EXAM  Physical Exam   Constitutional: She is oriented to person, place, and time. She appears well-developed.   HENT:   Head: Normocephalic and atraumatic.   Nose: Congestion present. Right sinus exhibits maxillary sinus tenderness (patient directed exam) and frontal sinus tenderness (patient directed exam). Left sinus exhibits maxillary sinus tenderness (patient directed exam) and frontal sinus tenderness (patient directed exam).   Eyes: Lids are normal. Right eye exhibits no discharge. Left eye exhibits no discharge. Right conjunctiva is not injected. Left conjunctiva is not injected.   Pulmonary/Chest:  No respiratory distress.  Neurological: She is alert and oriented to person, place, and time. No cranial nerve deficit.   Psychiatric: She has a normal mood and affect. Her speech is normal and behavior is normal. Judgment and thought content normal.       Results for orders placed or performed in visit on 10/04/23 "   POCT SARS-CoV-2 Antigen GINA    Specimen: Swab   Result Value Ref Range    SARS Antigen Detected (A) Not Detected, Presumptive Negative    Internal Control Passed Passed    Lot Number [id number]     Expiration Date 10,272,024    POCT Influenza A/B    Specimen: Swab   Result Value Ref Range    Rapid Influenza A Ag Negative Negative    Rapid Influenza B Ag Negative Negative    Internal Control Passed Passed    Lot Number [id number]     Expiration Date 7,312,024        Diagnoses and all orders for this visit:    1. Acute pansinusitis, recurrence not specified (Primary)    Other orders  -     amoxicillin (AMOXIL) 875 MG tablet; Take 1 tablet by mouth 2 (Two) Times a Day for 7 days.  Dispense: 14 tablet; Refill: 0  -     guaiFENesin (Mucinex) 600 MG 12 hr tablet; Take 1 tablet by mouth 2 (Two) Times a Day for 14 days.  Dispense: 28 tablet; Refill: 0    You have been prescribed a wait and see antibiotic for sinusitis.   Continue Mucinex as ordered with lots of water to create nice thin secretions.  Stop all nsaids except diclofenac as directed by neurology  Stop Flonase for a few days and see if this helps with nasal irritation  Continue Zofran you have on hand as needed for nausea    A sinusitis requiring antibiotics requires symptoms for 7 to 10  Days, fever and purulent drainage. That being said, if your symptom persist or worsen you may take the prescribed antibiotic as directed. If you take the antibiotic be sure and take your  probiotics or eat yogurt to replace the good bacteria that antibiotics destroy along with the bad bacteria. Take antibiotic and probiotic two hours apart.    FOLLOW-UP  If symptoms worsen or persist follow up with PCP, Virtual Care or Urgent Care    Patient verbalizes understanding of medication dosage, comfort measures, instructions for treatment and follow-up.    Mackenzie Markham, EDSON  11/04/2023  14:21 EDT    The use of a video visit has been reviewed with the patient and verbal informed consent  has been obtained. Myself and Amelie Henderson participated in this visit. The patient is located in [address] [facility].    I am located in [place], [place]. [employer] and [employer] were utilized. I spent 30 minutes in the patient's chart for this visit.

## 2023-11-04 NOTE — E-VISIT ESCALATED
Patient escalated   Provider EDSON Buchanan chose to escalate patient to another level of care because: Desired treatments not available   Patient was sent the following message:   Based on the information you've provided us, you need to take another step to get care.   What to do now:   Setup a video visit   Please, schedule your video visit   Video visit     You won't be charged for your eVisit. If you paid with a credit card, the charge will be reversed.   Chief Complaint: Cold, flu, COVID, sinus, hay fever, or seasonal allergies   Patient introduction   Patient is 38-year-old female with congestion, nasal discharge, itchy nose or sneezing, new loss of smell or taste, headache, sweats, fatigue, nausea or vomiting, and diarrhea that started 3 to 5 days ago. Regarding date of symptom onset, patient writes: 11/02/23.   COVID-19 testing history, vaccination status, and exposure:    Patient did a self-test within the last 24 hours. Test result was negative.    Received 2 doses of the COVID-19 vaccine (Pfizer, Pfizer). Received their most recent dose of the vaccine more than 14 days ago.    No high-risk (household) exposure to COVID-19 within the last 14 days.    Travel outside local community within the last 14 days.   Risk factors for severe disease from COVID-19 infection    BMI >= 25.    Smoked tobacco in the past.    A mood disorder.    Hypertension.    Lupus.    An unspecified autoimmune disorder.   Warning. The following may warrant further investigation:    Hypertension.    Dizziness that makes it hard to stand, walk, or do daily activities.   General presentation   Symptoms came on gradually.   Fever:    No fever.   Sinus and nasal symptoms:    Nasal discharge.    Itchy nose or sneezing.    White nasal drainage.    Nasal drainage is thin.    Postnasal drip.    1 to 3 episodes of antibiotic treatment for sinus infection in the last year.    Sinus pain or pressure on or around the forehead, eyes, nose, and  cheeks.    Patient first noticed sinus pain less than 5 days ago.    Sinus pain is worse with Valsalva.    No history of unhealed nasal septal ulcer/nasal wound.    No history of deviated septum or nasal polyps.   Throat symptoms:    No sore throat.   Head and body aches:    Headache described as moderate (4 to 6 on a scale of 1 to 10).    Sweats.    Fatigue.    No chills.    No myalgia.   Cough:    No cough.   Wheezing and shortness of breath:    No COPD diagnosis.    No asthma diagnosis.    No wheezing.    No shortness of breath.   Chest pain:    No chest pain.   Ear symptoms:    Current symptoms include pressure in the ear(s).   Dizziness:    Dizziness that interferes with daily activities.   Allergies:    Patient has known perennial allergies and known dust allergies.   Flu exposure:    No recent known exposure to a person with a confirmed flu diagnosis.    Has not had a flu vaccine this season.   Not taking any over-the-counter medications for current symptoms.   Review of red flags/alarm symptoms:    No changes in alertness or awareness.    No symptoms suggesting intracranial hemorrhage.    No decreased urination.   Pregnancy/menstrual status/breastfeeding:    Not pregnant.    Not breastfeeding.    Regarding date of last menstrual period, patient writes: No longer have periods, complete hysterectomy 04/12/23.   Self-exam:    Height: 177 centimeters    Weight: 94.8 kilograms    Swollen/painful neck lymph nodes.   Recent antibiotic use:    Has not taken antibiotics for similar symptoms within the past month.   Current medications   Currently taking Vitamin B Complex-C capsule, nystatin-triamcinolone 291426-9.1 UNIT/GM-% ointment, MILK THISTLE PO, hydrOXYzine 50 MG tablet, ondansetron 4 MG tablet, fluticasone 50 MCG/ACT nasal spray, ferrous gluconate 324 MG tablet, vitamin C 250 MG tablet, metoprolol tartrate 25 MG tablet, Fetzima 40 MG capsule sustained-release 24 hr, FORTIFY PROBIOTIC WOMENS EX ST PO,  Mirabegron ER 50 MG tablet sustained-release 24 hour 24 hr tablet, promethazine 25 MG tablet, albuterol sulfate  (90 Base) MCG/ACT inhaler, EPINEPHrine 0.3 MG/0.3ML solution auto-injector injection, losartan 50 MG tablet, multivitamin with minerals tablet tablet, traZODone 50 MG tablet, tiZANidine 4 MG tablet, HYDROcodone-acetaminophen 5-325 MG per tablet, Cholecalciferol 125 MCG (5000 UT) tablet, busPIRone 10 MG tablet, l-methylfolate 7.5 MG tablet tablet, zonisamide 100 MG capsule, rizatriptan 10 MG tablet, diclofenac 50 MG EC tablet, propranolol 10 MG tablet, omeprazole 20 MG capsule, Emgality Injectable Product, and Ubrelvy.   Medication allergies   No relevant drug allergies.   Medication contraindication review   Patient has history of hypertension, depression, and urinary retention. Therefore, the following medication(s) will not be prescribed:    Metoclopramide.    Acetaminophen-diphenhydramine-phenylephrine.    Pseudoephedrine.    Aspirin-chlorpheniramine-phenylephrine.   No history of metoclopramide-associated dystonic reaction and tardive dyskinesia.   No known history of amoxicillin-clavulanate-associated cholestatic jaundice or hepatic impairment.   No known history of azithromycin-associated cholestatic jaundice or hepatic impairment.   Past medical history   Immune conditions: Patient has lupus. Regarding an additional autoimmune disorder they have, patient writes: Dercum's disease, endometriosis. Patient is not currently taking medications for their condition(s).   No history of cancer.   Social history   Does not smoke tobacco; smoked previously.   Patient-submitted comments   Patient was asked if they had anything to add about their symptoms. Patient writes: I believe my sinuses are bleeding, nose is bloody when I wipe/blow/sneeze.   Patient did not request an excuse note.   Assessment:   Patient determined to need a level of care not appropriate to be delivered through eVisit.   Plan:    Patient informed of need to seek in-person care   ----------   Electronically signed by EDSON Buchanan on 2023-11-04 at 13:27PM   ----------   Patient Interview Transcript:   Which of these symptoms are bothering you? Select all that apply.    Stuffed-up nose or sinuses    Runny nose    Itchy nose or sneezing    Loss of smell or taste    Headache    Sweats    Fatigue or tiredness    Nausea or vomiting    Diarrhea   Not selected:    Cough    Shortness of breath    Itchy or watery eyes    Sore throat    Hoarse voice or loss of voice    Fever    Chills    Muscle or body aches    I don't have any of these symptoms   When did your current symptoms start? Select one.    3 to 5 days ago   Not selected:    Less than 48 hours ago    6 to 9 days ago    10 to 14 days ago    2 to 3 weeks ago    3 to 4 weeks ago    More than a month ago   Do you know the exact date your symptoms started? If so, enter the date as MM/DD/YY. Select one.    Yes (specify): 11/02/23   Not selected:    No   Did your symptoms come on suddenly or gradually? Select one.    Gradually   Not selected:    Suddenly    I'm not sure   Since your current symptoms started, have you had a viral test for COVID-19? - This includes home self-tests as well as nose swab or saliva tests done at a doctor's office, lab, or testing site. - It does NOT include antibody tests, which use a blood sample to test for past infection. Select one.    Yes   Not selected:    No   When was your most recent COVID-19 test? Select one.    Within the last 24 hours   Not selected:    Within the last week (specify date as MM/DD/YY)    7 to 14 days ago    15 to 30 days ago    More than 1 month ago   What type of COVID-19 test did you most recently have? Select one.    Viral self-test or home test   Not selected:    Viral test at a doctor's office, lab, or testing site   What was the result of your most recent COVID-19 test? Select one.    Negative   Not selected:    Positive   Has anyone  in your household tested positive for COVID-19 in the past 14 days? Select one.    No   Not selected:    Yes   Have you gotten the COVID-19 vaccine? Select one.    Yes   Not selected:    No   How many total doses of the COVID-19 vaccine have you gotten? This includes boosters as well as additional doses for those who are immunocompromised. Select one.    2 doses   Not selected:    1 dose    3 doses    4 doses    5 doses    6 doses   1st dose    Pfizer   Not selected:    J&J/Willis    Moderna    Novavax   2nd dose    Pfizer   Not selected:    J&J/Willis    Moderna    Novavax   When did you get your most recent dose of the COVID-19 vaccine?    More than 14 days ago   Not selected:    Less than 48 hours (2 days) ago    48 to 72 hours (3 days) ago    3 to 5 days ago    5 to 7 days ago    7 to 14 days ago   In the last 14 days, have you traveled outside of your local community? This includes travel by car, RV, bus, train, or plane. Travel increases your chances of getting and spreading COVID-19. Select one.    Yes   Not selected:    No   You mentioned having a headache. On a scale of 1 to 10, how severe is your headache pain? Select one.    Moderate (4 to 6)   Not selected:    Mild (1 to 3)    Severe (7 to 9)    Unbearable (10)    The worst headache of my life (10+)   Do you feel sinus pain or pressure in any of these areas?    In my forehead    Around my eyes    Behind my nose    In my cheeks   Not selected:    In my upper teeth or jaw    No   When did you first notice your sinus pain or pressure? Select one.    Less than 5 days ago   Not selected:    5 to 9 days ago    10 to 14 days ago    2 to 4 weeks ago    1 month ago or longer   Does coughing, sneezing, or leaning forward make your sinuses feel worse? Select one.    Yes   Not selected:    No   What color is your nasal drainage? Select one.    White   Not selected:    Clear    Yellow or yellowish    Green or greenish    My nose is stuffed but not draining or  "running   Is your nasal drainage thick or thin? Select one.    Thin   Not selected:    Thick   Is there any drainage (mucus) going down the back of your throat? This kind of drainage is also called \"postnasal drip.\" It can cause frequent throat clearing. Select one.    Yes   Not selected:    No, not that I know of   Since your symptoms started, have you felt dizzy? Select one.    Yes, and it makes it hard to stand, walk, or do daily activities   Not selected:    Yes, but I can continue with my regular daily activities    No   Do you have chest pain? You might also feel it as discomfort, aching, tightness, or squeezing in the chest. Select one.    No   Not selected:    Yes   Have you urinated at least 3 times in the last 24 hours? Select one.    Yes   Not selected:    No   Changes in alertness or awareness may mean you need emergency care. Since your symptoms started, have you had any of these? Select all that apply.    None of the above   Not selected:    Confusion    Slurred speech    Not knowing where you are or what day it is    Difficulty staying conscious    Fainting or passing out   Do your symptoms include a whistling sound, or wheezing, when you breathe? Select one.    No   Not selected:    Yes   Do you have any of these symptoms in your ear(s)? Select all that apply.    Pressure   Not selected:    Pain    Fullness    Crackling or popping    Plugged or blocked sensation    None of the above   Are your glands/lymph nodes swollen, or does it hurt when you touch them?    Yes   Not selected:    No, not that I can tell   In the past week, has anyone around you (such as at school, work, or home) had a confirmed diagnosis of the flu? A confirmed diagnosis means that a nose swab was done to verify a flu infection. Select all that apply.    No, not that I know of   Not selected:    I live with someone who has the flu    I've been within touching distance of someone who has the flu    I've walked by, or sat about 3 " feet away from, someone who has the flu    I've been in the same building as someone who has the flu   Have you ever been diagnosed with asthma? Select one.    No   Not selected:    Yes   Have you ever been diagnosed with chronic obstructive pulmonary disease (COPD)? Select one.    No, not that I know of   Not selected:    Yes   In the past month, have you taken antibiotics for similar symptoms? Examples of antibiotics include amoxicillin, amoxicillin-clavulanate (Augmentin), penicillin, cefdinir (Omnicef), doxycycline, and clindamycin (Cleocin). Select one.    No   Not selected:    Yes (specify)   In the last year, how many times were you treated with antibiotics for a sinus infection? Select one.    1 to 3 times   Not selected:    None    4 or more times   Have you been diagnosed with a deviated septum or nasal polyps? The nose is divided into two nostrils by the septum. A crooked septum is called a deviated septum. Nasal polyps are growths inside the nose or sinuses. Select one.    No, not that I know of   Not selected:    Yes, but I had surgery to treat them    Yes, I have a deviated septum    Yes, I have nasal polyps    Yes, I have a deviated septum and nasal polyps   Do you have a sore inside your nose that won't heal? Select one.    No, not that I know of   Not selected:    Yes   Do you have allergies (pollen, dust mites, mold, animal dander)? Select one.    Yes   Not selected:    No, not that I know of   What kind of allergies do you have? Select all that apply.    Perennial, or year-round, allergies (hay fever)    Dust allergies   Not selected:    Seasonal allergies (hay fever)    Pet allergies    None of the above    I'm not sure   Do you think your symptoms could be allergy-related? Select one.    I'm not sure   Not selected:    Yes    No   Have you had a flu shot this season? Select one.    No   Not selected:    Yes, less than 2 weeks ago    Yes, 2 to 4 weeks ago    Yes, 1 to 3 months ago    Yes, 3 to 6  months ago    Yes, more than 6 months ago   Are you pregnant? Select one.    No   Not selected:    Yes   When was your last menstrual period? If you don't currently have periods or no longer have periods, please briefly explain.    No longer have periods, complete hysterectomy 22   Within the last 2 weeks, have you: - Given birth - Had a miscarriage - Had a pregnancy loss - Had an  Being postpartum (live birth or loss) within the last 2 weeks increases your risk of flu complications. Select one.    No   Not selected:    Yes   Are you breastfeeding? Select one.    No   Not selected:    Yes   The flu and COVID-19 can be more serious for people in certain groups. The next few questions help us figure out if you or anyone you live with is at higher risk for complications from these infections. Do any of these statements apply to you? Select all that apply.    None of the above   Not selected:    I'm     I'm     I'm Black    I'm  or    Do you smoke tobacco? Select one.    No, I quit   Not selected:    Yes, every day    Yes, some days    No   Do you have any of these conditions? Select all that apply.    Mood disorder, including depression or schizophrenia spectrum disorders   Not selected:    Chronic lung disease, such as cystic fibrosis or interstitial fibrosis    Heart disease, such as congenital heart disease, congestive heart failure, or coronary artery disease    Disorder of the brain, spinal cord, or nerves and muscles, such as dementia, cerebral palsy, epilepsy, muscular dystrophy, or developmental delay    Metabolic disorder or mitochondrial disease    Cerebrovascular disease, such as stroke or another condition affecting the blood vessels or blood supply to the brain    Down syndrome    Substance use disorder, such as alcohol, opioid, or cocaine use disorder    Tuberculosis    None of the above   Do you live in a group care setting? Examples include: -  Nursing home - Residential care - Psychiatric treatment facility - Group [facility] and care home - Homeless shelter - Foster care setting Select one.    No   Not selected:    Yes   Are you a healthcare worker? Select one.    No   Not selected:    Yes   People with a very high body mass index (BMI) are at higher risk for developing complications from the flu and severe illness from COVID-19. To determine your BMI, we need to know your weight and height. Please enter your weight (in pounds).    Weight   Please enter your height.    Height   Do you have any of these conditions that can affect the immune system? Scroll to see all options. Select all that apply.    Lupus    An autoimmune disorder not listed here (specify): Dercum's disease, endometriosis   Not selected:    History of bone marrow transplant    Chronic kidney disease    Chronic liver disease (including cirrhosis)    HIV/AIDS    Inflammatory bowel disease (Crohn's disease or ulcerative colitis)    Moderate to severe plaque psoriasis    Multiple sclerosis    Rheumatoid arthritis    Sickle cell anemia    Alpha or beta thalassemia    History of solid organ transplant (kidney, liver, or heart)    History of spleen removal    A condition requiring treatment with long-term use of oral steroids (such as prednisone, prednisolone, or dexamethasone) (specify)    None of these   Do you take medications for your condition? This includes oral and injectable medications that are taken daily, weekly, or monthly. Select one.    No   Not selected:    Yes, regularly    Yes, for flare-ups only   Have you ever been diagnosed with cancer? Select one.    No   Not selected:    Yes, I have cancer now    Yes, but I'm in remission   Do any of these apply to you? Select all that apply.    None of the above   Not selected:    I've been hospitalized within the last 5 days    I have diabetes    I'm in close contact with a child in    The flu and COVID-19 can be  more serious for people in certain groups. Do any of these apply to the people who live with you? Select all that apply.    None of the above   Not selected:    Under age 5    Over age 65            Black     or     Pregnant    Has given birth, had a miscarriage, had a pregnancy loss, or had an  in the last 2 weeks   Does any member of your household have any of these medical conditions? Select all that apply.    None of the above   Not selected:    Asthma    Disorders of the brain, spinal cord, or nerves and muscles, such as dementia, cerebral palsy, epilepsy, muscular dystrophy, or developmental delay    Chronic lung disease, such as COPD or cystic fibrosis    Heart disease, such as congenital heart disease, congestive heart failure, or coronary artery disease    Cerebrovascular disease, such as stroke or another condition affecting the blood vessels or blood supply to the brain    Blood disorders, such as sickle cell disease    Diabetes    Metabolic disorders such as inherited metabolic disorders or mitochondrial disease    Kidney disorders    Liver disorders    Weakened immune system due to illness or medications such as chemotherapy or steroids    Children under the age of 19 who are on long-term aspirin therapy    Extreme obesity (BMI > 40)   Do you have any of these conditions? Scroll to see all options. Select all that apply.    Depression    Difficulty urinating or completely emptying your bladder    High blood pressure   Not selected:    Aspirin triad (also known as Samter's triad or ASA triad)    Asthma or hives from taking aspirin or other NSAIDs, such as ibuprofen or naproxen    Blockage or narrowing of the blood vessels of the heart    Blood clotting disorder    Blood dyscrasia, such anemia, leukemia, lymphoma, or myeloma    Bone marrow depression    Catecholamine-releasing paraganglioma    Congenital long QT syndrome    Uncorrected electrolyte  abnormalities    Fungal infection    Gastrointestinal (GI) bleeding    Gastrointestinal (GI) obstruction    G6PD deficiency    Recent heart attack    Irregular heartbeat or heart rhythm    Mononucleosis (mono)    Myasthenia gravis    Parkinson's disease    Pheochromocytoma    Reye syndrome    Seizure disorder    Thyroid disease    Ulcerative colitis    None of the above   Have you ever had either of these conditions? Select all that apply.    No   Not selected:    Metoclopramide-associated dystonic reaction    Tardive dyskinesia   Just a few more questions about medications, and then you're finished. Have you used any non-prescription medications or nasal sprays for your current symptoms? Examples include saline sprays, decongestants, NyQuil, and Tylenol. Select one.    No   Not selected:    Yes   Have you taken any monoamine oxidase inhibitor (MAOI) medications in the last 14 days? Examples include rasagiline (Azilect), selegiline (Eldepryl, Zelapar), isocarboxazid (Marplan), phenelzine (Nardil), and tranylcypromine (Parnate). Select one.    No, not that I know of   Not selected:    Yes   Do you take Kynmobi or Apokyn (apomorphine)? Select one.    No   Not selected:    Yes   Are you still taking these medications listed in your medical record? If you're not taking any of these, click Next. Select all that apply.    Vitamin B Complex-C capsule    nystatin-triamcinolone 424864-4.1 UNIT/GM-% ointment    MILK THISTLE PO    hydrOXYzine 50 MG tablet    ondansetron 4 MG tablet    fluticasone 50 MCG/ACT nasal spray    ferrous gluconate 324 MG tablet    vitamin C 250 MG tablet    metoprolol tartrate 25 MG tablet    Fetzima 40 MG capsule sustained-release 24 hr    FORTIFY PROBIOTIC WOMENS EX ST PO    Mirabegron ER 50 MG tablet sustained-release 24 hour 24 hr tablet    promethazine 25 MG tablet    albuterol sulfate  (90 Base) MCG/ACT inhaler    EPINEPHrine 0.3 MG/0.3ML solution auto-injector injection    losartan 50 MG  "tablet    multivitamin with minerals tablet tablet    traZODone 50 MG tablet    tiZANidine 4 MG tablet    HYDROcodone-acetaminophen 5-325 MG per tablet    Cholecalciferol 125 MCG (5000 UT) tablet    busPIRone 10 MG tablet    l-methylfolate 7.5 MG tablet tablet    zonisamide 100 MG capsule    rizatriptan 10 MG tablet    diclofenac 50 MG EC tablet    propranolol 10 MG tablet    omeprazole 20 MG capsule   Are you taking any other medications, vitamins, or supplements? Select one.    Yes   Not selected:    No   Have you ever had an allergic or bad reaction to any medication? Select one.    Yes   Not selected:    No   Have you had an allergic or bad reaction to any of these medications? Select all that apply.    No, not that I know of   Not selected:    Baloxavir (Xofluza)    Benzonatate (Tessalon Perles)    Fluconazole, itraconazole, or terconazole (brands include Diflucan, Sporanox, Terazol)    Oseltamivir (Tamiflu) or zanamivir (Relenza)    Paxlovid, nirmatrelvir, or ritonavir (Norvir)   Have you had an allergic or bad reaction to any of these antibiotic medications? Select all that apply.    No, not that I know of   Not selected:    Penicillin or any \"-cillin\" antibiotic, such as amoxicillin, ampicillin, dicloxacillin, nafcillin, or piperacillin (Brands include Augmentin, Unasyn, and Zosyn)    Tetracycline or any \"-cycline\" antibiotic, such as doxycycline, demeclocycline, minocycline (Brands include Declomycin, Doryx, Dynacin, Oracea, Monodox, Panmycin, and Vibramycin)    Ciprofloxacin or any \"-floxacin\" antibiotic, such as gemifloxacin, levofloxacin, moxifloxacin, or ofloxacin (Brands include Factive, Cipro, Floxin, and Levaquin)    Cephalexin or any \"cef-\" antibiotic, such as cefazolin, cefdinir, cefuroxime, ceftriaxone, ceftazidime, or cefepime (Brands include Ancef, Ceftin, Fortaz, Keflex, Maxipime, Rocephin, and Simplicef)    Azithromycin or any \"-thromycin\" antibiotic, such as erythromycin or clarithromycin " (Brands include Biaxin, Erythrocin, Z-bela, and Zithromax)    Clindamycin or lincomycin (Brands include Cleocin and Lincocin)   Have you had an allergic or bad reaction to any of these medications? Select all that apply.    No, not that I know of   Not selected:    Albuterol or a similar medication    Atropine    Corticosteroid (steroid) medication, including topical steroids, inhaled steroids, nasal steroids, or oral steroids (budesonide, ciclesonide, dexamethasone, flunisolide, fluticasone, methylprednisolone, triamcinolone, prednisone (or brand names Alvesco, Deltasone, Flovent, Medrol, Nasacort, Rhinocort, or Veramyst)    Metoclopramide (Reglan)    Ondansetron (Zuplenz, Zofran ODT, Zofran)    Prochlorperazine (Compazine)   Have you had an allergic or bad reaction to any of these eye drops, nasal sprays, or inhalers? Scroll to see all options. Select all that apply.    No, not that I know of   Not selected:    Azelastine (Astelin, Astepro, Optivar)    Cromolyn (Crolom, NasalCrom)    Ipratropium (Atrovent)    Ketotifen (Alaway, Zaditor)    Pheniramine/naphazoline (Naphcon-A, Opcon-A, Visine-A)    Olopatadine (Pataday, Patanol, Pazeo)   Have you had an allergic or bad reaction to any of these non-prescription medications? Scroll to see all options. Select all that apply.    No, not that I know of   Not selected:    Acetaminophen (Tylenol)    Aspirin    Cetirizine (Zyrtec)    Dextromethorphan (Delsym, Robitussin, Vicks DayQuil Cough)    Diphenhydramine (Benadryl)    Fexofenadine (Allegra)    Guaifenesin (Mucinex)    Dextromethorphan (Delsym)    Ibuprofen (Advil, Motrin, Midol)    Loratadine (Alavert, Claritin)    Oxymetazoline (Afrin)    Phenylephrine (Sudafed PE)    Pseudoephedrine (Sudafed)   Are you allergic to milk or to the proteins found in milk (for example, whey or casein)? A milk allergy is different from lactose intolerance. Select one.    No, not that I know of   Not selected:    Yes   Have you ever had  jaundice or liver problems as a result of taking amoxicillin-clavulanate (Augmentin)? Jaundice is a condition in which the skin and the whites of the eyes turn yellow. Select all that apply.    No, not that I know of   Not selected:    Yes, jaundice    Yes, liver problems   Have you ever had jaundice or liver problems as a result of taking azithromycin (Zithromax, Zmax)? Jaundice is a condition in which the skin and the whites of the eyes turn yellow. Select all that apply.    No, not that I know of   Not selected:    Yes, jaundice    Yes, liver problems   Do you need a doctor's note? A doctor's note confirms that you received care today and states when you can return to school or work. It does not contain information about your diagnosis or treatment plan. Your provider will make the final decision on whether to give you a doctor's note and for how long. Doctor's notes CANNOT be backdated. We can't provide medical leave paperwork through this type of visit. If more paperwork is needed to request time off, contact your primary care provider. Select one.    No   Not selected:    Today only (1 day)    Today and tomorrow (2 days)    3 days    5 days    7 days    10 days    14 days   Is there anything you'd like to add about your symptoms? Please limit your comments to the symptoms asked about in this interview. If you include comments about other concerns, your provider may recommend that you be seen in person.    I believe my sinuses are bleeding, nose is bloody when I wipe/blow/sneeze   ----------   Medical history   The following information was received from the EMR on November 04, 2023.   Allergies:    CODEINE   - Allergy Type: Medication   - Reaction: Hives, Itching   - Severity: High   - Clinical Status: Active   - Verification Status: Confirmed    LATEX   - Allergy Type: Medication   - Reaction: Rash   - Severity: High   - Clinical Status: Active   - Verification Status: Confirmed    MELOXICAM   - Allergy Type:  Medication   - Reaction: Other (See Comments)   - Severity: High   - Clinical Status: Active   - Verification Status: Confirmed    CEDAR   - Allergy Type: Medication   - Reaction: Anaphylaxis   - Severity: High   - Clinical Status: Active   - Verification Status: Confirmed    INFLUENZA VIRUS VACCINE   - Allergy Type: Medication   - Reaction: Other (See Comments)   - Severity: High   - Clinical Status: Active   - Verification Status: Confirmed   Medications:    VITAMIN B COMPLEX-C PO CAPS   - Route: Oral   - Start Date: October 20, 2022   - End Date: None   - Status: Active    naloxone (NARCAN) nasal spray 4 mg/0.1 mL   - Route:   - Start Date: July 08, 2023   - End Date: None   - Status: Active    nystatin-triamcinolone (MYCOLOG) ointment   - Route: Topical   - Start Date: July 24, 2023   - End Date: None   - Status: Active    MILK THISTLE PO   - Route: Oral   - Start Date: January 24, 2023   - End Date: None   - Status: Active    PHOSPHATIDYLSERINE PO   - Route: Oral   - Start Date: February 24, 2023   - End Date: None   - Status: Active    hydrOXYzine (ATARAX) tablet   - Route:   - Start Date: August 15, 2023   - End Date: None   - Status: Active    ondansetron (ZOFRAN) tablet   - Route: Oral   - Start Date: July 24, 2023   - End Date: None   - Status: Active    fluticasone (FLONASE) nasal spray   - Route:   - Start Date: October 07, 2022   - End Date: None   - Status: Active    QUVIVIQ 50 MG PO TABS   - Route: Oral   - Start Date: September 11, 2023   - End Date: None   - Status: Active    ferrous gluconate (FERGON) tablet   - Route:   - Start Date: August 25, 2023   - End Date: None   - Status: Active    vitamin C tablet   - Route: Oral   - Start Date: October 20, 2022   - End Date: None   - Status: Active    metoprolol tartrate (LOPRESSOR) tablet   - Route: Oral   - Start Date: November 03, 2023   - End Date: None   - Status: Active    FETZIMA 40 MG PO CP24   - Route: Oral   - Start Date: June 19, 2023   - End  Date: None   - Status: Active    FORTIFY PROBIOTIC WOMENS EX ST PO   - Route: Oral   - Start Date: November 24, 2021   - End Date: None   - Status: Active    AIMOVIG 140 MG/ML SC SOAJ   - Route:   - Start Date: January 03, 2023   - End Date: None   - Status: Active    mirabegron (MYRBETRIQ) 24 hour tablet 50 mg   - Route: Oral   - Start Date: November 03, 2023   - End Date: None   - Status: Active    promethazine (PHENERGAN) tablet   - Route: Oral   - Start Date: October 20, 2022   - End Date: None   - Status: Active    albuterol (PROVENTIL HFA;VENTOLIN HFA;PROAIR HFA) inhaler   - Route: Inhalation   - Start Date: June 19, 2023   - End Date: None   - Status: Active    EPINEPHrine (EPIPEN) injection 0.3 mg/0.3 mL   - Route:   - Start Date: June 19, 2023   - End Date: None   - Status: Active    HYDROcodone-acetaminophen (NORCO) tablet  mg   - Route: Oral   - Start Date: August 25, 2023   - End Date: None   - Status: Active    losartan (COZAAR) tablet   - Route: Oral   - Start Date: November 03, 2023   - End Date: None   - Status: Active    multivitamin with minerals tablet   - Route:   - Start Date: November 24, 2021   - End Date: None   - Status: Active    traZODone (DESYREL) tablet   - Route: Oral   - Start Date: November 03, 2023   - End Date: None   - Status: Active    predniSONE (DELTASONE) tablet   - Route: Oral   - Start Date: October 04, 2023   - End Date: None   - Status: Active    tiZANidine (ZANAFLEX) tablet   - Route: Oral   - Start Date: November 03, 2023   - End Date: None   - Status: Active    HYDROcodone-acetaminophen (NORCO) tablet 5-325 mg   - Route: Oral   - Start Date: July 11, 2023   - End Date: None   - Status: Active    CHOLECALCIFEROL 125 MCG (5000 UT) PO TABS   - Route: Oral   - Start Date: November 24, 2021   - End Date: None   - Status: Active    busPIRone (BUSPAR) tablet   - Route: Oral   - Start Date: October 25, 2023   - End Date: None   - Status: Active    l-methylfolate tablet 7.5  mg   - Route: Oral   - Start Date: January 24, 2023   - End Date: None   - Status: Active    zonisamide (ZONEGRAN) capsule   - Route: Oral   - Start Date: December 02, 2021   - End Date: None   - Status: Active    rizatriptan (MAXALT) tablet   - Route:   - Start Date: November 03, 2023   - End Date: None   - Status: Active    diclofenac (VOLTAREN) EC tablet   - Route: Oral   - Start Date: November 03, 2023   - End Date: None   - Status: Active    propranolol (INDERAL) tablet   - Route: Oral   - Start Date: November 03, 2023   - End Date: None   - Status: Active    omeprazole (priLOSEC) capsule   - Route: Oral   - Start Date: January 19, 2023   - End Date: None   - Status: Active   Problem list:    Acid reflux   - Category: Problem List Item   - Health Status:   - Start Date: November 24, 2021   - End Date: None   - Status: Active    Asthma   - Category: Problem List Item   - Health Status:   - Start Date: November 24, 2021   - End Date: None   - Status: Active    B12 deficiency   - Category: Problem List Item   - Health Status:   - Start Date: November 24, 2021   - End Date: None   - Status: Active    Easy bruising   - Category: Problem List Item   - Health Status:   - Start Date: November 24, 2021   - End Date: None   - Status: Active    Generalized anxiety disorder   - Category: Problem List Item   - Health Status:   - Start Date: November 24, 2021   - End Date: None   - Status: Active    Mixed anxiety depressive disorder   - Category: Problem List Item   - Health Status:   - Start Date: November 24, 2021   - End Date: None   - Status: Active    Elevated blood pressure reading   - Category: Problem List Item   - Health Status:   - Start Date: November 24, 2021   - End Date: None   - Status: Active    History of congestive heart disease   - Category: Problem List Item   - Health Status:   - Start Date: November 24, 2021   - End Date: None   - Status: Active    Idiopathic hypersomnia   - Category: Problem List  Item   - Health Status:   - Start Date: November 24, 2021   - End Date: None   - Status: Active    Medication overuse headache   - Category: Problem List Item   - Health Status:   - Start Date: November 24, 2021   - End Date: None   - Status: Active    Migraine with aura and without status migrainosus, not intractable   - Category: Problem List Item   - Health Status:   - Start Date: November 24, 2021   - End Date: None   - Status: Active    Sleep disturbance   - Category: Problem List Item   - Health Status:   - Start Date: November 24, 2021   - End Date: None   - Status: Active    Hypoglycemia   - Category: Problem List Item   - Health Status:   - Start Date: Adrienne 15, 2022   - End Date: None   - Status: Active    Vitamin D deficiency   - Category: Problem List Item   - Health Status:   - Start Date: Adrienne 15, 2022   - End Date: None   - Status: Active    Muscle spasm   - Category: Problem List Item   - Health Status:   - Start Date: August 25, 2023   - End Date: None   - Status: Active    Chronic right hip pain   - Category: Problem List Item   - Health Status:   - Start Date: August 25, 2023   - End Date: None   - Status: Active    Anxiety   - Category: Problem List Item   - Health Status:   - Start Date: August 25, 2023   - End Date: None   - Status: Active

## 2023-11-06 ENCOUNTER — PATIENT MESSAGE (OUTPATIENT)
Dept: FAMILY MEDICINE CLINIC | Facility: CLINIC | Age: 38
End: 2023-11-06
Payer: OTHER GOVERNMENT

## 2023-11-07 ENCOUNTER — CLINICAL SUPPORT (OUTPATIENT)
Dept: FAMILY MEDICINE CLINIC | Facility: CLINIC | Age: 38
End: 2023-11-07
Payer: OTHER GOVERNMENT

## 2023-11-07 VITALS — RESPIRATION RATE: 18 BRPM | OXYGEN SATURATION: 96 % | TEMPERATURE: 98.9 F | HEART RATE: 88 BPM

## 2023-11-07 DIAGNOSIS — R06.02 SHORTNESS OF BREATH: ICD-10-CM

## 2023-11-07 DIAGNOSIS — R10.84 GENERALIZED ABDOMINAL PAIN: ICD-10-CM

## 2023-11-07 DIAGNOSIS — R06.2 WHEEZING: ICD-10-CM

## 2023-11-07 DIAGNOSIS — R09.81 NASAL CONGESTION: ICD-10-CM

## 2023-11-07 DIAGNOSIS — R05.1 ACUTE COUGH: Primary | ICD-10-CM

## 2023-11-07 LAB
EXPIRATION DATE: NORMAL
EXPIRATION DATE: NORMAL
FLUAV AG UPPER RESP QL IA.RAPID: NOT DETECTED
FLUBV AG UPPER RESP QL IA.RAPID: NOT DETECTED
INTERNAL CONTROL: NORMAL
INTERNAL CONTROL: NORMAL
Lab: NORMAL
Lab: NORMAL
S PYO AG THROAT QL: NEGATIVE
SARS-COV-2 AG UPPER RESP QL IA.RAPID: NOT DETECTED

## 2023-11-07 PROCEDURE — 87428 SARSCOV & INF VIR A&B AG IA: CPT | Performed by: REGISTERED NURSE

## 2023-11-07 PROCEDURE — 87880 STREP A ASSAY W/OPTIC: CPT | Performed by: REGISTERED NURSE

## 2023-11-08 RX ORDER — ALBUTEROL SULFATE 2.5 MG/3ML
2.5 SOLUTION RESPIRATORY (INHALATION) EVERY 4 HOURS PRN
Qty: 120 ML | Refills: 1 | Status: SHIPPED | OUTPATIENT
Start: 2023-11-08

## 2023-11-13 ENCOUNTER — OFFICE VISIT (OUTPATIENT)
Dept: FAMILY MEDICINE CLINIC | Facility: CLINIC | Age: 38
End: 2023-11-13
Payer: OTHER GOVERNMENT

## 2023-11-13 VITALS
DIASTOLIC BLOOD PRESSURE: 90 MMHG | RESPIRATION RATE: 18 BRPM | TEMPERATURE: 98.2 F | HEART RATE: 82 BPM | OXYGEN SATURATION: 99 % | HEIGHT: 70 IN | SYSTOLIC BLOOD PRESSURE: 140 MMHG | WEIGHT: 214 LBS | BODY MASS INDEX: 30.64 KG/M2

## 2023-11-13 DIAGNOSIS — G89.29 CHRONIC MIDLINE LOW BACK PAIN WITH SCIATICA, SCIATICA LATERALITY UNSPECIFIED: ICD-10-CM

## 2023-11-13 DIAGNOSIS — M25.551 CHRONIC RIGHT HIP PAIN: ICD-10-CM

## 2023-11-13 DIAGNOSIS — M54.40 CHRONIC MIDLINE LOW BACK PAIN WITH SCIATICA, SCIATICA LATERALITY UNSPECIFIED: ICD-10-CM

## 2023-11-13 DIAGNOSIS — F41.9 ANXIETY: ICD-10-CM

## 2023-11-13 DIAGNOSIS — G89.29 CHRONIC RIGHT HIP PAIN: ICD-10-CM

## 2023-11-13 DIAGNOSIS — M79.644 THUMB PAIN, RIGHT: ICD-10-CM

## 2023-11-13 DIAGNOSIS — M54.16 LUMBAR RADICULOPATHY: ICD-10-CM

## 2023-11-13 DIAGNOSIS — R06.02 SHORTNESS OF BREATH: Primary | ICD-10-CM

## 2023-11-13 PROCEDURE — 99214 OFFICE O/P EST MOD 30 MIN: CPT | Performed by: REGISTERED NURSE

## 2023-11-13 RX ORDER — BUSPIRONE HYDROCHLORIDE 10 MG/1
10 TABLET ORAL 3 TIMES DAILY
Qty: 90 TABLET | Refills: 0 | Status: SHIPPED | OUTPATIENT
Start: 2023-11-13 | End: 2023-12-13

## 2023-11-13 RX ORDER — HYDROCODONE BITARTRATE AND ACETAMINOPHEN 5; 325 MG/1; MG/1
1 TABLET ORAL EVERY 12 HOURS PRN
Qty: 60 TABLET | Refills: 0 | Status: SHIPPED | OUTPATIENT
Start: 2023-11-13

## 2023-11-13 NOTE — PROGRESS NOTES
Chief Complaint  Follow-up    Subjective    History of Present Illness {  Problem List  Visit  Diagnosis   Encounters  Notes  Medications  Labs  Result Review Imaging  Media :23}     Amelie Henderson presents to [facility] for Follow-up.      History of Present Illness  Patient is a 38 y.o. female  presents to the clinic today for chronic pain, chronic anxiety, and acutely worsening cough.  Patient denies any chest pain, shortness of breath, or any fevers.  Patient denies any known exposure to COVID, flu, or any other contagious illnesses.    In regards to chronic pain, patient has been taking hydrocodone to manage her pain.  Patient shares that this been very helpful for her pain.  Patient has significant pain in her lower back, her thumb, and multiple other joints.  Patient is aware of potential for opioid addiction and voices understanding of ways that she will prevent this from happening.  Patient voices understanding of narcotic agreement and importance of urine drug screening.  Patient should use NSAIDs and other nonopioid medications first before using opioid.  Opioids should be used as only necessary when pain is above a 7 out of 10.    In regards to anxiety, patient is taking BuSpar to manage this.  Patient shares that BuSpar is helpful with managing her anxiety.  Patient denies any significant side effects from BuSpar.  Patient would like to continue with her current treatment for anxiety.    In regards to cough, patient shares she has had a cough now for more than a month.  Patient is concerned that she may have pneumonia or some other worsening issue.  Patient would like to move forward with getting a chest x-ray to rule out cause.       Review of Systems   Constitutional: Negative.  Negative for activity change, chills, fatigue and fever.   HENT: Negative.  Negative for congestion, dental problem, ear pain, hearing loss, rhinorrhea, sinus pain, sore throat, tinnitus  "and trouble swallowing.    Eyes: Negative.  Negative for pain and visual disturbance.   Respiratory:  Positive for cough. Negative for chest tightness, shortness of breath and wheezing.    Cardiovascular: Negative.  Negative for chest pain, palpitations and leg swelling.   Gastrointestinal: Negative.  Negative for abdominal pain, diarrhea, nausea and vomiting.   Endocrine: Negative.  Negative for polydipsia, polyphagia and polyuria.   Genitourinary: Negative.  Negative for difficulty urinating, dysuria, frequency and urgency.   Musculoskeletal:  Positive for arthralgias and back pain. Negative for myalgias.   Skin: Negative.  Negative for color change, pallor, rash and wound.   Allergic/Immunologic: Negative.  Negative for environmental allergies.   Neurological: Negative.  Negative for dizziness, speech difficulty, weakness, light-headedness, numbness and headaches.   Hematological: Negative.    Psychiatric/Behavioral: Negative.  Negative for confusion, decreased concentration, self-injury and suicidal ideas. The patient is not nervous/anxious.    All other systems reviewed and are negative.       Objective     Vital Signs:   /90 (BP Location: Left arm, Patient Position: Sitting, Cuff Size: Adult)   Pulse 82   Temp 98.2 °F (36.8 °C) (Oral)   Resp 18   Ht 177.8 cm (70\")   Wt 97.1 kg (214 lb)   SpO2 99%   BMI 30.71 kg/m²   Current Outpatient Medications on File Prior to Visit   Medication Sig Dispense Refill    albuterol (PROVENTIL) (2.5 MG/3ML) 0.083% nebulizer solution Take 2.5 mg by nebulization Every 4 (Four) Hours As Needed for Wheezing or Shortness of Air. 120 mL 1    albuterol sulfate  (90 Base) MCG/ACT inhaler Inhale 2 puffs Every 4 (Four) Hours As Needed for Wheezing. 18 g 2    Cholecalciferol 125 MCG (5000 UT) tablet Take 1 tablet by mouth Daily. 90 tablet 1    diclofenac (VOLTAREN) 50 MG EC tablet Take 1 tablet by mouth 2 (Two) Times a Day As Needed (arthralgia). for pain 180 tablet 1 "    EPINEPHrine (EPIPEN) 0.3 MG/0.3ML solution auto-injector injection INJECT 0.3MG IN THE MUSCLE AS DIRECTED      ferrous gluconate (FERGON) 324 MG tablet TAKE 1 TABLET BY MOUTH EVERY DAY WITH BREAKFAST 90 tablet 1    fluticasone (FLONASE) 50 MCG/ACT nasal spray SPRAY 2 SPRAYS INTO THE NOSTRIL AS DIRECTED BY PROVIDER DAILY. 16 mL 3    Galcanezumab-gnlm (EMGALITY SC) Inject  under the skin into the appropriate area as directed.      l-methylfolate 7.5 MG tablet tablet Take  by mouth Daily.      Levomilnacipran HCl ER (Fetzima) 40 MG capsule sustained-release 24 hr Take 40 mg by mouth Daily. 90 capsule 1    losartan (COZAAR) 50 MG tablet Take 1 tablet by mouth Every Morning. 90 tablet 1    metoprolol tartrate (LOPRESSOR) 25 MG tablet Take 1 tablet by mouth 2 (Two) Times a Day. 180 tablet 1    MILK THISTLE PO Take  by mouth.      Mirabegron ER (Myrbetriq) 50 MG tablet sustained-release 24 hour 24 hr tablet Take 50 mg by mouth Daily. 90 tablet 1    multivitamin with minerals tablet tablet 2 gummies po q am - smarty pants       naloxone (NARCAN) 4 MG/0.1ML nasal spray Call 911. Don't prime. [place] in 1 nostril for overdose. Repeat in 2-3 minutes in other nostril if no or minimal breathing/responsiveness. 2 each 0    NON FORMULARY Take 1 dose by mouth Daily. OSHWAGHANDHA SUPPLEMENT      nystatin-triamcinolone (MYCOLOG) 260563-3.1 UNIT/GM-% ointment Apply 1 application  topically to the appropriate area as directed 2 (Two) Times a Day. 15 g 2    omeprazole (priLOSEC) 20 MG capsule Take 1 capsule by mouth 2 (Two) Times a Day.      ondansetron (Zofran) 4 MG tablet Take 1 tablet by mouth Every 6 (Six) Hours As Needed for Nausea or Vomiting. 120 tablet 1    Probiotic Product (FORTIFY PROBIOTIC WOMENS EX ST PO) Take 1 capsule by mouth Daily.      promethazine (PHENERGAN) 25 MG tablet Take 1 tablet by mouth Every 6 (Six) Hours As Needed for Nausea or Vomiting. 12 tablet 0    rizatriptan (MAXALT) 10 MG tablet TAKE 1 TABLET AT THE  ONSET OF HEADACHE MAY REPEAT IN 2 HOURS MAX OF 2 TABS IN 24 HOURS 27 tablet 1    tiZANidine (ZANAFLEX) 4 MG tablet Take 1 tablet by mouth At Night As Needed for Muscle Spasms. 90 tablet 1    traZODone (DESYREL) 50 MG tablet Take 1 tablet by mouth every night at bedtime. 90 tablet 1    Vitamin B Complex-C capsule Take  by mouth.      vitamin C (ASCORBIC ACID) 250 MG tablet Take 1 tablet by mouth Daily.      zonisamide (ZONEGRAN) 100 MG capsule Take 2 capsules by mouth Every Night. 180 capsule 0    PHOSPHATIDYLSERINE PO Take 300 mg by mouth Daily.       No current facility-administered medications on file prior to visit.        Past Medical History:   Diagnosis Date    Allergic     Anemia     Anxiety     B12 deficiency     Callus     COVID     x 2     Depression     Dercum's disease     Difficulty walking     Endometriosis     Fallen arches     Gastritis     GERD (gastroesophageal reflux disease)     Hyperlipidemia     Borderline    Hypertension     Hypoglycemia     Ingrown toenail     Migraines     PTSD (post-traumatic stress disorder)     Shin splints     Stress fracture     Vitamin D deficiency 2020      Past Surgical History:   Procedure Laterality Date    CARPAL TUNNEL RELEASE Right      SECTION      x 2     CHOLECYSTECTOMY      KNEE SURGERY Right     x 3     KNEE SURGERY Left     x 1     SHOULDER ACROMIOPLASTY WITH ROTATOR CUFF REPAIR Left 2021    Procedure: LEFT SHOULDER ARTHROSCOPY WITH ROTATOR CUFF REPAIR AND SUBACROMIAL DECOMPRESSION- SLAP;  Surgeon: Rianna Jarvis MD;  Location: [facility];  Service: Orthopedics;  Laterality: Left;      Family History   Problem Relation Age of Onset    Thyroid disease Mother     Glaucoma Mother     Hypertension Mother     Transient ischemic attack Mother     Bell's palsy Mother     Alcohol abuse Father     ADD / ADHD Sister     Depression Son     Pyloric stenosis Son     Hypertension Maternal Grandmother     Heart failure Maternal Grandmother      Stroke Maternal Grandmother     Diabetes Paternal Grandfather     Malig Hyperthermia Neg Hx       Social History     Socioeconomic History    Marital status:    Tobacco Use    Smoking status: Former     Packs/day: 0.50     Years: 17.00     Additional pack years: 0.00     Total pack years: 8.50     Types: Cigarettes     Start date: 1999     Quit date: 2016     Years since quittin.9     Passive exposure: Past    Smokeless tobacco: Never   Vaping Use    Vaping Use: Never used   Substance and Sexual Activity    Alcohol use: Yes     Comment: Occassion    Drug use: Never    Sexual activity: Yes     Partners: Male     Birth control/protection: Tubal ligation, Hysterectomy, Surgical         Clinical Support on 2023   Component Date Value Ref Range Status    SARS Antigen 2023 Not Detected  Not Detected, Presumptive Negative Final    Influenza A Antigen GINA 2023 Not Detected  Not Detected Final    Influenza B Antigen GINA 2023 Not Detected  Not Detected Final    Internal Control 2023 Passed  Passed Final    Lot Number [id number]   Final    Expiration Date 2023 10/27/2024   Final    Rapid Strep A Screen 2023 Negative  Negative, VALID, INVALID, Not Performed Final    Internal Control 2023 Passed  Passed Final    Lot Number [id number]   Final    Expiration Date 2023   Final   Appointment on 10/04/2023   Component Date Value Ref Range Status    SARS Antigen 10/04/2023 Detected (A)  Not Detected, Presumptive Negative Final    Internal Control 10/04/2023 Passed  Passed Final    Lot Number 10/04/2023 [id number]   Final    Expiration Date 10/04/2023 10,[id number]   Final    Rapid Influenza A Ag 10/04/2023 Negative  Negative Final    Rapid Influenza B Ag 10/04/2023 Negative  Negative Final    Internal Control 10/04/2023 Passed  Passed Final    Lot Number 10/04/2023 [id number]   Final    Expiration Date 10/04/2023 [id number]   Final          Physical Exam  Vitals and nursing note reviewed.   Constitutional:       Appearance: Normal appearance. She is normal weight.   HENT:      Head: Normocephalic and atraumatic.   Cardiovascular:      Rate and Rhythm: Normal rate and regular rhythm.      Pulses: Normal pulses.      Heart sounds: Normal heart sounds. No murmur heard.     No friction rub. No gallop.   Pulmonary:      Effort: Pulmonary effort is normal. No respiratory distress.      Breath sounds: Normal breath sounds. No stridor. No wheezing, rhonchi or rales.   Chest:      Chest wall: No tenderness.   Abdominal:      General: Abdomen is flat. Bowel sounds are normal. There is no distension.      Palpations: Abdomen is soft. There is no mass.      Tenderness: There is no abdominal tenderness. There is no right CVA tenderness, left CVA tenderness, guarding or rebound.      Hernia: No hernia is present.   Skin:     General: Skin is warm and dry.      Capillary Refill: Capillary refill takes less than 2 seconds.      Coloration: Skin is not jaundiced or pale.   Neurological:      General: No focal deficit present.      Mental Status: She is alert and oriented to person, place, and time. Mental status is at baseline.      Motor: No weakness.      Coordination: Coordination normal.      Gait: Gait normal.   Psychiatric:         Mood and Affect: Mood normal.         Behavior: Behavior normal.         Thought Content: Thought content normal.         Judgment: Judgment normal.          Result Review  Data Reviewed:{ Labs  Result Review  Imaging  Med Tab  Media :23}   I have reviewed this patient's chart.  I have reviewed previous labs, previous imaging, previous medications, and previous encounters with notes that were available in this patient's chart.               Assessment and Plan {CC Problem List  Visit Diagnosis  ROS  Review (Popup)  [facility] Maintenance  Quality  BestPractice  Medications  SmartSets  SnapShot Encounters  Media :23}    Diagnoses and all orders for this visit:    1. Shortness of breath (Primary)  -     XR Chest 2 View; Future    2. Thumb pain, right  -     HYDROcodone-acetaminophen (NORCO) 5-325 MG per tablet; Take 1 tablet by mouth Every 12 (Twelve) Hours As Needed for Moderate Pain.  Dispense: 60 tablet; Refill: 0    3. Anxiety  -     busPIRone (BUSPAR) 10 MG tablet; Take 1 tablet by mouth 3 (Three) Times a Day for 30 days.  Dispense: 90 tablet; Refill: 0    4. Chronic midline low back pain with sciatica, sciatica laterality unspecified  -     HYDROcodone-acetaminophen (NORCO) 5-325 MG per tablet; Take 1 tablet by mouth Every 12 (Twelve) Hours As Needed for Moderate Pain.  Dispense: 60 tablet; Refill: 0    5. Chronic right hip pain  -     HYDROcodone-acetaminophen (NORCO) 5-325 MG per tablet; Take 1 tablet by mouth Every 12 (Twelve) Hours As Needed for Moderate Pain.  Dispense: 60 tablet; Refill: 0    6. Lumbar radiculopathy  -     HYDROcodone-acetaminophen (NORCO) 5-325 MG per tablet; Take 1 tablet by mouth Every 12 (Twelve) Hours As Needed for Moderate Pain.  Dispense: 60 tablet; Refill: 0        -X-ray to rule out reason for cough and shortness of breath.  -Hydrocodone refill for chronic pain at multiple sites.  -BuSpar refill for chronic anxiety control.  -ER red flags discussed with patient including risk versus benefit and education provided.  -Follow-up with me in 2 weeks if cough is not improving.  If improving follow-up at normal 3-month routine visit.    I spent 30 minutes caring for Amelie on this date of service. This time includes time spent by me in the following activities:preparing for the visit, reviewing tests, obtaining and/or reviewing a separately obtained history, performing a medically appropriate examination and/or evaluation , counseling and educating the patient/family/caregiver, ordering medications, tests, or procedures, referring and communicating with other health care professionals , documenting  information in the medical record, independently interpreting results and communicating that information with the patient/family/caregiver, and care coordination.    Follow Up {Instructions Charge Capture  Follow-up Communications :23}     Patient was given instructions and counseling regarding her condition or for health maintenance advice. Please see specific information pulled into the AVS (placed there by myself) if appropriate.    Return in about 2 weeks (around 11/27/2023), or if symptoms worsen or fail to improve, for Recheck cough.            EDSON Cali, FNP-BC

## 2023-11-16 DIAGNOSIS — F41.9 ANXIETY: ICD-10-CM

## 2023-11-18 ENCOUNTER — HOSPITAL ENCOUNTER (OUTPATIENT)
Dept: GENERAL RADIOLOGY | Facility: HOSPITAL | Age: 38
Discharge: HOME | End: 2023-11-18
Admitting: REGISTERED NURSE
Payer: OTHER GOVERNMENT

## 2023-11-18 DIAGNOSIS — R06.02 SHORTNESS OF BREATH: ICD-10-CM

## 2023-11-18 PROCEDURE — 71046 X-RAY EXAM CHEST 2 VIEWS: CPT

## 2023-11-20 ENCOUNTER — OFFICE VISIT (OUTPATIENT)
Dept: PODIATRY | Facility: CLINIC | Age: 38
End: 2023-11-20
Payer: OTHER GOVERNMENT

## 2023-11-20 ENCOUNTER — TELEPHONE (OUTPATIENT)
Dept: FAMILY MEDICINE CLINIC | Facility: CLINIC | Age: 38
End: 2023-11-20

## 2023-11-20 VITALS — RESPIRATION RATE: 18 BRPM | WEIGHT: 214 LBS | BODY MASS INDEX: 30.64 KG/M2 | HEIGHT: 70 IN

## 2023-11-20 DIAGNOSIS — Q66.71 PES CAVUS OF BOTH FEET: ICD-10-CM

## 2023-11-20 DIAGNOSIS — M21.861 ACQUIRED POSTERIOR EQUINUS OF BOTH LOWER EXTREMITIES: ICD-10-CM

## 2023-11-20 DIAGNOSIS — M79.671 BILATERAL FOOT PAIN: Primary | ICD-10-CM

## 2023-11-20 DIAGNOSIS — M72.2 PLANTAR FASCIITIS OF LEFT FOOT: ICD-10-CM

## 2023-11-20 DIAGNOSIS — M21.862 ACQUIRED POSTERIOR EQUINUS OF BOTH LOWER EXTREMITIES: ICD-10-CM

## 2023-11-20 DIAGNOSIS — F41.9 ANXIETY: ICD-10-CM

## 2023-11-20 DIAGNOSIS — Q66.72 PES CAVUS OF BOTH FEET: ICD-10-CM

## 2023-11-20 DIAGNOSIS — M79.672 BILATERAL FOOT PAIN: Primary | ICD-10-CM

## 2023-11-20 RX ORDER — HYDROXYZINE 50 MG/1
TABLET, FILM COATED ORAL
Qty: 120 TABLET | Refills: 3 | Status: SHIPPED | OUTPATIENT
Start: 2023-11-20

## 2023-11-20 RX ORDER — METHYLPREDNISOLONE 4 MG/1
TABLET ORAL
Qty: 21 TABLET | Refills: 0 | Status: SHIPPED | OUTPATIENT
Start: 2023-11-20

## 2023-11-20 RX ORDER — HYDROXYZINE 50 MG/1
TABLET, FILM COATED ORAL
Qty: 360 TABLET | Refills: 1 | OUTPATIENT
Start: 2023-11-20

## 2023-11-20 NOTE — TELEPHONE ENCOUNTER
Caller: Amelie Henderson    Relationship: Self    Best call back number: [phone]     Caller requesting test results: AS SOON AS POSSIBLE    What test was performed: CXR    When was the test performed: 11/18/23      Additional notes:   PATIENT WOULD LIKE A CALL ABOUT THE RESULTS

## 2023-11-20 NOTE — PROGRESS NOTES
2023  Foot and Ankle Surgery - New Patient   Provider: Dr. Servando Corral DPM  Location: [facility]    Subjective:  Amelie Henderson is a 38 y.o. female.     Chief Complaint   Patient presents with    Left Foot - Pain    Right Foot - Pain       HPI: The patient is a 38-year-old female who presents to the clinic for right foot pain. She is accompanied by an adult male.    She reports she noticed the pain more during cesar season. She states it felt like something was being ripped apart on the inside of her foot. She notes she noticed a bump on her foot that was bigger and she started using supports. She denies any previous issues with her foot. She states she has pain when she ambulates. She notes her pain has improved with the supports. She reports she has been told she has collapsing arches. She states she works around the house. She notes she has a bulging disc in her back.    Allergies   Allergen Reactions    Acme Anaphylaxis    Codeine Hives and Itching    Influenza Virus Vaccine Other (See Comments)     Egg allergy     Latex Rash     Skin gets red and burns, skin starts peeling      Meloxicam Other (See Comments)     Gastritis         Past Medical History:   Diagnosis Date    Allergic     Anemia     Anxiety     B12 deficiency     Callus     COVID     x 2     Depression     Dercum's disease     Difficulty walking     Endometriosis     Fallen arches     Gastritis     GERD (gastroesophageal reflux disease)     Hyperlipidemia     Borderline    Hypertension     Hypoglycemia     Ingrown toenail     Migraines     PTSD (post-traumatic stress disorder)     Shin splints     Stress fracture     Vitamin D deficiency        Past Surgical History:   Procedure Laterality Date    CARPAL TUNNEL RELEASE Right      SECTION      x 2     CHOLECYSTECTOMY      KNEE SURGERY Right     x 3     KNEE SURGERY Left     x 1     SHOULDER ACROMIOPLASTY WITH ROTATOR CUFF REPAIR Left 2021    Procedure: LEFT  SHOULDER ARTHROSCOPY WITH ROTATOR CUFF REPAIR AND SUBACROMIAL DECOMPRESSION- SLAP;  Surgeon: Rianna Jarvis MD;  Location: [facility] [place] [facility];  Service: Orthopedics;  Laterality: Left;       Family History   Problem Relation Age of Onset    Thyroid disease Mother     Glaucoma Mother     Hypertension Mother     Transient ischemic attack Mother     Bell's palsy Mother     Alcohol abuse Father     ADD / ADHD Sister     Depression Son     Pyloric stenosis Son     Hypertension Maternal Grandmother     Heart failure Maternal Grandmother     Stroke Maternal Grandmother     Diabetes Paternal Grandfather     Malig Hyperthermia Neg Hx        Social History     Socioeconomic History    Marital status:    Tobacco Use    Smoking status: Former     Packs/day: 0.50     Years: 17.00     Additional pack years: 0.00     Total pack years: 8.50     Types: Cigarettes     Start date: 1999     Quit date: 2016     Years since quittin.8     Passive exposure: Past    Smokeless tobacco: Never   Vaping Use    Vaping Use: Never used   Substance and Sexual Activity    Alcohol use: Yes     Comment: Occassion    Drug use: Never    Sexual activity: Yes     Partners: Male     Birth control/protection: Tubal ligation, Hysterectomy, Surgical        Current Outpatient Medications on File Prior to Visit   Medication Sig Dispense Refill    albuterol (PROVENTIL) (2.5 MG/3ML) 0.083% nebulizer solution Take 2.5 mg by nebulization Every 4 (Four) Hours As Needed for Wheezing or Shortness of Air. 120 mL 1    albuterol sulfate  (90 Base) MCG/ACT inhaler Inhale 2 puffs Every 4 (Four) Hours As Needed for Wheezing. 18 g 2    busPIRone (BUSPAR) 10 MG tablet Take 1 tablet by mouth 3 (Three) Times a Day for 30 days. 90 tablet 0    Cholecalciferol 125 MCG (5000 UT) tablet Take 1 tablet by mouth Daily. 90 tablet 1    diclofenac (VOLTAREN) 50 MG EC tablet Take 1 tablet by mouth 2 (Two) Times a Day As Needed (arthralgia). for pain 180 tablet 1     EPINEPHrine (EPIPEN) 0.3 MG/0.3ML solution auto-injector injection INJECT 0.3MG IN THE MUSCLE AS DIRECTED      ferrous gluconate (FERGON) 324 MG tablet TAKE 1 TABLET BY MOUTH EVERY DAY WITH BREAKFAST 90 tablet 1    fluticasone (FLONASE) 50 MCG/ACT nasal spray SPRAY 2 SPRAYS INTO THE NOSTRIL AS DIRECTED BY PROVIDER DAILY. 16 mL 3    Galcanezumab-gnlm (EMGALITY SC) Inject  under the skin into the appropriate area as directed.      HYDROcodone-acetaminophen (NORCO) 5-325 MG per tablet Take 1 tablet by mouth Every 12 (Twelve) Hours As Needed for Moderate Pain. 60 tablet 0    l-methylfolate 7.5 MG tablet tablet Take  by mouth Daily.      Levomilnacipran HCl ER (Fetzima) 40 MG capsule sustained-release 24 hr Take 40 mg by mouth Daily. 90 capsule 1    losartan (COZAAR) 50 MG tablet Take 1 tablet by mouth Every Morning. 90 tablet 1    metoprolol tartrate (LOPRESSOR) 25 MG tablet Take 1 tablet by mouth 2 (Two) Times a Day. 180 tablet 1    MILK THISTLE PO Take  by mouth.      Mirabegron ER (Myrbetriq) 50 MG tablet sustained-release 24 hour 24 hr tablet Take 50 mg by mouth Daily. 90 tablet 1    multivitamin with minerals tablet tablet 2 gummies po q am - smarty pants       naloxone (NARCAN) 4 MG/0.1ML nasal spray Call 911. Don't prime. Burden in 1 nostril for overdose. Repeat in 2-3 minutes in other nostril if no or minimal breathing/responsiveness. 2 each 0    NON FORMULARY Take 1 dose by mouth Daily. OSHWAGHANDHA SUPPLEMENT      nystatin-triamcinolone (MYCOLOG) 899260-7.1 UNIT/GM-% ointment Apply 1 application  topically to the appropriate area as directed 2 (Two) Times a Day. 15 g 2    omeprazole (priLOSEC) 20 MG capsule Take 1 capsule by mouth 2 (Two) Times a Day.      ondansetron (Zofran) 4 MG tablet Take 1 tablet by mouth Every 6 (Six) Hours As Needed for Nausea or Vomiting. 120 tablet 1    Probiotic Product (FORTIFY PROBIOTIC WOMENS EX ST PO) Take 1 capsule by mouth Daily.      promethazine (PHENERGAN) 25 MG tablet Take  "1 tablet by mouth Every 6 (Six) Hours As Needed for Nausea or Vomiting. 12 tablet 0    propranolol (INDERAL) 10 MG tablet Take 1 tablet by mouth 2 (Two) Times a Day As Needed (anxiety). 180 tablet 1    rizatriptan (MAXALT) 10 MG tablet TAKE 1 TABLET AT THE ONSET OF HEADACHE MAY REPEAT IN 2 HOURS MAX OF 2 TABS IN 24 HOURS 27 tablet 1    tiZANidine (ZANAFLEX) 4 MG tablet Take 1 tablet by mouth At Night As Needed for Muscle Spasms. 90 tablet 1    traZODone (DESYREL) 50 MG tablet Take 1 tablet by mouth every night at bedtime. 90 tablet 1    ubrogepant (UBRELVY) 100 MG tablet Take 1 tablet by mouth.      Vitamin B Complex-C capsule Take  by mouth.      vitamin C (ASCORBIC ACID) 250 MG tablet Take 1 tablet by mouth Daily.      zonisamide (ZONEGRAN) 100 MG capsule Take 2 capsules by mouth Every Night. 180 capsule 0    PHOSPHATIDYLSERINE PO Take 300 mg by mouth Daily.       No current facility-administered medications on file prior to visit.       Review of Systems:  General: Denies fever, chills, fatigue, and weakness.  Eyes: Denies vision loss, blurry vision, and excessive redness.  ENT: Denies hearing issues and difficulty swallowing.  Cardiovascular: Denies palpitations, chest pain, or syncopal episodes.  Respiratory: Denies shortness of breath, wheezing, and coughing.  GI: Denies abdominal pain, nausea, and vomiting.   : Denies frequency, hematuria, and urgency.  Musculoskeletal: Denies muscle cramps, joint pains, and stiffness.  Derm: Denies rash, open wounds, or suspicious lesions.  Neuro: Denies headaches, numbness, loss of coordination, and tremors.  Psych: Denies anxiety and depression.  Endocrine: Denies temperature intolerance and changes in appetite.  Heme: Denies bleeding disorders or abnormal bruising.     Objective   Resp 18   Ht 177.8 cm (70\")   Wt 97.1 kg (214 lb)   LMP 02/01/2023 (Approximate)   BMI 30.71 kg/m²     Foot/Ankle Exam    GENERAL  Orientation:  AAOx3  Affect:  appropriate    VASCULAR     " Right Foot Vascularity   Normal vascular exam    Dorsalis pedis:  2+  Posterior tibial:  2+  Skin temperature:  warm  Edema grading:  None  CFT:  < 3 seconds  Pedal hair growth:  Present  Varicosities:  none     Left Foot Vascularity   Normal vascular exam    Dorsalis pedis:  2+  Posterior tibial:  2+  Skin temperature:  warm  Edema grading:  None  CFT:  < 3 seconds  Pedal hair growth:  Present  Varicosities:  none     NEUROLOGIC     Right Foot Neurologic   Light touch sensation: normal  Hot/Cold sensation: normal  Achilles reflex:  2+     Left Foot Neurologic   Light touch sensation: normal  Hot/Cold sensation:  normal  Achilles reflex:  2+    MUSCULOSKELETAL     Right Foot Musculoskeletal   Arch:  Normal     Left Foot Musculoskeletal   Arch:  Normal    MUSCLE STRENGTH     Right Foot Muscle Strength   Normal strength    Foot dorsiflexion:  5  Foot plantar flexion:  5  Foot inversion:  5  Foot eversion:  5     Left Foot Muscle Strength   Normal strength    Foot dorsiflexion:  5  Foot plantar flexion:  5  Foot inversion:  5  Foot eversion:  5    DERMATOLOGIC      Right Foot Dermatologic   Skin  Right foot skin is intact.   Nails comment:  Nails 1-5     Left Foot Dermatologic   Skin  Left foot skin is intact.   Nails comment:  Nails 1-5    TESTS     Right Foot Tests   Anterior drawer: negative  Varus tilt: negative     Left Foot Tests   Anterior drawer: negative  Varus tilt: negative     Right foot additional comments: Significant discomfort with palpation involving the plantar medial calcaneal tuberosity and abductor hallucis muscle belly. Pes cavus foot structure. Moderate equinus contracture with knee extended, flexed with soft tissue rigidity. No obvious deformity or instability.      Assessment & Plan   Diagnoses and all orders for this visit:    1. Bilateral foot pain (Primary)  -     XR Foot 3+ View Bilateral    2. Plantar fasciitis of left foot    3. Acquired posterior equinus of both lower extremities    4.  Pes cavus of both feet    Other orders  -     methylPREDNISolone (MEDROL) 4 MG dose pack; Take as directed on package instructions.  Dispense: 21 tablet; Refill: 0        The patient is a 38-year-old female who presents to the office today for bilateral foot pain, left greater than right.  Patient states that she noticed swelling, pain, and redness involving the plantar medial aspect of the left heel some weeks ago.  She has noticed some improvement but continues to have discomfort with increased activity.  Imaging was reviewed showing no obvious fracture or dislocations.  Clinically, she does have increased medial arch which is likely causing excessive stress to the plantar soft tissues.  I reviewed the diagnoses, treatment options, etiology, and biomechanics of these issues.  I have suggested that she acquire a pair of over-the-counter arch supports and wear on a daily basis.  She is to avoid barefoot and unsupported weightbearing.  We did discuss the importance of stretching and manual therapy exercises routinely.  I have prescribed a Medrol Dosepak for symptom management.  We did review rice therapy.  I do feel that symptoms will improve with conservative care; however, I have asked that she follow-up with me in 4 weeks for reevaluation.  Greater than 45 minutes was spent before, during, and after evaluation for patient care.    Orders Placed This Encounter   Procedures    XR Foot 3+ View Bilateral     Scheduling Instructions:      R 10     Order Specific Question:   Reason for Exam:     Answer:   bilateral foot pain     Order Specific Question:   Patient Pregnant     Answer:   No     Order Specific Question:   Does this patient have a diabetic monitoring/medication delivering device on?     Answer:   No     Order Specific Question:   Release to patient     Answer:   Routine Release [[id number]]        Note is dictated utilizing voice recognition software. Unfortunately this leads to occasional typographical  errors. I apologize in advance if the situation occurs. If questions occur please do not hesitate to call our office.    Transcribed from ambient dictation for KAY Corral DPM by Kate Smith.  11/20/23   12:50 EST    Patient or patient representative verbalized consent to the visit recording.  I have personally performed the services described in this document as transcribed by the above individual, and it is both accurate and complete.

## 2023-11-21 ENCOUNTER — PATIENT ROUNDING (BHMG ONLY) (OUTPATIENT)
Dept: ORTHOPEDIC SURGERY | Facility: CLINIC | Age: 38
End: 2023-11-21
Payer: OTHER GOVERNMENT

## 2023-11-30 DIAGNOSIS — F41.9 ANXIETY: ICD-10-CM

## 2023-11-30 RX ORDER — PROPRANOLOL HYDROCHLORIDE 10 MG/1
10 TABLET ORAL 2 TIMES DAILY PRN
Qty: 60 TABLET | Refills: 1 | Status: SHIPPED | OUTPATIENT
Start: 2023-11-30

## 2023-12-18 ENCOUNTER — OFFICE VISIT (OUTPATIENT)
Dept: FAMILY MEDICINE CLINIC | Facility: CLINIC | Age: 38
End: 2023-12-18
Payer: OTHER GOVERNMENT

## 2023-12-18 VITALS
BODY MASS INDEX: 30.92 KG/M2 | WEIGHT: 216 LBS | HEIGHT: 70 IN | OXYGEN SATURATION: 100 % | SYSTOLIC BLOOD PRESSURE: 140 MMHG | HEART RATE: 90 BPM | RESPIRATION RATE: 18 BRPM | DIASTOLIC BLOOD PRESSURE: 110 MMHG | TEMPERATURE: 98.1 F

## 2023-12-18 DIAGNOSIS — Z91.038 HISTORY OF ANAPHYLACTIC SHOCK DUE TO INSECT STING: ICD-10-CM

## 2023-12-18 DIAGNOSIS — J30.89 ENVIRONMENTAL AND SEASONAL ALLERGIES: ICD-10-CM

## 2023-12-18 DIAGNOSIS — E66.01 MORBID (SEVERE) OBESITY DUE TO EXCESS CALORIES: ICD-10-CM

## 2023-12-18 DIAGNOSIS — S31.105A OPEN WOUND OF UMBILICAL REGION, INITIAL ENCOUNTER: ICD-10-CM

## 2023-12-18 DIAGNOSIS — F41.9 ANXIETY: Primary | ICD-10-CM

## 2023-12-18 RX ORDER — BUSPIRONE HYDROCHLORIDE 10 MG/1
20 TABLET ORAL 3 TIMES DAILY PRN
Qty: 180 TABLET | Refills: 0 | Status: SHIPPED | OUTPATIENT
Start: 2023-12-18 | End: 2024-01-17

## 2023-12-18 RX ORDER — MUPIROCIN CALCIUM 20 MG/G
1 CREAM TOPICAL 3 TIMES DAILY
Qty: 30 G | Refills: 1 | Status: SHIPPED | OUTPATIENT
Start: 2023-12-18

## 2023-12-18 RX ORDER — LEVOCETIRIZINE DIHYDROCHLORIDE 5 MG/1
5 TABLET, FILM COATED ORAL EVERY EVENING
Qty: 90 TABLET | Refills: 1 | Status: SHIPPED | OUTPATIENT
Start: 2023-12-18

## 2023-12-18 RX ORDER — EPINEPHRINE 0.3 MG/.3ML
0.3 INJECTION SUBCUTANEOUS ONCE
Qty: 1 EACH | Refills: 0 | Status: SHIPPED | OUTPATIENT
Start: 2023-12-18 | End: 2023-12-18

## 2023-12-18 NOTE — PROGRESS NOTES
Chief Complaint  Follow-up, Anxiety, and Depression    Subjective    History of Present Illness {CC  Problem List  Visit  Diagnosis   Encounters  Notes  Medications  Labs  Result Review Imaging  Media :23}     Amelie Henderson presents to [facility] for Follow-up, Anxiety, and Depression.      History of Present Illness  Patient is a 38 y.o. female  presents to the clinic today for 4-week follow-up for anxiety with concerns of wound on belly and able approximately 2 weeks and continued weight gain greater than 3 months.  Patient denies any chest pain, shortness of breath, or any fevers.  Patient denies any known exposure to COVID, flu, or any other contagious illnesses.    In regards to anxiety, patient would like a refill of BuSpar today.  Patient shares is helpful but does not completely take away her anxiety.  Patient denies any significant side effects or concerns in regards to her BuSpar at this time.    In regards to open wound around umbilicus, patient shares that this been in place now for couple weeks.  She shares that she has clear to sometimes purulent discharge.  Patient has been trying to clean this out but it has not healed yet.  We discussed options and patient is agreeable to antibiotic ointment to help clear this up.    In regards to weight gain, patient has tried get Wegovy approved in the past but her insurance did not approve it.  Patient would like to try to get new Zepbound filled through her insurance.  We discussed risk versus benefit and I will send the prescription over for her.       Review of Systems   Constitutional:  Positive for unexpected weight change. Negative for activity change, chills, fatigue and fever.   HENT: Negative.  Negative for congestion, dental problem, ear pain, hearing loss, rhinorrhea, sinus pain, sore throat, tinnitus and trouble swallowing.    Eyes: Negative.  Negative for pain and visual disturbance.   Respiratory: Negative.   "Negative for cough, chest tightness, shortness of breath and wheezing.    Cardiovascular: Negative.  Negative for chest pain, palpitations and leg swelling.   Gastrointestinal: Negative.  Negative for abdominal pain, diarrhea, nausea and vomiting.   Endocrine: Negative.  Negative for polydipsia, polyphagia and polyuria.   Genitourinary: Negative.  Negative for difficulty urinating, dysuria, frequency and urgency.   Musculoskeletal: Negative.  Negative for arthralgias, back pain and myalgias.   Skin: Negative.  Negative for color change, pallor, rash and wound.   Allergic/Immunologic: Negative.  Negative for environmental allergies.   Neurological: Negative.  Negative for dizziness, speech difficulty, weakness, light-headedness, numbness and headaches.   Hematological: Negative.    Psychiatric/Behavioral:  Negative for confusion, decreased concentration, self-injury and suicidal ideas. The patient is nervous/anxious.    All other systems reviewed and are negative.       Objective     Vital Signs:   BP (!) 140/110 (BP Location: Left arm, Patient Position: Sitting, Cuff Size: Adult)   Pulse 90   Temp 98.1 °F (36.7 °C) (Oral)   Resp 18   Ht 177.8 cm (70\")   Wt 98 kg (216 lb)   SpO2 100%   BMI 30.99 kg/m²   Current Outpatient Medications on File Prior to Visit   Medication Sig Dispense Refill    albuterol (PROVENTIL) (2.5 MG/3ML) 0.083% nebulizer solution Take 2.5 mg by nebulization Every 4 (Four) Hours As Needed for Wheezing or Shortness of Air. 120 mL 1    albuterol sulfate  (90 Base) MCG/ACT inhaler Inhale 2 puffs Every 4 (Four) Hours As Needed for Wheezing. 18 g 2    Cholecalciferol 125 MCG (5000 UT) tablet Take 1 tablet by mouth Daily. 90 tablet 1    diclofenac (VOLTAREN) 50 MG EC tablet Take 1 tablet by mouth 2 (Two) Times a Day As Needed (arthralgia). for pain 180 tablet 1    ferrous gluconate (FERGON) 324 MG tablet TAKE 1 TABLET BY MOUTH EVERY DAY WITH BREAKFAST 90 tablet 1    fluticasone (FLONASE) " 50 MCG/ACT nasal spray SPRAY 2 SPRAYS INTO THE NOSTRIL AS DIRECTED BY PROVIDER DAILY. 16 mL 3    Galcanezumab-gnlm (EMGALITY SC) Inject  under the skin into the appropriate area as directed.      HYDROcodone-acetaminophen (NORCO) 5-325 MG per tablet Take 1 tablet by mouth Every 12 (Twelve) Hours As Needed for Moderate Pain. 60 tablet 0    hydrOXYzine (ATARAX) 50 MG tablet MAY TAKE 1 TABLET 2 TIMES A DAY AS NEEDED FOR ITCHING, MAY ALSO TAKE 2 TABLETS AT NIGHT AS NEEDED. 120 tablet 3    l-methylfolate 7.5 MG tablet tablet Take  by mouth Daily.      Levomilnacipran HCl ER (Fetzima) 40 MG capsule sustained-release 24 hr Take 40 mg by mouth Daily. 90 capsule 1    losartan (COZAAR) 50 MG tablet Take 1 tablet by mouth Every Morning. 90 tablet 1    metoprolol tartrate (LOPRESSOR) 25 MG tablet Take 1 tablet by mouth 2 (Two) Times a Day. 180 tablet 1    MILK THISTLE PO Take  by mouth.      Mirabegron ER (Myrbetriq) 50 MG tablet sustained-release 24 hour 24 hr tablet Take 50 mg by mouth Daily. 90 tablet 1    multivitamin with minerals tablet tablet 2 gummies po q am - smarty pants       naloxone (NARCAN) 4 MG/0.1ML nasal spray Call 911. Don't prime. Brilliant in 1 nostril for overdose. Repeat in 2-3 minutes in other nostril if no or minimal breathing/responsiveness. 2 each 0    NON FORMULARY Take 1 dose by mouth Daily. [facility]      nystatin-triamcinolone (MYCOLOG) 221190-8.1 UNIT/GM-% ointment Apply 1 application  topically to the appropriate area as directed 2 (Two) Times a Day. 15 g 2    omeprazole (priLOSEC) 20 MG capsule Take 1 capsule by mouth 2 (Two) Times a Day.      ondansetron (Zofran) 4 MG tablet Take 1 tablet by mouth Every 6 (Six) Hours As Needed for Nausea or Vomiting. 120 tablet 1    Probiotic Product (FORTIFY PROBIOTIC WOMENS EX ST PO) Take 1 capsule by mouth Daily.      promethazine (PHENERGAN) 25 MG tablet Take 1 tablet by mouth Every 6 (Six) Hours As Needed for Nausea or Vomiting. 12 tablet 0     propranolol (INDERAL) 10 MG tablet TAKE 1 TABLET BY MOUTH 2 (TWO) TIMES A DAY AS NEEDED (ANXIETY). 60 tablet 1    rizatriptan (MAXALT) 10 MG tablet TAKE 1 TABLET AT THE ONSET OF HEADACHE MAY REPEAT IN 2 HOURS MAX OF 2 TABS IN 24 HOURS 27 tablet 1    tiZANidine (ZANAFLEX) 4 MG tablet Take 1 tablet by mouth At Night As Needed for Muscle Spasms. 90 tablet 1    traZODone (DESYREL) 50 MG tablet Take 1 tablet by mouth every night at bedtime. 90 tablet 1    ubrogepant (UBRELVY) 100 MG tablet Take 1 tablet by mouth.      Vitamin B Complex-C capsule Take  by mouth.      vitamin C (ASCORBIC ACID) 250 MG tablet Take 1 tablet by mouth Daily.      zonisamide (ZONEGRAN) 100 MG capsule Take 2 capsules by mouth Every Night. 180 capsule 0    methylPREDNISolone (MEDROL) 4 MG dose pack Take as directed on package instructions. 21 tablet 0    PHOSPHATIDYLSERINE PO Take 300 mg by mouth Daily.       No current facility-administered medications on file prior to visit.        Past Medical History:   Diagnosis Date    Allergic     Anemia     Anxiety     B12 deficiency     Callus     COVID     x 2     Depression     Dercum's disease     Difficulty walking     Endometriosis     Fallen arches     Gastritis     GERD (gastroesophageal reflux disease)     Hyperlipidemia     Borderline    Hypertension     Hypoglycemia     Ingrown toenail     Migraines     PTSD (post-traumatic stress disorder)     Shin splints     Stress fracture     Vitamin D deficiency       Past Surgical History:   Procedure Laterality Date    CARPAL TUNNEL RELEASE Right      SECTION      x 2     CHOLECYSTECTOMY      KNEE SURGERY Right     x 3     KNEE SURGERY Left     x 1     SHOULDER ACROMIOPLASTY WITH ROTATOR CUFF REPAIR Left 2021    Procedure: LEFT SHOULDER ARTHROSCOPY WITH ROTATOR CUFF REPAIR AND SUBACROMIAL DECOMPRESSION- SLAP;  Surgeon: Rianna Jarvis MD;  Location: [facility] [place] MAIN OR;  Service: Orthopedics;  Laterality: Left;      Family History    Problem Relation Age of Onset    Thyroid disease Mother     Glaucoma Mother     Hypertension Mother     Transient ischemic attack Mother     Bell's palsy Mother     Alcohol abuse Father     ADD / ADHD Sister     Depression Son     Pyloric stenosis Son     Hypertension Maternal Grandmother     Heart failure Maternal Grandmother     Stroke Maternal Grandmother     Diabetes Paternal Grandfather     Malig Hyperthermia Neg Hx       Social History     Socioeconomic History    Marital status:    Tobacco Use    Smoking status: Former     Packs/day: 0.50     Years: 17.00     Additional pack years: 0.00     Total pack years: 8.50     Types: Cigarettes     Start date: 1999     Quit date: 2016     Years since quittin.9     Passive exposure: Past    Smokeless tobacco: Never   Vaping Use    Vaping Use: Never used   Substance and Sexual Activity    Alcohol use: Yes     Comment: Occassion    Drug use: Never    Sexual activity: Yes     Partners: Male     Birth control/protection: Tubal ligation, Hysterectomy, Surgical         Clinical Support on 2023   Component Date Value Ref Range Status    SARS Antigen 2023 Not Detected  Not Detected, Presumptive Negative Final    Influenza A Antigen GINA 2023 Not Detected  Not Detected Final    Influenza B Antigen GINA 2023 Not Detected  Not Detected Final    Internal Control 2023 Passed  Passed Final    Lot Number [id number]   Final    Expiration Date 2023 10/27/2024   Final    Rapid Strep A Screen 2023 Negative  Negative, VALID, INVALID, Not Performed Final    Internal Control 2023 Passed  Passed Final    Lot Number [id number]   Final    Expiration Date 2023   Final   Appointment on 10/04/2023   Component Date Value Ref Range Status    SARS Antigen 10/04/2023 Detected (A)  Not Detected, Presumptive Negative Final    Internal Control 10/04/2023 Passed  Passed Final    Lot Number 10/04/2023  [id number]   Final    Expiration Date 10/04/2023 10,[id number]   Final    Rapid Influenza A Ag 10/04/2023 Negative  Negative Final    Rapid Influenza B Ag 10/04/2023 Negative  Negative Final    Internal Control 10/04/2023 Passed  Passed Final    Lot Number 10/04/2023 [id number]   Final    Expiration Date 10/04/2023 [id number]   Final         Physical Exam  Vitals and nursing note reviewed.   Constitutional:       Appearance: Normal appearance. She is normal weight.   HENT:      Head: Normocephalic and atraumatic.   Cardiovascular:      Rate and Rhythm: Normal rate and regular rhythm.      Pulses: Normal pulses.      Heart sounds: Normal heart sounds. No murmur heard.     No friction rub. No gallop.   Pulmonary:      Effort: Pulmonary effort is normal. No respiratory distress.      Breath sounds: Normal breath sounds. No stridor. No wheezing, rhonchi or rales.   Chest:      Chest wall: No tenderness.   Abdominal:      General: Abdomen is flat. Bowel sounds are normal. There is no distension.      Palpations: Abdomen is soft. There is no mass.      Tenderness: There is no abdominal tenderness. There is no right CVA tenderness, left CVA tenderness, guarding or rebound.      Hernia: No hernia is present.   Skin:     General: Skin is warm and dry.      Capillary Refill: Capillary refill takes less than 2 seconds.      Coloration: Skin is not jaundiced or pale.      Findings: Wound present.          Neurological:      General: No focal deficit present.      Mental Status: She is alert and oriented to person, place, and time. Mental status is at baseline.      Motor: No weakness.      Coordination: Coordination normal.      Gait: Gait normal.   Psychiatric:         Mood and Affect: Mood normal.         Behavior: Behavior normal.         Thought Content: Thought content normal.         Judgment: Judgment normal.          Result Review  Data Reviewed:{ Labs  Result Review  Imaging  Med Tab  Media :23}   I have reviewed this  patient's chart.  I have reviewed previous labs, previous imaging, previous medications, and previous encounters with notes that were available in this patient's chart.               Assessment and Plan {CC Problem List  Visit Diagnosis  ROS  Review (Popup)  [facility]  BestPractice  Medications  SmartSets  SnapShot Encounters  Media :23}   Diagnoses and all orders for this visit:    1. Anxiety (Primary)  -     busPIRone (BUSPAR) 10 MG tablet; Take 2 tablets by mouth 3 (Three) Times a Day As Needed (anxiety) for up to 30 days.  Dispense: 180 tablet; Refill: 0  -     Tirzepatide-Weight Management (ZEPBOUND) 2.5 MG/0.5ML solution auto-injector; Inject 0.5 mL under the skin into the appropriate area as directed 1 (One) Time Per Week.  Dispense: 2 mL; Refill: 0    2. Environmental and seasonal allergies  -     levocetirizine (XYZAL) 5 MG tablet; Take 1 tablet by mouth Every Evening.  Dispense: 90 tablet; Refill: 1    3. History of anaphylactic shock due to insect sting  -     EPINEPHrine (EPIPEN) 0.3 MG/0.3ML solution auto-injector injection; Inject 0.3 mL into the appropriate muscle as directed by prescriber 1 (One) Time for 1 dose.  Dispense: 1 each; Refill: 0    4. Open wound of umbilical region, initial encounter  -     mupirocin (BACTROBAN) 2 % cream; Apply 1 application  topically to the appropriate area as directed 3 (Three) Times a Day.  Dispense: 30 g; Refill: 1    5. BMI 31.0-31.9,adult  -     Tirzepatide-Weight Management (ZEPBOUND) 2.5 MG/0.5ML solution auto-injector; Inject 0.5 mL under the skin into the appropriate area as directed 1 (One) Time Per Week.  Dispense: 2 mL; Refill: 0    6. Morbid (severe) obesity due to excess calories  -     Tirzepatide-Weight Management (ZEPBOUND) 2.5 MG/0.5ML solution auto-injector; Inject 0.5 mL under the skin into the appropriate area as directed 1 (One) Time Per Week.  Dispense: 2 mL; Refill: 0        -Discussed Zepbound as a possibility for  weight management if insurance covers this.  -Refill of EpiPen at patient's request.  -Xyzal for seasonal allergies.  -ER red flags discussed with patient including risk versus benefit and education provided.  -Follow-up with me in 3 months or sooner if needed.    I spent 30 minutes caring for Amelie on this date of service. This time includes time spent by me in the following activities:preparing for the visit, reviewing tests, obtaining and/or reviewing a separately obtained history, performing a medically appropriate examination and/or evaluation , counseling and educating the patient/family/caregiver, ordering medications, tests, or procedures, referring and communicating with other health care professionals , documenting information in the medical record, independently interpreting results and communicating that information with the patient/family/caregiver, and care coordination.    Follow Up {Instructions Charge Capture  Follow-up Communications :23}     Patient was given instructions and counseling regarding her condition or for health maintenance advice. Please see specific information pulled into the AVS (placed there by myself) if appropriate.    Return in about 3 months (around 3/18/2024) for routine follow up.            EDSON Cali, FNP-BC

## 2023-12-18 NOTE — PATIENT INSTRUCTIONS
Zepbound titration scale:    Week 1 through week 4 : 2.5 mg once weekly.   Week 5 through week 8: 5 mg once weekly.   and thereafter (maintenance dosage): 5 mg once weekly unless increased by provider.  Dosage increases can be discussed at subsequent visits.

## 2023-12-19 ENCOUNTER — OFFICE VISIT (OUTPATIENT)
Dept: PODIATRY | Facility: CLINIC | Age: 38
End: 2023-12-19
Payer: OTHER GOVERNMENT

## 2023-12-19 VITALS — RESPIRATION RATE: 16 BRPM | BODY MASS INDEX: 30.92 KG/M2 | HEIGHT: 70 IN | WEIGHT: 216 LBS

## 2023-12-19 DIAGNOSIS — Q66.71 PES CAVUS OF BOTH FEET: ICD-10-CM

## 2023-12-19 DIAGNOSIS — M72.2 PLANTAR FASCIITIS OF LEFT FOOT: ICD-10-CM

## 2023-12-19 DIAGNOSIS — M21.862 ACQUIRED POSTERIOR EQUINUS OF BOTH LOWER EXTREMITIES: ICD-10-CM

## 2023-12-19 DIAGNOSIS — Q66.72 PES CAVUS OF BOTH FEET: ICD-10-CM

## 2023-12-19 DIAGNOSIS — M21.861 ACQUIRED POSTERIOR EQUINUS OF BOTH LOWER EXTREMITIES: ICD-10-CM

## 2023-12-19 DIAGNOSIS — M79.672 BILATERAL FOOT PAIN: Primary | ICD-10-CM

## 2023-12-19 DIAGNOSIS — M79.671 BILATERAL FOOT PAIN: Primary | ICD-10-CM

## 2023-12-19 RX ORDER — TRIAMCINOLONE ACETONIDE 40 MG/ML
20 INJECTION, SUSPENSION INTRA-ARTICULAR; INTRAMUSCULAR ONCE
Status: COMPLETED | OUTPATIENT
Start: 2023-12-19 | End: 2023-12-19

## 2023-12-19 RX ADMIN — TRIAMCINOLONE ACETONIDE 20 MG: 40 INJECTION, SUSPENSION INTRA-ARTICULAR; INTRAMUSCULAR at 11:50

## 2023-12-19 NOTE — PROGRESS NOTES
12/19/2023  Foot and Ankle Surgery - Established Patient/Follow-up  Provider: Dr. Servando Corral DPM  Location: [facility]    Subjective:  Amelie Henderson is a 38 y.o. female.     Chief Complaint   Patient presents with    Left Foot - Follow-up, Plantar Fasciitis    Right Foot - Follow-up, Pain    Follow-up     ROSY SANDHU      HPI:    The patient is a 38-year-old female who presents to the clinic for a follow-up regarding her left foot.      She was last seen approximately 1 month ago for issues with her left arch and heel. At that time, we discussed a Medrol Dosepak, inserts, and stretching exercises.      She reports a lump on her left foot that has increased in size; however, it has decreased in size. She states she has been using an item she bought to roll her foot. She notes she believed she sprained her left ankle on 11/25/2023. She reports she was unable to wear the arch supports for a few days as her foot would not fit in her shoe with the brace. She states her symptoms are about the same as they were at her last visit. She notes she has not been performing stretching exercises. The patient reports she has lupus. She states that she is getting epidural in her back tomorrow.            Allergies   Allergen Reactions    [place] Anaphylaxis    Codeine Hives and Itching    Influenza Virus Vaccine Other (See Comments)     Egg allergy     Latex Rash     Skin gets red and burns, skin starts peeling      Meloxicam Other (See Comments)     Gastritis         Current Outpatient Medications on File Prior to Visit   Medication Sig Dispense Refill    albuterol (PROVENTIL) (2.5 MG/3ML) 0.083% nebulizer solution Take 2.5 mg by nebulization Every 4 (Four) Hours As Needed for Wheezing or Shortness of Air. 120 mL 1    albuterol sulfate  (90 Base) MCG/ACT inhaler Inhale 2 puffs Every 4 (Four) Hours As Needed for Wheezing. 18 g 2    busPIRone (BUSPAR) 10 MG tablet Take 2 tablets by mouth 3 (Three) Times a Day As  Needed (anxiety) for up to 30 days. 180 tablet 0    Cholecalciferol 125 MCG (5000 UT) tablet Take 1 tablet by mouth Daily. 90 tablet 1    diclofenac (VOLTAREN) 50 MG EC tablet Take 1 tablet by mouth 2 (Two) Times a Day As Needed (arthralgia). for pain 180 tablet 1    ferrous gluconate (FERGON) 324 MG tablet TAKE 1 TABLET BY MOUTH EVERY DAY WITH BREAKFAST 90 tablet 1    fluticasone (FLONASE) 50 MCG/ACT nasal spray SPRAY 2 SPRAYS INTO THE NOSTRIL AS DIRECTED BY PROVIDER DAILY. 16 mL 3    Galcanezumab-gnlm (EMGALITY SC) Inject  under the skin into the appropriate area as directed.      HYDROcodone-acetaminophen (NORCO) 5-325 MG per tablet Take 1 tablet by mouth Every 12 (Twelve) Hours As Needed for Moderate Pain. 60 tablet 0    hydrOXYzine (ATARAX) 50 MG tablet MAY TAKE 1 TABLET 2 TIMES A DAY AS NEEDED FOR ITCHING, MAY ALSO TAKE 2 TABLETS AT NIGHT AS NEEDED. 120 tablet 3    l-methylfolate 7.5 MG tablet tablet Take  by mouth Daily.      levocetirizine (XYZAL) 5 MG tablet Take 1 tablet by mouth Every Evening. 90 tablet 1    Levomilnacipran HCl ER (Fetzima) 40 MG capsule sustained-release 24 hr Take 40 mg by mouth Daily. 90 capsule 1    losartan (COZAAR) 50 MG tablet Take 1 tablet by mouth Every Morning. 90 tablet 1    methylPREDNISolone (MEDROL) 4 MG dose pack Take as directed on package instructions. 21 tablet 0    metoprolol tartrate (LOPRESSOR) 25 MG tablet Take 1 tablet by mouth 2 (Two) Times a Day. 180 tablet 1    MILK THISTLE PO Take  by mouth.      Mirabegron ER (Myrbetriq) 50 MG tablet sustained-release 24 hour 24 hr tablet Take 50 mg by mouth Daily. 90 tablet 1    multivitamin with minerals tablet tablet 2 gummies po q am - smarty pants       mupirocin (BACTROBAN) 2 % cream Apply 1 application  topically to the appropriate area as directed 3 (Three) Times a Day. 30 g 1    naloxone (NARCAN) 4 MG/0.1ML nasal spray Call 911. Don't prime. Perry in 1 nostril for overdose. Repeat in 2-3 minutes in other nostril if no  "or minimal breathing/responsiveness. 2 each 0    NON FORMULARY Take 1 dose by mouth Daily. OSHWAGHANDHA SUPPLEMENT      nystatin-triamcinolone (MYCOLOG) 392873-1.1 UNIT/GM-% ointment Apply 1 application  topically to the appropriate area as directed 2 (Two) Times a Day. 15 g 2    omeprazole (priLOSEC) 20 MG capsule Take 1 capsule by mouth 2 (Two) Times a Day.      ondansetron (Zofran) 4 MG tablet Take 1 tablet by mouth Every 6 (Six) Hours As Needed for Nausea or Vomiting. 120 tablet 1    PHOSPHATIDYLSERINE PO Take 300 mg by mouth Daily.      Probiotic Product (FORTIFY PROBIOTIC WOMENS EX ST PO) Take 1 capsule by mouth Daily.      promethazine (PHENERGAN) 25 MG tablet Take 1 tablet by mouth Every 6 (Six) Hours As Needed for Nausea or Vomiting. 12 tablet 0    propranolol (INDERAL) 10 MG tablet TAKE 1 TABLET BY MOUTH 2 (TWO) TIMES A DAY AS NEEDED (ANXIETY). 60 tablet 1    rizatriptan (MAXALT) 10 MG tablet TAKE 1 TABLET AT THE ONSET OF HEADACHE MAY REPEAT IN 2 HOURS MAX OF 2 TABS IN 24 HOURS 27 tablet 1    Tirzepatide-Weight Management (ZEPBOUND) 2.5 MG/0.5ML solution auto-injector Inject 0.5 mL under the skin into the appropriate area as directed 1 (One) Time Per Week. 2 mL 0    tiZANidine (ZANAFLEX) 4 MG tablet Take 1 tablet by mouth At Night As Needed for Muscle Spasms. 90 tablet 1    traZODone (DESYREL) 50 MG tablet Take 1 tablet by mouth every night at bedtime. 90 tablet 1    ubrogepant (UBRELVY) 100 MG tablet Take 1 tablet by mouth.      Vitamin B Complex-C capsule Take  by mouth.      vitamin C (ASCORBIC ACID) 250 MG tablet Take 1 tablet by mouth Daily.      zonisamide (ZONEGRAN) 100 MG capsule Take 2 capsules by mouth Every Night. 180 capsule 0     No current facility-administered medications on file prior to visit.       Objective   Resp 16   Ht 177.8 cm (70\")   Wt 98 kg (216 lb)   LMP 02/01/2023 (Approximate)   BMI 30.99 kg/m²     Foot/Ankle Exam    GENERAL  Orientation:  AAOx3  Affect:  appropriate   "   VASCULAR      Right Foot Vascularity   Normal vascular exam    Dorsalis pedis:  2+  Posterior tibial:  2+  Skin temperature:  warm  Edema grading:  None  CFT:  < 3 seconds  Pedal hair growth:  Present  Varicosities:  none      Left Foot Vascularity   Normal vascular exam    Dorsalis pedis:  2+  Posterior tibial:  2+  Skin temperature:  warm  Edema grading:  None  CFT:  < 3 seconds  Pedal hair growth:  Present  Varicosities:  none     NEUROLOGIC      Right Foot Neurologic   Light touch sensation: normal  Hot/Cold sensation: normal  Achilles reflex:  2+      Left Foot Neurologic   Light touch sensation: normal  Hot/Cold sensation:  normal  Achilles reflex:  2+     MUSCULOSKELETAL      Right Foot Musculoskeletal   Arch:  Normal      Left Foot Musculoskeletal   Arch:  Normal     MUSCLE STRENGTH      Right Foot Muscle Strength   Normal strength    Foot dorsiflexion:  5  Foot plantar flexion:  5  Foot inversion:  5  Foot eversion:  5      Left Foot Muscle Strength   Normal strength    Foot dorsiflexion:  5  Foot plantar flexion:  5  Foot inversion:  5  Foot eversion:  5     DERMATOLOGIC       Right Foot Dermatologic   Skin  Right foot skin is intact.   Nails comment:  Nails 1-5      Left Foot Dermatologic   Skin  Left foot skin is intact.   Nails comment:  Nails 1-5     TESTS      Right Foot Tests   Anterior drawer: negative  Varus tilt: negative      Left Foot Tests   Anterior drawer: negative  Varus tilt: negative     Right foot additional comments: Significant discomfort with palpation involving the plantar medial calcaneal tuberosity and abductor hallucis muscle belly. Pes cavus foot structure. Moderate equinus contracture with knee extended, flexed with soft tissue rigidity. No obvious deformity or instability.    12/19/2023  Significant pain with palpation involving the plantar aspect of the left heel. Moderate equinus contracture. No significant deformity. No progressive deformity or instability.  Assessment &  Plan   Diagnoses and all orders for this visit:    1. Bilateral foot pain (Primary)    2. Plantar fasciitis of left foot  -     Ambulatory Referral to Physical Therapy Evaluate and treat  -     triamcinolone acetonide (KENALOG-40) injection 20 mg    3. Acquired posterior equinus of both lower extremities    4. Pes cavus of both feet      Patient returns for follow-up regarding her bilateral foot pain.  She continues to have discomfort involving the plantar aspect of her left heel.  She states that the pain is fairly significant with increased activity.  Phonically, she continues to have significant equinus contracture involving both lower extremities which is likely complicating her issues.  We did review the importance of stretching and manual therapy exercises.  I have asked that she continue to wear supportive shoes and inserts.  I did suggest a steroid injection for symptom management today.  Patient understands and agrees.  We did review risks and benefits.  Procedure was performed without complication.  I have also provided her with a referral to physical therapy.  Patient is to monitor her symptoms and call with any progressive issues or concerns.  I will see her in 6 weeks for reevaluation.    Greater than 20 minutes was spent before, during, and after evaluation for patient care.    Plantar Fascia Steroid Injection: Left    Consent and time out was performed before proceeding with the procedure.  The area of maximal tenderness was palpated near the plantar medial calcaneal tuberosity of left foot.  The area was cleansed with alcohol.  Under ultrasound guidance, the plantar fascia was visualized and a solution totaling 1.5 mL was injected about the origin of the plantar fascia.  The solution contained 0.5 mL of 1% lidocaine plain, 0.5 mL of 0.5% Marcaine plain, and 0.5 mL of Kenalog.  After the injection, the patient noted immediate pain relief.  Mild compression was placed at the injection site followed by  a sterile bandage.  The patient tolerated the injection well without complication.        Orders Placed This Encounter   Procedures    Ambulatory Referral to Physical Therapy Evaluate and treat     Referral Priority:   Routine     Referral Type:   Physical Therapy     Referral Reason:   Specialty Services Required     Requested Specialty:   Physical Therapy     Number of Visits Requested:   1          Note is dictated utilizing voice recognition software. Unfortunately this leads to occasional typographical errors. I apologize in advance if the situation occurs. If questions occur please do not hesitate to call our office.    Transcribed from ambient dictation for KAY Corral DPM by Mia Merchant.  12/19/23   11:22 EST    Patient or patient representative verbalized consent to the visit recording.

## 2023-12-28 ENCOUNTER — TELEPHONE (OUTPATIENT)
Dept: FAMILY MEDICINE CLINIC | Facility: CLINIC | Age: 38
End: 2023-12-28

## 2023-12-28 DIAGNOSIS — F51.01 PRIMARY INSOMNIA: ICD-10-CM

## 2023-12-28 RX ORDER — TRAZODONE HYDROCHLORIDE 50 MG/1
50 TABLET ORAL
Qty: 90 TABLET | Refills: 1 | Status: SHIPPED | OUTPATIENT
Start: 2023-12-28

## 2023-12-28 NOTE — TELEPHONE ENCOUNTER
Caller: Woody Henderson    Best call back number: [phone]     PATIENT IS NEEDING A PRIOR AUTHORIZATION ON HER MEDICATION:       Tirzepatide-Weight Management (ZEPBOUND) 2.5 MG/0.5ML solution auto-injector     PLEASE ADVISE

## 2023-12-30 DIAGNOSIS — I10 PRIMARY HYPERTENSION: ICD-10-CM

## 2024-01-08 ENCOUNTER — CLINICAL SUPPORT (OUTPATIENT)
Dept: FAMILY MEDICINE CLINIC | Facility: CLINIC | Age: 39
End: 2024-01-08
Payer: OTHER GOVERNMENT

## 2024-01-08 DIAGNOSIS — R05.1 ACUTE COUGH: ICD-10-CM

## 2024-01-08 DIAGNOSIS — R53.83 FATIGUE, UNSPECIFIED TYPE: ICD-10-CM

## 2024-01-08 DIAGNOSIS — R09.81 CHRONIC NASAL CONGESTION: ICD-10-CM

## 2024-01-08 DIAGNOSIS — J02.9 SORE THROAT: Primary | ICD-10-CM

## 2024-01-08 LAB
B PARAPERT DNA SPEC QL NAA+PROBE: NOT DETECTED
B PERT DNA SPEC QL NAA+PROBE: NOT DETECTED
C PNEUM DNA NPH QL NAA+NON-PROBE: NOT DETECTED
EXPIRATION DATE: NORMAL
EXPIRATION DATE: NORMAL
FLUAV AG UPPER RESP QL IA.RAPID: NOT DETECTED
FLUAV SUBTYP SPEC NAA+PROBE: NOT DETECTED
FLUBV AG UPPER RESP QL IA.RAPID: NOT DETECTED
FLUBV RNA ISLT QL NAA+PROBE: NOT DETECTED
HADV DNA SPEC NAA+PROBE: NOT DETECTED
HCOV 229E RNA SPEC QL NAA+PROBE: NOT DETECTED
HCOV HKU1 RNA SPEC QL NAA+PROBE: NOT DETECTED
HCOV NL63 RNA SPEC QL NAA+PROBE: NOT DETECTED
HCOV OC43 RNA SPEC QL NAA+PROBE: NOT DETECTED
HETEROPH AB SER QL LA: NEGATIVE
HMPV RNA NPH QL NAA+NON-PROBE: NOT DETECTED
HPIV1 RNA ISLT QL NAA+PROBE: NOT DETECTED
HPIV2 RNA SPEC QL NAA+PROBE: NOT DETECTED
HPIV3 RNA NPH QL NAA+PROBE: NOT DETECTED
HPIV4 P GENE NPH QL NAA+PROBE: NOT DETECTED
INTERNAL CONTROL: NORMAL
INTERNAL CONTROL: NORMAL
Lab: NORMAL
Lab: NORMAL
M PNEUMO IGG SER IA-ACNC: NOT DETECTED
RHINOVIRUS RNA SPEC NAA+PROBE: NOT DETECTED
RSV RNA NPH QL NAA+NON-PROBE: NOT DETECTED
SARS-COV-2 AG UPPER RESP QL IA.RAPID: NOT DETECTED
SARS-COV-2 RNA NPH QL NAA+NON-PROBE: NOT DETECTED

## 2024-01-08 PROCEDURE — 87081 CULTURE SCREEN ONLY: CPT | Performed by: REGISTERED NURSE

## 2024-01-08 PROCEDURE — 0202U NFCT DS 22 TRGT SARS-COV-2: CPT | Performed by: REGISTERED NURSE

## 2024-01-08 PROCEDURE — 87428 SARSCOV & INF VIR A&B AG IA: CPT | Performed by: REGISTERED NURSE

## 2024-01-08 PROCEDURE — 86308 HETEROPHILE ANTIBODY SCREEN: CPT | Performed by: REGISTERED NURSE

## 2024-01-10 LAB — BACTERIA SPEC AEROBE CULT: NORMAL

## 2024-01-16 ENCOUNTER — TREATMENT (OUTPATIENT)
Dept: PHYSICAL THERAPY | Facility: CLINIC | Age: 39
End: 2024-01-16
Payer: OTHER GOVERNMENT

## 2024-01-16 DIAGNOSIS — M54.40 CHRONIC MIDLINE LOW BACK PAIN WITH SCIATICA, SCIATICA LATERALITY UNSPECIFIED: ICD-10-CM

## 2024-01-16 DIAGNOSIS — R26.2 DIFFICULTY WALKING: ICD-10-CM

## 2024-01-16 DIAGNOSIS — G89.29 CHRONIC MIDLINE LOW BACK PAIN WITH SCIATICA, SCIATICA LATERALITY UNSPECIFIED: ICD-10-CM

## 2024-01-16 DIAGNOSIS — M25.551 CHRONIC RIGHT HIP PAIN: ICD-10-CM

## 2024-01-16 DIAGNOSIS — G89.29 CHRONIC RIGHT HIP PAIN: ICD-10-CM

## 2024-01-16 DIAGNOSIS — M54.16 LUMBAR RADICULOPATHY: ICD-10-CM

## 2024-01-16 DIAGNOSIS — M72.2 PLANTAR FASCIITIS OF LEFT FOOT: Primary | ICD-10-CM

## 2024-01-16 DIAGNOSIS — M79.644 THUMB PAIN, RIGHT: ICD-10-CM

## 2024-01-16 DIAGNOSIS — M79.671 ARCH PAIN, RIGHT: ICD-10-CM

## 2024-01-16 DIAGNOSIS — M79.672 ARCH PAIN OF LEFT FOOT: ICD-10-CM

## 2024-01-16 PROCEDURE — 97035 APP MDLTY 1+ULTRASOUND EA 15: CPT | Performed by: PHYSICAL THERAPIST

## 2024-01-16 PROCEDURE — 97161 PT EVAL LOW COMPLEX 20 MIN: CPT | Performed by: PHYSICAL THERAPIST

## 2024-01-16 PROCEDURE — 97110 THERAPEUTIC EXERCISES: CPT | Performed by: PHYSICAL THERAPIST

## 2024-01-16 NOTE — PROGRESS NOTES
Physical Therapy Initial Evaluation and Plan of Care    [address] [facility] [place] [address]  [phone] (p)  [phone] (f)    Patient: Amelie Henderson   : 1985  Diagnosis/ICD-10 Code:  Plantar fasciitis of left foot [M72.2]  Arch pain bilat M79.672, M79.671  Difficulty walking R26.2  Referring practitioner: KAY Corral DPM  Date of Initial Visit: 2024  Today's Date: 2024  Patient seen for 1 sessions         Subjective Questionnaire: FAAM:  = 56% function    Subjective   Pt with onset of L>R arch pan in 2023. Pain arch and medial heel. Worst with standing and walking. She reports large increase in activity and time spent standing due to cesar season; she is homesteader. Did a lot of standing, cooking, gardening, lifting, carrying, walking on uneven ground, gravel, hills. She feels that it has improved some since using foot roller, massaging calf with roller, elevating foot. wearing arch inserts and wearing supportive tennis shoes. She had cortizone injection L arch which helped some. She does have small lump on arch of foot. 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 She wear tennis shoes in house, wearing boots today due to cold weather. She has pain with first few steps after sitting, better with movement but worse again with prolonged walking. Significant difficulty with walking through grocery store due to pain, leans on cart.  Prolonged standing and cooking/cleaning also bother her, she tends to decrease WB on L LE. Modifies/sits as needed.  Unable to ride bowflex bike at home, too much pain. No issues driving, independent with ADLs with intermittent pain. Very tender to touch, or if hits it or touches with opposite foot or mis-steps can be painful. Lives with  in home no stairs. 2 to front door but does not enter through that way generally. OK with curb.  Occasionally wakes her up during the night.     2/10 (2/10 to 9/10) L>R  medial heel/ arch  Achey, throbbing, stabbing at times.     Has lupus and dercums disease, CHF, anxiety, arthritis multiple joints, multiple L ankle sprains and fxs, depression, headaches, SOB, anxiety, bulging disk lumbar, HTN, PTSD shin splints, stress fx    LATEX ALLERGY    Objective          Observations     Additional Ankle/Foot Observation Details  Standing: hyperextension L>R knee, mild decreased WB L LE.   Single leg balance: mild ankle sway, mild navicular drop L>R, discomfort L, painfree R. ~ 5 sec bilat  Sitting unremarkable    Observation: Very high arches, forefoot equinus. Calcaneal inversion bilat, good forefoot accomodation with WB    Palpation     Additional Palpation Details  Tenderness medial heel. L>R.  Small palpable knot. No significant tenderness foot or achilles.           Tenderness   Left Ankle/Foot   Tenderness in the plantar fascia.     Right Ankle/Foot   Tenderness in the plantar fascia.     Neurological Testing     Sensation     Ankle/Foot   Left Ankle/Foot   Intact: light touch    Right Ankle/Foot   Intact: light touch     Additional Neurological Details  Denies n/t         Active Range of Motion   Left Ankle/Foot   Dorsiflexion (ke): 0 degrees   Dorsiflexion (kf): 5 degrees   Plantar flexion: WFL  Inversion: 35 degrees   Eversion: 30 degrees   Great toe flexion: WFL  Great toe extension: WFL  Lesser toes: WFL    Right Ankle/Foot   Dorsiflexion (ke): 10 degrees   Dorsiflexion (kf): 5 degrees   Plantar flexion: WFL  Inversion: 60 degrees   Eversion: 35 degrees   Great toe flexion: WFL  Great toe extension: WFL  Lesser toes: WFL    Additional Active Range of Motion Details          Passive Range of Motion     Additional Passive Range of Motion Details          Joint Play     Right Ankle/Foot  Hypomobile in the forefoot.     Strength/Myotome Testing     Additional Strength Details  Strength pulling in arch all directions L ankle otherwise unremarkable, 4+/5 sitting throughout except L  ankle 4/5 all directions. Single leg heel raise not tested. Sidelying hip abd 4/5 bilat.      General Comments     Ankle/Foot Comments   Ambulation: Genu varus, mild scissoring pattern,pt reports pain first few steps, no significant antalgia  Stairs: not observed         Assessment & Plan       Assessment  Impairments: abnormal or restricted ROM, activity intolerance, impaired balance, impaired physical strength, lacks appropriate home exercise program, pain with function and weight-bearing intolerance   Functional limitations: carrying objects, lifting, sleeping, walking, pulling, pushing, uncomfortable because of pain, standing and stooping   Assessment details: Pt with restrictions ROM, flexibility and mild strength deficits, and pain medial heel/arch L>R consistent with plantar fascitis. Pt also with hx of bulging disk lumbar, but denies N/T or radicular symptoms; pts current foot symptoms more consistent with local musculoskeletal pain. Pt has symptoms bilat L>R; will focus on L and encourage pt to perform exercises bilaterally. 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   Prognosis: good    Goals  Plan Goals: ST.  Pt independent with HEP in 2 visits  2.  Pt to demonstrate ankle DF (ke and kf) AROM to 10 degrees or better in 4 weeks  3. L ankle strength 4+/5 all directions in 4 weeks   4. Pt to report decreased arch/heel pain 25% or more with ADLS in 4 weeks    LTG  1. Pt to demonstrate increased functional tolerance per FAAM score by D/C  2. Pt to demonstrate single leg balance on ground and uneven surface L without increased arch pain by D/C  3. Pt to standing to cook with proper shoewear with foot pain 2/10 or better by D/C  4. Pt to walk through grocery store with proper shoewear with foot pain 2/10 or better by D/C  5. Pt to voice readiness for D/C to independent HEP and  self management of symptoms.     Plan  Therapy options: will be seen for skilled therapy services  Planned modality interventions: ultrasound, thermotherapy (hydrocollator packs), dry needling, cryotherapy and electrical stimulation/Russian stimulation  Planned therapy interventions: balance/weight-bearing training, body mechanics training, flexibility, functional ROM exercises, gait training, home exercise program, joint mobilization, manual therapy, neuromuscular re-education, postural training, soft tissue mobilization, spinal/joint mobilization, strengthening, therapeutic activities and stretching  Frequency: 2x week  Duration in visits: 20  Duration in weeks: 12  Treatment plan discussed with: patient          History # of Personal Factors and/or Comorbidities: HIGH (3+)  Examination of Body System(s): # of elements: MODERATE (3)  Clinical Presentation: STABLE   Clinical Decision Making: LOW     Timed:         Manual Therapy:         mins  94950;     Therapeutic Exercise:    15     mins  27022;     Neuromuscular Clem:        mins  83322;    Therapeutic Activity:          mins  84285;     Gait Training:           mins  58323;     Ultrasound:      8    mins  25372;    Ionto                                   mins   36126  Self Care                            mins   70034  Aquatic                               mins 19701      Un-Timed:  Electrical Stimulation:         mins  96158 ( );  Dry Needling          mins self-pay  Traction          mins 26104  Low Eval     30     Mins  66479  Mod Eval          Mins  38850  High Eval                            Mins  01957  Re-Eval                               mins  17371        Timed Treatment:   23   mins   Total Treatment:     53   mins    PT SIGNATURE: Barbi Shankar PT, DPT   IN LICENCE: [id number]  DATE TREATMENT INITIATED: 1/16/2024    Initial Certification  Certification Period: 4/14/2024  I certify that the therapy services are furnished while this patient is  under my care.  The services outlined above are required by this patient, and will be reviewed every 90 days.     PHYSICIAN: KAY Corral DPM      DATE:     Please sign and return via fax to [phone].. Thank you, [facility].

## 2024-01-19 RX ORDER — HYDROCODONE BITARTRATE AND ACETAMINOPHEN 5; 325 MG/1; MG/1
1 TABLET ORAL EVERY 12 HOURS PRN
Qty: 60 TABLET | Refills: 0 | Status: SHIPPED | OUTPATIENT
Start: 2024-01-19

## 2024-01-19 NOTE — TELEPHONE ENCOUNTER
Rx Refill Note  Requested Prescriptions     Pending Prescriptions Disp Refills    HYDROcodone-acetaminophen (NORCO) 5-325 MG per tablet 60 tablet 0     Sig: Take 1 tablet by mouth Every 12 (Twelve) Hours As Needed for Moderate Pain.      Last office visit with prescribing clinician: 12/18/2023   Last telemedicine visit with prescribing clinician: Visit date not found   Next office visit with prescribing clinician: 2/19/2024     LAST FILLED ON 11/13/23    POC Urine Drug Screen, Triage (05/17/2023 11:27)     CONSENTS/WAIVERS - SCAN - CONSENT FOR NARCOTICS/05/17/2023 (05/17/2023)     INSPECT - SCAN - [place] INSPECT REPORT FROM 1/19/24 (01/19/2024)     Amy Romero MA  01/19/24, 16:10 EST

## 2024-01-24 ENCOUNTER — TREATMENT (OUTPATIENT)
Dept: PHYSICAL THERAPY | Facility: CLINIC | Age: 39
End: 2024-01-24
Payer: OTHER GOVERNMENT

## 2024-01-24 DIAGNOSIS — R26.2 DIFFICULTY WALKING: ICD-10-CM

## 2024-01-24 DIAGNOSIS — M72.2 PLANTAR FASCIITIS OF LEFT FOOT: Primary | ICD-10-CM

## 2024-01-24 DIAGNOSIS — M79.671 ARCH PAIN, RIGHT: ICD-10-CM

## 2024-01-24 DIAGNOSIS — M79.672 ARCH PAIN OF LEFT FOOT: ICD-10-CM

## 2024-01-24 PROCEDURE — 97035 APP MDLTY 1+ULTRASOUND EA 15: CPT | Performed by: PHYSICAL THERAPIST

## 2024-01-24 PROCEDURE — 97110 THERAPEUTIC EXERCISES: CPT | Performed by: PHYSICAL THERAPIST

## 2024-01-24 NOTE — PROGRESS NOTES
Physical Therapy Treatment Note    [address] [facility] [place] [address]  [phone] (p)  [phone] (f)    VISIT#: 2     Diagnosis Plan   1. Plantar fasciitis of left foot        2. Arch pain, right        3. Arch pain of left foot        4. Difficulty walking          Subjective   Amelie Henderson reports: Pain 5/10, pulling/tugging arch when stepping. Did well last visit, except went to grocery store after.    Objective     See Exercise, Manual, and Modality Logs for complete treatment.     Patient Education: ankle 4 way for home, pt reports does not need printout, TB provided.    Pt wearing boots without arch support today.    Pt with significant genu valgus L.    Assessment/Plan Good tolerance to exercise, reviewed HEP. Encoruage slow speed and good NM control. All without increased arch/heel pain.      Progress per Plan of Care            Timed:         Manual Therapy:         mins  54686;     Therapeutic Exercise:   35     mins  53367;     Neuromuscular Clem:     2   mins  06480;    Therapeutic Activity:          mins  80783;     Gait Training:           mins  09571;     Ultrasound:     8     mins  35831;    Ionto                                   mins   48645  Self Care                            mins   26422  Canalith Repos                   mins  4209  Aquatic                               mins 31298    Un-Timed:  Electrical Stimulation:         mins  71004 ( );  Dry Needling          mins self-pay  Traction          mins 96050  Low Eval          Mins  45795  Mod Eval          Mins  43935  High Eval                            Mins  06804  Re-Eval                               mins  07105    Timed Treatment:   45   mins   Total Treatment:     45   mins    Barbi Shankar, PT, DPT  IN LICENCE: [id number]

## 2024-01-26 ENCOUNTER — TREATMENT (OUTPATIENT)
Dept: PHYSICAL THERAPY | Facility: CLINIC | Age: 39
End: 2024-01-26
Payer: OTHER GOVERNMENT

## 2024-01-26 DIAGNOSIS — M79.671 ARCH PAIN, RIGHT: ICD-10-CM

## 2024-01-26 DIAGNOSIS — M79.672 ARCH PAIN OF LEFT FOOT: ICD-10-CM

## 2024-01-26 DIAGNOSIS — R26.2 DIFFICULTY WALKING: ICD-10-CM

## 2024-01-26 DIAGNOSIS — M72.2 PLANTAR FASCIITIS OF LEFT FOOT: Primary | ICD-10-CM

## 2024-01-26 PROCEDURE — 97110 THERAPEUTIC EXERCISES: CPT | Performed by: PHYSICAL THERAPIST

## 2024-01-26 PROCEDURE — 97035 APP MDLTY 1+ULTRASOUND EA 15: CPT | Performed by: PHYSICAL THERAPIST

## 2024-01-26 NOTE — PROGRESS NOTES
Physical Therapy Treatment Note    [address]   [place] [address]  [phone] (p)  [phone] (f)    VISIT#: 3     Diagnosis Plan   1. Plantar fasciitis of left foot        2. Arch pain, right        3. Difficulty walking        4. Arch pain of left foot          Subjective   Amelie Henderson reports: did well last visit, US continues to be beneficial.    Objective     See Exercise, Manual, and Modality Logs for complete treatment.     Heel lock lacing L shoe    Assessment/Plan Reviewed and progressed HEP with mild fatigue.      Progress per Plan of Care            Timed:         Manual Therapy:         mins  69870;     Therapeutic Exercise:    30     mins  90937;     Neuromuscular Clem:        mins  35358;    Therapeutic Activity:          mins  51004;     Gait Training:           mins  55385;     Ultrasound:      8    mins  43796;    Ionto                                  mins   68105  Self Care                            mins   83552  Canalith Repos                   mins  4209  Aquatic                               mins 74944    Un-Timed:  Electrical Stimulation:         mins  29394 ( );  Dry Needling          mins self-pay  Traction          mins 90652  Low Eval          Mins  99220  Mod Eval          Mins  00120  High Eval                            Mins  53545  Re-Eval                               mins  97514    Timed Treatment:   38   mins   Total Treatment:     38   mins    Barbi Shankar PT, DPT  IN LICENCE: [id number]   Risks/benefits discussed with patient/surrogate

## 2024-01-29 DIAGNOSIS — F41.8 MIXED ANXIETY DEPRESSIVE DISORDER: ICD-10-CM

## 2024-01-30 ENCOUNTER — OFFICE VISIT (OUTPATIENT)
Dept: PODIATRY | Facility: CLINIC | Age: 39
End: 2024-01-30
Payer: OTHER GOVERNMENT

## 2024-01-30 VITALS — RESPIRATION RATE: 20 BRPM | BODY MASS INDEX: 30.92 KG/M2 | WEIGHT: 216 LBS | HEIGHT: 70 IN

## 2024-01-30 DIAGNOSIS — M79.672 BILATERAL FOOT PAIN: Primary | ICD-10-CM

## 2024-01-30 DIAGNOSIS — M21.862 ACQUIRED POSTERIOR EQUINUS OF BOTH LOWER EXTREMITIES: ICD-10-CM

## 2024-01-30 DIAGNOSIS — Q66.72 PES CAVUS OF BOTH FEET: ICD-10-CM

## 2024-01-30 DIAGNOSIS — Q66.71 PES CAVUS OF BOTH FEET: ICD-10-CM

## 2024-01-30 DIAGNOSIS — M72.2 PLANTAR FASCIITIS OF LEFT FOOT: ICD-10-CM

## 2024-01-30 DIAGNOSIS — M79.671 BILATERAL FOOT PAIN: Primary | ICD-10-CM

## 2024-01-30 DIAGNOSIS — M21.861 ACQUIRED POSTERIOR EQUINUS OF BOTH LOWER EXTREMITIES: ICD-10-CM

## 2024-01-30 RX ORDER — TRIAMCINOLONE ACETONIDE 40 MG/ML
20 INJECTION, SUSPENSION INTRA-ARTICULAR; INTRAMUSCULAR ONCE
Status: COMPLETED | OUTPATIENT
Start: 2024-01-30 | End: 2024-01-30

## 2024-01-30 RX ORDER — AMOXICILLIN 875 MG/1
1 TABLET, COATED ORAL 2 TIMES DAILY
COMMUNITY

## 2024-01-30 RX ORDER — PREDNISONE 10 MG/1
1 TABLET ORAL DAILY
COMMUNITY

## 2024-01-30 RX ORDER — MOLNUPIRAVIR 200 MG/1
CAPSULE ORAL
COMMUNITY

## 2024-01-30 RX ADMIN — TRIAMCINOLONE ACETONIDE 20 MG: 40 INJECTION, SUSPENSION INTRA-ARTICULAR; INTRAMUSCULAR at 11:45

## 2024-01-30 NOTE — PROGRESS NOTES
"01/30/2024  Foot and Ankle Surgery - Established Patient/Follow-up  Provider: Dr. Servando Corral DPM  Location: [facility]    Subjective:  Amelie Henderson is a 38 y.o. female.     Chief Complaint   Patient presents with    Left Foot - Follow-up, Pain    Follow-up     ABIOLA POWELL APRN 1 8 2024       HPI: The patient is a 38-year-old female who presents to the clinic for a follow-up regarding her left foot. She is accompanied by an adult male.    She was last seen approximately 6 weeks ago. She received a steroid injection in her left heel. She reports approximately 30 to 40% improvement following the steroid injection. The patient states she is working with Barbi at physical therapy; however, she was late getting into physical therapy due to illness. She reports she has been performing stretching exercises at home. The patient states she continues to have issues, especially if she ambulates or stands for a long period of time. She notes she will prop her knee up on the counter when she is standing in the kitchen cooking. The patient states these issues began in 08/2023. She notes when she is standing, she can feel her foot stretching to the point where she feels a \"snap.\" She reports she is unsure of when she last attended physical therapy. The patient states she is following with Dr. Palacios for a tear of the labrum. She reports she is unable to use crutches secondary to her shoulder.    Allergies   Allergen Reactions    [place] Anaphylaxis    Codeine Hives and Itching    Influenza Virus Vaccine Other (See Comments)     Egg allergy     Latex Rash     Skin gets red and burns, skin starts peeling      Meloxicam Other (See Comments)     Gastritis         Current Outpatient Medications on File Prior to Visit   Medication Sig Dispense Refill    albuterol (PROVENTIL) (2.5 MG/3ML) 0.083% nebulizer solution Take 2.5 mg by nebulization Every 4 (Four) Hours As Needed for Wheezing or Shortness of Air. 120 mL 1    " albuterol sulfate  (90 Base) MCG/ACT inhaler Inhale 2 puffs Every 4 (Four) Hours As Needed for Wheezing. 18 g 2    Cholecalciferol 125 MCG (5000 UT) tablet Take 1 tablet by mouth Daily. 90 tablet 1    diclofenac (VOLTAREN) 50 MG EC tablet Take 1 tablet by mouth 2 (Two) Times a Day As Needed (arthralgia). for pain 180 tablet 1    ferrous gluconate (FERGON) 324 MG tablet TAKE 1 TABLET BY MOUTH EVERY DAY WITH BREAKFAST 90 tablet 1    fluticasone (FLONASE) 50 MCG/ACT nasal spray SPRAY 2 SPRAYS INTO THE NOSTRIL AS DIRECTED BY PROVIDER DAILY. 16 mL 3    Galcanezumab-gnlm (EMGALITY SC) Inject  under the skin into the appropriate area as directed.      HYDROcodone-acetaminophen (NORCO) 5-325 MG per tablet Take 1 tablet by mouth Every 12 (Twelve) Hours As Needed for Moderate Pain. 60 tablet 0    hydrOXYzine (ATARAX) 50 MG tablet MAY TAKE 1 TABLET 2 TIMES A DAY AS NEEDED FOR ITCHING, MAY ALSO TAKE 2 TABLETS AT NIGHT AS NEEDED. 120 tablet 3    l-methylfolate 7.5 MG tablet tablet Take  by mouth Daily.      levocetirizine (XYZAL) 5 MG tablet Take 1 tablet by mouth Every Evening. 90 tablet 1    Levomilnacipran HCl ER (Fetzima) 40 MG capsule sustained-release 24 hr Take 40 mg by mouth Daily. 90 capsule 1    losartan (COZAAR) 50 MG tablet Take 1 tablet by mouth Every Morning. 90 tablet 1    metoprolol tartrate (LOPRESSOR) 25 MG tablet Take 1 tablet by mouth 2 (Two) Times a Day. 180 tablet 1    MILK THISTLE PO Take  by mouth.      Mirabegron ER (Myrbetriq) 50 MG tablet sustained-release 24 hour 24 hr tablet Take 50 mg by mouth Daily. 90 tablet 1    Molnupiravir (Lagevrio) 200 MG capsule TAKE 4 CAPSULES BY MOUTH EVERY 12 HOURS FOR 5 DAYS      multivitamin with minerals tablet tablet 2 gummies po q am - smarty pants       mupirocin (BACTROBAN) 2 % cream Apply 1 application  topically to the appropriate area as directed 3 (Three) Times a Day. 30 g 1    naloxone (NARCAN) 4 MG/0.1ML nasal spray Call 911. Don't prime. [place] in 1  nostril for overdose. Repeat in 2-3 minutes in other nostril if no or minimal breathing/responsiveness. 2 each 0    NON FORMULARY Take 1 dose by mouth Daily. OSHWAGHANDHA SUPPLEMENT      nystatin-triamcinolone (MYCOLOG) 326421-8.1 UNIT/GM-% ointment Apply 1 application  topically to the appropriate area as directed 2 (Two) Times a Day. 15 g 2    omeprazole (priLOSEC) 20 MG capsule Take 1 capsule by mouth 2 (Two) Times a Day.      ondansetron (Zofran) 4 MG tablet Take 1 tablet by mouth Every 6 (Six) Hours As Needed for Nausea or Vomiting. 120 tablet 1    PHOSPHATIDYLSERINE PO Take 300 mg by mouth Daily.      Probiotic Product (FORTIFY PROBIOTIC WOMENS EX ST PO) Take 1 capsule by mouth Daily.      promethazine (PHENERGAN) 25 MG tablet Take 1 tablet by mouth Every 6 (Six) Hours As Needed for Nausea or Vomiting. 12 tablet 0    propranolol (INDERAL) 10 MG tablet TAKE 1 TABLET BY MOUTH 2 (TWO) TIMES A DAY AS NEEDED (ANXIETY). 60 tablet 1    rizatriptan (MAXALT) 10 MG tablet TAKE 1 TABLET AT THE ONSET OF HEADACHE MAY REPEAT IN 2 HOURS MAX OF 2 TABS IN 24 HOURS 27 tablet 1    Tirzepatide-Weight Management (ZEPBOUND) 2.5 MG/0.5ML solution auto-injector Inject 0.5 mL under the skin into the appropriate area as directed 1 (One) Time Per Week. 2 mL 0    tiZANidine (ZANAFLEX) 4 MG tablet Take 1 tablet by mouth At Night As Needed for Muscle Spasms. 90 tablet 1    traZODone (DESYREL) 50 MG tablet TAKE 1 TABLET BY MOUTH EVERY DAY AT NIGHT 90 tablet 1    ubrogepant (UBRELVY) 100 MG tablet Take 1 tablet by mouth.      Vitamin B Complex-C capsule Take  by mouth.      vitamin C (ASCORBIC ACID) 250 MG tablet Take 1 tablet by mouth Daily.      zonisamide (ZONEGRAN) 100 MG capsule Take 2 capsules by mouth Every Night. 180 capsule 0    amoxicillin (AMOXIL) 875 MG tablet Take 1 tablet by mouth 2 (Two) Times a Day. (Patient not taking: Reported on 1/30/2024)      busPIRone (BUSPAR) 10 MG tablet Take 2 tablets by mouth 3 (Three) Times a Day  "As Needed (anxiety) for up to 30 days. 180 tablet 0    methylPREDNISolone (MEDROL) 4 MG dose pack Take as directed on package instructions. (Patient not taking: Reported on 1/30/2024) 21 tablet 0    predniSONE (DELTASONE) 10 MG tablet Take 1 tablet by mouth Daily. (Patient not taking: Reported on 1/30/2024)       No current facility-administered medications on file prior to visit.       Objective   Resp 20   Ht 177.8 cm (70\")   Wt 98 kg (216 lb)   LMP 02/01/2023 (Approximate)   BMI 30.99 kg/m²     Foot/Ankle Exam    GENERAL  Orientation:  AAOx3  Affect:  appropriate    VASCULAR     Right Foot Vascularity   Normal vascular exam    Dorsalis pedis:  2+  Posterior tibial:  2+  Skin temperature:  warm  Edema grading:  None  CFT:  < 3 seconds  Pedal hair growth:  Present  Varicosities:  none     Left Foot Vascularity   Normal vascular exam    Dorsalis pedis:  2+  Posterior tibial:  2+  Skin temperature:  warm  Edema grading:  None  CFT:  < 3 seconds  Pedal hair growth:  Present  Varicosities:  none     NEUROLOGIC     Right Foot Neurologic   Light touch sensation: normal  Hot/Cold sensation: normal  Achilles reflex:  2+     Left Foot Neurologic   Light touch sensation: normal  Hot/Cold sensation:  normal  Achilles reflex:  2+    MUSCULOSKELETAL     Right Foot Musculoskeletal   Arch:  Normal     Left Foot Musculoskeletal   Arch:  Normal    MUSCLE STRENGTH     Right Foot Muscle Strength   Normal strength    Foot dorsiflexion:  5  Foot plantar flexion:  5  Foot inversion:  5  Foot eversion:  5     Left Foot Muscle Strength   Normal strength    Foot dorsiflexion:  5  Foot plantar flexion:  5  Foot inversion:  5  Foot eversion:  5    DERMATOLOGIC      Right Foot Dermatologic   Skin  Right foot skin is intact.   Nails comment:  Nails 1-5     Left Foot Dermatologic   Skin  Left foot skin is intact.   Nails comment:  Nails 1-5    TESTS     Right Foot Tests   Anterior drawer: negative  Varus tilt: negative     Left Foot Tests "   Anterior drawer: negative  Varus tilt: negative     Right foot additional comments: Significant discomfort with palpation involving the plantar medial calcaneal tuberosity and abductor hallucis muscle belly. Pes cavus foot structure. Moderate equinus contracture with knee extended, flexed with soft tissue rigidity. No obvious deformity or instability.    01/30/2024: Incurvation involving the medial borders of both great toes.     Left foot additional comments: 12/19/2023  Significant pain with palpation involving the plantar aspect of the left heel. Moderate equinus contracture. No significant deformity. No progressive deformity or instability.    01/30/2024: Continued discomfort with palpation involving the left first metatarsal. Continued pain involving the plantar aspect of the left calcaneus. Significant equinus contracture. Incurvation involving the medial borders of both great toes.      Assessment & Plan   Diagnoses and all orders for this visit:    1. Bilateral foot pain (Primary)    2. Plantar fasciitis of left foot  -     Injection Tendon or Ligament  -     triamcinolone acetonide (KENALOG-40) injection 20 mg    3. Acquired posterior equinus of both lower extremities    4. Pes cavus of both feet      Patient returns to the office today for a follow-up regarding her left foot pain. Discussed the etiology and biomechanics involved with her left foot pain. Explained soft tissues can take weeks to months to modify and improve. Discussed surgical intervention  and steroid injections with the patient. Explained the procedure, risks, benefits, and recovery in detail. The patient would like to hold off on surgery at this time. She will continue with physical therapy. The patient will return to the office in 6 weeks for reevaluation.    Plantar Fascia Steroid Injection: Left heel    Consent and time out was performed before proceeding with the procedure. The area of maximal tenderness was palpated near the  plantar aspect of the left calcaneus. The area was cleansed with alcohol. Under ultrasound guidance, the plantar fascia was visualized and a solution totaling 1.5 mL was injected about the origin of the plantar fascia. The solution contained 0.5 mL of 1% lidocaine plain, 0.5 mL of 0.5% Marcaine plain, and 0.5 mL of Kenalog. After the injection, the patient noted immediate pain relief. Mild compression was placed at the injection site followed by a sterile bandage. The patient tolerated the injection well without complication.    Orders Placed This Encounter   Procedures    Injection Tendon or Ligament     Order Specific Question:   Release to patient     Answer:   Routine Release [[id number]]          Note is dictated utilizing voice recognition software. Unfortunately this leads to occasional typographical errors. I apologize in advance if the situation occurs. If questions occur please do not hesitate to call our office.    Transcribed from ambient dictation for KAY Corral DPM by Hernandez Kiser.  01/30/24   13:16 EST    Patient or patient representative verbalized consent to the visit recording.  I have personally performed the services described in this document as transcribed by the above individual, and it is both accurate and complete.

## 2024-01-31 ENCOUNTER — TREATMENT (OUTPATIENT)
Dept: PHYSICAL THERAPY | Facility: CLINIC | Age: 39
End: 2024-01-31
Payer: OTHER GOVERNMENT

## 2024-01-31 DIAGNOSIS — M72.2 PLANTAR FASCIITIS OF LEFT FOOT: Primary | ICD-10-CM

## 2024-01-31 DIAGNOSIS — M79.671 ARCH PAIN, RIGHT: ICD-10-CM

## 2024-01-31 DIAGNOSIS — M79.672 ARCH PAIN OF LEFT FOOT: ICD-10-CM

## 2024-01-31 DIAGNOSIS — R26.2 DIFFICULTY WALKING: ICD-10-CM

## 2024-01-31 PROCEDURE — 97110 THERAPEUTIC EXERCISES: CPT | Performed by: PHYSICAL THERAPIST

## 2024-01-31 NOTE — PROGRESS NOTES
Physical Therapy Treatment Note    [address]   [place] [address]  [phone] (p)  [phone] (f)    VISIT#: 4     Diagnosis Plan   1. Plantar fasciitis of left foot        2. Arch pain, right        3. Difficulty walking        4. Arch pain of left foot          Subjective   Amelie Henderson reports: got another injection in foot yesterday, sore. Going to hold off on surgery for foot, continue conservative treatment. Doing fine with theraband exercises at home, no questions. She has L arm in sling due to shoulder injury/RTC tear.     Objective     See Exercise, Manual, and Modality Logs for complete treatment.     Pt wearing tennis shoes today.    Assessment/Plan Good tolerance to exercise progression, modified as needed for L shoulder.    Progress per Plan of Care Monitor response to taping.            Timed:         Manual Therapy:         mins  80302;     Therapeutic Exercise:    40     mins  69770;     Neuromuscular Clem:        mins  75022;    Therapeutic Activity:          mins  33886;     Gait Training:           mins  85355;     Ultrasound:          mins  02185;    Ionto                                   mins   85558  Self Care                            mins   35571  Canalith Repos                   mins  4209  Aquatic                               mins 63082    Un-Timed:  Electrical Stimulation:         mins  14935 ( );  Dry Needling          mins self-pay  Traction          mins 46911  Low Eval          Mins  15714  Mod Eval          Mins  54126  High Eval                            Mins  63506  Re-Eval                               mins  70970    Timed Treatment:   40   mins   Total Treatment:     40   mins    Barbi Shankar PT, DPT  IN LICENCE: [id number]

## 2024-02-02 ENCOUNTER — TREATMENT (OUTPATIENT)
Dept: PHYSICAL THERAPY | Facility: CLINIC | Age: 39
End: 2024-02-02
Payer: OTHER GOVERNMENT

## 2024-02-02 DIAGNOSIS — M79.671 ARCH PAIN, RIGHT: ICD-10-CM

## 2024-02-02 DIAGNOSIS — R26.2 DIFFICULTY WALKING: ICD-10-CM

## 2024-02-02 DIAGNOSIS — M72.2 PLANTAR FASCIITIS OF LEFT FOOT: Primary | ICD-10-CM

## 2024-02-02 PROCEDURE — 97110 THERAPEUTIC EXERCISES: CPT | Performed by: PHYSICAL THERAPIST

## 2024-02-02 RX ORDER — LEVOMILNACIPRAN HYDROCHLORIDE 40 MG/1
40 CAPSULE, EXTENDED RELEASE ORAL DAILY
Qty: 90 CAPSULE | Refills: 1 | Status: SHIPPED | OUTPATIENT
Start: 2024-02-02

## 2024-02-02 NOTE — PROGRESS NOTES
Physical Therapy Treatment Note    [address] [facility] [place] [address]  [phone] (p)  [phone] (f)    VISIT#: 5     Diagnosis Plan   1. Plantar fasciitis of left foot        2. Arch pain, right        3. Difficulty walking          Subjective   Amelie Henderson reports:  really liked taping, wants to be retaped for weekend    Objective     See Exercise, Manual, and Modality Logs for complete treatment.     Patient Education: reviewed HEP.    Assessment/Plan Good tolerance to mild exercise progression    Plan Goals: ST.  Pt independent with HEP in 2 visits - MET  2.  Pt to demonstrate ankle DF (ke and kf) AROM to 10 degrees or better in 4 weeks - PARTIALLY MET  3. L ankle strength 4+/5 all directions in 4 weeks - PARTIALLY MET  4. Pt to report decreased arch/heel pain 25% or more with ADLS in 4 weeks - PARTIALLY MET     LTG  1. Pt to demonstrate increased functional tolerance per FAAM score by D/C - NOT MET  2. Pt to demonstrate single leg balance on ground and uneven surface L without increased arch pain by D/C - NOT TESTED  3. Pt to standing to cook with proper shoewear with foot pain 2/10 or better by D/C - PARTIALLY MET  4. Pt to walk through grocery store with proper shoewear with foot pain 2/10 or better by D/C - PARTIALLY MET  5. Pt to voice readiness for D/C to independent HEP and self management of symptoms. - NOT MET       Progress per Plan of Care            Timed:         Manual Therapy:         mins  99516;     Therapeutic Exercise:    30     mins  31903;     Neuromuscular Clem:        mins  06586;    Therapeutic Activity:          mins  65245;     Gait Training:           mins  77500;     Ultrasound:          mins  75760;    Ionto                                   mins   84602  Self Care                            mins   98889  Canalith Repos                   mins  4209  Aquatic                               mins 03299    Un-Timed:  Electrical Stimulation:         mins  85443 (  );  Dry Needling          mins self-pay  Traction          mins 82108  Low Eval          Mins  24777  Mod Eval          Mins  15063  High Eval                            Mins  45385  Re-Eval                               mins  68731    Timed Treatment:   30   mins   Total Treatment:     30   mins    Barbi Shankar PT, DPT  IN L[place]ENCE: [id number]

## 2024-02-06 ENCOUNTER — TREATMENT (OUTPATIENT)
Dept: PHYSICAL THERAPY | Facility: CLINIC | Age: 39
End: 2024-02-06
Payer: OTHER GOVERNMENT

## 2024-02-06 DIAGNOSIS — M79.671 ARCH PAIN, RIGHT: ICD-10-CM

## 2024-02-06 DIAGNOSIS — M72.2 PLANTAR FASCIITIS OF LEFT FOOT: Primary | ICD-10-CM

## 2024-02-06 DIAGNOSIS — R26.2 DIFFICULTY WALKING: ICD-10-CM

## 2024-02-06 DIAGNOSIS — M79.672 ARCH PAIN OF LEFT FOOT: ICD-10-CM

## 2024-02-06 PROCEDURE — 97110 THERAPEUTIC EXERCISES: CPT | Performed by: PHYSICAL THERAPIST

## 2024-02-06 PROCEDURE — 97112 NEUROMUSCULAR REEDUCATION: CPT | Performed by: PHYSICAL THERAPIST

## 2024-02-06 NOTE — PROGRESS NOTES
Physical Therapy Treatment Note    [address]   [place] [address]  [phone] (p)  [phone] (f)    VISIT#: 6     Diagnosis Plan   1. Plantar fasciitis of left foot        2. Arch pain, right        3. Difficulty walking        4. Arch pain of left foot          Subjective   Amelie Henderson reports: ordered some arch support socks for arches. She reports last time tape was a little bit itchy, wishes to hold off today. Pt notes many previous fx and sprains L ankle in the past.    Objective     See Exercise, Manual, and Modality Logs for complete treatment.     Patient Education: proprioception training.    Assessment/Plan Pt tolerated treatment well with mild progression, reports going to grocery store after treatment      Progress per Plan of Care Plan to D/C foot to [facility] and self management of symptoms next visit due to pt getting rotator cuff surgery week after.             Timed:         Manual Therapy:    2     mins  67567;     Therapeutic Exercise:    33     mins  68350;     Neuromuscular Clem:    8    mins  30840;    Therapeutic Activity:          mins  50788;     Gait Training:           mins  89251;     Ultrasound:          mins  84465;    Ionto                                   mins   50205  Self Care                            mins   95791  Canalith Repos                   mins  4209  Aquatic                               mins 94711    Un-Timed:  Electrical Stimulation:         mins  11542 ( );  Dry Needling          mins self-pay  Traction          mins 91618  Low Eval          Mins  06820  Mod Eval          Mins  98170  High Eval                            Mins  93533  Re-Eval                               mins  40737    Timed Treatment:   43   mins   Total Treatment:     43   mins    Barbi Shankar, PT, DPT  IN L[place]ENCE: [id number]

## 2024-02-12 ENCOUNTER — TELEPHONE (OUTPATIENT)
Dept: FAMILY MEDICINE CLINIC | Facility: CLINIC | Age: 39
End: 2024-02-12
Payer: OTHER GOVERNMENT

## 2024-02-12 DIAGNOSIS — R11.0 NAUSEA: ICD-10-CM

## 2024-02-12 DIAGNOSIS — M54.16 LUMBAR RADICULOPATHY: Primary | ICD-10-CM

## 2024-02-12 RX ORDER — PROMETHAZINE HYDROCHLORIDE 25 MG/1
25 TABLET ORAL EVERY 8 HOURS PRN
Qty: 30 TABLET | Refills: 1 | Status: SHIPPED | OUTPATIENT
Start: 2024-02-12

## 2024-02-12 RX ORDER — OXYCODONE AND ACETAMINOPHEN 10; 325 MG/1; MG/1
1 TABLET ORAL EVERY 6 HOURS PRN
Qty: 28 TABLET | Refills: 0 | Status: SHIPPED | OUTPATIENT
Start: 2024-02-12 | End: 2024-02-12 | Stop reason: SDUPTHER

## 2024-02-12 RX ORDER — OXYCODONE AND ACETAMINOPHEN 10; 325 MG/1; MG/1
1 TABLET ORAL EVERY 6 HOURS PRN
Qty: 28 TABLET | Refills: 0 | Status: SHIPPED | OUTPATIENT
Start: 2024-02-12 | End: 2024-02-19 | Stop reason: DRUGHIGH

## 2024-02-16 ENCOUNTER — ANESTHESIA (OUTPATIENT)
Dept: PERIOP | Facility: HOSPITAL | Age: 39
End: 2024-02-16
Payer: OTHER GOVERNMENT

## 2024-02-16 ENCOUNTER — ANESTHESIA EVENT (OUTPATIENT)
Dept: PERIOP | Facility: HOSPITAL | Age: 39
End: 2024-02-16
Payer: OTHER GOVERNMENT

## 2024-02-16 ENCOUNTER — HOSPITAL ENCOUNTER (OUTPATIENT)
Facility: HOSPITAL | Age: 39
Setting detail: HOSPITAL OUTPATIENT SURGERY
Discharge: HOME | End: 2024-02-16
Attending: SPECIALIST | Admitting: SPECIALIST
Payer: OTHER GOVERNMENT

## 2024-02-16 VITALS
SYSTOLIC BLOOD PRESSURE: 150 MMHG | TEMPERATURE: 98.1 F | RESPIRATION RATE: 16 BRPM | DIASTOLIC BLOOD PRESSURE: 99 MMHG | OXYGEN SATURATION: 100 % | HEART RATE: 97 BPM

## 2024-02-16 PROCEDURE — 25010000002 DEXAMETHASONE PER 1 MG: Performed by: STUDENT IN AN ORGANIZED HEALTH CARE EDUCATION/TRAINING PROGRAM

## 2024-02-16 PROCEDURE — 25810000003 LACTATED RINGERS PER 1000 ML: Performed by: SPECIALIST

## 2024-02-16 PROCEDURE — 25010000002 PROPOFOL 200 MG/20ML EMULSION: Performed by: NURSE ANESTHETIST, CERTIFIED REGISTERED

## 2024-02-16 PROCEDURE — 25010000002 EPINEPHRINE PER 0.1 MG: Performed by: SPECIALIST

## 2024-02-16 PROCEDURE — 25810000003 LACTATED RINGERS PER 1000 ML: Performed by: STUDENT IN AN ORGANIZED HEALTH CARE EDUCATION/TRAINING PROGRAM

## 2024-02-16 PROCEDURE — 25010000002 SUGAMMADEX 200 MG/2ML SOLUTION: Performed by: NURSE ANESTHETIST, CERTIFIED REGISTERED

## 2024-02-16 PROCEDURE — 25010000002 ROPIVACAINE PER 1 MG: Performed by: STUDENT IN AN ORGANIZED HEALTH CARE EDUCATION/TRAINING PROGRAM

## 2024-02-16 PROCEDURE — 25010000002 LIDOCAINE 1 % SOLUTION: Performed by: STUDENT IN AN ORGANIZED HEALTH CARE EDUCATION/TRAINING PROGRAM

## 2024-02-16 PROCEDURE — 25010000002 FENTANYL CITRATE (PF) 50 MCG/ML SOLUTION: Performed by: STUDENT IN AN ORGANIZED HEALTH CARE EDUCATION/TRAINING PROGRAM

## 2024-02-16 PROCEDURE — C1713 ANCHOR/SCREW BN/BN,TIS/BN: HCPCS | Performed by: SPECIALIST

## 2024-02-16 PROCEDURE — 25010000002 CEFAZOLIN IN DEXTROSE 2-4 GM/100ML-% SOLUTION: Performed by: SPECIALIST

## 2024-02-16 PROCEDURE — 25010000002 ONDANSETRON PER 1 MG: Performed by: NURSE ANESTHETIST, CERTIFIED REGISTERED

## 2024-02-16 PROCEDURE — 25010000002 MIDAZOLAM PER 1 MG: Performed by: STUDENT IN AN ORGANIZED HEALTH CARE EDUCATION/TRAINING PROGRAM

## 2024-02-16 DEVICE — IMPLANTABLE DEVICE: Type: IMPLANTABLE DEVICE | Site: SHOULDER | Status: FUNCTIONAL

## 2024-02-16 RX ORDER — DIPHENHYDRAMINE HYDROCHLORIDE 50 MG/ML
12.5 INJECTION INTRAMUSCULAR; INTRAVENOUS
Status: DISCONTINUED | OUTPATIENT
Start: 2024-02-16 | End: 2024-02-16 | Stop reason: HOSPADM

## 2024-02-16 RX ORDER — ROCURONIUM BROMIDE 10 MG/ML
INJECTION, SOLUTION INTRAVENOUS AS NEEDED
Status: DISCONTINUED | OUTPATIENT
Start: 2024-02-16 | End: 2024-02-16 | Stop reason: SURG

## 2024-02-16 RX ORDER — FENTANYL CITRATE 50 UG/ML
50 INJECTION, SOLUTION INTRAMUSCULAR; INTRAVENOUS
Status: DISCONTINUED | OUTPATIENT
Start: 2024-02-16 | End: 2024-02-16 | Stop reason: HOSPADM

## 2024-02-16 RX ORDER — DEXAMETHASONE SODIUM PHOSPHATE 4 MG/ML
INJECTION, SOLUTION INTRA-ARTICULAR; INTRALESIONAL; INTRAMUSCULAR; INTRAVENOUS; SOFT TISSUE AS NEEDED
Status: DISCONTINUED | OUTPATIENT
Start: 2024-02-16 | End: 2024-02-16 | Stop reason: SURG

## 2024-02-16 RX ORDER — DROPERIDOL 2.5 MG/ML
0.62 INJECTION, SOLUTION INTRAMUSCULAR; INTRAVENOUS
Status: DISCONTINUED | OUTPATIENT
Start: 2024-02-16 | End: 2024-02-16 | Stop reason: HOSPADM

## 2024-02-16 RX ORDER — PROMETHAZINE HYDROCHLORIDE 25 MG/1
25 TABLET ORAL ONCE AS NEEDED
Status: DISCONTINUED | OUTPATIENT
Start: 2024-02-16 | End: 2024-02-16 | Stop reason: HOSPADM

## 2024-02-16 RX ORDER — SODIUM CHLORIDE 0.9 % (FLUSH) 0.9 %
3 SYRINGE (ML) INJECTION EVERY 12 HOURS SCHEDULED
Status: DISCONTINUED | OUTPATIENT
Start: 2024-02-16 | End: 2024-02-16 | Stop reason: HOSPADM

## 2024-02-16 RX ORDER — IPRATROPIUM BROMIDE AND ALBUTEROL SULFATE 2.5; .5 MG/3ML; MG/3ML
3 SOLUTION RESPIRATORY (INHALATION) ONCE AS NEEDED
Status: DISCONTINUED | OUTPATIENT
Start: 2024-02-16 | End: 2024-02-16 | Stop reason: HOSPADM

## 2024-02-16 RX ORDER — EPHEDRINE SULFATE 50 MG/ML
5 INJECTION, SOLUTION INTRAVENOUS ONCE AS NEEDED
Status: DISCONTINUED | OUTPATIENT
Start: 2024-02-16 | End: 2024-02-16 | Stop reason: HOSPADM

## 2024-02-16 RX ORDER — MIDAZOLAM HYDROCHLORIDE 1 MG/ML
1 INJECTION INTRAMUSCULAR; INTRAVENOUS
Status: DISCONTINUED | OUTPATIENT
Start: 2024-02-16 | End: 2024-02-16 | Stop reason: HOSPADM

## 2024-02-16 RX ORDER — OXYCODONE AND ACETAMINOPHEN 7.5; 325 MG/1; MG/1
1 TABLET ORAL EVERY 4 HOURS PRN
Status: DISCONTINUED | OUTPATIENT
Start: 2024-02-16 | End: 2024-02-16 | Stop reason: HOSPADM

## 2024-02-16 RX ORDER — HYDRALAZINE HYDROCHLORIDE 20 MG/ML
5 INJECTION INTRAMUSCULAR; INTRAVENOUS
Status: DISCONTINUED | OUTPATIENT
Start: 2024-02-16 | End: 2024-02-16 | Stop reason: HOSPADM

## 2024-02-16 RX ORDER — ONDANSETRON 2 MG/ML
INJECTION INTRAMUSCULAR; INTRAVENOUS AS NEEDED
Status: DISCONTINUED | OUTPATIENT
Start: 2024-02-16 | End: 2024-02-16 | Stop reason: SURG

## 2024-02-16 RX ORDER — LABETALOL HYDROCHLORIDE 5 MG/ML
5 INJECTION, SOLUTION INTRAVENOUS
Status: DISCONTINUED | OUTPATIENT
Start: 2024-02-16 | End: 2024-02-16 | Stop reason: HOSPADM

## 2024-02-16 RX ORDER — FLUMAZENIL 0.1 MG/ML
0.2 INJECTION INTRAVENOUS AS NEEDED
Status: DISCONTINUED | OUTPATIENT
Start: 2024-02-16 | End: 2024-02-16 | Stop reason: HOSPADM

## 2024-02-16 RX ORDER — PROPOFOL 10 MG/ML
INJECTION, EMULSION INTRAVENOUS AS NEEDED
Status: DISCONTINUED | OUTPATIENT
Start: 2024-02-16 | End: 2024-02-16 | Stop reason: SURG

## 2024-02-16 RX ORDER — PROMETHAZINE HYDROCHLORIDE 25 MG/1
25 SUPPOSITORY RECTAL ONCE AS NEEDED
Status: DISCONTINUED | OUTPATIENT
Start: 2024-02-16 | End: 2024-02-16 | Stop reason: HOSPADM

## 2024-02-16 RX ORDER — HYDROCODONE BITARTRATE AND ACETAMINOPHEN 5; 325 MG/1; MG/1
1 TABLET ORAL ONCE AS NEEDED
Status: DISCONTINUED | OUTPATIENT
Start: 2024-02-16 | End: 2024-02-16 | Stop reason: HOSPADM

## 2024-02-16 RX ORDER — HYDROMORPHONE HYDROCHLORIDE 1 MG/ML
0.5 INJECTION, SOLUTION INTRAMUSCULAR; INTRAVENOUS; SUBCUTANEOUS
Status: DISCONTINUED | OUTPATIENT
Start: 2024-02-16 | End: 2024-02-16 | Stop reason: HOSPADM

## 2024-02-16 RX ORDER — ROPIVACAINE HYDROCHLORIDE 5 MG/ML
INJECTION, SOLUTION EPIDURAL; INFILTRATION; PERINEURAL
Status: COMPLETED | OUTPATIENT
Start: 2024-02-16 | End: 2024-02-16

## 2024-02-16 RX ORDER — SODIUM CHLORIDE, SODIUM LACTATE, POTASSIUM CHLORIDE, CALCIUM CHLORIDE 600; 310; 30; 20 MG/100ML; MG/100ML; MG/100ML; MG/100ML
9 INJECTION, SOLUTION INTRAVENOUS CONTINUOUS
Status: DISCONTINUED | OUTPATIENT
Start: 2024-02-16 | End: 2024-02-16 | Stop reason: HOSPADM

## 2024-02-16 RX ORDER — NALOXONE HCL 0.4 MG/ML
0.2 VIAL (ML) INJECTION AS NEEDED
Status: DISCONTINUED | OUTPATIENT
Start: 2024-02-16 | End: 2024-02-16 | Stop reason: HOSPADM

## 2024-02-16 RX ORDER — CEFAZOLIN SODIUM 2 G/100ML
2000 INJECTION, SOLUTION INTRAVENOUS ONCE
Status: COMPLETED | OUTPATIENT
Start: 2024-02-16 | End: 2024-02-16

## 2024-02-16 RX ORDER — LIDOCAINE HYDROCHLORIDE 20 MG/ML
INJECTION, SOLUTION EPIDURAL; INFILTRATION; INTRACAUDAL; PERINEURAL AS NEEDED
Status: DISCONTINUED | OUTPATIENT
Start: 2024-02-16 | End: 2024-02-16 | Stop reason: SURG

## 2024-02-16 RX ORDER — FENTANYL CITRATE 50 UG/ML
50 INJECTION, SOLUTION INTRAMUSCULAR; INTRAVENOUS ONCE AS NEEDED
Status: COMPLETED | OUTPATIENT
Start: 2024-02-16 | End: 2024-02-16

## 2024-02-16 RX ORDER — ONDANSETRON 2 MG/ML
4 INJECTION INTRAMUSCULAR; INTRAVENOUS ONCE AS NEEDED
Status: DISCONTINUED | OUTPATIENT
Start: 2024-02-16 | End: 2024-02-16 | Stop reason: HOSPADM

## 2024-02-16 RX ORDER — LIDOCAINE HYDROCHLORIDE 10 MG/ML
0.5 INJECTION, SOLUTION INFILTRATION; PERINEURAL ONCE AS NEEDED
Status: COMPLETED | OUTPATIENT
Start: 2024-02-16 | End: 2024-02-16

## 2024-02-16 RX ORDER — ACETAMINOPHEN 500 MG
1000 TABLET ORAL ONCE
Status: COMPLETED | OUTPATIENT
Start: 2024-02-16 | End: 2024-02-16

## 2024-02-16 RX ORDER — DEXAMETHASONE SODIUM PHOSPHATE 4 MG/ML
INJECTION, SOLUTION INTRA-ARTICULAR; INTRALESIONAL; INTRAMUSCULAR; INTRAVENOUS; SOFT TISSUE
Status: COMPLETED | OUTPATIENT
Start: 2024-02-16 | End: 2024-02-16

## 2024-02-16 RX ORDER — SODIUM CHLORIDE 0.9 % (FLUSH) 0.9 %
3-10 SYRINGE (ML) INJECTION AS NEEDED
Status: DISCONTINUED | OUTPATIENT
Start: 2024-02-16 | End: 2024-02-16 | Stop reason: HOSPADM

## 2024-02-16 RX ADMIN — PROPOFOL 200 MG: 10 INJECTION, EMULSION INTRAVENOUS at 11:42

## 2024-02-16 RX ADMIN — OXYCODONE AND ACETAMINOPHEN 1 TABLET: 7.5; 325 TABLET ORAL at 14:22

## 2024-02-16 RX ADMIN — DEXAMETHASONE SODIUM PHOSPHATE 4 MG: 4 INJECTION, SOLUTION INTRA-ARTICULAR; INTRALESIONAL; INTRAMUSCULAR; INTRAVENOUS; SOFT TISSUE at 10:01

## 2024-02-16 RX ADMIN — PROPOFOL 50 MG: 10 INJECTION, EMULSION INTRAVENOUS at 12:04

## 2024-02-16 RX ADMIN — CEFAZOLIN SODIUM 2000 MG: 2 INJECTION, SOLUTION INTRAVENOUS at 11:31

## 2024-02-16 RX ADMIN — LIDOCAINE HYDROCHLORIDE 100 MG: 20 INJECTION, SOLUTION EPIDURAL; INFILTRATION; INTRACAUDAL; PERINEURAL at 11:42

## 2024-02-16 RX ADMIN — SUGAMMADEX 300 MG: 100 INJECTION, SOLUTION INTRAVENOUS at 13:00

## 2024-02-16 RX ADMIN — DEXAMETHASONE SODIUM PHOSPHATE 8 MG: 4 INJECTION, SOLUTION INTRA-ARTICULAR; INTRALESIONAL; INTRAMUSCULAR; INTRAVENOUS; SOFT TISSUE at 11:43

## 2024-02-16 RX ADMIN — ROPIVACAINE HYDROCHLORIDE 24 ML: 5 INJECTION EPIDURAL; INFILTRATION; PERINEURAL at 10:01

## 2024-02-16 RX ADMIN — PROPOFOL 100 MG: 10 INJECTION, EMULSION INTRAVENOUS at 11:44

## 2024-02-16 RX ADMIN — ROCURONIUM BROMIDE 50 MG: 10 INJECTION, SOLUTION INTRAVENOUS at 11:42

## 2024-02-16 RX ADMIN — ONDANSETRON 4 MG: 2 INJECTION INTRAMUSCULAR; INTRAVENOUS at 12:17

## 2024-02-16 RX ADMIN — MIDAZOLAM 2 MG: 1 INJECTION INTRAMUSCULAR; INTRAVENOUS at 09:56

## 2024-02-16 RX ADMIN — ACETAMINOPHEN 1000 MG: 500 TABLET ORAL at 08:53

## 2024-02-16 RX ADMIN — SODIUM CHLORIDE, POTASSIUM CHLORIDE, SODIUM LACTATE AND CALCIUM CHLORIDE 9 ML/HR: 600; 310; 30; 20 INJECTION, SOLUTION INTRAVENOUS at 09:10

## 2024-02-16 RX ADMIN — FENTANYL CITRATE 50 MCG: 50 INJECTION, SOLUTION INTRAMUSCULAR; INTRAVENOUS at 09:56

## 2024-02-16 RX ADMIN — LIDOCAINE HYDROCHLORIDE 0.5 ML: 10 INJECTION, SOLUTION INFILTRATION; PERINEURAL at 09:10

## 2024-02-16 NOTE — H&P
[facility]   HISTORY AND PHYSICAL    Patient Name: Amelie Henderson  : 1985  MRN: [id number]  Primary Care Physician:  Damon Valera APRN  Date of admission: 2024    Subjective   Subjective     Chief Complaint: Left shoulder pain    History of Present Illness    Patient presents to the office with a history of left shoulder pain.  She is status post a left rotator cuff repair that was performed by me in .  She recovered well until she was lifting some logs at her home around the first of the year and started complaining of increasing left shoulder pain.  There is a documented rotator cuff tear on MRI scan.      Review of Systems   Positive for left shoulder pain    Personal History     Past Medical History:   Diagnosis Date    Allergic     Anemia     Anxiety     B12 deficiency     Callus     COVID     x 2     Depression     Dercum's disease     Difficulty walking     Endometriosis     Fallen arches     Fibromyalgia 02/15/2024    Gastritis     GERD (gastroesophageal reflux disease)     Hyperlipidemia     Borderline    Hypertension     Hypoglycemia     Ingrown toenail     Migraines     PTSD (post-traumatic stress disorder)     Shin splints     Stress fracture     Vitamin D deficiency        Past Surgical History:   Procedure Laterality Date    CARPAL TUNNEL RELEASE Right      SECTION      x 2     CHOLECYSTECTOMY      HYSTERECTOMY  2023    KNEE SURGERY Right     x 3     KNEE SURGERY Left     x 1     SHOULDER ACROMIOPLASTY WITH ROTATOR CUFF REPAIR Left 2021    Procedure: LEFT SHOULDER ARTHROSCOPY WITH ROTATOR CUFF REPAIR AND SUBACROMIAL DECOMPRESSION- SLAP;  Surgeon: Rianna Jarvis MD;  Location: [facility] OR;  Service: Orthopedics;  Laterality: Left;       Family History: family history includes ADD / ADHD in her sister; Alcohol abuse in her father; Bell's palsy in her mother; Depression in her son; Diabetes in her paternal grandfather; Glaucoma in her mother;  Heart failure in her maternal grandmother; Hypertension in her maternal grandmother and mother; Pyloric stenosis in her son; Stroke in her maternal grandmother; Thyroid disease in her mother; Transient ischemic attack in her mother. Otherwise pertinent FHx was reviewed and not pertinent to current issue.    Social History:  reports that she quit smoking about 8 years ago. Her smoking use included cigarettes. She started smoking about 25 years ago. She has a 8.50 pack-year smoking history. She has been exposed to tobacco smoke. She has never used smokeless tobacco. She reports current alcohol use. She reports that she does not use drugs.    Home Medications:  Cholecalciferol, Galcanezumab-gnlm, HYDROcodone-acetaminophen, Levomilnacipran HCl ER, Milk Thistle, Mirabegron ER, Molnupiravir, NON FORMULARY, Phosphatidylserine, Probiotic Product, Tirzepatide-Weight Management, Vitamin B Complex-C, albuterol, albuterol sulfate HFA, amoxicillin, busPIRone, diclofenac, ferrous gluconate, fluticasone, hydrOXYzine, l-methylfolate, levocetirizine, losartan, methylPREDNISolone, metoprolol tartrate, multivitamin with minerals, mupirocin, naloxone, nystatin-triamcinolone, omeprazole, ondansetron, oxyCODONE-acetaminophen, predniSONE, promethazine, propranolol, rizatriptan, tiZANidine, traZODone, ubrogepant, vitamin C, and zonisamide    Allergies:  Allergies   Allergen Reactions    [place] Anaphylaxis    Codeine Hives and Itching    Influenza Virus Vaccine Other (See Comments)     Egg allergy     Latex Rash     Skin gets red and burns, skin starts peeling      Meloxicam Other (See Comments)     Gastritis         Objective    Objective     Vitals:   Temp:  [98.4 °F (36.9 °C)] 98.4 °F (36.9 °C)  Heart Rate:  [100] 100  Resp:  [20] 20  BP: (148)/(95) 148/95        Physical Examination:    HEENT:  EOMI.  Heart:  RRR without murmur.  Abdomen:  Soft, nontender, positive bowel sounds.  Lungs:  CTA.  Left upper extremity Exam: Positive Neer and  Mayur Eddy.  Well-healed surgical incisions.    Assessment: Left rotator cuff tear    Plan: Left shoulder arthroscopy with a rotator cuff repair and subacromial decompression.    Consent:  The risks, benefits and alternatives of the surgery were discussed with the patient. The risks of infection, hemorrhage, neurological damage, DVT risk, pulmonary embolism, chronic pain after the procedure, rerupture of a ligament or tendon if surgically repaired may occur postoperatively, hardware loosening, possible further surgery and death among other complications were discussed with the patient. An informed consent was given to the patient in the office and was signed by the patient, myself, and witnessed by the medical assistant. The informed consent covered other risks of surgical procedures that were less common and listed above. The patient was given the opportunity to answer any questions about any complications that I discussed with the patient or that were listed in the informed consent documentation that was signed. All questions answered. Patient understands the treatment plan. No guarantees were given.      Rianna Jarvis MD

## 2024-02-16 NOTE — OP NOTE
PREOPERATIVE DIAGNOSIS: Left rotator cuff tear and impingement syndrome    POSTOPERATIVE DIAGNOSIS: Same    PROCEDURE PERFORMED: Left rotator cuff repair, subacromial decompression and deep hardware removal and PRP injection    SURGEON:  Rianna Jarvis MD and Stephanie Sin PA-C    TOURNIQUET TIME: Not applicable    COMPLICATIONS: None    DESCRIPTION OF PROCEDURE: The patient was brought to the operative suite where general trach anesthesia was ministered followed by 2 g of IV Ancef placed on the operating table in a right lateral decubitus position.  All bony prominences were well-padded.  Right upper extremities prepped and draped in sterile fashion.  A surgical pause was completed.  Arm was placed in balance suspension which was released at the end of the procedure.  A surgical pause was completed.  An 18-gauge spinal needle was inserted into the glenohumeral joint posteriorly.  5 cc of fluid was then inserted.  My scope was introduced posteriorly.  Evaluation glenohumeral joint revealed that she had a full-thickness rotator cuff tear.  Her sutures are still in position but the cuff tissue had pulled through my sutures.  T there was mild fraying of the labrum.  The biceps tendon was intact there is some grade 1 changes on the humeral side.  I gently debrided the frayed labrum.  Sent my scope into the subacromial space made a series of the sensory portal sites.  My scope was inserted into the subacromial space.  Evaluation revealed that her's sutures were still in position but had pulled through the rotator cuff tissue these were removed.  Cut the ends of the sutures where the knot was located and would pull them out through the cannulas.  She did have an anterior osteophyte along the anterior aspect of the acromion I went ahead and flatten this out to a type I morphology with my 5 oh motorized bur.  I also abraded the undersurface of the acromion down to subchondral bone.  She had about a 1.5 cm rotator cuff  tear that was present.  I abraded the footprint of the supraspinatus down to subchondral bone.  Placed a triple loaded 5 point 5 suture anchor at the junction articular cartilage and footprint of the supraspinatus.  Passed my sutures through the rotator cuff using my suture passer.  Tied a series of alternating half hitch knots.  Divided my sutures in half place the anterior half of my sutures to my anterolateral anchor the posterior half of my sutures from a posterior lateral anchor.  Made 2 entry sites into the greater tuberosity introduced each suture anchor tightening down the sutures as I advanced the anchor into the greater tuberosity.  This was repeated x 2.  This gave me a watertight repair.    I then introduced 5 cc of PRP and PPP into the subacromial space.  The portal sites were closed with 3-0 Prolene.  A sterile close dressing was applied needle count sponge count correct x 2 the patient was extubated the operating room transferred recovery in stable condition.  And there were no complications

## 2024-02-19 ENCOUNTER — TELEPHONE (OUTPATIENT)
Dept: FAMILY MEDICINE CLINIC | Facility: CLINIC | Age: 39
End: 2024-02-19

## 2024-02-19 DIAGNOSIS — Z98.890 S/P LEFT ROTATOR CUFF REPAIR: Primary | ICD-10-CM

## 2024-02-19 RX ORDER — OXYCODONE HYDROCHLORIDE AND ACETAMINOPHEN 5; 325 MG/1; MG/1
1 TABLET ORAL EVERY 8 HOURS PRN
Qty: 21 TABLET | Refills: 0 | Status: SHIPPED | OUTPATIENT
Start: 2024-02-19 | End: 2024-02-26

## 2024-02-19 NOTE — TELEPHONE ENCOUNTER
Per patient's orthopedic surgeon he recommended Percocet 5/325 every 8 hours as needed for pain for 7 days.  This is a tapered down from previous 10/325.

## 2024-02-27 ENCOUNTER — TELEPHONE (OUTPATIENT)
Dept: FAMILY MEDICINE CLINIC | Facility: CLINIC | Age: 39
End: 2024-02-27
Payer: OTHER GOVERNMENT

## 2024-02-27 DIAGNOSIS — G89.29 CHRONIC LOW BACK PAIN, UNSPECIFIED BACK PAIN LATERALITY, UNSPECIFIED WHETHER SCIATICA PRESENT: Primary | ICD-10-CM

## 2024-02-27 DIAGNOSIS — M54.16 LUMBAR RADICULOPATHY: ICD-10-CM

## 2024-02-27 DIAGNOSIS — M54.50 CHRONIC LOW BACK PAIN, UNSPECIFIED BACK PAIN LATERALITY, UNSPECIFIED WHETHER SCIATICA PRESENT: Primary | ICD-10-CM

## 2024-02-27 RX ORDER — HYDROCODONE BITARTRATE AND ACETAMINOPHEN 7.5; 325 MG/1; MG/1
1 TABLET ORAL EVERY 8 HOURS PRN
Qty: 21 TABLET | Refills: 0 | Status: SHIPPED | OUTPATIENT
Start: 2024-02-27 | End: 2024-02-28 | Stop reason: SDUPTHER

## 2024-02-27 NOTE — TELEPHONE ENCOUNTER
New prescription from orthopedic provider scanned into chart.  Next step in titration of pain meds is Norco 7.5 mg p.o. every 8 hours as needed quantity 21.  Prescription has been sent to pharmacy.

## 2024-02-28 RX ORDER — HYDROCODONE BITARTRATE AND ACETAMINOPHEN 10; 325 MG/1; MG/1
1 TABLET ORAL EVERY 8 HOURS PRN
Qty: 21 TABLET | Refills: 0 | Status: SHIPPED | OUTPATIENT
Start: 2024-02-28 | End: 2024-03-04

## 2024-02-28 RX ORDER — HYDROCODONE BITARTRATE AND ACETAMINOPHEN 10; 325 MG/1; MG/1
1 TABLET ORAL EVERY 8 HOURS PRN
Qty: 21 TABLET | Refills: 0 | Status: SHIPPED | OUTPATIENT
Start: 2024-02-28 | End: 2024-02-28 | Stop reason: SDUPTHER

## 2024-02-29 DIAGNOSIS — M62.838 MUSCLE SPASM: ICD-10-CM

## 2024-02-29 RX ORDER — TIZANIDINE 4 MG/1
4 TABLET ORAL NIGHTLY PRN
Qty: 90 TABLET | Refills: 1 | Status: SHIPPED | OUTPATIENT
Start: 2024-02-29

## 2024-02-29 NOTE — TELEPHONE ENCOUNTER
PT REQUESTING 90 DAYS    Rx Refill Note  Requested Prescriptions     Pending Prescriptions Disp Refills    tiZANidine (ZANAFLEX) 4 MG tablet 90 tablet 1     Sig: Take 1 tablet by mouth At Night As Needed for Muscle Spasms.      Last office visit with prescribing clinician: 12/18/2023   Last telemedicine visit with prescribing clinician: Visit date not found   Next office visit with prescribing clinician: 3/5/2024                         Would you like a call back once the refill request has been completed: [] Yes [] No    If the office needs to give you a call back, can they leave a voicemail: [] Yes [] No    Judi Cid Rep  02/29/24, 15:54 EST

## 2024-03-04 RX ORDER — HYDROCODONE BITARTRATE AND ACETAMINOPHEN 7.5; 325 MG/1; MG/1
1 TABLET ORAL EVERY 8 HOURS PRN
Qty: 21 TABLET | Refills: 0 | Status: SHIPPED | OUTPATIENT
Start: 2024-03-07 | End: 2024-03-14

## 2024-03-04 NOTE — PROGRESS NOTES
Chief Complaint  Follow-up (Patient's rheumatologist referred  patient back to PCP for treatment of  fibromyalgia. ), Anxiety, and Depression    Subjective    History of Present Illness {CC  Problem List  Visit  Diagnosis   Encounters  Notes  Medications  Labs  Result Review Imaging  Media :23}     Amelie Henderson presents to [facility] for Follow-up (Patient's rheumatologist referred  patient back to PCP for treatment of  fibromyalgia. ), Anxiety, and Depression.      History of Present Illness  Patient is a 38 y.o. female  presents to the clinic today for 3-month follow-up for fibromyalgia, pain postsurgical procedure, with concerns of rash on the left shoulder greater than 1 week.  Patient denies any chest pain, shortness of breath, or any fevers.  Patient denies any known exposure to COVID, flu, or any other contagious illnesses.    Regards to fibromyalgia, patient is currently taking Lyrica to manage this.  Patient shares that the Lyrica is helpful for her.  Patient denies any concerns in regards to her fibromyalgia or Lyrica at this time.    In regards to surgical pain, patient had a repair of her left shoulder and is healing.  She does still require hydrocodone to help with her pain.  Her orthopedic surgeon has been titrating her dosage down.  As I am managing her pain with our pain management agreement, I have been writing the prescription at her orthopedist suggestion.  All scripts that they have suggested have been scanned into chart.  Patient denies any significant side effects from her pain medication at this time.    In regards to rash, patient reports having a rash on left shoulder that is spreading across chest.  Patient shares that she was seen in ER at [place].  She assumes that this is from her orthopedic surgery and she made orthopedic provider aware of this reaction.       Review of Systems   Constitutional: Negative.  Negative for activity change, chills,  "fatigue and fever.   HENT: Negative.  Negative for congestion, dental problem, ear pain, hearing loss, rhinorrhea, sinus pain, sore throat, tinnitus and trouble swallowing.    Eyes: Negative.  Negative for pain and visual disturbance.   Respiratory: Negative.  Negative for cough, chest tightness, shortness of breath and wheezing.    Cardiovascular: Negative.  Negative for chest pain, palpitations and leg swelling.   Gastrointestinal: Negative.  Negative for abdominal pain, diarrhea, nausea and vomiting.   Endocrine: Negative.  Negative for polydipsia, polyphagia and polyuria.   Genitourinary: Negative.  Negative for difficulty urinating, dysuria, frequency and urgency.   Musculoskeletal:  Positive for arthralgias. Negative for back pain and myalgias.   Skin:  Positive for rash (left shoulder post surgery). Negative for color change, pallor and wound.   Allergic/Immunologic: Negative.  Negative for environmental allergies.   Neurological: Negative.  Negative for dizziness, speech difficulty, weakness, light-headedness, numbness and headaches.   Hematological: Negative.    Psychiatric/Behavioral: Negative.  Negative for confusion, decreased concentration, self-injury and suicidal ideas. The patient is not nervous/anxious.    All other systems reviewed and are negative.       Objective     Vital Signs:   /90 (BP Location: Left arm, Patient Position: Sitting, Cuff Size: Adult)   Pulse 94   Temp 97.6 °F (36.4 °C) (Oral)   Resp 18   Ht 177.8 cm (70\")   Wt 104 kg (229 lb)   SpO2 97%   BMI 32.86 kg/m²   Current Outpatient Medications on File Prior to Visit   Medication Sig Dispense Refill    albuterol (PROVENTIL) (2.5 MG/3ML) 0.083% nebulizer solution Take 2.5 mg by nebulization Every 4 (Four) Hours As Needed for Wheezing or Shortness of Air. 120 mL 1    albuterol sulfate  (90 Base) MCG/ACT inhaler Inhale 2 puffs Every 4 (Four) Hours As Needed for Wheezing. 18 g 2    cephalexin (KEFLEX) 500 MG capsule " Take 1 capsule by mouth 3 (Three) Times a Day.      Cholecalciferol 125 MCG (5000 UT) tablet Take 1 tablet by mouth Daily. 90 tablet 1    diclofenac (VOLTAREN) 50 MG EC tablet Take 1 tablet by mouth 2 (Two) Times a Day As Needed (arthralgia). for pain 180 tablet 1    diphenhydrAMINE (BENADRYL) 25 mg capsule Take 1 capsule by mouth Every 6 (Six) Hours As Needed for Itching.      ferrous gluconate (FERGON) 324 MG tablet TAKE 1 TABLET BY MOUTH EVERY DAY WITH BREAKFAST 90 tablet 1    fluticasone (FLONASE) 50 MCG/ACT nasal spray SPRAY 2 SPRAYS INTO THE NOSTRIL AS DIRECTED BY PROVIDER DAILY. 16 mL 3    Galcanezumab-gnlm (EMGALITY SC) Inject  under the skin into the appropriate area as directed.      hydrOXYzine (ATARAX) 50 MG tablet MAY TAKE 1 TABLET 2 TIMES A DAY AS NEEDED FOR ITCHING, MAY ALSO TAKE 2 TABLETS AT NIGHT AS NEEDED. 120 tablet 3    l-methylfolate 7.5 MG tablet tablet Take  by mouth Daily.      levocetirizine (XYZAL) 5 MG tablet Take 1 tablet by mouth Every Evening. 90 tablet 1    Levomilnacipran HCl ER (Fetzima) 40 MG capsule sustained-release 24 hr Take 40 mg by mouth Daily. 90 capsule 1    losartan (COZAAR) 50 MG tablet Take 1 tablet by mouth Every Morning. 90 tablet 1    metoprolol tartrate (LOPRESSOR) 25 MG tablet Take 1 tablet by mouth 2 (Two) Times a Day. 180 tablet 1    MILK THISTLE PO Take  by mouth.      Mirabegron ER (Myrbetriq) 50 MG tablet sustained-release 24 hour 24 hr tablet Take 50 mg by mouth Daily. 90 tablet 1    multivitamin with minerals tablet tablet 2 gummies po q am - smarty pants       mupirocin (BACTROBAN) 2 % cream Apply 1 application  topically to the appropriate area as directed 3 (Three) Times a Day. 30 g 1    naloxone (NARCAN) 4 MG/0.1ML nasal spray Call 911. Don't prime. Archer in 1 nostril for overdose. Repeat in 2-3 minutes in other nostril if no or minimal breathing/responsiveness. 2 each 0    NON FORMULARY Take 1 dose by mouth Daily. OSHWAGHANDHA SUPPLEMENT      omeprazole  (priLOSEC) 20 MG capsule Take 1 capsule by mouth 2 (Two) Times a Day.      Probiotic Product (FORTIFY PROBIOTIC WOMENS EX ST PO) Take 1 capsule by mouth Daily.      promethazine (PHENERGAN) 25 MG tablet Take 1 tablet by mouth Every 8 (Eight) Hours As Needed for Nausea or Vomiting. 30 tablet 1    propranolol (INDERAL) 10 MG tablet TAKE 1 TABLET BY MOUTH 2 (TWO) TIMES A DAY AS NEEDED (ANXIETY). 60 tablet 1    rizatriptan (MAXALT) 10 MG tablet TAKE 1 TABLET AT THE ONSET OF HEADACHE MAY REPEAT IN 2 HOURS MAX OF 2 TABS IN 24 HOURS 27 tablet 1    Tirzepatide-Weight Management (ZEPBOUND) 2.5 MG/0.5ML solution auto-injector Inject 0.5 mL under the skin into the appropriate area as directed 1 (One) Time Per Week. 2 mL 0    tiZANidine (ZANAFLEX) 4 MG tablet Take 1 tablet by mouth At Night As Needed for Muscle Spasms. 90 tablet 1    traZODone (DESYREL) 50 MG tablet TAKE 1 TABLET BY MOUTH EVERY DAY AT NIGHT 90 tablet 1    ubrogepant (UBRELVY) 100 MG tablet Take 1 tablet by mouth.      Vitamin B Complex-C capsule Take  by mouth.      vitamin C (ASCORBIC ACID) 250 MG tablet Take 1 tablet by mouth Daily.      zonisamide (ZONEGRAN) 100 MG capsule Take 2 capsules by mouth Every Night. 180 capsule 0    amoxicillin (AMOXIL) 875 MG tablet Take 1 tablet by mouth 2 (Two) Times a Day. (Patient not taking: Reported on 1/30/2024)      busPIRone (BUSPAR) 10 MG tablet Take 2 tablets by mouth 3 (Three) Times a Day As Needed (anxiety) for up to 30 days. 180 tablet 0     No current facility-administered medications on file prior to visit.        Past Medical History:   Diagnosis Date    Allergic     Anemia     Anxiety     B12 deficiency     Callus     COVID     x 2     Depression     Dercum's disease     Difficulty walking     Endometriosis     Fallen arches     Fibromyalgia 02/15/2024    Gastritis     GERD (gastroesophageal reflux disease)     Hyperlipidemia 2021    Borderline    Hypertension     Hypoglycemia     Ingrown toenail     Migraines      PTSD (post-traumatic stress disorder)     Shin splints     Stress fracture     Vitamin D deficiency       Past Surgical History:   Procedure Laterality Date    CARPAL TUNNEL RELEASE Right      SECTION      x 2     CHOLECYSTECTOMY      HYSTERECTOMY  2023    KNEE SURGERY Right     x 3     KNEE SURGERY Left     x 1     SHOULDER ACROMIOPLASTY WITH ROTATOR CUFF REPAIR Left 2021    Procedure: LEFT SHOULDER ARTHROSCOPY WITH ROTATOR CUFF REPAIR AND SUBACROMIAL DECOMPRESSION- SLAP;  Surgeon: Rianna Jarvis MD;  Location: [facility] [place] [facility] OR;  Service: Orthopedics;  Laterality: Left;    SHOULDER ARTHROSCOPY W/ ROTATOR CUFF REPAIR Left 2024    Procedure: SHOULDER ARTHROSCOPY WITH ROTATOR CUFF REPAIR, DECOMPRESSION AND PRP INJECTION;  Surgeon: Rianna Jarvis MD;  [place] [facility] [place] [facility] [place]  Service: Orthopedics;  Laterality: Left;      Family History   Problem Relation Age of Onset    Thyroid disease Mother     Glaucoma Mother     Hypertension Mother     Transient ischemic attack Mother     Bell's palsy Mother     Alcohol abuse Father     ADD / ADHD Sister     Depression Son     Pyloric stenosis Son     Hypertension Maternal Grandmother     Heart failure Maternal Grandmother     Stroke Maternal Grandmother     Diabetes Paternal Grandfather     Malig Hyperthermia Neg Hx       Social History     Socioeconomic History    Marital status:    Tobacco Use    Smoking status: Former     Current packs/day: 0.00     Average packs/day: 0.5 packs/day for 17.0 years (8.5 ttl pk-yrs)     Types: Cigarettes     Start date: 1999     Quit date: 2016     Years since quittin.2     Passive exposure: Past    Smokeless tobacco: Never   Vaping Use    Vaping status: Never Used   Substance and Sexual Activity    Alcohol use: Yes     Comment: Occassion    Drug use: Never    Sexual activity: Yes     Partners: Male     Birth control/protection: Tubal ligation, Hysterectomy, Surgical         Clinical Support on  01/08/2024   Component Date Value Ref Range Status    ADENOVIRUS, PCR 01/08/2024 Not Detected  Not Detected Final    Coronavirus 229E 01/08/2024 Not Detected  Not Detected Final    Coronavirus HKU1 01/08/2024 Not Detected  Not Detected Final    Coronavirus NL63 01/08/2024 Not Detected  Not Detected Final    Coronavirus OC43 01/08/2024 Not Detected  Not Detected Final    COVID19 01/08/2024 Not Detected  Not Detected - Ref. Range Final    Human Metapneumovirus 01/08/2024 Not Detected  Not Detected Final    Human Rhinovirus/Enterovirus 01/08/2024 Not Detected  Not Detected Final    Influenza A PCR 01/08/2024 Not Detected  Not Detected Final    Influenza B PCR 01/08/2024 Not Detected  Not Detected Final    Parainfluenza Virus 1 01/08/2024 Not Detected  Not Detected Final    Parainfluenza Virus 2 01/08/2024 Not Detected  Not Detected Final    Parainfluenza Virus 3 01/08/2024 Not Detected  Not Detected Final    Parainfluenza Virus 4 01/08/2024 Not Detected  Not Detected Final    RSV, PCR 01/08/2024 Not Detected  Not Detected Final    Bordetella pertussis pcr 01/08/2024 Not Detected  Not Detected Final    Bordetella parapertussis PCR 01/08/2024 Not Detected  Not Detected Final    Chlamydophila pneumoniae PCR 01/08/2024 Not Detected  Not Detected Final    Mycoplasma pneumo by PCR 01/08/2024 Not Detected  Not Detected Final    SARS Antigen 01/08/2024 Not Detected  Not Detected, Presumptive Negative Final    Influenza A Antigen GINA 01/08/2024 Not Detected  Not Detected Final    Influenza B Antigen GINA 01/08/2024 Not Detected  Not Detected Final    Internal Control 01/08/2024 Passed  Passed Final    Lot Number 01/08/2024 [id number]   Final    Expiration Date 01/08/2024 11/10/2024   Final    Monospot 01/08/2024 Negative  Negative Final    Internal Control 01/08/2024 Passed  Passed Final    Lot Number 01/08/2024 [id number]   Final    Expiration Date 01/08/2024 07/31/2024   Final    Throat Culture, Beta Strep 01/08/2024 No Beta  Hemolytic Streptococcus Isolated   Final         Physical Exam  Vitals and nursing note reviewed.   Constitutional:       Appearance: Normal appearance. She is normal weight.   HENT:      Head: Normocephalic and atraumatic.   Cardiovascular:      Rate and Rhythm: Normal rate and regular rhythm.      Pulses: Normal pulses.      Heart sounds: Normal heart sounds. No murmur heard.     No friction rub. No gallop.   Pulmonary:      Effort: Pulmonary effort is normal. No respiratory distress.      Breath sounds: Normal breath sounds. No stridor. No wheezing, rhonchi or rales.   Chest:      Chest wall: No tenderness.   Abdominal:      General: Abdomen is flat. Bowel sounds are normal. There is no distension.      Palpations: Abdomen is soft. There is no mass.      Tenderness: There is no abdominal tenderness. There is no right CVA tenderness, left CVA tenderness, guarding or rebound.      Hernia: No hernia is present.   Skin:     General: Skin is warm and dry.      Capillary Refill: Capillary refill takes less than 2 seconds.      Coloration: Skin is not jaundiced or pale.   Neurological:      General: No focal deficit present.      Mental Status: She is alert and oriented to person, place, and time. Mental status is at baseline.      Motor: No weakness.      Coordination: Coordination normal.      Gait: Gait normal.   Psychiatric:         Mood and Affect: Mood normal.         Behavior: Behavior normal.         Thought Content: Thought content normal.         Judgment: Judgment normal.          Result Review  Data Reviewed:{ Labs  Result Review  Imaging  Med Tab  Media :23}   I have reviewed this patient's chart.  I have reviewed previous labs, previous imaging, previous medications, and previous encounters with notes that were available in this patient's chart.               Assessment and Plan {CC Problem List  Visit Diagnosis  ROS  Review (Popup)  [facility] Maintenance  Quality  BestPractice  Medications   [facility]  SnapShot Encounters  Media :23}   Diagnoses and all orders for this visit:    1. Chronic low back pain, unspecified back pain laterality, unspecified whether sciatica present (Primary)  -     HYDROcodone-acetaminophen (NORCO) 7.5-325 MG per tablet; Take 1 tablet by mouth Every 8 (Eight) Hours As Needed for Severe Pain for up to 7 days.  Dispense: 21 tablet; Refill: 0    2. Lumbar radiculopathy  -     HYDROcodone-acetaminophen (NORCO) 7.5-325 MG per tablet; Take 1 tablet by mouth Every 8 (Eight) Hours As Needed for Severe Pain for up to 7 days.  Dispense: 21 tablet; Refill: 0    3. Lumbar spondylosis  -     HYDROcodone-acetaminophen (NORCO) 7.5-325 MG per tablet; Take 1 tablet by mouth Every 8 (Eight) Hours As Needed for Severe Pain for up to 7 days.  Dispense: 21 tablet; Refill: 0    4. Rash and other nonspecific skin eruption  -     predniSONE (DELTASONE) 10 MG tablet; Take 1 tablet by mouth Daily.  Dispense: 10 tablet; Refill: 0    5. Fibromyalgia  Comments:  diagnosed by rheumatology ( Feb 2024)  Orders:  -     pregabalin (Lyrica) 50 MG capsule; Take 1 capsule by mouth 3 (Three) Times a Day.  Dispense: 90 capsule; Refill: 0        -Decrease hydrocodone down to 7.5 mg for 1 week per orthopedic recommendations.  -Continue on Lyrica for fibromyalgia.  This fibromyalgia was diagnosed by rheumatology in February 2024.  -ER red flags discussed with patient including risk versus benefit and education provided.  -Follow-up with me in 4 weeks or sooner if needed.    I spent 30 minutes caring for Amelie on this date of service. This time includes time spent by me in the following activities:preparing for the visit, reviewing tests, obtaining and/or reviewing a separately obtained history, performing a medically appropriate examination and/or evaluation , counseling and educating the patient/family/caregiver, ordering medications, tests, or procedures, referring and communicating with other health care  professionals , documenting information in the medical record, independently interpreting results and communicating that information with the patient/family/caregiver, and care coordination.    Follow Up {Instructions Charge Capture  Follow-up Communications :23}     Patient was given instructions and counseling regarding her condition or for health maintenance advice. Please see specific information pulled into the AVS (placed there by myself) if appropriate.    Return in about 4 weeks (around 4/2/2024) for Recheck.    BMI is >= 30 and <35. (Class 1 Obesity). The following options were offered after discussion;: exercise counseling/recommendations and nutrition counseling/recommendations       EDSON Cali, FNP-BC

## 2024-03-05 ENCOUNTER — OFFICE VISIT (OUTPATIENT)
Dept: FAMILY MEDICINE CLINIC | Facility: CLINIC | Age: 39
End: 2024-03-05
Payer: OTHER GOVERNMENT

## 2024-03-05 VITALS
TEMPERATURE: 97.6 F | WEIGHT: 229 LBS | SYSTOLIC BLOOD PRESSURE: 138 MMHG | RESPIRATION RATE: 18 BRPM | HEART RATE: 94 BPM | BODY MASS INDEX: 32.78 KG/M2 | OXYGEN SATURATION: 97 % | HEIGHT: 70 IN | DIASTOLIC BLOOD PRESSURE: 90 MMHG

## 2024-03-05 DIAGNOSIS — M54.16 LUMBAR RADICULOPATHY: ICD-10-CM

## 2024-03-05 DIAGNOSIS — M47.816 LUMBAR SPONDYLOSIS: ICD-10-CM

## 2024-03-05 DIAGNOSIS — R21 RASH AND OTHER NONSPECIFIC SKIN ERUPTION: ICD-10-CM

## 2024-03-05 DIAGNOSIS — G89.29 CHRONIC LOW BACK PAIN, UNSPECIFIED BACK PAIN LATERALITY, UNSPECIFIED WHETHER SCIATICA PRESENT: Primary | ICD-10-CM

## 2024-03-05 DIAGNOSIS — M54.50 CHRONIC LOW BACK PAIN, UNSPECIFIED BACK PAIN LATERALITY, UNSPECIFIED WHETHER SCIATICA PRESENT: Primary | ICD-10-CM

## 2024-03-05 DIAGNOSIS — M79.7 FIBROMYALGIA: ICD-10-CM

## 2024-03-05 PROCEDURE — 99214 OFFICE O/P EST MOD 30 MIN: CPT | Performed by: REGISTERED NURSE

## 2024-03-05 RX ORDER — DIPHENHYDRAMINE HCL 25 MG
25 CAPSULE ORAL EVERY 6 HOURS PRN
COMMUNITY

## 2024-03-05 RX ORDER — PREGABALIN 50 MG/1
50 CAPSULE ORAL 3 TIMES DAILY
Qty: 90 CAPSULE | Refills: 0 | Status: SHIPPED | OUTPATIENT
Start: 2024-03-05

## 2024-03-05 RX ORDER — PREDNISONE 10 MG/1
10 TABLET ORAL DAILY
Qty: 10 TABLET | Refills: 0 | Status: SHIPPED | OUTPATIENT
Start: 2024-03-05 | End: 2024-04-04

## 2024-03-05 RX ORDER — CEPHALEXIN 500 MG/1
500 CAPSULE ORAL 3 TIMES DAILY
COMMUNITY
End: 2024-04-04

## 2024-03-07 ENCOUNTER — LAB (OUTPATIENT)
Dept: LAB | Facility: HOSPITAL | Age: 39
End: 2024-03-07
Payer: OTHER GOVERNMENT

## 2024-03-07 ENCOUNTER — TRANSCRIBE ORDERS (OUTPATIENT)
Dept: ADMINISTRATIVE | Facility: HOSPITAL | Age: 39
End: 2024-03-07
Payer: OTHER GOVERNMENT

## 2024-03-07 ENCOUNTER — TELEPHONE (OUTPATIENT)
Dept: FAMILY MEDICINE CLINIC | Facility: CLINIC | Age: 39
End: 2024-03-07
Payer: OTHER GOVERNMENT

## 2024-03-07 DIAGNOSIS — R52 PAIN: Primary | ICD-10-CM

## 2024-03-07 DIAGNOSIS — R52 PAIN: ICD-10-CM

## 2024-03-07 LAB — CRP SERPL-MCNC: <0.3 MG/DL (ref 0–0.5)

## 2024-03-07 PROCEDURE — 36415 COLL VENOUS BLD VENIPUNCTURE: CPT

## 2024-03-07 PROCEDURE — 86140 C-REACTIVE PROTEIN: CPT

## 2024-03-07 NOTE — TELEPHONE ENCOUNTER
Name of Drug and current dose:  NORCO 5 MG    Quantity Requested (30 day, 90 day, or other):     Any special pharmacy requests/changes: [facility]    When was the last visit with [facility], Selena Pandya, or Bethany: 3/6/24    Has chart been checked for medication being sent recently(within 3 weeks): Yes/No? 3/6/24    Has [facility] ever filled this drug (if not, please list last provider and when. If from a historical provider provider, will need medical records to prove when last filled or copy from pharmacy-please get prior to sending request): PATIENT WAS GIVEN RX FOR NORCO 7.5 MG BUT PHARMACY IS OUT OF STOCK AND NOT SURE WHEN THEY WILL GET THESE IN,     THEY DO HAVE THE 5 MG AND PATIENT IS ASKING IF THIS CAN BE SENT IN INSTEAD?     THANKS   NGUYEN

## 2024-03-26 ENCOUNTER — APPOINTMENT (OUTPATIENT)
Dept: CT IMAGING | Facility: HOSPITAL | Age: 39
End: 2024-03-26
Payer: OTHER GOVERNMENT

## 2024-03-26 ENCOUNTER — APPOINTMENT (OUTPATIENT)
Dept: GENERAL RADIOLOGY | Facility: HOSPITAL | Age: 39
End: 2024-03-26
Payer: OTHER GOVERNMENT

## 2024-03-26 ENCOUNTER — HOSPITAL ENCOUNTER (EMERGENCY)
Facility: HOSPITAL | Age: 39
Discharge: HOME | End: 2024-03-26
Attending: EMERGENCY MEDICINE | Admitting: EMERGENCY MEDICINE
Payer: OTHER GOVERNMENT

## 2024-03-26 ENCOUNTER — APPOINTMENT (OUTPATIENT)
Dept: ULTRASOUND IMAGING | Facility: HOSPITAL | Age: 39
End: 2024-03-26
Payer: OTHER GOVERNMENT

## 2024-03-26 VITALS
OXYGEN SATURATION: 100 % | TEMPERATURE: 98.1 F | HEART RATE: 120 BPM | RESPIRATION RATE: 22 BRPM | DIASTOLIC BLOOD PRESSURE: 88 MMHG | SYSTOLIC BLOOD PRESSURE: 142 MMHG | BODY MASS INDEX: 31.5 KG/M2 | WEIGHT: 220 LBS | HEIGHT: 70 IN

## 2024-03-26 DIAGNOSIS — I82.622 ACUTE DEEP VEIN THROMBOSIS (DVT) OF BRACHIAL VEIN OF LEFT UPPER EXTREMITY: Primary | ICD-10-CM

## 2024-03-26 LAB
ALBUMIN SERPL-MCNC: 4.5 G/DL (ref 3.5–5.2)
ALBUMIN/GLOB SERPL: 1.7 G/DL
ALP SERPL-CCNC: 134 U/L (ref 39–117)
ALT SERPL W P-5'-P-CCNC: 123 U/L (ref 1–33)
ANION GAP SERPL CALCULATED.3IONS-SCNC: 8.9 MMOL/L (ref 5–15)
AST SERPL-CCNC: 46 U/L (ref 1–32)
BASOPHILS # BLD AUTO: 0.02 10*3/MM3 (ref 0–0.2)
BASOPHILS NFR BLD AUTO: 0.2 % (ref 0–1.5)
BILIRUB SERPL-MCNC: 0.3 MG/DL (ref 0–1.2)
BUN SERPL-MCNC: 13 MG/DL (ref 6–20)
BUN/CREAT SERPL: 15.1 (ref 7–25)
CALCIUM SPEC-SCNC: 9.7 MG/DL (ref 8.6–10.5)
CHLORIDE SERPL-SCNC: 102 MMOL/L (ref 98–107)
CO2 SERPL-SCNC: 25.1 MMOL/L (ref 22–29)
CREAT SERPL-MCNC: 0.86 MG/DL (ref 0.57–1)
CRP SERPL-MCNC: 1.06 MG/DL (ref 0–0.5)
DEPRECATED RDW RBC AUTO: 41 FL (ref 37–54)
EGFRCR SERPLBLD CKD-EPI 2021: 88.3 ML/MIN/1.73
EOSINOPHIL # BLD AUTO: 0.09 10*3/MM3 (ref 0–0.4)
EOSINOPHIL NFR BLD AUTO: 1 % (ref 0.3–6.2)
ERYTHROCYTE [DISTWIDTH] IN BLOOD BY AUTOMATED COUNT: 12.9 % (ref 12.3–15.4)
ERYTHROCYTE [SEDIMENTATION RATE] IN BLOOD: 22 MM/HR (ref 0–20)
GLOBULIN UR ELPH-MCNC: 2.6 GM/DL
GLUCOSE SERPL-MCNC: 112 MG/DL (ref 65–99)
HCT VFR BLD AUTO: 37.6 % (ref 34–46.6)
HGB BLD-MCNC: 12.4 G/DL (ref 12–15.9)
IMM GRANULOCYTES # BLD AUTO: 0.02 10*3/MM3 (ref 0–0.05)
IMM GRANULOCYTES NFR BLD AUTO: 0.2 % (ref 0–0.5)
LYMPHOCYTES # BLD AUTO: 1.92 10*3/MM3 (ref 0.7–3.1)
LYMPHOCYTES NFR BLD AUTO: 21.8 % (ref 19.6–45.3)
MCH RBC QN AUTO: 28.6 PG (ref 26.6–33)
MCHC RBC AUTO-ENTMCNC: 33 G/DL (ref 31.5–35.7)
MCV RBC AUTO: 86.8 FL (ref 79–97)
MONOCYTES # BLD AUTO: 0.45 10*3/MM3 (ref 0.1–0.9)
MONOCYTES NFR BLD AUTO: 5.1 % (ref 5–12)
NEUTROPHILS NFR BLD AUTO: 6.3 10*3/MM3 (ref 1.7–7)
NEUTROPHILS NFR BLD AUTO: 71.7 % (ref 42.7–76)
PLATELET # BLD AUTO: 287 10*3/MM3 (ref 140–450)
PMV BLD AUTO: 9.3 FL (ref 6–12)
POTASSIUM SERPL-SCNC: 3.8 MMOL/L (ref 3.5–5.2)
PROT SERPL-MCNC: 7.1 G/DL (ref 6–8.5)
RBC # BLD AUTO: 4.33 10*6/MM3 (ref 3.77–5.28)
SODIUM SERPL-SCNC: 136 MMOL/L (ref 136–145)
WBC NRBC COR # BLD AUTO: 8.8 10*3/MM3 (ref 3.4–10.8)

## 2024-03-26 PROCEDURE — 93971 EXTREMITY STUDY: CPT

## 2024-03-26 PROCEDURE — 99285 EMERGENCY DEPT VISIT HI MDM: CPT

## 2024-03-26 PROCEDURE — 36415 COLL VENOUS BLD VENIPUNCTURE: CPT

## 2024-03-26 PROCEDURE — 86140 C-REACTIVE PROTEIN: CPT | Performed by: PHYSICIAN ASSISTANT

## 2024-03-26 PROCEDURE — 73030 X-RAY EXAM OF SHOULDER: CPT

## 2024-03-26 PROCEDURE — 99284 EMERGENCY DEPT VISIT MOD MDM: CPT | Performed by: PHYSICIAN ASSISTANT

## 2024-03-26 PROCEDURE — 71275 CT ANGIOGRAPHY CHEST: CPT

## 2024-03-26 PROCEDURE — 87040 BLOOD CULTURE FOR BACTERIA: CPT | Performed by: PHYSICIAN ASSISTANT

## 2024-03-26 PROCEDURE — 80053 COMPREHEN METABOLIC PANEL: CPT | Performed by: PHYSICIAN ASSISTANT

## 2024-03-26 PROCEDURE — 85652 RBC SED RATE AUTOMATED: CPT | Performed by: PHYSICIAN ASSISTANT

## 2024-03-26 PROCEDURE — 25510000001 IOPAMIDOL PER 1 ML: Performed by: EMERGENCY MEDICINE

## 2024-03-26 PROCEDURE — 85025 COMPLETE CBC W/AUTO DIFF WBC: CPT | Performed by: PHYSICIAN ASSISTANT

## 2024-03-26 RX ADMIN — APIXABAN 10 MG: 5 TABLET, FILM COATED ORAL at 18:14

## 2024-03-26 RX ADMIN — IOPAMIDOL 100 ML: 755 INJECTION, SOLUTION INTRAVENOUS at 17:35

## 2024-03-26 NOTE — DISCHARGE INSTRUCTIONS
Continue with your doxycycline as previously directed.  Take the Eliquis as prescribed.  Follow-up with your PCP and orthopedics closely.    Return to the ER with any further concerns, should your condition change/worsen, or should you develop a fever, chest pain, shortness of breath.

## 2024-03-27 DIAGNOSIS — R11.2 NAUSEA AND VOMITING, UNSPECIFIED VOMITING TYPE: ICD-10-CM

## 2024-03-27 RX ORDER — ONDANSETRON 4 MG/1
4 TABLET, FILM COATED ORAL EVERY 6 HOURS PRN
Qty: 120 TABLET | Refills: 1 | Status: SHIPPED | OUTPATIENT
Start: 2024-03-27

## 2024-03-31 LAB
BACTERIA SPEC AEROBE CULT: NORMAL
BACTERIA SPEC AEROBE CULT: NORMAL

## 2024-04-04 ENCOUNTER — HOSPITAL ENCOUNTER (OUTPATIENT)
Facility: HOSPITAL | Age: 39
Setting detail: OBSERVATION
Discharge: HOME | End: 2024-04-05
Attending: EMERGENCY MEDICINE | Admitting: EMERGENCY MEDICINE
Payer: OTHER GOVERNMENT

## 2024-04-04 ENCOUNTER — APPOINTMENT (OUTPATIENT)
Dept: MRI IMAGING | Facility: HOSPITAL | Age: 39
End: 2024-04-04
Payer: OTHER GOVERNMENT

## 2024-04-04 ENCOUNTER — APPOINTMENT (OUTPATIENT)
Dept: CT IMAGING | Facility: HOSPITAL | Age: 39
End: 2024-04-04
Payer: OTHER GOVERNMENT

## 2024-04-04 DIAGNOSIS — I82.622 ACUTE EMBOLISM AND THROMBOSIS OF DEEP VEIN OF LEFT UPPER EXTREMITY: ICD-10-CM

## 2024-04-04 DIAGNOSIS — R06.00 DYSPNEA, UNSPECIFIED TYPE: ICD-10-CM

## 2024-04-04 DIAGNOSIS — R07.9 CHEST PAIN, UNSPECIFIED TYPE: Primary | ICD-10-CM

## 2024-04-04 DIAGNOSIS — L53.9 ARM ERYTHEMA: ICD-10-CM

## 2024-04-04 DIAGNOSIS — B37.9 YEAST INFECTION: ICD-10-CM

## 2024-04-04 LAB
ALBUMIN SERPL-MCNC: 4.3 G/DL (ref 3.5–5.2)
ALBUMIN/GLOB SERPL: 1.8 G/DL
ALP SERPL-CCNC: 111 U/L (ref 39–117)
ALT SERPL W P-5'-P-CCNC: 34 U/L (ref 1–33)
ANION GAP SERPL CALCULATED.3IONS-SCNC: 11 MMOL/L (ref 5–15)
AST SERPL-CCNC: 18 U/L (ref 1–32)
B PARAPERT DNA SPEC QL NAA+PROBE: NOT DETECTED
B PERT DNA SPEC QL NAA+PROBE: NOT DETECTED
BASOPHILS # BLD AUTO: 0.03 10*3/MM3 (ref 0–0.2)
BASOPHILS NFR BLD AUTO: 0.5 % (ref 0–1.5)
BILIRUB SERPL-MCNC: 0.2 MG/DL (ref 0–1.2)
BUN SERPL-MCNC: 10 MG/DL (ref 6–20)
BUN/CREAT SERPL: 10.5 (ref 7–25)
C PNEUM DNA NPH QL NAA+NON-PROBE: NOT DETECTED
CALCIUM SPEC-SCNC: 10.1 MG/DL (ref 8.6–10.5)
CHLORIDE SERPL-SCNC: 105 MMOL/L (ref 98–107)
CHOLEST SERPL-MCNC: 254 MG/DL (ref 0–200)
CO2 SERPL-SCNC: 24 MMOL/L (ref 22–29)
CREAT SERPL-MCNC: 0.95 MG/DL (ref 0.57–1)
DEPRECATED RDW RBC AUTO: 41.6 FL (ref 37–54)
EGFRCR SERPLBLD CKD-EPI 2021: 78.3 ML/MIN/1.73
EOSINOPHIL # BLD AUTO: 0.17 10*3/MM3 (ref 0–0.4)
EOSINOPHIL NFR BLD AUTO: 2.8 % (ref 0.3–6.2)
ERYTHROCYTE [DISTWIDTH] IN BLOOD BY AUTOMATED COUNT: 13 % (ref 12.3–15.4)
FLUAV SUBTYP SPEC NAA+PROBE: NOT DETECTED
FLUBV RNA ISLT QL NAA+PROBE: NOT DETECTED
GLOBULIN UR ELPH-MCNC: 2.4 GM/DL
GLUCOSE SERPL-MCNC: 103 MG/DL (ref 65–99)
HADV DNA SPEC NAA+PROBE: NOT DETECTED
HCOV 229E RNA SPEC QL NAA+PROBE: NOT DETECTED
HCOV HKU1 RNA SPEC QL NAA+PROBE: NOT DETECTED
HCOV NL63 RNA SPEC QL NAA+PROBE: NOT DETECTED
HCOV OC43 RNA SPEC QL NAA+PROBE: NOT DETECTED
HCT VFR BLD AUTO: 36.6 % (ref 34–46.6)
HDLC SERPL-MCNC: 61 MG/DL (ref 40–60)
HGB BLD-MCNC: 11.8 G/DL (ref 12–15.9)
HMPV RNA NPH QL NAA+NON-PROBE: NOT DETECTED
HOLD SPECIMEN: NORMAL
HOLD SPECIMEN: NORMAL
HPIV1 RNA ISLT QL NAA+PROBE: NOT DETECTED
HPIV2 RNA SPEC QL NAA+PROBE: NOT DETECTED
HPIV3 RNA NPH QL NAA+PROBE: NOT DETECTED
HPIV4 P GENE NPH QL NAA+PROBE: NOT DETECTED
IMM GRANULOCYTES # BLD AUTO: 0.07 10*3/MM3 (ref 0–0.05)
IMM GRANULOCYTES NFR BLD AUTO: 1.1 % (ref 0–0.5)
LDLC SERPL CALC-MCNC: 174 MG/DL (ref 0–100)
LDLC/HDLC SERPL: 2.8 {RATIO}
LYMPHOCYTES # BLD AUTO: 1.95 10*3/MM3 (ref 0.7–3.1)
LYMPHOCYTES NFR BLD AUTO: 31.6 % (ref 19.6–45.3)
M PNEUMO IGG SER IA-ACNC: NOT DETECTED
MCH RBC QN AUTO: 28.6 PG (ref 26.6–33)
MCHC RBC AUTO-ENTMCNC: 32.2 G/DL (ref 31.5–35.7)
MCV RBC AUTO: 88.6 FL (ref 79–97)
MONOCYTES # BLD AUTO: 0.33 10*3/MM3 (ref 0.1–0.9)
MONOCYTES NFR BLD AUTO: 5.3 % (ref 5–12)
NEUTROPHILS NFR BLD AUTO: 3.63 10*3/MM3 (ref 1.7–7)
NEUTROPHILS NFR BLD AUTO: 58.7 % (ref 42.7–76)
NRBC BLD AUTO-RTO: 0 /100 WBC (ref 0–0.2)
NT-PROBNP SERPL-MCNC: 112.4 PG/ML (ref 0–450)
PLATELET # BLD AUTO: 348 10*3/MM3 (ref 140–450)
PMV BLD AUTO: 9.3 FL (ref 6–12)
POTASSIUM SERPL-SCNC: 3.9 MMOL/L (ref 3.5–5.2)
PROT SERPL-MCNC: 6.7 G/DL (ref 6–8.5)
RBC # BLD AUTO: 4.13 10*6/MM3 (ref 3.77–5.28)
RHINOVIRUS RNA SPEC NAA+PROBE: NOT DETECTED
RSV RNA NPH QL NAA+NON-PROBE: NOT DETECTED
SARS-COV-2 RNA NPH QL NAA+NON-PROBE: NOT DETECTED
SODIUM SERPL-SCNC: 140 MMOL/L (ref 136–145)
TRIGL SERPL-MCNC: 110 MG/DL (ref 0–150)
TROPONIN T SERPL HS-MCNC: <6 NG/L
VLDLC SERPL-MCNC: 19 MG/DL (ref 5–40)
WBC NRBC COR # BLD AUTO: 6.18 10*3/MM3 (ref 3.4–10.8)
WHOLE BLOOD HOLD COAG: NORMAL
WHOLE BLOOD HOLD SPECIMEN: NORMAL

## 2024-04-04 PROCEDURE — 0202U NFCT DS 22 TRGT SARS-COV-2: CPT | Performed by: PHYSICIAN ASSISTANT

## 2024-04-04 PROCEDURE — 63710000001 ONDANSETRON ODT 4 MG TABLET DISPERSIBLE: Performed by: PHYSICIAN ASSISTANT

## 2024-04-04 PROCEDURE — G0378 HOSPITAL OBSERVATION PER HR: HCPCS

## 2024-04-04 PROCEDURE — 63710000001 PROMETHAZINE PER 25 MG: Performed by: PHYSICIAN ASSISTANT

## 2024-04-04 PROCEDURE — 25010000002 HYDROMORPHONE 1 MG/ML SOLUTION: Performed by: PHYSICIAN ASSISTANT

## 2024-04-04 PROCEDURE — 73221 MRI JOINT UPR EXTREM W/O DYE: CPT

## 2024-04-04 PROCEDURE — 36415 COLL VENOUS BLD VENIPUNCTURE: CPT

## 2024-04-04 PROCEDURE — 93005 ELECTROCARDIOGRAM TRACING: CPT

## 2024-04-04 PROCEDURE — 80061 LIPID PANEL: CPT | Performed by: PHYSICIAN ASSISTANT

## 2024-04-04 PROCEDURE — 80053 COMPREHEN METABOLIC PANEL: CPT | Performed by: PHYSICIAN ASSISTANT

## 2024-04-04 PROCEDURE — 93005 ELECTROCARDIOGRAM TRACING: CPT | Performed by: EMERGENCY MEDICINE

## 2024-04-04 PROCEDURE — 85025 COMPLETE CBC W/AUTO DIFF WBC: CPT | Performed by: PHYSICIAN ASSISTANT

## 2024-04-04 PROCEDURE — 96374 THER/PROPH/DIAG INJ IV PUSH: CPT

## 2024-04-04 PROCEDURE — 87040 BLOOD CULTURE FOR BACTERIA: CPT | Performed by: PHYSICIAN ASSISTANT

## 2024-04-04 PROCEDURE — 84484 ASSAY OF TROPONIN QUANT: CPT | Performed by: PHYSICIAN ASSISTANT

## 2024-04-04 PROCEDURE — 25510000001 IOPAMIDOL PER 1 ML: Performed by: PHYSICIAN ASSISTANT

## 2024-04-04 PROCEDURE — 83880 ASSAY OF NATRIURETIC PEPTIDE: CPT | Performed by: PHYSICIAN ASSISTANT

## 2024-04-04 PROCEDURE — 99285 EMERGENCY DEPT VISIT HI MDM: CPT

## 2024-04-04 PROCEDURE — 71275 CT ANGIOGRAPHY CHEST: CPT

## 2024-04-04 RX ORDER — LEVOCETIRIZINE DIHYDROCHLORIDE 5 MG/1
5 TABLET, FILM COATED ORAL EVERY MORNING
COMMUNITY

## 2024-04-04 RX ORDER — SODIUM CHLORIDE 0.9 % (FLUSH) 0.9 %
10 SYRINGE (ML) INJECTION AS NEEDED
Status: DISCONTINUED | OUTPATIENT
Start: 2024-04-04 | End: 2024-04-05 | Stop reason: HOSPADM

## 2024-04-04 RX ORDER — PREGABALIN 25 MG/1
50 CAPSULE ORAL 3 TIMES DAILY
Status: DISCONTINUED | OUTPATIENT
Start: 2024-04-04 | End: 2024-04-05 | Stop reason: HOSPADM

## 2024-04-04 RX ORDER — ALBUTEROL SULFATE 2.5 MG/3ML
2.5 SOLUTION RESPIRATORY (INHALATION) EVERY 4 HOURS PRN
Status: DISCONTINUED | OUTPATIENT
Start: 2024-04-04 | End: 2024-04-05 | Stop reason: HOSPADM

## 2024-04-04 RX ORDER — BUSPIRONE HYDROCHLORIDE 10 MG/1
20 TABLET ORAL 3 TIMES DAILY PRN
COMMUNITY

## 2024-04-04 RX ORDER — ALUMINA, MAGNESIA, AND SIMETHICONE 2400; 2400; 240 MG/30ML; MG/30ML; MG/30ML
15 SUSPENSION ORAL EVERY 6 HOURS PRN
Status: DISCONTINUED | OUTPATIENT
Start: 2024-04-04 | End: 2024-04-05 | Stop reason: HOSPADM

## 2024-04-04 RX ORDER — AMOXICILLIN 250 MG
2 CAPSULE ORAL 2 TIMES DAILY PRN
Status: DISCONTINUED | OUTPATIENT
Start: 2024-04-04 | End: 2024-04-05 | Stop reason: HOSPADM

## 2024-04-04 RX ORDER — BISACODYL 5 MG/1
5 TABLET, DELAYED RELEASE ORAL DAILY PRN
Status: DISCONTINUED | OUTPATIENT
Start: 2024-04-04 | End: 2024-04-05 | Stop reason: HOSPADM

## 2024-04-04 RX ORDER — SODIUM CHLORIDE 9 MG/ML
40 INJECTION, SOLUTION INTRAVENOUS AS NEEDED
Status: DISCONTINUED | OUTPATIENT
Start: 2024-04-04 | End: 2024-04-05 | Stop reason: HOSPADM

## 2024-04-04 RX ORDER — TRAZODONE HYDROCHLORIDE 50 MG/1
50 TABLET ORAL NIGHTLY
Status: DISCONTINUED | OUTPATIENT
Start: 2024-04-04 | End: 2024-04-05 | Stop reason: HOSPADM

## 2024-04-04 RX ORDER — TIZANIDINE 4 MG/1
4 TABLET ORAL NIGHTLY PRN
Status: DISCONTINUED | OUTPATIENT
Start: 2024-04-04 | End: 2024-04-05 | Stop reason: HOSPADM

## 2024-04-04 RX ORDER — BISACODYL 10 MG
10 SUPPOSITORY, RECTAL RECTAL DAILY PRN
Status: DISCONTINUED | OUTPATIENT
Start: 2024-04-04 | End: 2024-04-05 | Stop reason: HOSPADM

## 2024-04-04 RX ORDER — HYDROXYZINE HYDROCHLORIDE 25 MG/1
50 TABLET, FILM COATED ORAL ONCE
Status: COMPLETED | OUTPATIENT
Start: 2024-04-04 | End: 2024-04-04

## 2024-04-04 RX ORDER — ACETAMINOPHEN 325 MG/1
650 TABLET ORAL EVERY 4 HOURS PRN
Status: DISCONTINUED | OUTPATIENT
Start: 2024-04-04 | End: 2024-04-05 | Stop reason: HOSPADM

## 2024-04-04 RX ORDER — NITROGLYCERIN 0.4 MG/1
0.4 TABLET SUBLINGUAL
Status: DISCONTINUED | OUTPATIENT
Start: 2024-04-04 | End: 2024-04-05 | Stop reason: HOSPADM

## 2024-04-04 RX ORDER — PROPRANOLOL HYDROCHLORIDE 10 MG/1
10 TABLET ORAL 2 TIMES DAILY
COMMUNITY
End: 2024-04-05 | Stop reason: HOSPADM

## 2024-04-04 RX ORDER — OXYCODONE HYDROCHLORIDE 5 MG/1
5 TABLET ORAL EVERY 4 HOURS PRN
Status: DISCONTINUED | OUTPATIENT
Start: 2024-04-04 | End: 2024-04-05 | Stop reason: HOSPADM

## 2024-04-04 RX ORDER — LOSARTAN POTASSIUM 50 MG/1
50 TABLET ORAL DAILY
Status: DISCONTINUED | OUTPATIENT
Start: 2024-04-04 | End: 2024-04-05 | Stop reason: HOSPADM

## 2024-04-04 RX ORDER — CETIRIZINE HYDROCHLORIDE 10 MG/1
10 TABLET ORAL DAILY
Status: DISCONTINUED | OUTPATIENT
Start: 2024-04-05 | End: 2024-04-05 | Stop reason: HOSPADM

## 2024-04-04 RX ORDER — ONDANSETRON 4 MG/1
4 TABLET, ORALLY DISINTEGRATING ORAL EVERY 6 HOURS PRN
Status: DISCONTINUED | OUTPATIENT
Start: 2024-04-04 | End: 2024-04-05 | Stop reason: HOSPADM

## 2024-04-04 RX ORDER — FLUCONAZOLE 150 MG/1
150 TABLET ORAL ONCE
Status: COMPLETED | OUTPATIENT
Start: 2024-04-04 | End: 2024-04-04

## 2024-04-04 RX ORDER — PANTOPRAZOLE SODIUM 40 MG/1
40 TABLET, DELAYED RELEASE ORAL
Status: DISCONTINUED | OUTPATIENT
Start: 2024-04-05 | End: 2024-04-05 | Stop reason: HOSPADM

## 2024-04-04 RX ORDER — SODIUM CHLORIDE 0.9 % (FLUSH) 0.9 %
10 SYRINGE (ML) INJECTION EVERY 12 HOURS SCHEDULED
Status: DISCONTINUED | OUTPATIENT
Start: 2024-04-04 | End: 2024-04-05 | Stop reason: HOSPADM

## 2024-04-04 RX ORDER — HYDROCODONE BITARTRATE AND ACETAMINOPHEN 5; 325 MG/1; MG/1
1 TABLET ORAL EVERY 12 HOURS PRN
COMMUNITY

## 2024-04-04 RX ORDER — SUCRALFATE 1 G/1
1 TABLET ORAL
Status: DISCONTINUED | OUTPATIENT
Start: 2024-04-04 | End: 2024-04-05 | Stop reason: HOSPADM

## 2024-04-04 RX ORDER — ALUMINA, MAGNESIA, AND SIMETHICONE 2400; 2400; 240 MG/30ML; MG/30ML; MG/30ML
15 SUSPENSION ORAL EVERY 6 HOURS PRN
Status: DISCONTINUED | OUTPATIENT
Start: 2024-04-04 | End: 2024-04-04

## 2024-04-04 RX ORDER — ONDANSETRON 2 MG/ML
4 INJECTION INTRAMUSCULAR; INTRAVENOUS EVERY 6 HOURS PRN
Status: DISCONTINUED | OUTPATIENT
Start: 2024-04-04 | End: 2024-04-05 | Stop reason: HOSPADM

## 2024-04-04 RX ORDER — FERROUS SULFATE 324(65)MG
324 TABLET, DELAYED RELEASE (ENTERIC COATED) ORAL
Status: DISCONTINUED | OUTPATIENT
Start: 2024-04-05 | End: 2024-04-05 | Stop reason: HOSPADM

## 2024-04-04 RX ORDER — POLYETHYLENE GLYCOL 3350 17 G/17G
17 POWDER, FOR SOLUTION ORAL DAILY PRN
Status: DISCONTINUED | OUTPATIENT
Start: 2024-04-04 | End: 2024-04-05 | Stop reason: HOSPADM

## 2024-04-04 RX ORDER — LIDOCAINE HYDROCHLORIDE 20 MG/ML
10 SOLUTION OROPHARYNGEAL ONCE AS NEEDED
Status: COMPLETED | OUTPATIENT
Start: 2024-04-04 | End: 2024-04-05

## 2024-04-04 RX ORDER — OXYBUTYNIN CHLORIDE 10 MG/1
10 TABLET, EXTENDED RELEASE ORAL DAILY
Status: DISCONTINUED | OUTPATIENT
Start: 2024-04-04 | End: 2024-04-05 | Stop reason: HOSPADM

## 2024-04-04 RX ORDER — ACETAMINOPHEN 160 MG/5ML
650 SOLUTION ORAL EVERY 4 HOURS PRN
Status: DISCONTINUED | OUTPATIENT
Start: 2024-04-04 | End: 2024-04-05 | Stop reason: HOSPADM

## 2024-04-04 RX ORDER — PROMETHAZINE HYDROCHLORIDE 25 MG/1
25 TABLET ORAL EVERY 8 HOURS PRN
Status: DISCONTINUED | OUTPATIENT
Start: 2024-04-04 | End: 2024-04-05 | Stop reason: HOSPADM

## 2024-04-04 RX ORDER — CHOLECALCIFEROL (VITAMIN D3) 125 MCG
5 CAPSULE ORAL NIGHTLY PRN
Status: DISCONTINUED | OUTPATIENT
Start: 2024-04-04 | End: 2024-04-05 | Stop reason: HOSPADM

## 2024-04-04 RX ORDER — ACETAMINOPHEN 650 MG/1
650 SUPPOSITORY RECTAL EVERY 4 HOURS PRN
Status: DISCONTINUED | OUTPATIENT
Start: 2024-04-04 | End: 2024-04-05 | Stop reason: HOSPADM

## 2024-04-04 RX ADMIN — ONDANSETRON 4 MG: 4 TABLET, ORALLY DISINTEGRATING ORAL at 19:53

## 2024-04-04 RX ADMIN — PREGABALIN 50 MG: 25 CAPSULE ORAL at 21:51

## 2024-04-04 RX ADMIN — HYDROXYZINE HYDROCHLORIDE 50 MG: 25 TABLET, FILM COATED ORAL at 21:31

## 2024-04-04 RX ADMIN — TIZANIDINE 4 MG: 4 TABLET ORAL at 21:51

## 2024-04-04 RX ADMIN — HYDROMORPHONE HYDROCHLORIDE 0.5 MG: 1 INJECTION, SOLUTION INTRAMUSCULAR; INTRAVENOUS; SUBCUTANEOUS at 14:49

## 2024-04-04 RX ADMIN — TRAZODONE HYDROCHLORIDE 50 MG: 50 TABLET ORAL at 21:31

## 2024-04-04 RX ADMIN — OXYCODONE 5 MG: 5 TABLET ORAL at 23:19

## 2024-04-04 RX ADMIN — Medication 10 ML: at 21:51

## 2024-04-04 RX ADMIN — Medication 10 ML: at 14:49

## 2024-04-04 RX ADMIN — IOPAMIDOL 100 ML: 755 INJECTION, SOLUTION INTRAVENOUS at 11:21

## 2024-04-04 RX ADMIN — SUCRALFATE 1 G: 1 TABLET ORAL at 16:34

## 2024-04-04 RX ADMIN — APIXABAN 5 MG: 5 TABLET, FILM COATED ORAL at 21:31

## 2024-04-04 RX ADMIN — PROMETHAZINE HYDROCHLORIDE 25 MG: 25 TABLET ORAL at 23:43

## 2024-04-04 RX ADMIN — OXYCODONE 5 MG: 5 TABLET ORAL at 19:53

## 2024-04-04 RX ADMIN — METOPROLOL TARTRATE 25 MG: 25 TABLET, FILM COATED ORAL at 21:31

## 2024-04-04 RX ADMIN — BUSPIRONE HYDROCHLORIDE 20 MG: 5 TABLET ORAL at 16:34

## 2024-04-04 RX ADMIN — PREGABALIN 50 MG: 25 CAPSULE ORAL at 16:34

## 2024-04-04 RX ADMIN — ACETAMINOPHEN 650 MG: 325 TABLET, FILM COATED ORAL at 19:53

## 2024-04-04 RX ADMIN — LOSARTAN POTASSIUM 50 MG: 50 TABLET, FILM COATED ORAL at 16:34

## 2024-04-04 RX ADMIN — ACETAMINOPHEN 650 MG: 325 TABLET, FILM COATED ORAL at 23:19

## 2024-04-04 RX ADMIN — FLUCONAZOLE 150 MG: 150 TABLET ORAL at 13:18

## 2024-04-04 NOTE — CASE MANAGEMENT/SOCIAL WORK
Discharge Planning Assessment   [place]     Patient Name: Amelie Henderson  MRN: [id number]  Today's Date: 4/4/2024    Admit Date: 4/4/2024    Plan: D/c Plan: Return home with spouse.   Discharge Needs Assessment       Row Name 04/04/24 1441       Living Environment    People in Home spouse;child(hal), dependent    Name(s) of People in Home Woody    Current Living Arrangements home    Potentially Unsafe Housing Conditions none    In the past 12 months has the electric, gas, oil, or water company threatened to shut off services in your home? No    Primary Care Provided by self    Provides Primary Care For no one    Family Caregiver if Needed spouse    Family Caregiver Names Woody    Quality of Family Relationships helpful;involved;supportive    Able to Return to Prior Arrangements yes       Resource/Environmental Concerns    Resource/Environmental Concerns none    Transportation Concerns none       Transportation Needs    In the past 12 months, has lack of transportation kept you from medical appointments or from getting medications? no    In the past 12 months, has lack of transportation kept you from meetings, work, or from getting things needed for daily living? No       Food Insecurity    Within the past 12 months, you worried that your food would run out before you got the money to buy more. Never true    Within the past 12 months, the food you bought just didn't last and you didn't have money to get more. Never true       Transition Planning    Patient/Family Anticipates Transition to home with family    Patient/Family Anticipated Services at Transition none    Transportation Anticipated family or friend will provide       Discharge Needs Assessment    Readmission Within the Last 30 Days no previous admission in last 30 days    Equipment Currently Used at Home nebulizer    Concerns to be Addressed denies needs/concerns at this time    Anticipated Changes Related to Illness none    Equipment Needed After Discharge  none    Provided Post Acute Provider List? N/A    Provided Post Acute Provider Quality & Resource List? N/A                   Discharge Plan       Row Name 04/04/24 1442       Plan    Plan D/c Plan: Return home with spouse.    Provided Post Acute Provider List? N/A    Provided Post Acute Provider Quality & Resource List? N/A    Plan Comments CM spoke with patient's  on the phone to discuss admission assessment and discharge planning. Woody confirms PCP and pharmacy. Woody has declined meds to bed program at this time. Woody denies any difficulty affording medications at this time. Woody denies any additional needs for services or DME at this time. Woody can transport at d/c. D/C barriers: stress test pending.                  Expected Discharge Date and Time       Expected Discharge Date Expected Discharge Time    Apr 4, 2024            Demographic Summary       Row Name 04/04/24 1440       General Information    Admission Type observation    Arrived From emergency department    Referral Source admission list    Reason for Consult discharge planning    Preferred Language English       Contact Information    Permission Granted to Share Info With                    Functional Status       Row Name 04/04/24 1440       Functional Status    Usual Activity Tolerance good    Current Activity Tolerance good       Functional Status, IADL    Medications independent    Meal Preparation independent    Housekeeping independent    Laundry independent    Shopping independent       Mental Status    General Appearance WDL WDL       Mental Status Summary    Recent Changes in Mental Status/Cognitive Functioning no changes       Employment/    Employment Status self-employed;, previous service           Current or Previous  Service none                  Shara Conteh RN     [address] [address]  Phone: [phone]  Fax:  [phone]

## 2024-04-04 NOTE — H&P
[facility] H&P    Patient Name: Amelie Henderson  : 1985  MRN: [id number]  Primary Care Physician: Damon Valera APRN  Date of admission: 2024     Patient Care Team:  Damon Valera APRN as PCP - General (Nurse Practitioner)          Subjective   History Present Illness     Chief Complaint:   Chief Complaint   Patient presents with    Shortness of Breath     Soa last 2 days, pt was dx with DVT left arm, Pt is on eliquis.  Pt reports her oxygen this morning on portable pulse ox was low and she felt like she was going to pass out.  Pt recent rotator cuff surgery.        Chest pain and dyspnea    History of Present Illness  Patient is postop rotator cuff repair from 2024 with continued pain as well as erythema and swelling of her left upper extremity and subsequent finding of a DVT for which she has been on Eliquis therapy and compliant x 9 days.  She has continued noticed some transient increase in the redness and pain in her left shoulder radiating down her left arm and across to her left breast.  On the day of presentation however she reports significant pain located substernally unique from the pain she has been having related to her previous shoulder surgery with some associated dyspnea as well as nausea without vomiting.  Family history is notable for CAD and congestive heart failure in her maternal grandmother.        Review of Systems   Constitutional: Negative.   HENT: Negative.     Eyes: Negative.    Cardiovascular:  Positive for chest pain.   Respiratory:  Positive for shortness of breath.    Skin: Negative.    Musculoskeletal:  Positive for joint pain and joint swelling.   Gastrointestinal:  Positive for nausea. Negative for vomiting.   Genitourinary: Negative.    Neurological: Negative.    Psychiatric/Behavioral: Negative.             Personal History     Past Medical History:   Past Medical History:   Diagnosis Date    Allergic     Anemia     Anxiety     B12  deficiency     Callus     COVID     x 2     Depression     Dercum's disease     Difficulty walking     Endometriosis     Fallen arches     Fibromyalgia 02/15/2024    Gastritis     GERD (gastroesophageal reflux disease)     Hyperlipidemia     Borderline    Hypertension     Hypoglycemia     Ingrown toenail     Migraines     PTSD (post-traumatic stress disorder)     Shin splints     Stress fracture     Vitamin D deficiency        Surgical History:      Past Surgical History:   Procedure Laterality Date    CARPAL TUNNEL RELEASE Right      SECTION      x 2     CHOLECYSTECTOMY      HYSTERECTOMY  2023    KNEE SURGERY Right     x 3     KNEE SURGERY Left     x 1     SHOULDER ACROMIOPLASTY WITH ROTATOR CUFF REPAIR Left 2021    Procedure: LEFT SHOULDER ARTHROSCOPY WITH ROTATOR CUFF REPAIR AND SUBACROMIAL DECOMPRESSION- SLAP;  Surgeon: Rianna Jarvis MD;  Location: [facility];  Service: Orthopedics;  Laterality: Left;    SHOULDER ARTHROSCOPY W/ ROTATOR CUFF REPAIR Left 2024    Procedure: SHOULDER ARTHROSCOPY WITH ROTATOR CUFF REPAIR, DECOMPRESSION AND PRP INJECTION;  Surgeon: Rianna Jarvis MD;  Location: [facility];  Service: Orthopedics;  Laterality: Left;           Family History: family history includes ADD / ADHD in her sister; Alcohol abuse in her father; Bell's palsy in her mother; Depression in her son; Diabetes in her paternal grandfather; Glaucoma in her mother; Heart failure in her maternal grandmother; Hypertension in her maternal grandmother and mother; Pyloric stenosis in her son; Stroke in her maternal grandmother; Thyroid disease in her mother; Transient ischemic attack in her mother. Otherwise pertinent FHx was reviewed and unremarkable.     Social History:  reports that she quit smoking about 8 years ago. Her smoking use included cigarettes. She started smoking about 25 years ago. She has a 8.5 pack-year smoking history. She has been exposed to tobacco smoke. She has  never used smokeless tobacco. She reports current alcohol use. She reports that she does not use drugs.      Medications:  Prior to Admission medications    Medication Sig Start Date End Date Taking? Authorizing Provider   albuterol (PROVENTIL) (2.5 MG/3ML) 0.083% nebulizer solution Take 2.5 mg by nebulization Every 4 (Four) Hours As Needed for Wheezing or Shortness of Air. 11/8/23   Damon Valera APRN   albuterol sulfate  (90 Base) MCG/ACT inhaler Inhale 2 puffs Every 4 (Four) Hours As Needed for Wheezing. 6/19/23   Damon Valera APRN   Apixaban Starter Pack tablet therapy pack Take two 5 mg tablets by mouth every 12 hours for 7 days. Followed by one 5 mg tablet every 12 hours. (Dispense starter pack if available) 3/26/24   Dangelo Edge III, PA   busPIRone (BUSPAR) 10 MG tablet Take 2 tablets by mouth 3 (Three) Times a Day As Needed (anxiety) for up to 30 days. 12/18/23 2/16/24  Damon Valera APRN   cephalexin (KEFLEX) 500 MG capsule Take 1 capsule by mouth 3 (Three) Times a Day.    ProviderEthel MD   Cholecalciferol 125 MCG (5000 UT) tablet Take 1 tablet by mouth Daily. 11/24/21   Fadia Tovar MD   diclofenac (VOLTAREN) 50 MG EC tablet Take 1 tablet by mouth 2 (Two) Times a Day As Needed (arthralgia). for pain 11/3/23   Damon Valera APRN   diphenhydrAMINE (BENADRYL) 25 mg capsule Take 1 capsule by mouth Every 6 (Six) Hours As Needed for Itching.    ProviderEthel MD   ferrous gluconate (FERGON) 324 MG tablet TAKE 1 TABLET BY MOUTH EVERY DAY WITH BREAKFAST 8/25/23   Damon Valera APRN   fluticasone (FLONASE) 50 MCG/ACT nasal spray SPRAY 2 SPRAYS INTO THE NOSTRIL AS DIRECTED BY PROVIDER DAILY. 10/7/22   Damon Valera APRN   Galcanezumab-gnlm (EMGALITY SC) Inject  under the skin into the appropriate area as directed.    ProviderEthel MD   hydrOXYzine (ATARAX) 50 MG tablet MAY TAKE 1 TABLET 2 TIMES A DAY AS NEEDED FOR  ITCHING, MAY ALSO TAKE 2 TABLETS AT NIGHT AS NEEDED. 11/20/23   Damon Valera APRN   l-methylfolate 7.5 MG tablet tablet Take  by mouth Daily.    ProviderEthel MD   levocetirizine (XYZAL) 5 MG tablet Take 1 tablet by mouth Every Evening. 12/18/23   Damon Valera APRN   Levomilnacipran HCl ER (Fetzima) 40 MG capsule sustained-release 24 hr Take 40 mg by mouth Daily. 2/2/24   Damon Valera APRN   losartan (COZAAR) 50 MG tablet Take 1 tablet by mouth Every Morning. 11/3/23   Damon Valera APRN   metoprolol tartrate (LOPRESSOR) 25 MG tablet Take 1 tablet by mouth 2 (Two) Times a Day. 11/3/23   Damon Valera APRN   MILK THISTLE PO Take  by mouth.    ProviderEthel MD   Mirabegron ER (Myrbetriq) 50 MG tablet sustained-release 24 hour 24 hr tablet Take 50 mg by mouth Daily. 11/3/23   Damon Valera APRN   multivitamin with minerals tablet tablet 2 gummies po q am - smarty pants     ProviderEthel MD   mupirocin (BACTROBAN) 2 % cream Apply 1 application  topically to the appropriate area as directed 3 (Three) Times a Day. 12/18/23   Damon Valera APRN   naloxone (NARCAN) 4 MG/0.1ML nasal spray Call 911. Don't prime. [place] in 1 nostril for overdose. Repeat in 2-3 minutes in other nostril if no or minimal breathing/responsiveness. 7/8/23   Amelie Bella APRN   NON FORMULARY Take 1 dose by mouth Daily. OSHWAGHANDHA SUPPLEMENT    ProviderEthel MD   omeprazole (priLOSEC) 20 MG capsule Take 1 capsule by mouth 2 (Two) Times a Day. 12/11/22   Ethel Rey MD   ondansetron (Zofran) 4 MG tablet Take 1 tablet by mouth Every 6 (Six) Hours As Needed for Nausea or Vomiting. 3/27/24   Damon Valera APRN   predniSONE (DELTASONE) 10 MG tablet Take 1 tablet by mouth Daily. 3/5/24   Damon Valera APRN   pregabalin (Lyrica) 50 MG capsule Take 1 capsule by mouth 3 (Three) Times a Day. 3/5/24   Damon Valera APRN    Probiotic Product (FORTIFY PROBIOTIC WOMENS EX ST PO) Take 1 capsule by mouth Daily.    ProviderEthel MD   promethazine (PHENERGAN) 25 MG tablet Take 1 tablet by mouth Every 8 (Eight) Hours As Needed for Nausea or Vomiting. 2/12/24   Damon Valera APRN   propranolol (INDERAL) 10 MG tablet TAKE 1 TABLET BY MOUTH 2 (TWO) TIMES A DAY AS NEEDED (ANXIETY). 11/30/23   Damon Valera APRN   rizatriptan (MAXALT) 10 MG tablet TAKE 1 TABLET AT THE ONSET OF HEADACHE MAY REPEAT IN 2 HOURS MAX OF 2 TABS IN 24 HOURS 11/3/23   Damon Valera APRN   tiZANidine (ZANAFLEX) 4 MG tablet Take 1 tablet by mouth At Night As Needed for Muscle Spasms. 2/29/24   Damon Valera APRN   traZODone (DESYREL) 50 MG tablet TAKE 1 TABLET BY MOUTH EVERY DAY AT NIGHT 12/28/23   Damon Valera APRN   ubrogepant (UBRELVY) 100 MG tablet Take 1 tablet by mouth. 11/9/23   Ethel Rey MD   Vitamin B Complex-C capsule Take  by mouth.    ProviderEthel MD   vitamin C (ASCORBIC ACID) 250 MG tablet Take 1 tablet by mouth Daily.    ProviderEthel MD   zonisamide (ZONEGRAN) 100 MG capsule Take 2 capsules by mouth Every Night. 12/2/21   Fadia Tovar MD       Allergies:    Allergies   Allergen Reactions    [place] Anaphylaxis    Codeine Hives and Itching    Influenza Virus Vaccine Other (See Comments)     Egg allergy     Latex Rash     Skin gets red and burns, skin starts peeling      Meloxicam Other (See Comments)     Gastritis         Objective   Objective     Vital Signs  Temp:  [98 °F (36.7 °C)] 98 °F (36.7 °C)  Heart Rate:  [78-95] 81  Resp:  [24] 24  BP: (125-139)/(82) 125/82  SpO2:  [92 %-100 %] 100 %  on   ;      Body mass index is 31.57 kg/m².    Physical Exam  Vitals reviewed.   Constitutional:       General: She is not in acute distress.     Appearance: Normal appearance. She is obese. She is not ill-appearing, toxic-appearing or diaphoretic.   HENT:      Head: Normocephalic.       Right Ear: External ear normal.      Left Ear: External ear normal.      Nose: Nose normal.      Mouth/Throat:      Mouth: Mucous membranes are moist.   Eyes:      Extraocular Movements: Extraocular movements intact.   Cardiovascular:      Rate and Rhythm: Normal rate and regular rhythm.      Pulses: Normal pulses.   Pulmonary:      Effort: Pulmonary effort is normal.      Breath sounds: Normal breath sounds.   Abdominal:      General: Bowel sounds are normal.      Palpations: Abdomen is soft.   Musculoskeletal:         General: Swelling present. Normal range of motion.      Cervical back: Normal range of motion.      Right lower leg: No edema.      Left lower leg: No edema.   Skin:     General: Skin is warm and dry.      Capillary Refill: Capillary refill takes less than 2 seconds.      Comments: Normal erythema around left shoulder with well-approximated incisions without bleeding, discharge or drainage noted   Neurological:      General: No focal deficit present.      Mental Status: She is alert.   Psychiatric:         Mood and Affect: Mood normal.         Behavior: Behavior normal.         Thought Content: Thought content normal.         Judgment: Judgment normal.         Results Review:  I have personally reviewed most recent cardiac tracings, lab results, and radiology images and interpretations and agree with findings, most notably: Troponin, proBNP, CMP, CBC, respiratory panel, CT PE protocol and EKG.    Results from last 7 days   Lab Units 04/04/24  1007   WBC 10*3/mm3 6.18   HEMOGLOBIN g/dL 11.8*   HEMATOCRIT % 36.6   PLATELETS 10*3/mm3 348     Results from last 7 days   Lab Units 04/04/24  1007   SODIUM mmol/L 140   POTASSIUM mmol/L 3.9   CHLORIDE mmol/L 105   CO2 mmol/L 24.0   BUN mg/dL 10   CREATININE mg/dL 0.95   GLUCOSE mg/dL 103*   CALCIUM mg/dL 10.1   ALK PHOS U/L 111   ALT (SGPT) U/L 34*   AST (SGOT) U/L 18   HSTROP T ng/L <6   PROBNP pg/mL 112.4     Estimated Creatinine Clearance: 101.7 mL/min  (by C-G formula based on SCr of 0.95 mg/dL).  Brief Urine Lab Results       None            Microbiology Results (last 10 days)       Procedure Component Value - Date/Time    Respiratory Panel PCR w/COVID-19(SARS-CoV-2) WILLIAMS/LAYLA/ARNAV/PAD/COR/MELISSA In-House, NP Swab in UTM/VTM, 2 HR TAT - Swab, Nasopharynx [[id number]]  (Normal) Collected: 04/04/24 1059    Lab Status: Final result Specimen: Swab from Nasopharynx Updated: 04/04/24 1156     ADENOVIRUS, PCR Not Detected     Coronavirus 229E Not Detected     Coronavirus HKU1 Not Detected     Coronavirus NL63 Not Detected     Coronavirus OC43 Not Detected     COVID19 Not Detected     Human Metapneumovirus Not Detected     Human Rhinovirus/Enterovirus Not Detected     Influenza A PCR Not Detected     Influenza B PCR Not Detected     Parainfluenza Virus 1 Not Detected     Parainfluenza Virus 2 Not Detected     Parainfluenza Virus 3 Not Detected     Parainfluenza Virus 4 Not Detected     RSV, PCR Not Detected     Bordetella pertussis pcr Not Detected     Bordetella parapertussis PCR Not Detected     Chlamydophila pneumoniae PCR Not Detected     Mycoplasma pneumo by PCR Not Detected    Narrative:      In the setting of a positive respiratory panel with a viral infection PLUS a negative procalcitonin without other underlying concern for bacterial infection, consider observing off antibiotics or discontinuation of antibiotics and continue supportive care. If the respiratory panel is positive for atypical bacterial infection (Bordetella pertussis, Chlamydophila pneumoniae, or Mycoplasma pneumoniae), consider antibiotic de-escalation to target atypical bacterial infection.    Blood Culture - Blood, Arm, Left [[id number]]  (Normal) Collected: 03/26/24 1626    Lab Status: Final result Specimen: Blood from Arm, Left Updated: 03/31/24 1645     Blood Culture No growth at 5 days    Narrative:      Less than seven (7) mL's of blood was collected.  Insufficient quantity may yield false  negative results.    Blood Culture - Blood, Arm, Right [[id number]]  (Normal) Collected: 03/26/24 1622    Lab Status: Final result Specimen: Blood from Arm, Right Updated: 03/31/24 1631     Blood Culture No growth at 5 days            ECG/EMG Results (most recent)       Procedure Component Value Units Date/Time    ECG 12 Lead Dyspnea [[id number]] Collected: 04/04/24 1001     Updated: 04/04/24 1002     QT Interval 346 ms      QTC Interval 425 ms     Narrative:      HEART RATE= 90  bpm  RR Interval= 664  ms  MN Interval= 155  ms  P Horizontal Axis= 16  deg  P Front Axis= 25  deg  QRSD Interval= 95  ms  QT Interval= 346  ms  QTcB= 425  ms  QRS Axis= 42  deg  T Wave Axis= 21  deg  - NORMAL ECG -  Sinus rhythm  No previous ECG available for comparison  Electronically Signed By:   Date and Time of Study: 2024-04-04 10:01:02                    CT Angiogram Chest Pulmonary Embolism    Result Date: 4/4/2024  1.No acute abnormality is identified within the thorax. Specifically, there is no evidence of acute pulmonary embolism. 2.Please see above for additional details. Electronically Signed: Tien Jameson MD  4/4/2024 11:30 AM EDT  Workstation ID: [id number]       Estimated Creatinine Clearance: 101.7 mL/min (by C-G formula based on SCr of 0.95 mg/dL).    Assessment & Plan   Assessment/Plan       Active Hospital Problems    Diagnosis  POA    **Chest pain [R07.9]  Yes      Resolved Hospital Problems   No resolved problems to display.     Chest pain with a history of recent left rotator cuff repair and recent diagnosis of upper extremity DVT  Lab Results   Component Value Date    TROPONINT <6 04/04/2024   -Trend troponin  -proBNP: 112.4  -Lipid panel showed total cholesterol of 254 with an LDL of 61 and HDL of 174  -CT PE protocol showed no acute abnormality specifically no evidence of pulmonary embolism  -EKG: Sinus rhythm at 90 without obvious acute changes or ectopy with a QTc of 425 ms  -Blood cultures ordered and are  pending  -MRI of left shoulder ordered in the ED  -Continue anticoagulation  -Stress Test ordered  -Telemetry  -NPO    Anemia  Lab Results   Component Value Date    HGB 11.8 (L) 04/04/2024    MCV 88.6 04/04/2024    MCHC 32.2 04/04/2024   -Continue iron supplementation  -Monitor while admitted    Asthma  -Albuterol    Hypertension  -Well controlled   BP Readings from Last 1 Encounters:   04/04/24 125/82   - Continue losartan and metoprolol  - Monitor while admitted    Anxiety/depression  -BuSpar and trazodone  -May take home Fetzima if it becomes available    GERD  -PPI        VTE Prophylaxis -   Mechanical Order History:       None          Pharmalogical Order History:       None            CODE STATUS:    There are no questions and answers to display.       This patient has been examined wearing personal protective equipment.     I discussed the patient's findings and my recommendations with patient and nursing staff.      Signature:Electronically signed by Damon Josue PA-C, 04/04/24, 3:49 PM EDT.

## 2024-04-04 NOTE — ED NOTES
Nursing report ED to floor  Amelie Henderson  38 y.o.  female    HPI:   Chief Complaint   Patient presents with    Shortness of Breath     Soa last 2 days, pt was dx with DVT left arm, Pt is on eliquis.  Pt reports her oxygen this morning on portable pulse ox was low and she felt like she was going to pass out.  Pt recent rotator cuff surgery.          Admitting doctor:   Gilberto Graff MD    Admitting diagnosis:   The encounter diagnosis was Chest pain, unspecified type.    Code status:   Current Code Status       Date Active Code Status Order ID Comments User Context       Not on file            Allergies:   Cedar, Codeine, Influenza virus vaccine, Latex, and Meloxicam    Isolation:  No active isolations     Fall Risk:  Fall Risk Assessment was completed, and patient is at low risk for falls.   Predictive Model Details         2 (Low) Factor Value    Calculated 4/4/2024 13:25 Age 38    Risk of Fall Model Musculoskeletal Assessment WDL     Active Peripheral IV Present     Imaging order in this encounter Present     Respiratory Rate 24     Skin Assessment WDL     Magnesium not on file     Drug Use No     Tobacco Use Quit     Jamil Scale not on file     Number of Distinct Medication Classes administered 2     Financial Class Private Insurance     Peripheral Vascular Assessment WDL     Cardiac Assessment X     Diastolic BP 82     Gastrointestinal Assessment WDL     ALT 34 U/L     Creatinine 0.95 mg/dL     Albumin 4.3 g/dL     Days after Admission 0.15     Calcium 10.1 mg/dL     Chloride 105 mmol/L     Potassium 3.9 mmol/L     Total Bilirubin 0.2 mg/dL         Weight:       04/04/24  0946   Weight: 99.8 kg (220 lb)       Intake and Output  No intake or output data in the 24 hours ending 04/04/24 1325    Diet:        Most recent vitals:   Vitals:    04/04/24 0957 04/04/24 1106 04/04/24 1131 04/04/24 1153   BP:   125/82    BP Location:       Patient Position:       Pulse: 95 78 78 81   Resp:       Temp:       TempSrc:        SpO2:  100% 92% 100%   Weight:       Height:           Active LDAs/IV Access:   Lines, Drains & Airways       Active LDAs       Name Placement date Placement time Site Days    Peripheral IV 04/04/24 1007 Right Antecubital 04/04/24  1007  Antecubital  less than 1                    Skin Condition:   Skin Assessments (last day)       None             Labs (abnormal labs have a star):   Labs Reviewed   COMPREHENSIVE METABOLIC PANEL - Abnormal; Notable for the following components:       Result Value    Glucose 103 (*)     ALT (SGPT) 34 (*)     All other components within normal limits    Narrative:     GFR Normal >60  Chronic Kidney Disease <60  Kidney Failure <15     CBC WITH AUTO DIFFERENTIAL - Abnormal; Notable for the following components:    Hemoglobin 11.8 (*)     Immature Grans % 1.1 (*)     Immature Grans, Absolute 0.07 (*)     All other components within normal limits   RESPIRATORY PANEL PCR W/ COVID-19 (SARS-COV-2), NP SWAB IN UTM/VTP, 2 HR TAT - Normal    Narrative:     In the setting of a positive respiratory panel with a viral infection PLUS a negative procalcitonin without other underlying concern for bacterial infection, consider observing off antibiotics or discontinuation of antibiotics and continue supportive care. If the respiratory panel is positive for atypical bacterial infection (Bordetella pertussis, Chlamydophila pneumoniae, or Mycoplasma pneumoniae), consider antibiotic de-escalation to target atypical bacterial infection.   BNP (IN-HOUSE) - Normal    Narrative:     This assay is used as an aid in the diagnosis of individuals suspected of having heart failure. It can be used as an aid in the diagnosis of acute decompensated heart failure (ADHF) in patients presenting with signs and symptoms of ADHF to the emergency department (ED). In addition, NT-proBNP of <300 pg/mL indicates ADHF is not likely.    Age Range Result Interpretation  NT-proBNP Concentration (pg/mL:      <50              Positive            >450                   Gray                 300-450                    Negative             <300    50-75           Positive            >900                  Gray                300-900                  Negative            <300      >75             Positive            >1800                  Gray                300-1800                  Negative            <300   SINGLE HS TROPONIN T - Normal    Narrative:     High Sensitive Troponin T Reference Range:  <14.0 ng/L- Negative Female for AMI  <22.0 ng/L- Negative Male for AMI  >=14 - Abnormal Female indicating possible myocardial injury.  >=22 - Abnormal Male indicating possible myocardial injury.   Clinicians would have to utilize clinical acumen, EKG, Troponin, and serial changes to determine if it is an Acute Myocardial Infarction or myocardial injury due to an underlying chronic condition.        BLOOD CULTURE   BLOOD CULTURE   RAINBOW DRAW    Narrative:     The following orders were created for panel order [place] Draw.  Procedure                               Abnormality         Status                     ---------                               -----------         ------                     Green Top (Gel)[[id number]]                                  Final result               Lavender Top[[id number]]                                     Final result               Gold Top - SST[[id number]]                                   Final result               Light Blue Top[[id number]]                                   Final result                 Please view results for these tests on the individual orders.   LIPID PANEL   CBC AND DIFFERENTIAL    Narrative:     The following orders were created for panel order CBC & Differential.  Procedure                               Abnormality         Status                     ---------                               -----------         ------                     CBC Auto Differential[[id number]]        Abnormal             Final result                 Please view results for these tests on the individual orders.   GREEN TOP   LAVENDER TOP   GOLD TOP - SST   LIGHT BLUE TOP       LOC: Person, Place, Time, and Situation    Telemetry:  Observation Unit    Cardiac Monitoring Ordered: no    EKG:   ECG 12 Lead Dyspnea   Preliminary Result   HEART RATE= 90  bpm   RR Interval= 664  ms   FL Interval= 155  ms   P Horizontal Axis= 16  deg   P Front Axis= 25  deg   QRSD Interval= 95  ms   QT Interval= 346  ms   QTcB= 425  ms   QRS Axis= 42  deg   T Wave Axis= 21  deg   - NORMAL ECG -   Sinus rhythm   No previous ECG available for comparison   Electronically Signed By:    Date and Time of Study: 2024 10:01:02          Medications Given in the ED:   Medications   sodium chloride 0.9 % flush 10 mL (has no administration in time range)   iopamidol (ISOVUE-370) 76 % injection 100 mL (100 mL Intravenous Given 24 1121)   fluconazole (DIFLUCAN) tablet 150 mg (150 mg Oral Given 24 1318)       Imaging results:  CT Angiogram Chest Pulmonary Embolism    Result Date: 2024  1.No acute abnormality is identified within the thorax. Specifically, there is no evidence of acute pulmonary embolism. 2.Please see above for additional details. Electronically Signed: Tien Jameson MD  2024 11:30 AM EDT  Workstation ID: [id number]     Social issues:   Social History     Socioeconomic History    Marital status:    Tobacco Use    Smoking status: Former     Current packs/day: 0.00     Average packs/day: 0.5 packs/day for 17.0 years (8.5 ttl pk-yrs)     Types: Cigarettes     Start date: 1999     Quit date: 2016     Years since quittin.2     Passive exposure: Past    Smokeless tobacco: Never   Vaping Use    Vaping status: Never Used   Substance and Sexual Activity    Alcohol use: Yes     Comment: Occassion    Drug use: Never    Sexual activity: Yes     Partners: Male     Birth control/protection: Tubal ligation, Hysterectomy, Surgical        NIH Stroke Scale:  Interval: (not recorded)  1a. Level of Consciousness: (not recorded)  1b. LOC Questions: (not recorded)  1c. LOC Commands: (not recorded)  2. Best Gaze: (not recorded)  3. Visual: (not recorded)  4. Facial Palsy: (not recorded)  5a. Motor Arm, Left: (not recorded)  5b. Motor Arm, Right: (not recorded)  6a. Motor Leg, Left: (not recorded)  6b. Motor Leg, Right: (not recorded)  7. Limb Ataxia: (not recorded)  8. Sensory: (not recorded)  9. Best Language: (not recorded)  10. Dysarthria: (not recorded)  11. Extinction and Inattention (formerly Neglect): (not recorded)    Total (NIH Stroke Scale): (not recorded)     Additional notable assessment information:     Nursing report ED to floor:  Obs, KALIA Taylor RN   04/04/24 13:25 EDT

## 2024-04-04 NOTE — ED PROVIDER NOTES
Subjective   History of Present Illness  Patient presents with several complaints.  She reports over the last 2 days she is felt more short of breath and having palpitations and chest pain.  She reports that she had 6 weeks ago a rotator cuff surgery.  She reports that she has been on 2 courses of antibiotics for possible cellulitis.  She saw dermatology and was sent to the ER for rule out DVT and was diagnosed with a left upper arm blood clot.  He was started on Eliquis.        Review of Systems   Constitutional:  Negative for chills, diaphoresis, fatigue and fever.   HENT:  Negative for congestion, ear pain, sinus pressure, sore throat, trouble swallowing and voice change.    Respiratory:  Positive for shortness of breath. Negative for cough.    Cardiovascular:  Positive for chest pain. Negative for palpitations.   Gastrointestinal:  Negative for abdominal distention, nausea and vomiting.   Genitourinary: Negative.    Musculoskeletal: Negative.    Skin:  Positive for color change.   Neurological: Negative.    Psychiatric/Behavioral: Negative.         Past Medical History:   Diagnosis Date    Allergic     Anemia     Anxiety     B12 deficiency     Callus     COVID     x 2     Depression     Dercum's disease     Difficulty walking     Endometriosis     Fallen arches     Fibromyalgia 02/15/2024    Gastritis     GERD (gastroesophageal reflux disease)     Hyperlipidemia 2021    Borderline    Hypertension     Hypoglycemia     Ingrown toenail     Migraines     PTSD (post-traumatic stress disorder)     Shin splints     Stress fracture     Vitamin D deficiency 2020       Allergies   Allergen Reactions    [place] Anaphylaxis    Codeine Hives and Itching    Influenza Virus Vaccine Other (See Comments)     Egg allergy     Latex Rash     Skin gets red and burns, skin starts peeling      Meloxicam Other (See Comments)     Gastritis         Past Surgical History:   Procedure Laterality Date    CARPAL TUNNEL RELEASE Right       SECTION      x 2     CHOLECYSTECTOMY      HYSTERECTOMY  2023    KNEE SURGERY Right     x 3     KNEE SURGERY Left     x 1     SHOULDER ACROMIOPLASTY WITH ROTATOR CUFF REPAIR Left 2021    Procedure: LEFT SHOULDER ARTHROSCOPY WITH ROTATOR CUFF REPAIR AND SUBACROMIAL DECOMPRESSION- SLAP;  Surgeon: Rianna Jarvis MD;  Location: [facility] OR;  Service: Orthopedics;  Laterality: Left;    SHOULDER ARTHROSCOPY W/ ROTATOR CUFF REPAIR Left 2024    Procedure: SHOULDER ARTHROSCOPY WITH ROTATOR CUFF REPAIR, DECOMPRESSION AND PRP INJECTION;  Surgeon: Rianna Jarvis MD;  [place] [facility] [place] [facility] [place]  Service: [place]opedics;  Laterality: Left;       Family History   Problem Relation Age of Onset    Thyroid disease Mother     Glaucoma Mother     Hypertension Mother     Transient ischemic attack Mother     Bell's palsy Mother     Alcohol abuse Father     ADD / ADHD Sister     Depression Son     Pyloric stenosis Son     Hypertension Maternal Grandmother     Heart failure Maternal Grandmother     Stroke Maternal Grandmother     Diabetes Paternal Grandfather     Malig Hyperthermia Neg Hx        Social History     Socioeconomic History    Marital status:    Tobacco Use    Smoking status: Former     Current packs/day: 0.00     Average packs/day: 0.5 packs/day for 17.0 years (8.5 ttl pk-yrs)     Types: Cigarettes     Start date: 1999     Quit date: 2016     Years since quittin.2     Passive exposure: Past    Smokeless tobacco: Never   Vaping Use    Vaping status: Never Used   Substance and Sexual Activity    Alcohol use: Yes     Comment: Occassion    Drug use: Never    Sexual activity: Yes     Partners: Male     Birth control/protection: Tubal ligation, Hysterectomy, Surgical           Objective   Physical Exam  Constitutional:       General: She is not in acute distress.     Appearance: Normal appearance. She is well-developed. She is not ill-appearing, toxic-appearing or diaphoretic.   HENT:     "  Head: Normocephalic and atraumatic.      Nose: Nose normal.   Eyes:      Conjunctiva/sclera: Conjunctivae normal.   Cardiovascular:      Rate and Rhythm: Normal rate and regular rhythm.   Pulmonary:      Effort: Pulmonary effort is normal. No respiratory distress.      Breath sounds: Normal breath sounds. No stridor. No decreased breath sounds, wheezing, rhonchi or rales.   Musculoskeletal:         General: Normal range of motion.      Cervical back: Normal range of motion.   Skin:     General: Skin is warm and dry.      Comments: Left upper arm is edematous erythematous warm to touch.   Neurological:      General: No focal deficit present.      Mental Status: She is alert and oriented to person, place, and time. Mental status is at baseline.   Psychiatric:         Mood and Affect: Mood normal.         Behavior: Behavior normal.         Thought Content: Thought content normal.         Judgment: Judgment normal.         Procedures           ED Course  ED Course as of 04/04/24 1850   Thu Apr 04, 2024   1240 EKG interpreted by ER physician reviewed by myself.  Sinus rhythm rate of 90 no previous on file. [MG]      ED Course User Index  [MG] Barbi Montero PA-C                HEART Score: 0                              Medical Decision Making  Appropriate PPE worn during exam.    /89 (BP Location: Right arm, Patient Position: Lying)   Pulse 95   Temp 99.1 °F (37.3 °C) (Oral)   Resp 18   Ht 177.8 cm (70\")   Wt 99.8 kg (220 lb)   LMP 02/01/2023 (Approximate)   SpO2 100%   BMI 31.57 kg/m²      Co-morbidities --  has a past medical history of Allergic, Anemia, Anxiety, B12 deficiency, Callus, COVID, Depression, Dercum's disease, Difficulty walking, Endometriosis, Fallen arches, Fibromyalgia (02/15/2024), Gastritis, GERD (gastroesophageal reflux disease), Hyperlipidemia (2021), Hypertension, Hypoglycemia, Ingrown toenail, Migraines, PTSD (post-traumatic stress disorder), Shin splints, Stress fracture, and " Vitamin D deficiency (2020).  Radiology interpretation --  X-rays reviewed by me and independently interpreted by radiologist:  MRI Shoulder Left Without Contrast    Result Date: 4/4/2024  Impression: 1. Postoperative changes status post rotator cuff repair with some irregularity involving the supraspinatus tendon along its insertion some of which may represent healing and forming granulation tissue. Residual mild grade articular surface tear is suspected with some focal fluid signal noted. 2. Mild interstitial tearing of the infraspinatus tendon with some additional insertional tendinosis and trace fluid tracking to the myotendinous junction. Low T1 and low T2 signal along the anterior insertion of the infraspinatus tendon may represent calcific tendinosis. 3. Fluid in the subacromial-subdeltoid bursa. There is mild edema within the deltoid muscle and some fluid within the adjacent subcutaneous soft tissue thickening which may represent a port site. No well-defined fluid collection or abscess noted. 4. Mild attenuation of fraying along the posterior margin of the superior labrum without discrete tear noted. Electronically Signed: Noemy Pringle MD  4/4/2024 4:06 PM EDT  Workstation ID: [id number]    CT Angiogram Chest Pulmonary Embolism    Result Date: 4/4/2024  1.No acute abnormality is identified within the thorax. Specifically, there is no evidence of acute pulmonary embolism. 2.Please see above for additional details. Electronically Signed: Tien Jameson MD  4/4/2024 11:30 AM EDT  Workstation ID: [id number]        Discussed with the patient outpatient Holter monitor versus inpatient.  She would like to stay for ops.  Chest pain rule out.  Her CT chest was negative.  I discussed the findings and recommendations with the patient who voices understanding. Stable while in the ER.     Note Disclaimer: At [facility], we believe that sharing information builds trust and better relationships. You are receiving this  note because you are receiving care at [facility] or recently visited. It is possible you will see health information before a provider has talked with you about it. This kind of information can be easy to misunderstand. To help you fully understand what it means for your health, we urge you to discuss this note with your provider.        Problems Addressed:  Chest pain, unspecified type: complicated acute illness or injury    Amount and/or Complexity of Data Reviewed  Labs: ordered.  Radiology: ordered.    Risk  Prescription drug management.  Decision regarding hospitalization.        Final diagnoses:   Chest pain, unspecified type   Yeast infection   Arm erythema   Acute embolism and thrombosis of deep vein of left upper extremity   Dyspnea, unspecified type       ED Disposition  ED Disposition       ED Disposition   Decision to Admit    Condition   --    Comment   --               No follow-up provider specified.       Medication List        ASK your doctor about these medications      apixaban 5 MG tablet tablet  Commonly known as: ELIQUIS  Ask about: Which instructions should I use?     busPIRone 10 MG tablet  Commonly known as: BUSPAR  Ask about: Which instructions should I use?     levocetirizine 5 MG tablet  Commonly known as: XYZAL  Ask about: Which instructions should I use?     metoprolol tartrate 25 MG tablet  Commonly known as: LOPRESSOR  Ask about: Which instructions should I use?     propranolol 10 MG tablet  Commonly known as: INDERAL  Ask about: Which instructions should I use?                 Barbi Montero PA-C  04/04/24 9952

## 2024-04-04 NOTE — PLAN OF CARE
Goal Outcome Evaluation:      A/O x4 RA occasional C/O pain to left shoulder area with PRN's noted effective, no n.,v, no SOA. Left shoulder and upper arm edema and redness warm to touch with + DVT. NPO after midnight for stress test in am

## 2024-04-05 ENCOUNTER — APPOINTMENT (OUTPATIENT)
Dept: NUCLEAR MEDICINE | Facility: HOSPITAL | Age: 39
End: 2024-04-05
Payer: OTHER GOVERNMENT

## 2024-04-05 ENCOUNTER — READMISSION MANAGEMENT (OUTPATIENT)
Dept: CALL CENTER | Facility: HOSPITAL | Age: 39
End: 2024-04-05
Payer: OTHER GOVERNMENT

## 2024-04-05 VITALS
RESPIRATION RATE: 15 BRPM | TEMPERATURE: 97.9 F | OXYGEN SATURATION: 97 % | HEART RATE: 79 BPM | SYSTOLIC BLOOD PRESSURE: 119 MMHG | HEIGHT: 70 IN | WEIGHT: 220.02 LBS | DIASTOLIC BLOOD PRESSURE: 76 MMHG | BODY MASS INDEX: 31.5 KG/M2

## 2024-04-05 LAB
ANION GAP SERPL CALCULATED.3IONS-SCNC: 11 MMOL/L (ref 5–15)
BASOPHILS # BLD AUTO: 0.02 10*3/MM3 (ref 0–0.2)
BASOPHILS NFR BLD AUTO: 0.3 % (ref 0–1.5)
BH CV REST NUCLEAR ISOTOPE DOSE: 11 MCI
BH CV STRESS BP STAGE 1: NORMAL
BH CV STRESS BP STAGE 2: NORMAL
BH CV STRESS COMMENTS STAGE 1: NORMAL
BH CV STRESS COMMENTS STAGE 2: NORMAL
BH CV STRESS DOSE REGADENOSON STAGE 1: 0.4
BH CV STRESS DURATION MIN STAGE 1: 0
BH CV STRESS DURATION MIN STAGE 2: 4
BH CV STRESS DURATION SEC STAGE 1: 10
BH CV STRESS DURATION SEC STAGE 2: 0
BH CV STRESS HR STAGE 1: 67
BH CV STRESS HR STAGE 2: 92
BH CV STRESS NUCLEAR ISOTOPE DOSE: 33 MCI
BH CV STRESS PROTOCOL 1: NORMAL
BH CV STRESS RECOVERY BP: NORMAL MMHG
BH CV STRESS RECOVERY HR: 85 BPM
BH CV STRESS STAGE 1: 1
BH CV STRESS STAGE 2: 2
BUN SERPL-MCNC: 11 MG/DL (ref 6–20)
BUN/CREAT SERPL: 11.1 (ref 7–25)
CALCIUM SPEC-SCNC: 9.5 MG/DL (ref 8.6–10.5)
CHLORIDE SERPL-SCNC: 106 MMOL/L (ref 98–107)
CO2 SERPL-SCNC: 23 MMOL/L (ref 22–29)
CREAT SERPL-MCNC: 0.99 MG/DL (ref 0.57–1)
CRP SERPL-MCNC: 0.38 MG/DL (ref 0–0.5)
DEPRECATED RDW RBC AUTO: 42.1 FL (ref 37–54)
EGFRCR SERPLBLD CKD-EPI 2021: 74.5 ML/MIN/1.73
EOSINOPHIL # BLD AUTO: 0.15 10*3/MM3 (ref 0–0.4)
EOSINOPHIL NFR BLD AUTO: 2.5 % (ref 0.3–6.2)
ERYTHROCYTE [DISTWIDTH] IN BLOOD BY AUTOMATED COUNT: 13 % (ref 12.3–15.4)
ERYTHROCYTE [SEDIMENTATION RATE] IN BLOOD: 25 MM/HR (ref 0–20)
GLUCOSE SERPL-MCNC: 99 MG/DL (ref 65–99)
HCT VFR BLD AUTO: 34.1 % (ref 34–46.6)
HCT VFR BLD AUTO: 34.2 % (ref 34–46.6)
HGB BLD-MCNC: 10.9 G/DL (ref 12–15.9)
HGB BLD-MCNC: 11.1 G/DL (ref 12–15.9)
IMM GRANULOCYTES # BLD AUTO: 0.06 10*3/MM3 (ref 0–0.05)
IMM GRANULOCYTES NFR BLD AUTO: 1 % (ref 0–0.5)
LV EF NUC BP: 80 %
LYMPHOCYTES # BLD AUTO: 2.17 10*3/MM3 (ref 0.7–3.1)
LYMPHOCYTES NFR BLD AUTO: 36.8 % (ref 19.6–45.3)
MAGNESIUM SERPL-MCNC: 2.3 MG/DL (ref 1.6–2.6)
MAXIMAL PREDICTED HEART RATE: 181 BPM
MCH RBC QN AUTO: 28.5 PG (ref 26.6–33)
MCHC RBC AUTO-ENTMCNC: 31.9 G/DL (ref 31.5–35.7)
MCV RBC AUTO: 89.3 FL (ref 79–97)
MONOCYTES # BLD AUTO: 0.35 10*3/MM3 (ref 0.1–0.9)
MONOCYTES NFR BLD AUTO: 5.9 % (ref 5–12)
NEUTROPHILS NFR BLD AUTO: 3.14 10*3/MM3 (ref 1.7–7)
NEUTROPHILS NFR BLD AUTO: 53.5 % (ref 42.7–76)
NRBC BLD AUTO-RTO: 0 /100 WBC (ref 0–0.2)
PERCENT MAX PREDICTED HR: 51.93 %
PLATELET # BLD AUTO: 285 10*3/MM3 (ref 140–450)
PMV BLD AUTO: 9.4 FL (ref 6–12)
POTASSIUM SERPL-SCNC: 3.9 MMOL/L (ref 3.5–5.2)
PROCALCITONIN SERPL-MCNC: 0.04 NG/ML (ref 0–0.25)
QT INTERVAL: 346 MS
QTC INTERVAL: 425 MS
RBC # BLD AUTO: 3.83 10*6/MM3 (ref 3.77–5.28)
RETICS # AUTO: 0.09 10*6/MM3 (ref 0.02–0.13)
RETICS/RBC NFR AUTO: 2.27 % (ref 0.7–1.9)
SODIUM SERPL-SCNC: 140 MMOL/L (ref 136–145)
STRESS BASELINE BP: NORMAL MMHG
STRESS BASELINE HR: 67 BPM
STRESS PERCENT HR: 61 %
STRESS POST PEAK BP: NORMAL MMHG
STRESS POST PEAK HR: 94 BPM
STRESS TARGET HR: 154 BPM
WBC NRBC COR # BLD AUTO: 5.89 10*3/MM3 (ref 3.4–10.8)

## 2024-04-05 PROCEDURE — 85045 AUTOMATED RETICULOCYTE COUNT: CPT | Performed by: PHYSICIAN ASSISTANT

## 2024-04-05 PROCEDURE — A9502 TC99M TETROFOSMIN: HCPCS | Performed by: EMERGENCY MEDICINE

## 2024-04-05 PROCEDURE — 85652 RBC SED RATE AUTOMATED: CPT | Performed by: PHYSICIAN ASSISTANT

## 2024-04-05 PROCEDURE — G0378 HOSPITAL OBSERVATION PER HR: HCPCS

## 2024-04-05 PROCEDURE — 80048 BASIC METABOLIC PNL TOTAL CA: CPT | Performed by: EMERGENCY MEDICINE

## 2024-04-05 PROCEDURE — 85025 COMPLETE CBC W/AUTO DIFF WBC: CPT | Performed by: PHYSICIAN ASSISTANT

## 2024-04-05 PROCEDURE — 85014 HEMATOCRIT: CPT | Performed by: PHYSICIAN ASSISTANT

## 2024-04-05 PROCEDURE — 0 TECHNETIUM TETROFOSMIN KIT: Performed by: EMERGENCY MEDICINE

## 2024-04-05 PROCEDURE — 93017 CV STRESS TEST TRACING ONLY: CPT

## 2024-04-05 PROCEDURE — 83735 ASSAY OF MAGNESIUM: CPT | Performed by: PHYSICIAN ASSISTANT

## 2024-04-05 PROCEDURE — 85018 HEMOGLOBIN: CPT | Performed by: PHYSICIAN ASSISTANT

## 2024-04-05 PROCEDURE — 78452 HT MUSCLE IMAGE SPECT MULT: CPT | Performed by: INTERNAL MEDICINE

## 2024-04-05 PROCEDURE — 78452 HT MUSCLE IMAGE SPECT MULT: CPT

## 2024-04-05 PROCEDURE — 93018 CV STRESS TEST I&R ONLY: CPT | Performed by: INTERNAL MEDICINE

## 2024-04-05 PROCEDURE — 63710000001 ONDANSETRON ODT 4 MG TABLET DISPERSIBLE: Performed by: PHYSICIAN ASSISTANT

## 2024-04-05 PROCEDURE — 84145 PROCALCITONIN (PCT): CPT | Performed by: PHYSICIAN ASSISTANT

## 2024-04-05 PROCEDURE — 25010000002 REGADENOSON 0.4 MG/5ML SOLUTION: Performed by: EMERGENCY MEDICINE

## 2024-04-05 PROCEDURE — 86140 C-REACTIVE PROTEIN: CPT | Performed by: PHYSICIAN ASSISTANT

## 2024-04-05 RX ORDER — SUCRALFATE 1 G/1
1 TABLET ORAL
Qty: 90 TABLET | Refills: 0 | Status: SHIPPED | OUTPATIENT
Start: 2024-04-05 | End: 2024-05-05

## 2024-04-05 RX ORDER — REGADENOSON 0.08 MG/ML
0.4 INJECTION, SOLUTION INTRAVENOUS
Status: COMPLETED | OUTPATIENT
Start: 2024-04-05 | End: 2024-04-05

## 2024-04-05 RX ADMIN — REGADENOSON 0.4 MG: 0.08 INJECTION, SOLUTION INTRAVENOUS at 09:38

## 2024-04-05 RX ADMIN — TETROFOSMIN 1 DOSE: 1.38 INJECTION, POWDER, LYOPHILIZED, FOR SOLUTION INTRAVENOUS at 06:51

## 2024-04-05 RX ADMIN — METOPROLOL TARTRATE 25 MG: 25 TABLET, FILM COATED ORAL at 10:29

## 2024-04-05 RX ADMIN — ACETAMINOPHEN 650 MG: 325 TABLET, FILM COATED ORAL at 05:35

## 2024-04-05 RX ADMIN — Medication 10 ML: at 08:46

## 2024-04-05 RX ADMIN — SUCRALFATE 1 G: 1 TABLET ORAL at 08:44

## 2024-04-05 RX ADMIN — OXYCODONE 5 MG: 5 TABLET ORAL at 10:28

## 2024-04-05 RX ADMIN — FERROUS SULFATE TAB EC 324 MG (65 MG FE EQUIVALENT) 324 MG: 324 (65 FE) TABLET DELAYED RESPONSE at 08:39

## 2024-04-05 RX ADMIN — TETROFOSMIN 1 DOSE: 1.38 INJECTION, POWDER, LYOPHILIZED, FOR SOLUTION INTRAVENOUS at 09:38

## 2024-04-05 RX ADMIN — SUCRALFATE 1 G: 1 TABLET ORAL at 11:33

## 2024-04-05 RX ADMIN — LOSARTAN POTASSIUM 50 MG: 50 TABLET, FILM COATED ORAL at 08:39

## 2024-04-05 RX ADMIN — OXYCODONE 5 MG: 5 TABLET ORAL at 05:35

## 2024-04-05 RX ADMIN — CETIRIZINE HYDROCHLORIDE 10 MG: 10 TABLET, FILM COATED ORAL at 08:39

## 2024-04-05 RX ADMIN — BUSPIRONE HYDROCHLORIDE 20 MG: 5 TABLET ORAL at 08:45

## 2024-04-05 RX ADMIN — PANTOPRAZOLE SODIUM 40 MG: 40 TABLET, DELAYED RELEASE ORAL at 08:44

## 2024-04-05 RX ADMIN — APIXABAN 5 MG: 5 TABLET, FILM COATED ORAL at 08:39

## 2024-04-05 RX ADMIN — ONDANSETRON 4 MG: 4 TABLET, ORALLY DISINTEGRATING ORAL at 05:35

## 2024-04-05 RX ADMIN — LIDOCAINE HYDROCHLORIDE 10 ML: 20 SOLUTION ORAL at 10:28

## 2024-04-05 RX ADMIN — PREGABALIN 50 MG: 25 CAPSULE ORAL at 08:39

## 2024-04-05 NOTE — PLAN OF CARE
Goal Outcome Evaluation:      A/O x4 RA. Pain left shoulder controlled with PRN's . Patient NPO for stress test today

## 2024-04-05 NOTE — DISCHARGE SUMMARY
[facility]    Soto RaheemEDSON Paniagua    CHIEF COMPLAINT:     Chest and left shoulder pain    HISTORY OF PRESENT ILLNESS:    History of Present Illness  Patient is postop rotator cuff repair from February 2024 with continued pain as well as erythema and swelling of her left upper extremity and subsequent finding of a DVT for which she has been on Eliquis therapy and compliant x 9 days.  She has continued noticed some transient increase in the redness and pain in her left shoulder radiating down her left arm and across to her left breast.  On the day of presentation however she reports significant pain located substernally unique from the pain she has been having related to her previous shoulder surgery with some associated dyspnea as well as nausea without vomiting.  Family history is notable for CAD and congestive heart failure in her maternal grandmother.     4/5/2024:  Patient reports some continued pain substernally and on the right side of her chest in the lower portion as well as pain in her left shoulder.  Patient and  continue to note transient redness at the site of her left shoulder which will at times extend up the left side of her neck to her face and down her left arm though this is not appreciable at the time of my exam.  Patient did also report some chest tenderness on the left side when placing stethoscope to auscultate cardiac sounds this morning.  No other new or acute symptoms are noted.            Past Medical History:   Diagnosis Date    Allergic     Anemia     Anxiety     B12 deficiency     Callus     COVID     x 2     Depression     Dercum's disease     Difficulty walking     Endometriosis     Fallen arches     Fibromyalgia 02/15/2024    Gastritis     GERD (gastroesophageal reflux disease)     Hyperlipidemia 2021    Borderline    Hypertension     Hypoglycemia     Ingrown toenail     Migraines     PTSD (post-traumatic stress disorder)     Shin splints      Stress fracture     Vitamin D deficiency      Past Surgical History:   Procedure Laterality Date    CARPAL TUNNEL RELEASE Right      SECTION      x 2     CHOLECYSTECTOMY      HYSTERECTOMY  2023    KNEE SURGERY Right     x 3     KNEE SURGERY Left     x 1     SHOULDER ACROMIOPLASTY WITH ROTATOR CUFF REPAIR Left 2021    Procedure: LEFT SHOULDER ARTHROSCOPY WITH ROTATOR CUFF REPAIR AND SUBACROMIAL DECOMPRESSION- SLAP;  Surgeon: Rianna Jarvis MD;  Location: [facility] OR;  Service: Orthopedics;  Laterality: Left;    SHOULDER ARTHROSCOPY W/ ROTATOR CUFF REPAIR Left 2024    Procedure: SHOULDER ARTHROSCOPY WITH ROTATOR CUFF REPAIR, DECOMPRESSION AND PRP INJECTION;  Surgeon: Rianna Jarvis MD;  Location: [facility];  Service: Orthopedics;  Laterality: Left;     Family History   Problem Relation Age of Onset    Thyroid disease Mother     Glaucoma Mother     Hypertension Mother     Transient ischemic attack Mother     Bell's palsy Mother     Alcohol abuse Father     ADD / ADHD Sister     Depression Son     Pyloric stenosis Son     Hypertension Maternal Grandmother     Heart failure Maternal Grandmother     Stroke Maternal Grandmother     Diabetes Paternal Grandfather     Malig Hyperthermia Neg Hx      Social History     Tobacco Use    Smoking status: Former     Current packs/day: 0.00     Average packs/day: 0.5 packs/day for 17.0 years (8.5 ttl pk-yrs)     Types: Cigarettes     Start date: 1999     Quit date: 2016     Years since quittin.2     Passive exposure: Past    Smokeless tobacco: Never   Vaping Use    Vaping status: Never Used   Substance Use Topics    Alcohol use: Yes     Comment: Occassion    Drug use: Never     Medications Prior to Admission   Medication Sig Dispense Refill Last Dose    apixaban (ELIQUIS) 5 MG tablet tablet Take 1 tablet by mouth Every 12 (Twelve) Hours.       busPIRone (BUSPAR) 10 MG tablet Take 2 tablets by mouth 3 (Three) Times a Day As Needed.        diclofenac (VOLTAREN) 50 MG EC tablet Take 1 tablet by mouth 2 (Two) Times a Day As Needed (arthralgia). for pain 180 tablet 1     diphenhydrAMINE (BENADRYL) 25 mg capsule Take 1 capsule by mouth Every 6 (Six) Hours As Needed for Itching.       ferrous gluconate (FERGON) 324 MG tablet TAKE 1 TABLET BY MOUTH EVERY DAY WITH BREAKFAST 90 tablet 1     fluticasone (FLONASE) 50 MCG/ACT nasal spray SPRAY 2 SPRAYS INTO THE NOSTRIL AS DIRECTED BY PROVIDER DAILY. 16 mL 3     galcanezumab-gnlm (EMGALITY) 120 MG/ML auto-injector pen Inject  under the skin into the appropriate area as directed Every 30 (Thirty) Days.       HYDROcodone-acetaminophen (NORCO) 5-325 MG per tablet Take 1 tablet by mouth Every 12 (Twelve) Hours As Needed.       hydrOXYzine (ATARAX) 50 MG tablet MAY TAKE 1 TABLET 2 TIMES A DAY AS NEEDED FOR ITCHING, MAY ALSO TAKE 2 TABLETS AT NIGHT AS NEEDED. 120 tablet 3     l-methylfolate 7.5 MG tablet tablet Take  by mouth Daily.       levocetirizine (XYZAL) 5 MG tablet Take 1 tablet by mouth Every Morning.       Levomilnacipran HCl ER (Fetzima) 40 MG capsule sustained-release 24 hr Take 40 mg by mouth Daily. 90 capsule 1     losartan (COZAAR) 50 MG tablet Take 1 tablet by mouth Every Morning. 90 tablet 1     Mirabegron ER (Myrbetriq) 50 MG tablet sustained-release 24 hour 24 hr tablet Take 50 mg by mouth Daily. 90 tablet 1     mupirocin (BACTROBAN) 2 % cream Apply 1 application  topically to the appropriate area as directed 3 (Three) Times a Day. 30 g 1     omeprazole (priLOSEC) 20 MG capsule Take 1 capsule by mouth 2 (Two) Times a Day.       ondansetron (Zofran) 4 MG tablet Take 1 tablet by mouth Every 6 (Six) Hours As Needed for Nausea or Vomiting. 120 tablet 1     pregabalin (Lyrica) 50 MG capsule Take 1 capsule by mouth 3 (Three) Times a Day. 90 capsule 0     promethazine (PHENERGAN) 25 MG tablet Take 1 tablet by mouth Every 8 (Eight) Hours As Needed for Nausea or Vomiting. 30 tablet 1     propranolol  (INDERAL) 10 MG tablet Take 1 tablet by mouth 2 (Two) Times a Day.       rizatriptan (MAXALT) 10 MG tablet TAKE 1 TABLET AT THE ONSET OF HEADACHE MAY REPEAT IN 2 HOURS MAX OF 2 TABS IN 24 HOURS 27 tablet 1     tiZANidine (ZANAFLEX) 4 MG tablet Take 1 tablet by mouth At Night As Needed for Muscle Spasms. 90 tablet 1     traZODone (DESYREL) 50 MG tablet TAKE 1 TABLET BY MOUTH EVERY DAY AT NIGHT 90 tablet 1     ubrogepant (UBRELVY) 100 MG tablet Take 1 tablet by mouth 1 (One) Time As Needed.       Vitamin B Complex-C capsule Take 1 capsule by mouth Daily.       vitamin C (ASCORBIC ACID) 250 MG tablet Take 1 tablet by mouth Daily.       zonisamide (ZONEGRAN) 100 MG capsule Take 2 capsules by mouth Every Night. 180 capsule 0     albuterol (PROVENTIL) (2.5 MG/3ML) 0.083% nebulizer solution Take 2.5 mg by nebulization Every 4 (Four) Hours As Needed for Wheezing or Shortness of Air. 120 mL 1     albuterol sulfate  (90 Base) MCG/ACT inhaler Inhale 2 puffs Every 4 (Four) Hours As Needed for Wheezing. 18 g 2     Cholecalciferol 125 MCG (5000 UT) tablet Take 1 tablet by mouth Daily. 90 tablet 1     metoprolol tartrate (LOPRESSOR) 25 MG tablet Take 1 tablet by mouth Daily.       MILK THISTLE PO Take  by mouth.       multivitamin with minerals tablet tablet 2 gummies po q am - smarty pants        naloxone (NARCAN) 4 MG/0.1ML nasal spray Call 911. Don't prime. [place] in 1 nostril for overdose. Repeat in 2-3 minutes in other nostril if no or minimal breathing/responsiveness. 2 each 0     NON FORMULARY Take 1 dose by mouth Daily. OSHWAGHANDHA SUPPLEMENT       Probiotic Product (FORTIFY PROBIOTIC WOMENS EX ST PO) Take 1 capsule by mouth Daily.        Allergies:  Cedar, Codeine, Influenza virus vaccine, Latex, and Meloxicam    Immunization History   Administered Date(s) Administered    COVID-19 (PFIZER) Purple Cap Monovalent 09/27/2021, 10/18/2021    Tdap 03/10/2016           REVIEW OF SYSTEMS:    Review of Systems    Constitutional: Negative.   HENT: Negative.     Eyes: Negative.    Cardiovascular:  Positive for chest pain.   Respiratory:  Positive for shortness of breath.    Skin: Negative.    Musculoskeletal:  Positive for joint pain and joint swelling.   Gastrointestinal:  Positive for nausea. Negative for vomiting.   Genitourinary: Negative.    Neurological: Negative.    Psychiatric/Behavioral: Negative.       Vital Signs  Temp:  [97.6 °F (36.4 °C)-99.1 °F (37.3 °C)] 97.9 °F (36.6 °C)  Heart Rate:  [66-95] 79  Resp:  [15-18] 15  BP: (104-151)/(70-94) 119/76          Physical Exam:  Vitals reviewed.   Constitutional:       General: She is not in acute distress.     Appearance: Normal appearance. She is obese. She is not ill-appearing, toxic-appearing or diaphoretic.   HENT:      Head: Normocephalic.      Right Ear: External ear normal.      Left Ear: External ear normal.      Nose: Nose normal.      Mouth/Throat:      Mouth: Mucous membranes are moist.   Eyes:      Extraocular Movements: Extraocular movements intact.   Cardiovascular:      Rate and Rhythm: Normal rate and regular rhythm.      Pulses: Normal pulses.   Pulmonary:      Effort: Pulmonary effort is normal.      Breath sounds: Normal breath sounds.   Abdominal:      General: Bowel sounds are normal.      Palpations: Abdomen is soft.   Musculoskeletal:         General: Swelling present. Normal range of motion.      Cervical back: Normal range of motion.      Right lower leg: No edema.      Left lower leg: No edema.   Skin:     General: Skin is warm and dry.      Capillary Refill: Capillary refill takes less than 2 seconds.      Comments: Normal erythema around left shoulder with well-approximated incisions without bleeding, discharge or drainage noted   Neurological:      General: No focal deficit present.      Mental Status: She is alert.   Psychiatric:         Mood and Affect: Mood normal.         Behavior: Behavior normal.         Thought Content: Thought  content normal.         Judgment: Judgment normal.     Emotional Behavior:   Normal   Debilities:  None  Results Review:    I reviewed the patient's new clinical results.  Lab Results (most recent)       Procedure Component Value Units Date/Time    Lipid Panel [[id number]]  (Abnormal) Collected: 04/04/24 1007    Specimen: Blood Updated: 04/04/24 1350     Total Cholesterol 254 mg/dL      Triglycerides 110 mg/dL      HDL Cholesterol 61 mg/dL      LDL Cholesterol  174 mg/dL      VLDL Cholesterol 19 mg/dL      LDL/HDL Ratio 2.80    Narrative:      Cholesterol Reference Ranges  (U.S. Department of Health and Human Services ATP III Classifications)    Desirable          <200 mg/dL  Borderline High    200-239 mg/dL  High Risk          >240 mg/dL      Triglyceride Reference Ranges  (U.S. Department of Health and Human Services ATP III Classifications)    Normal           <150 mg/dL  Borderline High  150-199 mg/dL  High             200-499 mg/dL  Very High        >500 mg/dL    HDL Reference Ranges  (U.S. Department of Health and Human Services ATP III Classifications)    Low     <40 mg/dl (major risk factor for CHD)  High    >60 mg/dl ('negative' risk factor for CHD)        LDL Reference Ranges  (U.S. Department of Health and Human Services ATP III Classifications)    Optimal          <100 mg/dL  Near Optimal     100-129 mg/dL  Borderline High  130-159 mg/dL  High             160-189 mg/dL  Very High        >189 mg/dL    Blood Culture - Blood, Arm, Right [[id number]] Collected: 04/04/24 1314    Specimen: Blood from Arm, Right Updated: 04/04/24 1316    Blood Culture - Blood, Arm, Left [[id number]] Collected: 04/04/24 1314    Specimen: Blood from Arm, Left Updated: 04/04/24 1316    Respiratory Panel PCR w/COVID-19(SARS-CoV-2) WILLIAMS/LAYLA/ARNAV/PAD/COR/MELISSA In-House, NP Swab in UTM/VTM, 2 HR TAT - Swab, Nasopharynx [[id number]]  (Normal) Collected: 04/04/24 1059    Specimen: Swab from Nasopharynx Updated: 04/04/24 1156     ADENOVIRUS, PCR  Not Detected     Coronavirus 229E Not Detected     Coronavirus HKU1 Not Detected     Coronavirus NL63 Not Detected     Coronavirus OC43 Not Detected     COVID19 Not Detected     Human Metapneumovirus Not Detected     Human Rhinovirus/Enterovirus Not Detected     Influenza A PCR Not Detected     Influenza B PCR Not Detected     Parainfluenza Virus 1 Not Detected     Parainfluenza Virus 2 Not Detected     Parainfluenza Virus 3 Not Detected     Parainfluenza Virus 4 Not Detected     RSV, PCR Not Detected     Bordetella pertussis pcr Not Detected     Bordetella parapertussis PCR Not Detected     Chlamydophila pneumoniae PCR Not Detected     Mycoplasma pneumo by PCR Not Detected    Narrative:      In the setting of a positive respiratory panel with a viral infection PLUS a negative procalcitonin without other underlying concern for bacterial infection, consider observing off antibiotics or discontinuation of antibiotics and continue supportive care. If the respiratory panel is positive for atypical bacterial infection (Bordetella pertussis, Chlamydophila pneumoniae, or Mycoplasma pneumoniae), consider antibiotic de-escalation to target atypical bacterial infection.    [place] Draw [[id number]] Collected: 04/04/24 1007    Specimen: Blood Updated: 04/04/24 1115    Narrative:      The following orders were created for panel order [place] Draw.  Procedure                               Abnormality         Status                     ---------                               -----------         ------                     Green Top (Gel)[[id number]]                                  Final result               Lavender Top[[id number]]                                     Final result               Gold Top - SST[[id number]]                                   Final result               Light Blue Top[[id number]]                                   Final result                 Please view results for these tests on the individual orders.     Green Top (Gel) [[id number]] Collected: 04/04/24 1007    Specimen: Blood Updated: 04/04/24 1115     Extra Tube Hold for add-ons.     Comment: Auto resulted.       Lavender Top [[id number]] Collected: 04/04/24 1007    Specimen: Blood Updated: 04/04/24 1115     Extra Tube hold for add-on     Comment: Auto resulted       Gold Top - SST [[id number]] Collected: 04/04/24 1007    Specimen: Blood Updated: 04/04/24 1115     Extra Tube Hold for add-ons.     Comment: Auto resulted.       Light Blue Top [[id number]] Collected: 04/04/24 1007    Specimen: Blood Updated: 04/04/24 1115     Extra Tube Hold for add-ons.     Comment: Auto resulted       Comprehensive Metabolic Panel [[id number]]  (Abnormal) Collected: 04/04/24 1007    Specimen: Blood Updated: 04/04/24 1045     Glucose 103 mg/dL      BUN 10 mg/dL      Creatinine 0.95 mg/dL      Sodium 140 mmol/L      Potassium 3.9 mmol/L      Chloride 105 mmol/L      CO2 24.0 mmol/L      Calcium 10.1 mg/dL      Total Protein 6.7 g/dL      Albumin 4.3 g/dL      ALT (SGPT) 34 U/L      AST (SGOT) 18 U/L      Alkaline Phosphatase 111 U/L      Total Bilirubin 0.2 mg/dL      Globulin 2.4 gm/dL      A/G Ratio 1.8 g/dL      BUN/Creatinine Ratio 10.5     Anion Gap 11.0 mmol/L      eGFR 78.3 mL/min/1.73     Narrative:      GFR Normal >60  Chronic Kidney Disease <60  Kidney Failure <15      BNP [[id number]]  (Normal) Collected: 04/04/24 1007    Specimen: Blood Updated: 04/04/24 1045     proBNP 112.4 pg/mL     Narrative:      This assay is used as an aid in the diagnosis of individuals suspected of having heart failure. It can be used as an aid in the diagnosis of acute decompensated heart failure (ADHF) in patients presenting with signs and symptoms of ADHF to the emergency department (ED). In addition, NT-proBNP of <300 pg/mL indicates ADHF is not likely.    Age Range Result Interpretation  NT-proBNP Concentration (pg/mL:      <50             Positive            >450                   Ag                  300-450                    Negative             <300    50-75           Positive            >900                  Gray                300-900                  Negative            <300      >75             Positive            >1800                  Gray                300-1800                  Negative            <300    Single High Sensitivity Troponin T [[id number]]  (Normal) Collected: 04/04/24 1007    Specimen: Blood Updated: 04/04/24 1045     HS Troponin T <6 ng/L     Narrative:      High Sensitive Troponin T Reference Range:  <14.0 ng/L- Negative Female for AMI  <22.0 ng/L- Negative Male for AMI  >=14 - Abnormal Female indicating possible myocardial injury.  >=22 - Abnormal Male indicating possible myocardial injury.   Clinicians would have to utilize clinical acumen, EKG, Troponin, and serial changes to determine if it is an Acute Myocardial Infarction or myocardial injury due to an underlying chronic condition.         CBC & Differential [[id number]]  (Abnormal) Collected: 04/04/24 1007    Specimen: Blood Updated: 04/04/24 1017    Narrative:      The following orders were created for panel order CBC & Differential.  Procedure                               Abnormality         Status                     ---------                               -----------         ------                     CBC Auto Differential[[id number]]        Abnormal            Final result                 Please view results for these tests on the individual orders.    CBC Auto Differential [[id number]]  (Abnormal) Collected: 04/04/24 1007    Specimen: Blood Updated: 04/04/24 1017     WBC 6.18 10*3/mm3      RBC 4.13 10*6/mm3      Hemoglobin 11.8 g/dL      Hematocrit 36.6 %      MCV 88.6 fL      MCH 28.6 pg      MCHC 32.2 g/dL      RDW 13.0 %      RDW-SD 41.6 fl      MPV 9.3 fL      Platelets 348 10*3/mm3      Neutrophil % 58.7 %      Lymphocyte % 31.6 %      Monocyte % 5.3 %      Eosinophil % 2.8 %      Basophil % 0.5 %      Immature Grans  % 1.1 %      Neutrophils, Absolute 3.63 10*3/mm3      Lymphocytes, Absolute 1.95 10*3/mm3      Monocytes, Absolute 0.33 10*3/mm3      Eosinophils, Absolute 0.17 10*3/mm3      Basophils, Absolute 0.03 10*3/mm3      Immature Grans, Absolute 0.07 10*3/mm3      nRBC 0.0 /100 WBC             Imaging Results (Most Recent)       Procedure Component Value Units Date/Time    MRI Shoulder Left Without Contrast [[id number]] Collected: 04/04/24 1549     Updated: 04/04/24 1608    Narrative:      MRI SHOULDER LEFT WO CONTRAST    Date of Exam: 4/4/2024 1:53 PM EDT    Indication: recent rotator surgery 6 weeks ago. And DVT LUE with worsenign swelling and pain.     Comparison: Left shoulder 3/26/2024    Technique:  Routine multiplanar/multisequence images of the left shoulder were obtained without contrast administration.        Findings:  Evaluation of the left shoulder demonstrates the long head of the biceps tendon visualized within the bicipital groove. The biceps labral anchor is intact.    There is susceptibility artifact compatible with recent postoperative status with surgical hardware noted in the greater tuberosity. The supraspinatus tendon has some increased signal intensity along its posterior insertion (image 16 of series 6 and   image 11 of series 7. This is adjacent to the surgical anchor and may represent some granulation tissue however some focal fluid signal is noted with mild residual articular surface tear not entirely excluded. Bursal surface fibers appear intact.  The infraspinatus tendon has some bursal surface fraying along its anterior insertion. There is globular low T1 low T2 signal at the insertion of the infraspinatus tendon measuring 6 x 8 mm for which calcific tendinosis cannot be excluded. There is mild   grade interstitial linear signal within the infraspinatus tendon compatible with mild tearing without measurable tear along its insertion or tendon retraction. Trace fluid extends to the myotendinous  junction.   The teres minor and subscapularis are intact.    No advanced muscle atrophy. Mild edema is noted within the deltoid muscle with some adjacent subcutaneous edema and focal fluid which may represent a port site.    There is some increased signal intensity within the posterior superior labrum with mild attenuation and fraying. Well-defined or discrete tear is not identified.    There is mild edema in the greater tuberosity. Glenohumeral articulation is preserved.    There are degenerative changes at the acromioclavicular articulation. There is fluid in the subacromial subdeltoid bursa and trace joint effusion.          Impression:      Impression:    1. Postoperative changes status post rotator cuff repair with some irregularity involving the supraspinatus tendon along its insertion some of which may represent healing and forming granulation tissue. Residual mild grade articular surface tear is   suspected with some focal fluid signal noted.    2. Mild interstitial tearing of the infraspinatus tendon with some additional insertional tendinosis and trace fluid tracking to the myotendinous junction. Low T1 and low T2 signal along the anterior insertion of the infraspinatus tendon may represent   calcific tendinosis.    3. Fluid in the subacromial-subdeltoid bursa. There is mild edema within the deltoid muscle and some fluid within the adjacent subcutaneous soft tissue thickening which may represent a port site. No well-defined fluid collection or abscess noted.    4. Mild attenuation of fraying along the posterior margin of the superior labrum without discrete tear noted.        Electronically Signed: Noemy Pringle MD    4/4/2024 4:06 PM EDT    Workstation ID: [id number]    CT Angiogram Chest Pulmonary Embolism [[id number]] Collected: 04/04/24 1125     Updated: 04/04/24 1132    Narrative:      CT ANGIOGRAM CHEST PULMONARY EMBOLISM    Date of Exam: 4/4/2024 11:13 AM EDT    Indication: Pulmonary embolism (PE)  suspected, high prob  DVT LUE.    Comparison: Chest CTA dated 3/26/2024    Technique: Axial CT images were obtained of the chest after the uneventful intravenous administration of iodinated contrast utilizing pulmonary embolism protocol.  Sagittal and coronal reconstructions were performed.  Automated exposure control and   iterative reconstruction methods were used.    FINDINGS:    Thoracic inlet: Unremarkable.    Pulmonary arteries: No filling defects are identified within the pulmonary arteries to suggest acute pulmonary embolism.    Great vessels: The thoracic aorta and proximal arch vessels appear unremarkable.    Mediastinum/Pita: No pathologically enlarged mediastinal lymph nodes are seen. The esophagus appears unremarkable.    Lung parenchyma: The lungs appear adequately aerated. No acute consolidation is seen. No suspicious pulmonary nodules are seen.    Trachea and airways: The trachea and central airways appear unremarkable.    Pleural space: No significant pleural effusion or pneumothorax is seen.    Heart and pericardium: Mild coronary artery calcifications are present. Otherwise, the heart and pericardium appear unremarkable.    Chest wall: No acute or suspicious osseous or soft tissue lesion is identified.    Upper abdomen: No acute abnormality is identified within the visualized upper abdomen. Status post cholecystectomy.      Impression:      1.No acute abnormality is identified within the thorax. Specifically, there is no evidence of acute pulmonary embolism.  2.Please see above for additional details.      Electronically Signed: Tien Jameson MD    4/4/2024 11:30 AM EDT    Workstation ID: [id number]          reviewed    ECG/EMG Results (most recent)       Procedure Component Value Units Date/Time    ECG 12 Lead Dyspnea [[id number]] Collected: 04/04/24 1001     Updated: 04/05/24 0601     QT Interval 346 ms      QTC Interval 425 ms     Narrative:      HEART RATE= 90  bpm  RR Interval= 664  ms  MI  Interval= 155  ms  P Horizontal Axis= 16  deg  P Front Axis= 25  deg  QRSD Interval= 95  ms  QT Interval= 346  ms  QTcB= 425  ms  QRS Axis= 42  deg  T Wave Axis= 21  deg  - NORMAL ECG -  Sinus rhythm  No previous ECG available for comparison  Electronically Signed By: Gilberto Graff (ARNAV) 05-Apr-2024 06:01:22  Date and Time of Study: 2024-04-04 10:01:02          reviewed            Microbiology Results (last 10 days)       Procedure Component Value - Date/Time    Blood Culture - Blood, Arm, Left [[id number]]  (Normal) Collected: 04/04/24 1314    Lab Status: Preliminary result Specimen: Blood from Arm, Left Updated: 04/05/24 1330     Blood Culture No growth at 24 hours    Narrative:      Less than seven (7) mL's of blood was collected.  Insufficient quantity may yield false negative results.    Blood Culture - Blood, Arm, Right [[id number]]  (Normal) Collected: 04/04/24 1314    Lab Status: Preliminary result Specimen: Blood from Arm, Right Updated: 04/05/24 1330     Blood Culture No growth at 24 hours    Respiratory Panel PCR w/COVID-19(SARS-CoV-2) WILLIAMS/LAYLA/ARNAV/PAD/COR/MELISSA In-House, NP Swab in UTM/VTM, 2 HR TAT - Swab, Nasopharynx [[id number]]  (Normal) Collected: 04/04/24 1059    Lab Status: Final result Specimen: Swab from Nasopharynx Updated: 04/04/24 1156     ADENOVIRUS, PCR Not Detected     Coronavirus 229E Not Detected     Coronavirus HKU1 Not Detected     Coronavirus NL63 Not Detected     Coronavirus OC43 Not Detected     COVID19 Not Detected     Human Metapneumovirus Not Detected     Human Rhinovirus/Enterovirus Not Detected     Influenza A PCR Not Detected     Influenza B PCR Not Detected     Parainfluenza Virus 1 Not Detected     Parainfluenza Virus 2 Not Detected     Parainfluenza Virus 3 Not Detected     Parainfluenza Virus 4 Not Detected     RSV, PCR Not Detected     Bordetella pertussis pcr Not Detected     Bordetella parapertussis PCR Not Detected     Chlamydophila pneumoniae PCR Not Detected      Mycoplasma pneumo by PCR Not Detected    Narrative:      In the setting of a positive respiratory panel with a viral infection PLUS a negative procalcitonin without other underlying concern for bacterial infection, consider observing off antibiotics or discontinuation of antibiotics and continue supportive care. If the respiratory panel is positive for atypical bacterial infection (Bordetella pertussis, Chlamydophila pneumoniae, or Mycoplasma pneumoniae), consider antibiotic de-escalation to target atypical bacterial infection.    Blood Culture - Blood, Arm, Left [[id number]]  (Normal) Collected: 03/26/24 1626    Lab Status: Final result Specimen: Blood from Arm, Left Updated: 03/31/24 1645     Blood Culture No growth at 5 days    Narrative:      Less than seven (7) mL's of blood was collected.  Insufficient quantity may yield false negative results.    Blood Culture - Blood, Arm, Right [[id number]]  (Normal) Collected: 03/26/24 1622    Lab Status: Final result Specimen: Blood from Arm, Right Updated: 03/31/24 1631     Blood Culture No growth at 5 days            Assessment & Plan     Chest pain       Chest pain  Lab Results   Component Value Date    TROPONINT <6 04/04/2024   -proBNP: 112.4  -Lipid panel showed total cholesterol of 254 with an HDL of 61 and LDL of 174, 0.9% risk of ASCVD over the next 10 years calculated with no recommendations for statin and patient counseled on diet lifestyle modifications  -CT PE protocol showed no acute abnormality specifically no evidence of pulmonary embolism  -EKG: Sinus rhythm at 90 without obvious acute changes or ectopy with a QTc of 425 ms  -Continue anticoagulation  -Stress Test showed normal myocardial perfusion imaging without evidence of ischemia consistent with a low risk study with an EF calculated at greater than 70% and some diaphragmatic attenuation present  -Telemetry  -NPO at midnight  -Continue PPI and add Carafate    Recent left rotator cuff repair and  postoperative DVT  -WBCs: 6.18-5.89  -CRP: 0.38, procalcitonin: 0.04, sed rate: 25  -Blood cultures negative pending  -MRI of the left shoulder reported with postoperative changes with some irregularity involving the supraspinatus tendon along the insertion possibly representing healing or formation of granulation tissue with residual mild grade articular surface tear suspected and some focal fluid signal noted.  Mild interstitial tearing of the infraspinatus tendon with some additional insertion tendinitis and trace fluid tracking to the myotendinous junction with low T1 and low T2 signal along the anterior insertion of the infraspinatus tendon possibly representing calcific tendinosis.  Fluid in the subacromial subdeltoid bursa was reported with mild edema within the thickening possibly representing a port site with no well-defined fluid collection or abscess.  Mild attenuation fraying along the posterior margin of the superior labrum without discrete tear is also noted  -Continue Eliquis  -Patient given Dilaudid during MRI  -As needed oxycodone      Anemia        Lab Results   Component Value Date     HGB 11.8 (L) 04/04/2024     MCV 88.6 04/04/2024     MCHC 32.2 04/04/2024   -Hemoglobin: 10.9 on 4/5/2024 with reticulocyte count of 0.0872  -Continue iron supplementation  -Monitor while admitted     Asthma  -Alb  Deltoid muscle and some fluid within the adjacent subcutaneous soft tissueuterol     Hypertension  -Well controlled       BP Readings from Last 1 Encounters:   04/04/24 125/82   - Continue losartan and metoprolol  - Monitor while admitted     Anxiety/depression  -BuSpar and trazodone  -May take home Fetzima if it becomes available     GERD  -PPI    I discussed the patients findings and my recommendations with patient and nursing staff.     Discharge Diagnosis:      Chest pain      Hospital Course  Patient is a 38 y.o. female presented with chest pain with continued left shoulder and arm pain following  recent rotator cuff repair and DVT with an HPI noted above.  Troponin was assessed and found to be less than 6.  proBNP was within normal limits at 112.4 and lipid panel showed total cholesterol of 254 with an LDL of 61 and HDL of 174.  CT PE protocol was obtained in the ED which showed no acute abnormality specifically no evidence of pulmonary embolism and EKG was obtained which showed sinus rhythm at 70 without obvious acute changes and she was continued on telemetry without significant events reported.  WBCs were within normal limits at 6.19 on presentation and trended to 5.89 on the morning following.  Patient was given Dilaudid x 1 during MRI and oxycodone was started along with Carafate for possible GI source of her symptoms.  MRI of left shoulder was ordered in the ED with results noted above.  At her anticoagulation from previous DVT was continued and stress testing was ordered with an EF calculated greater than 70% with some diaphragmatic attenuation but normal myocardial perfusion imaging without evidence of ischemia.  GI cocktail was ordered x 1.  Hemoglobin trended from 11.8-10.9 on the morning following admission with patient reporting that she had been using significant amount of NSAIDs for her postop shoulder pain but with no overt signs of bleeding reported.  Given that she is on Eliquis for active DVT however H&H was trended to 11.1 at 8 hours.   Lengthy discussion was had with patient and family regarding her cardiac testings as well as possible alternative etiologies for her chest discomfort along with her continued shoulder discomfort, current therapy for DVT and possibility of some continued inflammation causing nonspecific lab abnormalities.  At this time patient is felt to be in good condition for discharge with close follow-up with her PCP as well as cardiology and orthopedic surgery on an outpatient basis.  Her full testing/results and plan were discussed with patient along with  concerning/alarm symptoms for which to call 911/return to the ED. patient will be prescribed Carafate at discharge and is instructed to discontinue diclofenac, follow-up with GI and it was also recommended that she discontinue propranolol and continue metoprolol therapy though patient reports that her PCP had intended for her to be on both and she will speak with them on 2024.  All questions were answered and she verbalizes her understanding and agreement.    Past Medical History:     Past Medical History:   Diagnosis Date    Allergic     Anemia     Anxiety     B12 deficiency     Callus     COVID     x 2     Depression     Dercum's disease     Difficulty walking     Endometriosis     Fallen arches     Fibromyalgia 02/15/2024    Gastritis     GERD (gastroesophageal reflux disease)     Hyperlipidemia     Borderline    Hypertension     Hypoglycemia     Ingrown toenail     Migraines     PTSD (post-traumatic stress disorder)     Shin splints     Stress fracture     Vitamin D deficiency        Past Surgical History:     Past Surgical History:   Procedure Laterality Date    CARPAL TUNNEL RELEASE Right      SECTION      x 2     CHOLECYSTECTOMY      HYSTERECTOMY  2023    KNEE SURGERY Right     x 3     KNEE SURGERY Left     x 1     SHOULDER ACROMIOPLASTY WITH ROTATOR CUFF REPAIR Left 2021    Procedure: LEFT SHOULDER ARTHROSCOPY WITH ROTATOR CUFF REPAIR AND SUBACROMIAL DECOMPRESSION- SLAP;  Surgeon: Rianna Jarvis MD;  Location: [facility] OR;  Service: Orthopedics;  Laterality: Left;    SHOULDER ARTHROSCOPY W/ ROTATOR CUFF REPAIR Left 2024    Procedure: SHOULDER ARTHROSCOPY WITH ROTATOR CUFF REPAIR, DECOMPRESSION AND PRP INJECTION;  Surgeon: Rianna Jarvis MD;  Location: [facility];  Service: Orthopedics;  Laterality: Left;       Social History:   Social History     Socioeconomic History    Marital status:    Tobacco Use    Smoking status: Former     Current packs/day: 0.00      Average packs/day: 0.5 packs/day for 17.0 years (8.5 ttl pk-yrs)     Types: Cigarettes     Start date: 1999     Quit date: 2016     Years since quittin.2     Passive exposure: Past    Smokeless tobacco: Never   Vaping Use    Vaping status: Never Used   Substance and Sexual Activity    Alcohol use: Yes     Comment: Occassion    Drug use: Never    Sexual activity: Yes     Partners: Male     Birth control/protection: Tubal ligation, Hysterectomy, Surgical       Procedures Performed         Consults:   Consults       No orders found for last 30 day(s).            Condition on Discharge:     Stable    Discharge Disposition  Home or Self Care    Discharge Medications     Discharge Medications        New Medications        Instructions Start Date   sucralfate 1 g tablet  Commonly known as: CARAFATE   1 g, Oral, 3 Times Daily Before Meals             Continue These Medications        Instructions Start Date   albuterol sulfate  (90 Base) MCG/ACT inhaler  Commonly known as: PROVENTIL HFA;VENTOLIN HFA;PROAIR HFA   2 puffs, Inhalation, Every 4 Hours PRN      albuterol (2.5 MG/3ML) 0.083% nebulizer solution  Commonly known as: PROVENTIL   2.5 mg, Nebulization, Every 4 Hours PRN      apixaban 5 MG tablet tablet  Commonly known as: ELIQUIS   5 mg, Oral, Every 12 Hours Scheduled      busPIRone 10 MG tablet  Commonly known as: BUSPAR   20 mg, Oral, 3 Times Daily PRN      Cholecalciferol 125 MCG (5000 UT) tablet   125 mcg, Oral, Daily      diphenhydrAMINE 25 mg capsule  Commonly known as: BENADRYL   25 mg, Oral, Every 6 Hours PRN      ferrous gluconate 324 MG tablet  Commonly known as: FERGON   TAKE 1 TABLET BY MOUTH EVERY DAY WITH BREAKFAST      Fetzima 40 MG capsule sustained-release 24 hr  Generic drug: Levomilnacipran HCl ER   40 mg, Oral, Daily      fluticasone 50 MCG/ACT nasal spray  Commonly known as: FLONASE   SPRAY 2 SPRAYS INTO THE NOSTRIL AS DIRECTED BY PROVIDER DAILY.      FORTIFY PROBIOTIC WOMENS  EX ST PO   1 capsule, Oral, Daily      galcanezumab-gnlm 120 MG/ML auto-injector pen  Commonly known as: EMGALITY   Subcutaneous, Every 30 Days      HYDROcodone-acetaminophen 5-325 MG per tablet  Commonly known as: NORCO   1 tablet, Oral, Every 12 Hours PRN      hydrOXYzine 50 MG tablet  Commonly known as: ATARAX   MAY TAKE 1 TABLET 2 TIMES A DAY AS NEEDED FOR ITCHING, MAY ALSO TAKE 2 TABLETS AT NIGHT AS NEEDED.      l-methylfolate 7.5 MG tablet tablet   Oral, Daily      levocetirizine 5 MG tablet  Commonly known as: XYZAL   5 mg, Oral, Every Morning      losartan 50 MG tablet  Commonly known as: COZAAR   50 mg, Oral, Every Morning      metoprolol tartrate 25 MG tablet  Commonly known as: LOPRESSOR   25 mg, Oral, Daily      MILK THISTLE PO   Oral      Mirabegron ER 50 MG tablet sustained-release 24 hour 24 hr tablet  Commonly known as: Myrbetriq   50 mg, Oral, Daily      multivitamin with minerals tablet tablet   2 gummies po q am - smarty pants       mupirocin 2 % cream  Commonly known as: BACTROBAN   1 application , Topical, 3 Times Daily      naloxone 4 MG/0.1ML nasal spray  Commonly known as: NARCAN   Call 911. Don't prime. [place] in 1 nostril for overdose. Repeat in 2-3 minutes in other nostril if no or minimal breathing/responsiveness.      NON FORMULARY   1 dose, Oral, Daily, OSHWAGHANDHA SUPPLEMENT      omeprazole 20 MG capsule  Commonly known as: priLOSEC   20 mg, Oral, 2 Times Daily      ondansetron 4 MG tablet  Commonly known as: Zofran   4 mg, Oral, Every 6 Hours PRN      pregabalin 50 MG capsule  Commonly known as: Lyrica   50 mg, Oral, 3 Times Daily      promethazine 25 MG tablet  Commonly known as: PHENERGAN   25 mg, Oral, Every 8 Hours PRN      rizatriptan 10 MG tablet  Commonly known as: MAXALT   TAKE 1 TABLET AT THE ONSET OF HEADACHE MAY REPEAT IN 2 HOURS MAX OF 2 TABS IN 24 HOURS      tiZANidine 4 MG tablet  Commonly known as: ZANAFLEX   4 mg, Oral, Nightly PRN      traZODone 50 MG tablet  Commonly  known as: DESYREL   50 mg, Oral, Every Night at Bedtime      ubrogepant 100 MG tablet  Commonly known as: UBRELVY   100 mg, Oral, Once As Needed      Vitamin B Complex-C capsule   1 capsule, Oral, Daily      vitamin C 250 MG tablet  Commonly known as: ASCORBIC ACID   250 mg, Oral, Daily      zonisamide 100 MG capsule  Commonly known as: ZONEGRAN   200 mg, Oral, Nightly             Stop These Medications      diclofenac 50 MG EC tablet  Commonly known as: VOLTAREN     propranolol 10 MG tablet  Commonly known as: INDERAL              Discharge Diet:   Diet Instructions       Diet: Gastrointestinal Diets; Fat-Restricted; Thin (IDDSI 0)      Discharge Diet: Gastrointestinal Diets    Gastrointestinal Diet: Fat-Restricted    Fluid Consistency: Thin (IDDSI 0)            Activity at Discharge:     Follow-up Appointments  Future Appointments   Date Time Provider Department Center   4/9/2024  2:15 PM Damon Valera APRN [facility]   4/15/2024 11:30 AM Damon Valera APRN [facility]     Additional Instructions for the Follow-ups that You Need to Schedule       Discharge Follow-up with PCP   As directed       Currently Documented PCP:    Damon Valera APRN    PCP Phone Number:    [phone]     Follow Up Details: 5 to 7 days        Discharge Follow-up with Specified Provider: GI   As directed      To: GI        Discharge Follow-up with Specified Provider: Orthopedic surgery   As directed      To: Orthopedic surgery                Test Results Pending at Discharge  Pending Labs       Order Current Status    Blood Culture - Blood, Arm, Left Preliminary result    Blood Culture - Blood, Arm, Right Preliminary result             Risk for Readmission (LACE) Score: 4 (4/5/2024  6:00 AM)      Greater than 30 minutes spent in discharge activities for this patient    Signature:Electronically signed by Damon Josue PA-C, 04/05/24, 2:21 PM EDT.

## 2024-04-05 NOTE — PLAN OF CARE
Problem: Chest Pain  Goal: Resolution of Chest Pain Symptoms  Outcome: Ongoing, Progressing     Problem: Adult Inpatient Plan of Care  Goal: Plan of Care Review  Outcome: Ongoing, Progressing  Goal: Patient-Specific Goal (Individualized)  Outcome: Ongoing, Progressing  Goal: Absence of Hospital-Acquired Illness or Injury  Outcome: Ongoing, Progressing  Intervention: Identify and Manage Fall Risk  Recent Flowsheet Documentation  Taken 4/4/2024 2100 by Marah Broderick RN  Safety Promotion/Fall Prevention:   safety round/check completed   fall prevention program maintained  Taken 4/4/2024 1953 by Marah Broderick RN  Safety Promotion/Fall Prevention:   safety round/check completed   fall prevention program maintained  Intervention: Prevent Skin Injury  Recent Flowsheet Documentation  Taken 4/4/2024 1953 by Marah Broderick RN  Body Position: position changed independently  Skin Protection:   adhesive use limited   transparent dressing maintained   incontinence pads utilized  Intervention: Prevent and Manage VTE (Venous Thromboembolism) Risk  Recent Flowsheet Documentation  Taken 4/4/2024 1953 by Marah Broderick RN  Activity Management: activity encouraged  Range of Motion: active ROM (range of motion) encouraged  Intervention: Prevent Infection  Recent Flowsheet Documentation  Taken 4/4/2024 1953 by Marah Broderick RN  Infection Prevention:   rest/sleep promoted   hand hygiene promoted   equipment surfaces disinfected  Goal: Optimal Comfort and Wellbeing  Outcome: Ongoing, Progressing  Intervention: Provide Person-Centered Care  Recent Flowsheet Documentation  Taken 4/4/2024 1953 by Marah Broderick RN  Trust Relationship/Rapport:   care explained   choices provided   emotional support provided   empathic listening provided   questions answered   questions encouraged   reassurance provided   thoughts/feelings acknowledged  Goal: Readiness for Transition of Care  Outcome: Ongoing, Progressing      Problem: Hypertension Comorbidity  Goal: Blood Pressure in Desired Range  Outcome: Ongoing, Progressing  Intervention: Maintain Blood Pressure Management  Recent Flowsheet Documentation  Taken 4/4/2024 1953 by Marah Broderick RN  Medication Review/Management: medications reviewed     Problem: Pain Acute  Goal: Acceptable Pain Control and Functional Ability  Outcome: Ongoing, Progressing  Intervention: Prevent or Manage Pain  Recent Flowsheet Documentation  Taken 4/4/2024 1953 by Marah Broderick RN  Medication Review/Management: medications reviewed  Intervention: Optimize Psychosocial Wellbeing  Recent Flowsheet Documentation  Taken 4/4/2024 1953 by Marah Broderick RN  Supportive Measures:   self-care encouraged   relaxation techniques promoted   verbalization of feelings encouraged  Diversional Activities:   television   smartphone   Goal Outcome Evaluation:

## 2024-04-05 NOTE — PAYOR COMM NOTE
"    Amelie Trujillo (38 y.o. Female)       Date of Birth   1985    Social Security Number       Address   [address] [facility] [address] [place] [address]    Home Phone   [phone]    MRN   [id number]       Latter day   Mu-ism    Marital Status                               Admission Date   24    Admission Type   Emergency    Admitting Provider   Gilberto Graff MD    Attending Provider   Gilberto Graff MD    Department, Room/Bed   [facility] OBSERVATION, 108/       Discharge Date       Discharge Disposition       Discharge Destination                                 Attending Provider: Gilberto Graff MD    Allergies: Cedar, Codeine, Influenza Virus Vaccine, Latex, Meloxicam    Isolation: None   Infection: None   Code Status: CPR    Ht: 177.8 cm (70\")   Wt: 99.8 kg (220 lb 0.3 oz)    Admission Cmt: None   Principal Problem: Chest pain [R07.9]                   Active Insurance as of 2024       Primary Coverage       Payor Plan Insurance Group Employer/Plan Group    [facility]        Payor Plan Address Payor Plan Phone Number Payor Plan Fax Number Effective Dates    [address] [phone]  11/3/2021 - None Entered    [facility] [id number]         Subscriber Name Subscriber Birth Date Member ID       AMELIE TRUJILLO 1985 [id number]                     Emergency Contacts        (Rel.) Home Phone Work Phone Mobile Phone    STAN TRUJILLO (Spouse) -- -- [phone]                 History & Physical        Damon Josue PA-C at 24 1251          [facility] H&P    Patient Name: Amelie Trujillo  : 1985  MRN: [id number]  Primary Care Physician: Damon Valera APRN  Date of admission: 2024     Patient Care Team:  Damon Valera APRN as PCP - General (Nurse Practitioner)          Subjective  History Present Illness     Chief Complaint:   Chief Complaint   Patient presents with    Shortness of Breath     " Soa last 2 days, pt was dx with DVT left arm, Pt is on eliquis.  Pt reports her oxygen this morning on portable pulse ox was low and she felt like she was going to pass out.  Pt recent rotator cuff surgery.        Chest pain and dyspnea    History of Present Illness  Patient is postop rotator cuff repair from February 2024 with continued pain as well as erythema and swelling of her left upper extremity and subsequent finding of a DVT for which she has been on Eliquis therapy and compliant x 9 days.  She has continued noticed some transient increase in the redness and pain in her left shoulder radiating down her left arm and across to her left breast.  On the day of presentation however she reports significant pain located substernally unique from the pain she has been having related to her previous shoulder surgery with some associated dyspnea as well as nausea without vomiting.  Family history is notable for CAD and congestive heart failure in her maternal grandmother.        Review of Systems   Constitutional: Negative.   HENT: Negative.     Eyes: Negative.    Cardiovascular:  Positive for chest pain.   Respiratory:  Positive for shortness of breath.    Skin: Negative.    Musculoskeletal:  Positive for joint pain and joint swelling.   Gastrointestinal:  Positive for nausea. Negative for vomiting.   Genitourinary: Negative.    Neurological: Negative.    Psychiatric/Behavioral: Negative.             Personal History     Past Medical History:   Past Medical History:   Diagnosis Date    Allergic     Anemia     Anxiety     B12 deficiency     Callus     COVID     x 2     Depression     Dercum's disease     Difficulty walking     Endometriosis     Fallen arches     Fibromyalgia 02/15/2024    Gastritis     GERD (gastroesophageal reflux disease)     Hyperlipidemia 2021    Borderline    Hypertension     Hypoglycemia     Ingrown toenail     Migraines     PTSD (post-traumatic stress disorder)     Shin splints     Stress  fracture     Vitamin D deficiency        Surgical History:      Past Surgical History:   Procedure Laterality Date    CARPAL TUNNEL RELEASE Right      SECTION      x 2     CHOLECYSTECTOMY      HYSTERECTOMY  2023    KNEE SURGERY Right     x 3     KNEE SURGERY Left     x 1     SHOULDER ACROMIOPLASTY WITH ROTATOR CUFF REPAIR Left 2021    Procedure: LEFT SHOULDER ARTHROSCOPY WITH ROTATOR CUFF REPAIR AND SUBACROMIAL DECOMPRESSION- SLAP;  Surgeon: Rianna Jarvis MD;  Location: [facility] OR;  Service: Orthopedics;  Laterality: Left;    SHOULDER ARTHROSCOPY W/ ROTATOR CUFF REPAIR Left 2024    Procedure: SHOULDER ARTHROSCOPY WITH ROTATOR CUFF REPAIR, DECOMPRESSION AND PRP INJECTION;  Surgeon: Rianna Jarvis MD;  Location: [facility];  Service: Orthopedics;  Laterality: Left;           Family History: family history includes ADD / ADHD in her sister; Alcohol abuse in her father; Bell's palsy in her mother; Depression in her son; Diabetes in her paternal grandfather; Glaucoma in her mother; Heart failure in her maternal grandmother; Hypertension in her maternal grandmother and mother; Pyloric stenosis in her son; Stroke in her maternal grandmother; Thyroid disease in her mother; Transient ischemic attack in her mother. Otherwise pertinent FHx was reviewed and unremarkable.     Social History:  reports that she quit smoking about 8 years ago. Her smoking use included cigarettes. She started smoking about 25 years ago. She has a 8.5 pack-year smoking history. She has been exposed to tobacco smoke. She has never used smokeless tobacco. She reports current alcohol use. She reports that she does not use drugs.      Medications:  Prior to Admission medications    Medication Sig Start Date End Date Taking? Authorizing Provider   albuterol (PROVENTIL) (2.5 MG/3ML) 0.083% nebulizer solution Take 2.5 mg by nebulization Every 4 (Four) Hours As Needed for Wheezing or Shortness of Air. 23    Damon Valera APRN   albuterol sulfate  (90 Base) MCG/ACT inhaler Inhale 2 puffs Every 4 (Four) Hours As Needed for Wheezing. 6/19/23   Damon Valera APRN   Apixaban Starter Pack tablet therapy pack Take two 5 mg tablets by mouth every 12 hours for 7 days. Followed by one 5 mg tablet every 12 hours. (Dispense starter pack if available) 3/26/24   Dangelo Edge III, PA   busPIRone (BUSPAR) 10 MG tablet Take 2 tablets by mouth 3 (Three) Times a Day As Needed (anxiety) for up to 30 days. 12/18/23 2/16/24  Damon Valera APRN   cephalexin (KEFLEX) 500 MG capsule Take 1 capsule by mouth 3 (Three) Times a Day.    Provider, MD Ethel   Cholecalciferol 125 MCG (5000 UT) tablet Take 1 tablet by mouth Daily. 11/24/21   Fadia Tovar MD   diclofenac (VOLTAREN) 50 MG EC tablet Take 1 tablet by mouth 2 (Two) Times a Day As Needed (arthralgia). for pain 11/3/23   Damon Valera APRN   diphenhydrAMINE (BENADRYL) 25 mg capsule Take 1 capsule by mouth Every 6 (Six) Hours As Needed for Itching.    ProviderEthel MD   ferrous gluconate (FERGON) 324 MG tablet TAKE 1 TABLET BY MOUTH EVERY DAY WITH BREAKFAST 8/25/23   Damon Valera APRN   fluticasone (FLONASE) 50 MCG/ACT nasal spray SPRAY 2 SPRAYS INTO THE NOSTRIL AS DIRECTED BY PROVIDER DAILY. 10/7/22   Damon Valera APRN   Galcanezumab-gnlm (EMGALITY SC) Inject  under the skin into the appropriate area as directed.    ProviderEthel MD   hydrOXYzine (ATARAX) 50 MG tablet MAY TAKE 1 TABLET 2 TIMES A DAY AS NEEDED FOR ITCHING, MAY ALSO TAKE 2 TABLETS AT NIGHT AS NEEDED. 11/20/23   Damon Valera APRN   l-methylfolate 7.5 MG tablet tablet Take  by mouth Daily.    ProviderEthel MD   levocetirizine (XYZAL) 5 MG tablet Take 1 tablet by mouth Every Evening. 12/18/23   Damon Valera APRN   Levomilnacipran HCl ER (Fetzima) 40 MG capsule sustained-release 24 hr Take 40 mg by mouth  Daily. 2/2/24   Damon Valera APRN   losartan (COZAAR) 50 MG tablet Take 1 tablet by mouth Every Morning. 11/3/23   Damon Valera APRN   metoprolol tartrate (LOPRESSOR) 25 MG tablet Take 1 tablet by mouth 2 (Two) Times a Day. 11/3/23   Damon Valera APRN   MILK THISTLE PO Take  by mouth.    ProviderEthel MD   Mirabegron ER (Myrbetriq) 50 MG tablet sustained-release 24 hour 24 hr tablet Take 50 mg by mouth Daily. 11/3/23   Damon Valera APRN   multivitamin with minerals tablet tablet 2 gummies po q am - smarty pants     ProviderEthel MD   mupirocin (BACTROBAN) 2 % cream Apply 1 application  topically to the appropriate area as directed 3 (Three) Times a Day. 12/18/23   Damon Valera APRN   naloxone (NARCAN) 4 MG/0.1ML nasal spray Call 911. Don't prime. [place] in 1 nostril for overdose. Repeat in 2-3 minutes in other nostril if no or minimal breathing/responsiveness. 7/8/23   Amelie Bella APRN   NON FORMULARY Take 1 dose by mouth Daily. OSHWAGHANDHA SUPPLEMENT    Provider, MD Ethel   omeprazole (priLOSEC) 20 MG capsule Take 1 capsule by mouth 2 (Two) Times a Day. 12/11/22   ProviderEthel MD   ondansetron (Zofran) 4 MG tablet Take 1 tablet by mouth Every 6 (Six) Hours As Needed for Nausea or Vomiting. 3/27/24   Damon Valera APRN   predniSONE (DELTASONE) 10 MG tablet Take 1 tablet by mouth Daily. 3/5/24   Damon Valera APRN   pregabalin (Lyrica) 50 MG capsule Take 1 capsule by mouth 3 (Three) Times a Day. 3/5/24   Damon Valera APRN   Probiotic Product (FORTIFY PROBIOTIC WOMENS EX ST PO) Take 1 capsule by mouth Daily.    ProviderEthel MD   promethazine (PHENERGAN) 25 MG tablet Take 1 tablet by mouth Every 8 (Eight) Hours As Needed for Nausea or Vomiting. 2/12/24   Damon Valera APRN   propranolol (INDERAL) 10 MG tablet TAKE 1 TABLET BY MOUTH 2 (TWO) TIMES A DAY AS NEEDED (ANXIETY). 11/30/23    Damon Valera APRN   rizatriptan (MAXALT) 10 MG tablet TAKE 1 TABLET AT THE ONSET OF HEADACHE MAY REPEAT IN 2 HOURS MAX OF 2 TABS IN 24 HOURS 11/3/23   Damon Valera APRN   tiZANidine (ZANAFLEX) 4 MG tablet Take 1 tablet by mouth At Night As Needed for Muscle Spasms. 2/29/24   Damon Valera APRN   traZODone (DESYREL) 50 MG tablet TAKE 1 TABLET BY MOUTH EVERY DAY AT NIGHT 12/28/23   Damno Valera APRN   ubrogepant (UBRELVY) 100 MG tablet Take 1 tablet by mouth. 11/9/23   Provider, MD Ethel   Vitamin B Complex-C capsule Take  by mouth.    Provider, MD Ethel   vitamin C (ASCORBIC ACID) 250 MG tablet Take 1 tablet by mouth Daily.    Provider, MD Ethel   zonisamide (ZONEGRAN) 100 MG capsule Take 2 capsules by mouth Every Night. 12/2/21   Fadia Tovar MD       Allergies:    Allergies   Allergen Reactions    Avery Anaphylaxis    Codeine Hives and Itching    Influenza Virus Vaccine Other (See Comments)     Egg allergy     Latex Rash     Skin gets red and burns, skin starts peeling      Meloxicam Other (See Comments)     Gastritis         Objective  Objective     Vital Signs  Temp:  [98 °F (36.7 °C)] 98 °F (36.7 °C)  Heart Rate:  [78-95] 81  Resp:  [24] 24  BP: (125-139)/(82) 125/82  SpO2:  [92 %-100 %] 100 %  on   ;      Body mass index is 31.57 kg/m².    Physical Exam  Vitals reviewed.   Constitutional:       General: She is not in acute distress.     Appearance: Normal appearance. She is obese. She is not ill-appearing, toxic-appearing or diaphoretic.   HENT:      Head: Normocephalic.      Right Ear: External ear normal.      Left Ear: External ear normal.      Nose: Nose normal.      Mouth/Throat:      Mouth: Mucous membranes are moist.   Eyes:      Extraocular Movements: Extraocular movements intact.   Cardiovascular:      Rate and Rhythm: Normal rate and regular rhythm.      Pulses: Normal pulses.   Pulmonary:      Effort: Pulmonary effort is normal.      Breath  sounds: Normal breath sounds.   Abdominal:      General: Bowel sounds are normal.      Palpations: Abdomen is soft.   Musculoskeletal:         General: Swelling present. Normal range of motion.      Cervical back: Normal range of motion.      Right lower leg: No edema.      Left lower leg: No edema.   Skin:     General: Skin is warm and dry.      Capillary Refill: Capillary refill takes less than 2 seconds.      Comments: Normal erythema around left shoulder with well-approximated incisions without bleeding, discharge or drainage noted   Neurological:      General: No focal deficit present.      Mental Status: She is alert.   Psychiatric:         Mood and Affect: Mood normal.         Behavior: Behavior normal.         Thought Content: Thought content normal.         Judgment: Judgment normal.         Results Review:  I have personally reviewed most recent cardiac tracings, lab results, and radiology images and interpretations and agree with findings, most notably: Troponin, proBNP, CMP, CBC, respiratory panel, CT PE protocol and EKG.    Results from last 7 days   Lab Units 04/04/24  1007   WBC 10*3/mm3 6.18   HEMOGLOBIN g/dL 11.8*   HEMATOCRIT % 36.6   PLATELETS 10*3/mm3 348     Results from last 7 days   Lab Units 04/04/24  1007   SODIUM mmol/L 140   POTASSIUM mmol/L 3.9   CHLORIDE mmol/L 105   CO2 mmol/L 24.0   BUN mg/dL 10   CREATININE mg/dL 0.95   GLUCOSE mg/dL 103*   CALCIUM mg/dL 10.1   ALK PHOS U/L 111   ALT (SGPT) U/L 34*   AST (SGOT) U/L 18   HSTROP T ng/L <6   PROBNP pg/mL 112.4     Estimated Creatinine Clearance: 101.7 mL/min (by C-G formula based on SCr of 0.95 mg/dL).  Brief Urine Lab Results       None            Microbiology Results (last 10 days)       Procedure Component Value - Date/Time    Respiratory Panel PCR w/COVID-19(SARS-CoV-2) WILLIAMS/LAYLA/ARNAV/PAD/COR/MELISSA In-House, NP Swab in UTM/VTM, 2 HR TAT - Swab, Nasopharynx [[id number]]  (Normal) Collected: 04/04/24 1059    Lab Status: Final result  Specimen: Swab from Nasopharynx Updated: 04/04/24 1156     ADENOVIRUS, PCR Not Detected     Coronavirus 229E Not Detected     Coronavirus HKU1 Not Detected     Coronavirus NL63 Not Detected     Coronavirus OC43 Not Detected     COVID19 Not Detected     Human Metapneumovirus Not Detected     Human Rhinovirus/Enterovirus Not Detected     Influenza A PCR Not Detected     Influenza B PCR Not Detected     Parainfluenza Virus 1 Not Detected     Parainfluenza Virus 2 Not Detected     Parainfluenza Virus 3 Not Detected     Parainfluenza Virus 4 Not Detected     RSV, PCR Not Detected     Bordetella pertussis pcr Not Detected     Bordetella parapertussis PCR Not Detected     Chlamydophila pneumoniae PCR Not Detected     Mycoplasma pneumo by PCR Not Detected    Narrative:      In the setting of a positive respiratory panel with a viral infection PLUS a negative procalcitonin without other underlying concern for bacterial infection, consider observing off antibiotics or discontinuation of antibiotics and continue supportive care. If the respiratory panel is positive for atypical bacterial infection (Bordetella pertussis, Chlamydophila pneumoniae, or Mycoplasma pneumoniae), consider antibiotic de-escalation to target atypical bacterial infection.    Blood Culture - Blood, Arm, Left [[id number]]  (Normal) Collected: 03/26/24 1626    Lab Status: Final result Specimen: Blood from Arm, Left Updated: 03/31/24 1645     Blood Culture No growth at 5 days    Narrative:      Less than seven (7) mL's of blood was collected.  Insufficient quantity may yield false negative results.    Blood Culture - Blood, Arm, Right [[id number]]  (Normal) Collected: 03/26/24 1622    Lab Status: Final result Specimen: Blood from Arm, Right Updated: 03/31/24 1631     Blood Culture No growth at 5 days            ECG/EMG Results (most recent)       Procedure Component Value Units Date/Time    ECG 12 Lead Dyspnea [[id number]] Collected: 04/04/24 1001     Updated:  04/04/24 1002     QT Interval 346 ms      QTC Interval 425 ms     Narrative:      HEART RATE= 90  bpm  RR Interval= 664  ms  AL Interval= 155  ms  P Horizontal Axis= 16  deg  P Front Axis= 25  deg  QRSD Interval= 95  ms  QT Interval= 346  ms  QTcB= 425  ms  QRS Axis= 42  deg  T Wave Axis= 21  deg  - NORMAL ECG -  Sinus rhythm  No previous ECG available for comparison  Electronically Signed By:   Date and Time of Study: 2024-04-04 10:01:02                    CT Angiogram Chest Pulmonary Embolism    Result Date: 4/4/2024  1.No acute abnormality is identified within the thorax. Specifically, there is no evidence of acute pulmonary embolism. 2.Please see above for additional details. Electronically Signed: Tien Jameson MD  4/4/2024 11:30 AM EDT  Workstation ID: [id number]       Estimated Creatinine Clearance: 101.7 mL/min (by C-G formula based on SCr of 0.95 mg/dL).    Assessment & Plan  Assessment/Plan       Active Hospital Problems    Diagnosis  POA    **Chest pain [R07.9]  Yes      Resolved Hospital Problems   No resolved problems to display.     Chest pain with a history of recent left rotator cuff repair and recent diagnosis of upper extremity DVT  Lab Results   Component Value Date    TROPONINT <6 04/04/2024   -Trend troponin  -proBNP: 112.4  -Lipid panel showed total cholesterol of 254 with an LDL of 61 and HDL of 174  -CT PE protocol showed no acute abnormality specifically no evidence of pulmonary embolism  -EKG: Sinus rhythm at 90 without obvious acute changes or ectopy with a QTc of 425 ms  -Blood cultures ordered and are pending  -MRI of left shoulder ordered in the ED  -Continue anticoagulation  -Stress Test ordered  -Telemetry  -NPO    Anemia  Lab Results   Component Value Date    HGB 11.8 (L) 04/04/2024    MCV 88.6 04/04/2024    MCHC 32.2 04/04/2024   -Continue iron supplementation  -Monitor while admitted    Asthma  -Albuterol    Hypertension  -Well controlled   BP Readings from Last 1 Encounters:    04/04/24 125/82   - Continue losartan and metoprolol  - Monitor while admitted    Anxiety/depression  -BuSpar and trazodone  -May take home Fetzima if it becomes available    GERD  -PPI        VTE Prophylaxis -   Mechanical Order History:       None          Pharmalogical Order History:       None            CODE STATUS:    There are no questions and answers to display.       This patient has been examined wearing personal protective equipment.     I discussed the patient's findings and my recommendations with patient and nursing staff.      Signature:Electronically signed by Damon Josue PA-C, 04/04/24, 3:49 PM EDT.                Electronically signed by aDmon Josue PA-C at 04/04/24 1554          Emergency Department Notes        Barbi Montero PA-C at 04/04/24 1619       Attestation signed by Glen Richter MD at 04/04/24 1929        SHARED APC NON FACE TO FACE: I performed a substantive part of the MDM during the patient's E/M visit. I personally made or approved the documented management plan and acknowledge its risk of complications.   Glen Richter MD 4/4/2024 19:29 EDT                         Subjective   History of Present Illness  Patient presents with several complaints.  She reports over the last 2 days she is felt more short of breath and having palpitations and chest pain.  She reports that she had 6 weeks ago a rotator cuff surgery.  She reports that she has been on 2 courses of antibiotics for possible cellulitis.  She saw dermatology and was sent to the ER for rule out DVT and was diagnosed with a left upper arm blood clot.  He was started on Eliquis.        Review of Systems   Constitutional:  Negative for chills, diaphoresis, fatigue and fever.   HENT:  Negative for congestion, ear pain, sinus pressure, sore throat, trouble swallowing and voice change.    Respiratory:  Positive for shortness of breath. Negative for cough.    Cardiovascular:  Positive for chest pain. Negative for  palpitations.   Gastrointestinal:  Negative for abdominal distention, nausea and vomiting.   Genitourinary: Negative.    Musculoskeletal: Negative.    Skin:  Positive for color change.   Neurological: Negative.    Psychiatric/Behavioral: Negative.         Past Medical History:   Diagnosis Date    Allergic     Anemia     Anxiety     B12 deficiency     Callus     COVID     x 2     Depression     Dercum's disease     Difficulty walking     Endometriosis     Fallen arches     Fibromyalgia 02/15/2024    Gastritis     GERD (gastroesophageal reflux disease)     Hyperlipidemia     Borderline    Hypertension     Hypoglycemia     Ingrown toenail     Migraines     PTSD (post-traumatic stress disorder)     Shin splints     Stress fracture     Vitamin D deficiency        Allergies   Allergen Reactions    [place] Anaphylaxis    Codeine Hives and Itching    Influenza Virus Vaccine Other (See Comments)     Egg allergy     Latex Rash     Skin gets red and burns, skin starts peeling      Meloxicam Other (See Comments)     Gastritis         Past Surgical History:   Procedure Laterality Date    CARPAL TUNNEL RELEASE Right      SECTION      x 2     CHOLECYSTECTOMY      HYSTERECTOMY  2023    KNEE SURGERY Right     x 3     KNEE SURGERY Left     x 1     SHOULDER ACROMIOPLASTY WITH ROTATOR CUFF REPAIR Left 2021    Procedure: LEFT SHOULDER ARTHROSCOPY WITH ROTATOR CUFF REPAIR AND SUBACROMIAL DECOMPRESSION- SLAP;  Surgeon: Rianna Jarvis MD;  Location: [facility] OR;  Service: Orthopedics;  Laterality: Left;    SHOULDER ARTHROSCOPY W/ ROTATOR CUFF REPAIR Left 2024    Procedure: SHOULDER ARTHROSCOPY WITH ROTATOR CUFF REPAIR, DECOMPRESSION AND PRP INJECTION;  Surgeon: Rianna Jarvis MD;  Location: [facility];  Service: Orthopedics;  Laterality: Left;       Family History   Problem Relation Age of Onset    Thyroid disease Mother     Glaucoma Mother     Hypertension Mother     Transient ischemic attack  Mother     Bell's palsy Mother     Alcohol abuse Father     ADD / ADHD Sister     Depression Son     Pyloric stenosis Son     Hypertension Maternal Grandmother     Heart failure Maternal Grandmother     Stroke Maternal Grandmother     Diabetes Paternal Grandfather     Malig Hyperthermia Neg Hx        Social History     Socioeconomic History    Marital status:    Tobacco Use    Smoking status: Former     Current packs/day: 0.00     Average packs/day: 0.5 packs/day for 17.0 years (8.5 ttl pk-yrs)     Types: Cigarettes     Start date: 1999     Quit date: 2016     Years since quittin.2     Passive exposure: Past    Smokeless tobacco: Never   Vaping Use    Vaping status: Never Used   Substance and Sexual Activity    Alcohol use: Yes     Comment: Occassion    Drug use: Never    Sexual activity: Yes     Partners: Male     Birth control/protection: Tubal ligation, Hysterectomy, Surgical           Objective   Physical Exam  Constitutional:       General: She is not in acute distress.     Appearance: Normal appearance. She is well-developed. She is not ill-appearing, toxic-appearing or diaphoretic.   HENT:      Head: Normocephalic and atraumatic.      Nose: Nose normal.   Eyes:      Conjunctiva/sclera: Conjunctivae normal.   Cardiovascular:      Rate and Rhythm: Normal rate and regular rhythm.   Pulmonary:      Effort: Pulmonary effort is normal. No respiratory distress.      Breath sounds: Normal breath sounds. No stridor. No decreased breath sounds, wheezing, rhonchi or rales.   Musculoskeletal:         General: Normal range of motion.      Cervical back: Normal range of motion.   Skin:     General: Skin is warm and dry.      Comments: Left upper arm is edematous erythematous warm to touch.   Neurological:      General: No focal deficit present.      Mental Status: She is alert and oriented to person, place, and time. Mental status is at baseline.   Psychiatric:         Mood and Affect: Mood normal.        "  Behavior: Behavior normal.         Thought Content: Thought content normal.         Judgment: Judgment normal.         Procedures          ED Course  ED Course as of 04/04/24 1850   Thu Apr 04, 2024   1240 EKG interpreted by ER physician reviewed by myself.  Sinus rhythm rate of 90 no previous on file. [MG]      ED Course User Index  [MG] Barbi Montero YOUSIF GALLEGOS                HEART Score: 0                              Medical Decision Making  Appropriate PPE worn during exam.    /89 (BP Location: Right arm, Patient Position: Lying)   Pulse 95   Temp 99.1 °F (37.3 °C) (Oral)   Resp 18   Ht 177.8 cm (70\")   Wt 99.8 kg (220 lb)   LMP 02/01/2023 (Approximate)   SpO2 100%   BMI 31.57 kg/m²      Co-morbidities --  has a past medical history of Allergic, Anemia, Anxiety, B12 deficiency, Callus, COVID, Depression, Dercum's disease, Difficulty walking, Endometriosis, Fallen arches, Fibromyalgia (02/15/2024), Gastritis, GERD (gastroesophageal reflux disease), Hyperlipidemia (2021), Hypertension, Hypoglycemia, Ingrown toenail, Migraines, PTSD (post-traumatic stress disorder), Shin splints, Stress fracture, and Vitamin D deficiency (2020).  Radiology interpretation --  X-rays reviewed by me and independently interpreted by radiologist:  MRI Shoulder Left Without Contrast    Result Date: 4/4/2024  Impression: 1. Postoperative changes status post rotator cuff repair with some irregularity involving the supraspinatus tendon along its insertion some of which may represent healing and forming granulation tissue. Residual mild grade articular surface tear is suspected with some focal fluid signal noted. 2. Mild interstitial tearing of the infraspinatus tendon with some additional insertional tendinosis and trace fluid tracking to the myotendinous junction. Low T1 and low T2 signal along the anterior insertion of the infraspinatus tendon may represent calcific tendinosis. 3. Fluid in the subacromial-subdeltoid bursa. " There is mild edema within the deltoid muscle and some fluid within the adjacent subcutaneous soft tissue thickening which may represent a port site. No well-defined fluid collection or abscess noted. 4. Mild attenuation of fraying along the posterior margin of the superior labrum without discrete tear noted. Electronically Signed: Noemy Pringle MD  4/4/2024 4:06 PM EDT  Workstation ID: [id number]    CT Angiogram Chest Pulmonary Embolism    Result Date: 4/4/2024  1.No acute abnormality is identified within the thorax. Specifically, there is no evidence of acute pulmonary embolism. 2.Please see above for additional details. Electronically Signed: Tien Jameson MD  4/4/2024 11:30 AM EDT  Workstation ID: [id number]        Discussed with the patient outpatient Holter monitor versus inpatient.  She would like to stay for ops.  Chest pain rule out.  Her CT chest was negative.  I discussed the findings and recommendations with the patient who voices understanding. Stable while in the ER.     Note Disclaimer: At [facility], we believe that sharing information builds trust and better relationships. You are receiving this note because you are receiving care at [facility] or recently visited. It is possible you will see health information before a provider has talked with you about it. This kind of information can be easy to misunderstand. To help you fully understand what it means for your health, we urge you to discuss this note with your provider.        Problems Addressed:  Chest pain, unspecified type: complicated acute illness or injury    Amount and/or Complexity of Data Reviewed  Labs: ordered.  Radiology: ordered.    Risk  Prescription drug management.  Decision regarding hospitalization.        Final diagnoses:   Chest pain, unspecified type   Yeast infection   Arm erythema   Acute embolism and thrombosis of deep vein of left upper extremity   Dyspnea, unspecified type       ED Disposition  ED Disposition        ED Disposition   Decision to Admit    Condition   --    Comment   --               No follow-up provider specified.       Medication List        ASK your doctor about these medications      apixaban 5 MG tablet tablet  Commonly known as: ELIQUIS  Ask about: Which instructions should I use?     busPIRone 10 MG tablet  Commonly known as: BUSPAR  Ask about: Which instructions should I use?     levocetirizine 5 MG tablet  Commonly known as: XYZAL  Ask about: Which instructions should I use?     metoprolol tartrate 25 MG tablet  Commonly known as: LOPRESSOR  Ask about: Which instructions should I use?     propranolol 10 MG tablet  Commonly known as: INDERAL  Ask about: Which instructions should I use?                 Barbi Montero PA-C  04/04/24 1850      Electronically signed by Glen Richter MD at 04/04/24 1929       Virginia Taylor, RN at 04/04/24 1325          Nursing report ED to floor  Amelie Henderson  38 y.o.  female    HPI:   Chief Complaint   Patient presents with    Shortness of Breath     Soa last 2 days, pt was dx with DVT left arm, Pt is on eliquis.  Pt reports her oxygen this morning on portable pulse ox was low and she felt like she was going to pass out.  Pt recent rotator cuff surgery.          Admitting doctor:   Gilberto Graff MD    Admitting diagnosis:   The encounter diagnosis was Chest pain, unspecified type.    Code status:   Current Code Status       Date Active Code Status Order ID Comments User Context       Not on file            Allergies:   Cedar, Codeine, Influenza virus vaccine, Latex, and Meloxicam    Isolation:  No active isolations     Fall Risk:  Fall Risk Assessment was completed, and patient is at low risk for falls.   Predictive Model Details         2 (Low) Factor Value    Calculated 4/4/2024 13:25 Age 38    Risk of Fall Model Musculoskeletal Assessment WDL     Active Peripheral IV Present     Imaging order in this encounter Present     Respiratory Rate 24     Skin Assessment  WDL     Magnesium not on file     Drug Use No     Tobacco Use Quit     Jamil Scale not on file     Number of Distinct Medication Classes administered 2     Financial Class Private Insurance     Peripheral Vascular Assessment WDL     Cardiac Assessment X     Diastolic BP 82     Gastrointestinal Assessment WDL     ALT 34 U/L     Creatinine 0.95 mg/dL     Albumin 4.3 g/dL     Days after Admission 0.15     Calcium 10.1 mg/dL     Chloride 105 mmol/L     Potassium 3.9 mmol/L     Total Bilirubin 0.2 mg/dL         Weight:       04/04/24  0946   Weight: 99.8 kg (220 lb)       Intake and Output  No intake or output data in the 24 hours ending 04/04/24 1325    Diet:        Most recent vitals:   Vitals:    04/04/24 0957 04/04/24 1106 04/04/24 1131 04/04/24 1153   BP:   125/82    BP Location:       Patient Position:       Pulse: 95 78 78 81   Resp:       Temp:       TempSrc:       SpO2:  100% 92% 100%   Weight:       Height:           Active LDAs/IV Access:   Lines, Drains & Airways       Active LDAs       Name Placement date Placement time Site Days    Peripheral IV 04/04/24 1007 Right Antecubital 04/04/24  1007  Antecubital  less than 1                    Skin Condition:   Skin Assessments (last day)       None             Labs (abnormal labs have a star):   Labs Reviewed   COMPREHENSIVE METABOLIC PANEL - Abnormal; Notable for the following components:       Result Value    Glucose 103 (*)     ALT (SGPT) 34 (*)     All other components within normal limits    Narrative:     GFR Normal >60  Chronic Kidney Disease <60  Kidney Failure <15     CBC WITH AUTO DIFFERENTIAL - Abnormal; Notable for the following components:    Hemoglobin 11.8 (*)     Immature Grans % 1.1 (*)     Immature Grans, Absolute 0.07 (*)     All other components within normal limits   RESPIRATORY PANEL PCR W/ COVID-19 (SARS-COV-2), NP SWAB IN UTM/VTP, 2 HR TAT - Normal    Narrative:     In the setting of a positive respiratory panel with a viral infection PLUS  a negative procalcitonin without other underlying concern for bacterial infection, consider observing off antibiotics or discontinuation of antibiotics and continue supportive care. If the respiratory panel is positive for atypical bacterial infection (Bordetella pertussis, Chlamydophila pneumoniae, or Mycoplasma pneumoniae), consider antibiotic de-escalation to target atypical bacterial infection.   BNP (IN-HOUSE) - Normal    Narrative:     This assay is used as an aid in the diagnosis of individuals suspected of having heart failure. It can be used as an aid in the diagnosis of acute decompensated heart failure (ADHF) in patients presenting with signs and symptoms of ADHF to the emergency department (ED). In addition, NT-proBNP of <300 pg/mL indicates ADHF is not likely.    Age Range Result Interpretation  NT-proBNP Concentration (pg/mL:      <50             Positive            >450                   Gray                 300-450                    Negative             <300    50-75           Positive            >900                  Gray                300-900                  Negative            <300      >75             Positive            >1800                  Gray                300-1800                  Negative            <300   SINGLE HS TROPONIN T - Normal    Narrative:     High Sensitive Troponin T Reference Range:  <14.0 ng/L- Negative Female for AMI  <22.0 ng/L- Negative Male for AMI  >=14 - Abnormal Female indicating possible myocardial injury.  >=22 - Abnormal Male indicating possible myocardial injury.   Clinicians would have to utilize clinical acumen, EKG, Troponin, and serial changes to determine if it is an Acute Myocardial Infarction or myocardial injury due to an underlying chronic condition.        BLOOD CULTURE   BLOOD CULTURE   RAINBOW DRAW    Narrative:     The following orders were created for panel order [place] Draw.  Procedure                               Abnormality         Status                      ---------                               -----------         ------                     Green Top (Gel)[[id number]]                                  Final result               Lavender Top[[id number]]                                     Final result               Gold Top - SST[[id number]]                                   Final result               Light Blue Top[[id number]]                                   Final result                 Please view results for these tests on the individual orders.   LIPID PANEL   CBC AND DIFFERENTIAL    Narrative:     The following orders were created for panel order CBC & Differential.  Procedure                               Abnormality         Status                     ---------                               -----------         ------                     CBC Auto Differential[[id number]]        Abnormal            Final result                 Please view results for these tests on the individual orders.   GREEN TOP   LAVENDER TOP   GOLD TOP - SST   LIGHT BLUE TOP       LOC: Person, Place, Time, and Situation    Telemetry:  Observation Unit    Cardiac Monitoring Ordered: no    EKG:   ECG 12 Lead Dyspnea   Preliminary Result   HEART RATE= 90  bpm   RR Interval= 664  ms   [place] Interval= 155  ms   P Horizontal Axis= 16  deg   P Front Axis= 25  deg   QRSD Interval= 95  ms   QT Interval= 346  ms   QTcB= 425  ms   QRS Axis= 42  deg   T Wave Axis= 21  deg   - NORMAL ECG -   Sinus rhythm   No previous ECG available for comparison   Electronically Signed By:    Date and Time of Study: 2024-04-04 10:01:02          Medications Given in the ED:   Medications   sodium chloride 0.9 % flush 10 mL (has no administration in time range)   iopamidol (ISOVUE-370) 76 % injection 100 mL (100 mL Intravenous Given 4/4/24 1121)   fluconazole (DIFLUCAN) tablet 150 mg (150 mg Oral Given 4/4/24 1318)       Imaging results:  CT Angiogram Chest Pulmonary Embolism    Result Date: 4/4/2024  1.No acute  abnormality is identified within the thorax. Specifically, there is no evidence of acute pulmonary embolism. 2.Please see above for additional details. Electronically Signed: Tien Jameson MD  2024 11:30 AM EDT  Workstation ID: [id number]     Social issues:   Social History     Socioeconomic History    Marital status:    Tobacco Use    Smoking status: Former     Current packs/day: 0.00     Average packs/day: 0.5 packs/day for 17.0 years (8.5 ttl pk-yrs)     Types: Cigarettes     Start date: 1999     Quit date: 2016     Years since quittin.2     Passive exposure: Past    Smokeless tobacco: Never   Vaping Use    Vaping status: Never Used   Substance and Sexual Activity    Alcohol use: Yes     Comment: Occassion    Drug use: Never    Sexual activity: Yes     Partners: Male     Birth control/protection: Tubal ligation, Hysterectomy, Surgical       NIH Stroke Scale:  Interval: (not recorded)  1a. Level of Consciousness: (not recorded)  1b. LOC Questions: (not recorded)  1c. LOC Commands: (not recorded)  2. Best Gaze: (not recorded)  3. Visual: (not recorded)  4. Facial Palsy: (not recorded)  5a. Motor Arm, Left: (not recorded)  5b. Motor Arm, Right: (not recorded)  6a. Motor Leg, Left: (not recorded)  6b. Motor Leg, Right: (not recorded)  7. Limb Ataxia: (not recorded)  8. Sensory: (not recorded)  9. Best Language: (not recorded)  10. Dysarthria: (not recorded)  11. Extinction and Inattention (formerly Neglect): (not recorded)    Total (NIH Stroke Scale): (not recorded)     Additional notable assessment information:     Nursing report ED to floor:  Obs, RN    Virginia Taylor, RN   24 13:25 EDT     Electronically signed by Virginia Taylor RN at 24 132       Vital Signs (last day)       Date/Time Temp Temp src Pulse Resp BP Patient Position SpO2    24 0516 97.6 (36.4) Oral 68 16 104/70 Lying 98    24 0200 97.9 (36.6) Oral 66 18 151/83 Lying 100    24 2213 98.7 (37.1)  Oral 90 18 141/79 Lying 100    04/04/24 1821 99.1 (37.3) Oral 95 18 146/89 Lying 100    04/04/24 1608 97.8 (36.6) Oral -- 18 140/94 Sitting 100    04/04/24 1331 -- -- 80 -- 133/81 -- 100    04/04/24 1153 -- -- 81 -- -- -- 100    04/04/24 1131 -- -- 78 -- 125/82 -- 92    04/04/24 1106 -- -- 78 -- -- -- 100    04/04/24 0957 -- -- 95 -- -- -- --    04/04/24 0946 98 (36.7) Oral 95 24 139/82 Sitting 96          Oxygen Therapy (last day)       Date/Time SpO2 Device (Oxygen Therapy) Flow (L/min) Oxygen Concentration (%) ETCO2 (mmHg)    04/05/24 0732 -- room air -- -- --    04/05/24 0516 98 room air -- -- --    04/05/24 0200 100 room air -- -- --    04/04/24 2213 100 room air -- -- --    04/04/24 1953 -- room air -- -- --    04/04/24 1821 100 room air -- -- --    04/04/24 1614 -- room air -- -- --    04/04/24 1608 100 room air -- -- --    04/04/24 1331 100 -- -- -- --    04/04/24 1153 100 -- -- -- --    04/04/24 1131 92 -- -- -- --    04/04/24 1106 100 -- -- -- --    04/04/24 0946 96 -- -- -- --          Facility-Administered Medications as of 4/5/2024   Medication Dose Route Frequency Provider Last Rate Last Admin    acetaminophen (TYLENOL) tablet 650 mg  650 mg Oral Q4H PRN Damon Josue PA-C   650 mg at 04/05/24 0535    Or    acetaminophen (TYLENOL) 160 MG/5ML oral solution 650 mg  650 mg Oral Q4H PRN Damon Josue PA-C        Or    acetaminophen (TYLENOL) suppository 650 mg  650 mg Rectal Q4H PRN Damon Josue PA-C        albuterol (PROVENTIL) nebulizer solution 0.083% 2.5 mg/3mL  2.5 mg Nebulization Q4H PRN Damon Josue PA-C        aluminum-magnesium hydroxide-simethicone (MAALOX MAX) 400-400-40 MG/5ML suspension 15 mL  15 mL Oral Q6H PRN Damon Josue PA-C        apixaban (ELIQUIS) tablet 5 mg  5 mg Oral Q12H Damon Josue PA-C   5 mg at 04/05/24 0839    sennosides-docusate (PERICOLACE) 8.6-50 MG per tablet 2 tablet  2 tablet Oral BID PRN Damon Josue PA-C        And     polyethylene glycol (MIRALAX) packet 17 g  17 g Oral Daily PRN Damon Josue PA-C        And    bisacodyl (DULCOLAX) EC tablet 5 mg  5 mg Oral Daily PRN Damon Josue PA-C        And    bisacodyl (DULCOLAX) suppository 10 mg  10 mg Rectal Daily PRN Damon Josue PA-C        busPIRone (BUSPAR) tablet 20 mg  20 mg Oral TID PRN Damon Josue PA-C   20 mg at 04/05/24 0845    Calcium Replacement - Follow Nurse / BPA Driven Protocol   Does not apply PRN Damon Josue PA-C        cetirizine (zyrTEC) tablet 10 mg  10 mg Oral Daily Damon Josue PA-C   10 mg at 04/05/24 0839    ferrous sulfate EC tablet 324 mg  324 mg Oral Daily With Breakfast Damon Josue PA-C   324 mg at 04/05/24 0839    [COMPLETED] fluconazole (DIFLUCAN) tablet 150 mg  150 mg Oral Once Barbi Montero PA-C   150 mg at 04/04/24 1318    [COMPLETED] hydrOXYzine (ATARAX) tablet 50 mg  50 mg Oral Once Glen Richter MD   50 mg at 04/04/24 2131    [COMPLETED] iopamidol (ISOVUE-370) 76 % injection 100 mL  100 mL Intravenous Once in imaging Barbi Montero PA-C   100 mL at 04/04/24 1121    Lidocaine Viscous HCl (XYLOCAINE) 2 % solution 10 mL  10 mL Mouth/Throat Once PRN Damon Josue PA-C        losartan (COZAAR) tablet 50 mg  50 mg Oral Daily Damon Josue PA-C   50 mg at 04/05/24 0839    Magnesium Standard Dose Replacement - Follow Nurse / BPA Driven Protocol   Does not apply PRN Damon Josue PA-C        melatonin tablet 5 mg  5 mg Oral Nightly PRN Damon Josue PA-C        metoprolol tartrate (LOPRESSOR) tablet 25 mg  25 mg Oral BID Damon Josue PA-C   25 mg at 04/04/24 2131    nitroglycerin (NITROSTAT) SL tablet 0.4 mg  0.4 mg Sublingual Q5 Min PRN Josue, Christopher, PA-C        ondansetron ODT (ZOFRAN-ODT) disintegrating tablet 4 mg  4 mg Oral Q6H PRN Damon Josue PA-C   4 mg at 04/05/24 0535    Or    ondansetron (ZOFRAN) injection 4 mg  4 mg Intravenous Q6H  PRN Damon Josue PA-C        oxybutynin XL (DITROPAN-XL) 24 hr tablet 10 mg  10 mg Oral Daily Damon Josue PA-C        oxyCODONE (ROXICODONE) immediate release tablet 5 mg  5 mg Oral Q4H PRN Damon Josue PA-C   5 mg at 04/05/24 0535    pantoprazole (PROTONIX) EC tablet 40 mg  40 mg Oral Q AM Damon Josue PA-C   40 mg at 04/05/24 0844    Phosphorus Replacement - Follow Nurse / BPA Driven Protocol   Does not apply PRN Damon Josue PA-C        Potassium Replacement - Follow Nurse / BPA Driven Protocol   Does not apply PRN Damon Josue PA-C        pregabalin (LYRICA) capsule 50 mg  50 mg Oral TID Damon Josue PA-C   50 mg at 04/05/24 0839    promethazine (PHENERGAN) tablet 25 mg  25 mg Oral Q8H PRN Barbi Arizmendi PA   25 mg at 04/04/24 2343    [COMPLETED] regadenoson (LEXISCAN) injection 0.4 mg  0.4 mg Intravenous Once in imaging Gilberto Graff MD   0.4 mg at 04/05/24 0938    sodium chloride 0.9 % flush 10 mL  10 mL Intravenous PRN Barbi Montero PA-C        sodium chloride 0.9 % flush 10 mL  10 mL Intravenous Q12H Damon Josue PA-C   10 mL at 04/05/24 0846    sodium chloride 0.9 % flush 10 mL  10 mL Intravenous PRN Damon Josue PA-C        sodium chloride 0.9 % infusion 40 mL  40 mL Intravenous PRN Damon Josue PA-C        sucralfate (CARAFATE) tablet 1 g  1 g Oral TID AC Damon Josue PA-C   1 g at 04/05/24 0844    [COMPLETED] technetium tetrofosmin (Tc-MYOVIEW) injection 1 dose  1 dose Intravenous Once in imaging Gilberto Graff MD   1 dose at 04/05/24 0651    [COMPLETED] technetium tetrofosmin (Tc-MYOVIEW) injection 1 dose  1 dose Intravenous Once in imaging Gilberto Graff MD   1 dose at 04/05/24 0938    tiZANidine (ZANAFLEX) tablet 4 mg  4 mg Oral Nightly PRN Damon Josue PA-C   4 mg at 04/04/24 2151    traZODone (DESYREL) tablet 50 mg  50 mg Oral Nightly Damon Josue PA-C   50 mg at 04/04/24 2131  "    Lab Results (last 24 hours)       Procedure Component Value Units Date/Time    Procalcitonin [[id number]]  (Normal) Collected: 04/05/24 0528    Specimen: Blood Updated: 04/05/24 0836     Procalcitonin 0.04 ng/mL     Narrative:      As a Marker for Sepsis (Non-Neonates):    1. <0.5 ng/mL represents a low risk of severe sepsis and/or septic shock.  2. >2 ng/mL represents a high risk of severe sepsis and/or septic shock.    As a Marker for Lower Respiratory Tract Infections that require antibiotic therapy:    PCT on Admission    Antibiotic Therapy       6-12 Hrs later    >0.5                Strongly Recommended  >0.25 - <0.5        Recommended   0.1 - 0.25          Discouraged              Remeasure/reassess PCT  <0.1                Strongly Discouraged     Remeasure/reassess PCT    As 28 day mortality risk marker: \"Change in Procalcitonin Result\" (>80% or <=80%) if Day 0 (or Day 1) and Day 4 values are available. Refer to http://www.[email or web address] [email or web address] [email or web address]-pct-calculator.com    Change in PCT <=80%  A decrease of PCT levels below or equal to 80% defines a positive change in PCT test result representing a higher risk for 28-day all-cause mortality of patients diagnosed with severe sepsis for septic shock.    Change in PCT >80%  A decrease of PCT levels of more than 80% defines a negative change in PCT result representing a lower risk for 28-day all-cause mortality of patients diagnosed with severe sepsis or septic shock.       Sedimentation Rate [[id number]]  (Abnormal) Collected: 04/05/24 0528    Specimen: Blood Updated: 04/05/24 0820     Sed Rate 25 mm/hr     C-reactive Protein [[id number]] Collected: 04/05/24 0528    Specimen: Blood Updated: 04/05/24 0813    Reticulocytes [[id number]]  (Abnormal) Collected: 04/05/24 0528    Specimen: Blood Updated: 04/05/24 0811     Reticulocyte % 2.27 %      Reticulocyte Absolute 0.0872 10*6/mm3     Magnesium [[id number]]  (Normal) Collected: 04/05/24 0528    Specimen: Blood Updated: 04/05/24 0611    "  Magnesium 2.3 mg/dL     Basic Metabolic Panel [[id number]]  (Normal) Collected: 04/05/24 0528    Specimen: Blood Updated: 04/05/24 0611     Glucose 99 mg/dL      BUN 11 mg/dL      Creatinine 0.99 mg/dL      Sodium 140 mmol/L      Potassium 3.9 mmol/L      Chloride 106 mmol/L      CO2 23.0 mmol/L      Calcium 9.5 mg/dL      BUN/Creatinine Ratio 11.1     Anion Gap 11.0 mmol/L      eGFR 74.5 mL/min/1.73     Narrative:      GFR Normal >60  Chronic Kidney Disease <60  Kidney Failure <15      CBC & Differential [[id number]]  (Abnormal) Collected: 04/05/24 0528    Specimen: Blood Updated: 04/05/24 0548    Narrative:      The following orders were created for panel order CBC & Differential.  Procedure                               Abnormality         Status                     ---------                               -----------         ------                     CBC Auto Differential[[id number]]        Abnormal            Final result                 Please view results for these tests on the individual orders.    CBC Auto Differential [[id number]]  (Abnormal) Collected: 04/05/24 0528    Specimen: Blood Updated: 04/05/24 0548     WBC 5.89 10*3/mm3      RBC 3.83 10*6/mm3      Hemoglobin 10.9 g/dL      Hematocrit 34.2 %      MCV 89.3 fL      MCH 28.5 pg      MCHC 31.9 g/dL      RDW 13.0 %      RDW-SD 42.1 fl      MPV 9.4 fL      Platelets 285 10*3/mm3      Neutrophil % 53.5 %      Lymphocyte % 36.8 %      Monocyte % 5.9 %      Eosinophil % 2.5 %      Basophil % 0.3 %      Immature Grans % 1.0 %      Neutrophils, Absolute 3.14 10*3/mm3      Lymphocytes, Absolute 2.17 10*3/mm3      Monocytes, Absolute 0.35 10*3/mm3      Eosinophils, Absolute 0.15 10*3/mm3      Basophils, Absolute 0.02 10*3/mm3      Immature Grans, Absolute 0.06 10*3/mm3      nRBC 0.0 /100 WBC     Lipid Panel [[id number]]  (Abnormal) Collected: 04/04/24 1007    Specimen: Blood Updated: 04/04/24 1350     Total Cholesterol 254 mg/dL      Triglycerides 110 mg/dL       HDL Cholesterol 61 mg/dL      LDL Cholesterol  174 mg/dL      VLDL Cholesterol 19 mg/dL      LDL/HDL Ratio 2.80    Narrative:      Cholesterol Reference Ranges  (U.S. Department of Health and Human Services ATP III Classifications)    Desirable          <200 mg/dL  Borderline High    200-239 mg/dL  High Risk          >240 mg/dL      Triglyceride Reference Ranges  (U.S. Department of Health and Human Services ATP III Classifications)    Normal           <150 mg/dL  Borderline High  150-199 mg/dL  High             200-499 mg/dL  Very High        >500 mg/dL    HDL Reference Ranges  (U.S. Department of Health and Human Services ATP III Classifications)    Low     <40 mg/dl (major risk factor for CHD)  High    >60 mg/dl ('negative' risk factor for CHD)        LDL Reference Ranges  (U.S. Department of Health and Human Services ATP III Classifications)    Optimal          <100 mg/dL  Near Optimal     100-129 mg/dL  Borderline High  130-159 mg/dL  High             160-189 mg/dL  Very High        >189 mg/dL    Blood Culture - Blood, Arm, Right [[id number]] Collected: 04/04/24 1314    Specimen: Blood from Arm, Right Updated: 04/04/24 1316    Blood Culture - Blood, Arm, Left [[id number]] Collected: 04/04/24 1314    Specimen: Blood from Arm, Left Updated: 04/04/24 1316    Respiratory Panel PCR w/COVID-19(SARS-CoV-2) WILLIAMS/LAYLA/ARNAV/PAD/COR/MELISSA In-House, NP Swab in UTM/VTM, 2 HR TAT - Swab, Nasopharynx [[id number]]  (Normal) Collected: 04/04/24 1059    Specimen: Swab from Nasopharynx Updated: 04/04/24 1156     ADENOVIRUS, PCR Not Detected     Coronavirus 229E Not Detected     Coronavirus HKU1 Not Detected     Coronavirus NL63 Not Detected     Coronavirus OC43 Not Detected     COVID19 Not Detected     Human Metapneumovirus Not Detected     Human Rhinovirus/Enterovirus Not Detected     Influenza A PCR Not Detected     Influenza B PCR Not Detected     Parainfluenza Virus 1 Not Detected     Parainfluenza Virus 2 Not Detected      Parainfluenza Virus 3 Not Detected     Parainfluenza Virus 4 Not Detected     RSV, PCR Not Detected     Bordetella pertussis pcr Not Detected     Bordetella parapertussis PCR Not Detected     Chlamydophila pneumoniae PCR Not Detected     Mycoplasma pneumo by PCR Not Detected    Narrative:      In the setting of a positive respiratory panel with a viral infection PLUS a negative procalcitonin without other underlying concern for bacterial infection, consider observing off antibiotics or discontinuation of antibiotics and continue supportive care. If the respiratory panel is positive for atypical bacterial infection (Bordetella pertussis, Chlamydophila pneumoniae, or Mycoplasma pneumoniae), consider antibiotic de-escalation to target atypical bacterial infection.    [place] [facility] [[id number]] Collected: 04/04/24 1007    Specimen: Blood Updated: 04/04/24 1115    Narrative:      The following orders were created for panel order [place] [facility].  Procedure                               Abnormality         Status                     ---------                               -----------         ------                     Green Top (Gel)[[id number]]                                  Final result               Lavender Top[[id number]]                                     Final result               Gold Top - SST[[id number]]                                   Final result               Light Blue Top[[id number]]                                   Final result                 Please view results for these tests on the individual orders.    Green Top (Gel) [[id number]] Collected: 04/04/24 1007    Specimen: Blood Updated: 04/04/24 1115     Extra Tube Hold for add-ons.     Comment: Auto resulted.       Lavender Top [[id number]] Collected: 04/04/24 1007    Specimen: Blood Updated: 04/04/24 1115     Extra Tube hold for add-on     Comment: Auto resulted       Gold Top - SST [[id number]] Collected: 04/04/24 1007    Specimen: Blood Updated: 04/04/24  1115     Extra Tube Hold for add-ons.     Comment: Auto resulted.       Light Blue Top [[id number]] Collected: 04/04/24 1007    Specimen: Blood Updated: 04/04/24 1115     Extra Tube Hold for add-ons.     Comment: Auto resulted       Comprehensive Metabolic Panel [[id number]]  (Abnormal) Collected: 04/04/24 1007    Specimen: Blood Updated: 04/04/24 1045     Glucose 103 mg/dL      BUN 10 mg/dL      Creatinine 0.95 mg/dL      Sodium 140 mmol/L      Potassium 3.9 mmol/L      Chloride 105 mmol/L      CO2 24.0 mmol/L      Calcium 10.1 mg/dL      Total Protein 6.7 g/dL      Albumin 4.3 g/dL      ALT (SGPT) 34 U/L      AST (SGOT) 18 U/L      Alkaline Phosphatase 111 U/L      Total Bilirubin 0.2 mg/dL      Globulin 2.4 gm/dL      A/G Ratio 1.8 g/dL      BUN/Creatinine Ratio 10.5     Anion Gap 11.0 mmol/L      eGFR 78.3 mL/min/1.73     Narrative:      GFR Normal >60  Chronic Kidney Disease <60  Kidney Failure <15      BNP [[id number]]  (Normal) Collected: 04/04/24 1007    Specimen: Blood Updated: 04/04/24 1045     proBNP 112.4 pg/mL     Narrative:      This assay is used as an aid in the diagnosis of individuals suspected of having heart failure. It can be used as an aid in the diagnosis of acute decompensated heart failure (ADHF) in patients presenting with signs and symptoms of ADHF to the emergency department (ED). In addition, NT-proBNP of <300 pg/mL indicates ADHF is not likely.    Age Range Result Interpretation  NT-proBNP Concentration (pg/mL:      <50             Positive            >450                   Gray                 300-450                    Negative             <300    50-75           Positive            >900                  Gray                300-900                  Negative            <300      >75             Positive            >1800                  Gray                300-1800                  Negative            <300    Single High Sensitivity Troponin T [[id number]]  (Normal) Collected: 04/04/24  1007    Specimen: Blood Updated: 04/04/24 1045     HS Troponin T <6 ng/L     Narrative:      High Sensitive Troponin T Reference Range:  <14.0 ng/L- Negative Female for AMI  <22.0 ng/L- Negative Male for AMI  >=14 - Abnormal Female indicating possible myocardial injury.  >=22 - Abnormal Male indicating possible myocardial injury.   Clinicians would have to utilize clinical acumen, EKG, Troponin, and serial changes to determine if it is an Acute Myocardial Infarction or myocardial injury due to an underlying chronic condition.         CBC & Differential [[id number]]  (Abnormal) Collected: 04/04/24 1007    Specimen: Blood Updated: 04/04/24 1017    Narrative:      The following orders were created for panel order CBC & Differential.  Procedure                               Abnormality         Status                     ---------                               -----------         ------                     CBC Auto Differential[[id number]]        Abnormal            Final result                 Please view results for these tests on the individual orders.    CBC Auto Differential [[id number]]  (Abnormal) Collected: 04/04/24 1007    Specimen: Blood Updated: 04/04/24 1017     WBC 6.18 10*3/mm3      RBC 4.13 10*6/mm3      Hemoglobin 11.8 g/dL      Hematocrit 36.6 %      MCV 88.6 fL      MCH 28.6 pg      MCHC 32.2 g/dL      RDW 13.0 %      RDW-SD 41.6 fl      MPV 9.3 fL      Platelets 348 10*3/mm3      Neutrophil % 58.7 %      Lymphocyte % 31.6 %      Monocyte % 5.3 %      Eosinophil % 2.8 %      Basophil % 0.5 %      Immature Grans % 1.1 %      Neutrophils, Absolute 3.63 10*3/mm3      Lymphocytes, Absolute 1.95 10*3/mm3      Monocytes, Absolute 0.33 10*3/mm3      Eosinophils, Absolute 0.17 10*3/mm3      Basophils, Absolute 0.03 10*3/mm3      Immature Grans, Absolute 0.07 10*3/mm3      nRBC 0.0 /100 WBC           Imaging Results (All)       Procedure Component Value Units Date/Time    MRI Shoulder Left Without Contrast  [[id number]] Collected: 04/04/24 1549     Updated: 04/04/24 1608    Narrative:      MRI SHOULDER LEFT WO CONTRAST    Date of Exam: 4/4/2024 1:53 PM EDT    Indication: recent rotator surgery 6 weeks ago. And DVT LUE with worsenign swelling and pain.     Comparison: Left shoulder 3/26/2024    Technique:  Routine multiplanar/multisequence images of the left shoulder were obtained without contrast administration.        Findings:  Evaluation of the left shoulder demonstrates the long head of the biceps tendon visualized within the bicipital groove. The biceps labral anchor is intact.    There is susceptibility artifact compatible with recent postoperative status with surgical hardware noted in the greater tuberosity. The supraspinatus tendon has some increased signal intensity along its posterior insertion (image 16 of series 6 and   image 11 of series 7. This is adjacent to the surgical anchor and may represent some granulation tissue however some focal fluid signal is noted with mild residual articular surface tear not entirely excluded. Bursal surface fibers appear intact.  The infraspinatus tendon has some bursal surface fraying along its anterior insertion. There is globular low T1 low T2 signal at the insertion of the infraspinatus tendon measuring 6 x 8 mm for which calcific tendinosis cannot be excluded. There is mild   grade interstitial linear signal within the infraspinatus tendon compatible with mild tearing without measurable tear along its insertion or tendon retraction. Trace fluid extends to the myotendinous junction.   The teres minor and subscapularis are intact.    No advanced muscle atrophy. Mild edema is noted within the deltoid muscle with some adjacent subcutaneous edema and focal fluid which may represent a port site.    There is some increased signal intensity within the posterior superior labrum with mild attenuation and fraying. Well-defined or discrete tear is not identified.    There is mild  edema in the greater tuberosity. Glenohumeral articulation is preserved.    There are degenerative changes at the acromioclavicular articulation. There is fluid in the subacromial subdeltoid bursa and trace joint effusion.          Impression:      Impression:    1. Postoperative changes status post rotator cuff repair with some irregularity involving the supraspinatus tendon along its insertion some of which may represent healing and forming granulation tissue. Residual mild grade articular surface tear is   suspected with some focal fluid signal noted.    2. Mild interstitial tearing of the infraspinatus tendon with some additional insertional tendinosis and trace fluid tracking to the myotendinous junction. Low T1 and low T2 signal along the anterior insertion of the infraspinatus tendon may represent   calcific tendinosis.    3. Fluid in the subacromial-subdeltoid bursa. There is mild edema within the deltoid muscle and some fluid within the adjacent subcutaneous soft tissue thickening which may represent a port site. No well-defined fluid collection or abscess noted.    4. Mild attenuation of fraying along the posterior margin of the superior labrum without discrete tear noted.        Electronically Signed: Noemy Pringle MD    4/4/2024 4:06 PM EDT    Workstation ID: [id number]    CT Angiogram Chest Pulmonary Embolism [[id number]] Collected: 04/04/24 1125     Updated: 04/04/24 1132    Narrative:      CT ANGIOGRAM CHEST PULMONARY EMBOLISM    Date of Exam: 4/4/2024 11:13 AM EDT    Indication: Pulmonary embolism (PE) suspected, high prob  DVT LUE.    Comparison: Chest CTA dated 3/26/2024    Technique: Axial CT images were obtained of the chest after the uneventful intravenous administration of iodinated contrast utilizing pulmonary embolism protocol.  Sagittal and coronal reconstructions were performed.  Automated exposure control and   iterative reconstruction methods were used.    FINDINGS:    Thoracic inlet:  Unremarkable.    Pulmonary arteries: No filling defects are identified within the pulmonary arteries to suggest acute pulmonary embolism.    Great vessels: The thoracic aorta and proximal arch vessels appear unremarkable.    Mediastinum/Pita: No pathologically enlarged mediastinal lymph nodes are seen. The esophagus appears unremarkable.    Lung parenchyma: The lungs appear adequately aerated. No acute consolidation is seen. No suspicious pulmonary nodules are seen.    Trachea and airways: The trachea and central airways appear unremarkable.    Pleural space: No significant pleural effusion or pneumothorax is seen.    Heart and pericardium: Mild coronary artery calcifications are present. Otherwise, the heart and pericardium appear unremarkable.    Chest wall: No acute or suspicious osseous or soft tissue lesion is identified.    Upper abdomen: No acute abnormality is identified within the visualized upper abdomen. Status post cholecystectomy.      Impression:      1.No acute abnormality is identified within the thorax. Specifically, there is no evidence of acute pulmonary embolism.  2.Please see above for additional details.      Electronically Signed: Tien Jameson MD    4/4/2024 11:30 AM EDT    Workstation ID: [id number]          ECG/EMG Results (all)       Procedure Component Value Units Date/Time    ECG 12 Lead Dyspnea [[id number]] Collected: 04/04/24 1001     Updated: 04/05/24 0601     QT Interval 346 ms      QTC Interval 425 ms     Narrative:      HEART RATE= 90  bpm  RR Interval= 664  ms  OR Interval= 155  ms  P Horizontal Axis= 16  deg  P Front Axis= 25  deg  QRSD Interval= 95  ms  QT Interval= 346  ms  QTcB= 425  ms  QRS Axis= 42  deg  T Wave Axis= 21  deg  - NORMAL ECG -  Sinus rhythm  No previous ECG available for comparison  Electronically Signed By: Gilberto Graff ([facility]) 05-Apr-2024 06:01:22  Date and Time of Study: 2024-04-04 10:01:02          Physician Progress Notes (all)    No notes of this type  exist for this encounter.       Consult Notes (all)    No notes of this type exist for this encounter.

## 2024-04-05 NOTE — OUTREACH NOTE
Prep Survey      Flowsheet Row Responses   [facility] facility patient discharged from? [facility]   Is LACE score < 7 ? Yes   Eligibility [facility]   Date of Admission 04/04/24   Date of Discharge 04/05/24   Discharge Disposition Home or Self Care   Discharge diagnosis Chest pain   Does the patient have one of the following disease processes/diagnoses(primary or secondary)? Other   Does the patient have Home health ordered? No   Is there a DME ordered? No   Prep survey completed? Yes            VLADIMIR COX - Registered Nurse

## 2024-04-08 ENCOUNTER — TRANSITIONAL CARE MANAGEMENT TELEPHONE ENCOUNTER (OUTPATIENT)
Dept: CALL CENTER | Facility: HOSPITAL | Age: 39
End: 2024-04-08
Payer: OTHER GOVERNMENT

## 2024-04-08 NOTE — OUTREACH NOTE
Call Center TCM Note      Flowsheet Row Responses   [facility] patient discharged from? [facility]   Does the patient have one of the following disease processes/diagnoses(primary or secondary)? Other   TCM attempt successful? Yes   Call start time 0815   Call end time 0818   Discharge diagnosis Chest pain   Meds reviewed with patient/caregiver? Yes   Is the patient having any side effects they believe may be caused by any medication additions or changes? No   Does the patient have all medications ordered at discharge? Yes   Is the patient taking all medications as directed (includes completed medication regime)? Yes   Does the patient have an appointment with their PCP within 7-14 days of discharge? Yes   Has home health visited the patient within 72 hours of discharge? N/A   Psychosocial issues? No   Did the patient receive a copy of their discharge instructions? Yes   Nursing interventions Reviewed instructions with patient   What is the patient's perception of their health status since discharge? Improving   Is the patient/caregiver able to teach back signs and symptoms related to disease process for when to call PCP? Yes   Is the patient/caregiver able to teach back signs and symptoms related to disease process for when to call 911? Yes   Is the patient/caregiver able to teach back the hierarchy of who to call/visit for symptoms/problems? PCP, Specialist, Home health nurse, Urgent Care, ED, 911 Yes   If the patient is a current smoker, are they able to teach back resources for cessation? Not a smoker   TCM call completed? Yes   Call end time 0818   Would this patient benefit from a Referral to Amb Social Work? No   Is the patient interested in additional calls from an ambulatory ? No            Maki Pickett LPN    4/8/2024, 08:23 EDT

## 2024-04-09 ENCOUNTER — PRIOR AUTHORIZATION (OUTPATIENT)
Dept: FAMILY MEDICINE CLINIC | Facility: CLINIC | Age: 39
End: 2024-04-09

## 2024-04-09 ENCOUNTER — OFFICE VISIT (OUTPATIENT)
Dept: FAMILY MEDICINE CLINIC | Facility: CLINIC | Age: 39
End: 2024-04-09
Payer: OTHER GOVERNMENT

## 2024-04-09 VITALS
RESPIRATION RATE: 18 BRPM | DIASTOLIC BLOOD PRESSURE: 80 MMHG | HEART RATE: 85 BPM | TEMPERATURE: 98.5 F | SYSTOLIC BLOOD PRESSURE: 102 MMHG | HEIGHT: 70 IN | WEIGHT: 222 LBS | BODY MASS INDEX: 31.78 KG/M2 | OXYGEN SATURATION: 98 %

## 2024-04-09 DIAGNOSIS — M54.50 CHRONIC LOW BACK PAIN, UNSPECIFIED BACK PAIN LATERALITY, UNSPECIFIED WHETHER SCIATICA PRESENT: Primary | ICD-10-CM

## 2024-04-09 DIAGNOSIS — M25.551 CHRONIC RIGHT HIP PAIN: ICD-10-CM

## 2024-04-09 DIAGNOSIS — G89.29 CHRONIC RIGHT HIP PAIN: ICD-10-CM

## 2024-04-09 DIAGNOSIS — G89.29 CHRONIC LOW BACK PAIN, UNSPECIFIED BACK PAIN LATERALITY, UNSPECIFIED WHETHER SCIATICA PRESENT: Primary | ICD-10-CM

## 2024-04-09 DIAGNOSIS — D64.9 LOW HEMOGLOBIN: ICD-10-CM

## 2024-04-09 DIAGNOSIS — M75.102 TEAR OF LEFT SUPRASPINATUS TENDON: ICD-10-CM

## 2024-04-09 DIAGNOSIS — F41.9 ANXIETY: ICD-10-CM

## 2024-04-09 DIAGNOSIS — M62.838 MUSCLE SPASM: ICD-10-CM

## 2024-04-09 DIAGNOSIS — R70.1 RETICULOCYTOSIS: ICD-10-CM

## 2024-04-09 DIAGNOSIS — I82.90 BLOOD CLOT IN VEIN: ICD-10-CM

## 2024-04-09 DIAGNOSIS — R70.0 ELEVATED SEDIMENTATION RATE: ICD-10-CM

## 2024-04-09 LAB
BACTERIA SPEC AEROBE CULT: NORMAL
BACTERIA SPEC AEROBE CULT: NORMAL

## 2024-04-09 PROCEDURE — 36415 COLL VENOUS BLD VENIPUNCTURE: CPT | Performed by: REGISTERED NURSE

## 2024-04-09 PROCEDURE — 80053 COMPREHEN METABOLIC PANEL: CPT | Performed by: REGISTERED NURSE

## 2024-04-09 PROCEDURE — 99214 OFFICE O/P EST MOD 30 MIN: CPT | Performed by: REGISTERED NURSE

## 2024-04-09 RX ORDER — PROPRANOLOL HYDROCHLORIDE 20 MG/1
20 TABLET ORAL 2 TIMES DAILY PRN
Qty: 60 TABLET | Refills: 2 | Status: SHIPPED | OUTPATIENT
Start: 2024-04-09 | End: 2024-04-09

## 2024-04-09 RX ORDER — HYDROCODONE BITARTRATE AND ACETAMINOPHEN 10; 325 MG/1; MG/1
1 TABLET ORAL EVERY 6 HOURS PRN
Qty: 90 TABLET | Refills: 0 | Status: SHIPPED | OUTPATIENT
Start: 2024-04-09

## 2024-04-09 RX ORDER — TIZANIDINE 4 MG/1
4 TABLET ORAL EVERY 8 HOURS PRN
Qty: 270 TABLET | Refills: 1 | Status: SHIPPED | OUTPATIENT
Start: 2024-04-09

## 2024-04-09 RX ORDER — PROPRANOLOL HYDROCHLORIDE 20 MG/1
20 TABLET ORAL 2 TIMES DAILY PRN
Qty: 180 TABLET | Refills: 1 | Status: SHIPPED | OUTPATIENT
Start: 2024-04-09

## 2024-04-09 RX ORDER — TIZANIDINE 4 MG/1
4 TABLET ORAL EVERY 8 HOURS PRN
Qty: 90 TABLET | Refills: 1 | Status: SHIPPED | OUTPATIENT
Start: 2024-04-09 | End: 2024-04-09

## 2024-04-09 NOTE — PROGRESS NOTES
Venipuncture Blood Specimen Collection  Venipuncture performed in rt arm via venipuncture by Judi Ames with good hemostasis. Patient tolerated the procedure well without complications.   04/09/24   Judi Ames

## 2024-04-09 NOTE — TELEPHONE ENCOUNTER
Amelie ivey (Key: [id number])  PA Case ID #: [id number]  Need Help? Call us at [phone]  Status  sent iconSent to Plan today  Drug  Zepbound 2.5MG/0.5ML pen-injectors  ePA cloud logo  Form

## 2024-04-09 NOTE — PROGRESS NOTES
Chief Complaint  Hospital Follow Up Visit    Subjective    History of Present Illness {CC  Problem List  Visit  Diagnosis   Encounters  Notes  Medications  Labs  Result Review Imaging  Media :23}     Amelie Henderson presents to [facility] for Hospital Follow Up Visit.      History of Present Illness  Patient is a 38 y.o. female  presents to the clinic today for 3-month follow-up for chronic pain, postsurgery follow-up for left shoulder repair, and recent abnormal lab work.  Patient denies any chest pain, shortness of breath, or any fevers.  Patient denies any known exposure to COVID, flu, or any other contagious illnesses.    In regards to chronic pain, patient has significant history of chronic back, hip, and shoulder pain.  Most recently patient had a tear to her left shoulder which she has had surgically worked on.  Patient's ice pad had dropped after surgery to which pulled on her left arm and follow-up imaging has showed that she still has a partial tear.  I have asked her to follow-up with her orthopedic surgeon in regards to this.    In regards to recent abnormal lab work, patient was found to have elevated sedimentation rate, elevated reticulocytes, chronic worsening low hemoglobin, and blood clot in left arm surgery.  Patient states that her surgeon recommended infectious disease consult to further investigate these findings.  I am more than happy to facilitate this referral.           Review of Systems   Constitutional: Negative.  Negative for activity change, chills, fatigue and fever.   HENT: Negative.  Negative for congestion, dental problem, ear pain, hearing loss, rhinorrhea, sinus pain, sore throat, tinnitus and trouble swallowing.    Eyes: Negative.  Negative for pain and visual disturbance.   Respiratory: Negative.  Negative for cough, chest tightness, shortness of breath and wheezing.    Cardiovascular: Negative.  Negative for chest pain, palpitations and leg  "swelling.   Gastrointestinal: Negative.  Negative for abdominal pain, diarrhea, nausea and vomiting.   Endocrine: Negative.  Negative for polydipsia, polyphagia and polyuria.   Genitourinary: Negative.  Negative for difficulty urinating, dysuria, frequency and urgency.   Musculoskeletal: Negative.  Positive for arthralgias. Negative for back pain and myalgias.   Skin: Negative.  Negative for color change, pallor, rash and wound.   Allergic/Immunologic: Negative.  Negative for environmental allergies.   Neurological: Negative.  Negative for dizziness, speech difficulty, weakness, light-headedness, numbness and headaches.   Hematological: Negative.    Psychiatric/Behavioral: Negative.  Negative for confusion, decreased concentration, self-injury and suicidal ideas. The patient is not nervous/anxious.    All other systems reviewed and are negative.       Objective     Vital Signs:   /80 (BP Location: Left arm, Patient Position: Sitting, Cuff Size: Adult)   Pulse 85   Temp 98.5 °F (36.9 °C) (Oral)   Resp 18   Ht 177.8 cm (70\")   Wt 101 kg (222 lb)   SpO2 98%   BMI 31.85 kg/m²   Current Outpatient Medications on File Prior to Visit   Medication Sig Dispense Refill    albuterol (PROVENTIL) (2.5 MG/3ML) 0.083% nebulizer solution Take 2.5 mg by nebulization Every 4 (Four) Hours As Needed for Wheezing or Shortness of Air. 120 mL 1    albuterol sulfate  (90 Base) MCG/ACT inhaler Inhale 2 puffs Every 4 (Four) Hours As Needed for Wheezing. 18 g 2    apixaban (ELIQUIS) 5 MG tablet tablet Take 1 tablet by mouth Every 12 (Twelve) Hours.      busPIRone (BUSPAR) 10 MG tablet Take 2 tablets by mouth 3 (Three) Times a Day As Needed.      Cholecalciferol 125 MCG (5000 UT) tablet Take 1 tablet by mouth Daily. 90 tablet 1    diphenhydrAMINE (BENADRYL) 25 mg capsule Take 1 capsule by mouth Every 6 (Six) Hours As Needed for Itching.      ferrous gluconate (FERGON) 324 MG tablet TAKE 1 TABLET BY MOUTH EVERY DAY WITH " BREAKFAST 90 tablet 1    fluticasone (FLONASE) 50 MCG/ACT nasal spray SPRAY 2 SPRAYS INTO THE NOSTRIL AS DIRECTED BY PROVIDER DAILY. 16 mL 3    galcanezumab-gnlm (EMGALITY) 120 MG/ML auto-injector pen Inject  under the skin into the appropriate area as directed Every 30 (Thirty) Days.      hydrOXYzine (ATARAX) 50 MG tablet MAY TAKE 1 TABLET 2 TIMES A DAY AS NEEDED FOR ITCHING, MAY ALSO TAKE 2 TABLETS AT NIGHT AS NEEDED. 120 tablet 3    l-methylfolate 7.5 MG tablet tablet Take  by mouth Daily.      levocetirizine (XYZAL) 5 MG tablet Take 1 tablet by mouth Every Morning.      Levomilnacipran HCl ER (Fetzima) 40 MG capsule sustained-release 24 hr Take 40 mg by mouth Daily. 90 capsule 1    losartan (COZAAR) 50 MG tablet Take 1 tablet by mouth Every Morning. 90 tablet 1    metoprolol tartrate (LOPRESSOR) 25 MG tablet Take 1 tablet by mouth Daily.      MILK THISTLE PO Take  by mouth.      Mirabegron ER (Myrbetriq) 50 MG tablet sustained-release 24 hour 24 hr tablet Take 50 mg by mouth Daily. 90 tablet 1    multivitamin with minerals tablet tablet 2 gummies po q am - smarty pants       mupirocin (BACTROBAN) 2 % cream Apply 1 application  topically to the appropriate area as directed 3 (Three) Times a Day. 30 g 1    naloxone (NARCAN) 4 MG/0.1ML nasal spray Call 911. Don't prime. [place] in 1 nostril for overdose. Repeat in 2-3 minutes in other nostril if no or minimal breathing/responsiveness. 2 each 0    NON FORMULARY Take 1 dose by mouth Daily. OSHWAGHANDHA SUPPLEMENT      omeprazole (priLOSEC) 20 MG capsule Take 1 capsule by mouth 2 (Two) Times a Day.      ondansetron (Zofran) 4 MG tablet Take 1 tablet by mouth Every 6 (Six) Hours As Needed for Nausea or Vomiting. 120 tablet 1    pregabalin (Lyrica) 50 MG capsule Take 1 capsule by mouth 3 (Three) Times a Day. 90 capsule 0    Probiotic Product (FORTIFY PROBIOTIC WOMENS EX ST PO) Take 1 capsule by mouth Daily.      promethazine (PHENERGAN) 25 MG tablet Take 1 tablet by mouth  Every 8 (Eight) Hours As Needed for Nausea or Vomiting. 30 tablet 1    rizatriptan (MAXALT) 10 MG tablet TAKE 1 TABLET AT THE ONSET OF HEADACHE MAY REPEAT IN 2 HOURS MAX OF 2 TABS IN 24 HOURS 27 tablet 1    sucralfate (CARAFATE) 1 g tablet Take 1 tablet by mouth 3 (Three) Times a Day Before Meals for 30 days. 90 tablet 0    traZODone (DESYREL) 50 MG tablet TAKE 1 TABLET BY MOUTH EVERY DAY AT NIGHT 90 tablet 1    ubrogepant (UBRELVY) 100 MG tablet Take 1 tablet by mouth 1 (One) Time As Needed.      Vitamin B Complex-C capsule Take 1 capsule by mouth Daily.      vitamin C (ASCORBIC ACID) 250 MG tablet Take 1 tablet by mouth Daily.      zonisamide (ZONEGRAN) 100 MG capsule Take 2 capsules by mouth Every Night. 180 capsule 0     No current facility-administered medications on file prior to visit.        Past Medical History:   Diagnosis Date    Allergic     Anemia     Anxiety     B12 deficiency     Callus     COVID     x 2     Depression     Dercum's disease     Difficulty walking     Endometriosis     Fallen arches     Fibromyalgia 02/15/2024    Gastritis     GERD (gastroesophageal reflux disease)     Hyperlipidemia     Borderline    Hypertension     Hypoglycemia     Ingrown toenail     Migraines     PTSD (post-traumatic stress disorder)     Shin splints     Stress fracture     Vitamin D deficiency       Past Surgical History:   Procedure Laterality Date    CARPAL TUNNEL RELEASE Right      SECTION      x 2     CHOLECYSTECTOMY      HYSTERECTOMY  2023    KNEE SURGERY Right     x 3     KNEE SURGERY Left     x 1     SHOULDER ACROMIOPLASTY WITH ROTATOR CUFF REPAIR Left 2021    Procedure: LEFT SHOULDER ARTHROSCOPY WITH ROTATOR CUFF REPAIR AND SUBACROMIAL DECOMPRESSION- SLAP;  Surgeon: Rianna Jarvis MD;  [place] [facility] [place]  Service: Orthopedics;  Laterality: Left;    SHOULDER ARTHROSCOPY W/ ROTATOR CUFF REPAIR Left 2024    Procedure: SHOULDER ARTHROSCOPY WITH ROTATOR CUFF REPAIR,  DECOMPRESSION AND PRP INJECTION;  Surgeon: Rianna Jarvis MD;  [place] [facility] [place] [facility] [place]  Service: [place]opedics;  Laterality: Left;      Family History   Problem Relation Age of Onset    Thyroid disease Mother     Glaucoma Mother     Hypertension Mother     Transient ischemic attack Mother     Bell's palsy Mother     Alcohol abuse Father     ADD / ADHD Sister     Depression Son     Pyloric stenosis Son     Hypertension Maternal Grandmother     Heart failure Maternal Grandmother     Stroke Maternal Grandmother     Diabetes Paternal Grandfather     Malig Hyperthermia Neg Hx       Social History     Socioeconomic History    Marital status:    Tobacco Use    Smoking status: Former     Current packs/day: 0.00     Average packs/day: 0.5 packs/day for 17.0 years (8.5 ttl pk-yrs)     Types: Cigarettes     Start date: 1999     Quit date: 2016     Years since quittin.2     Passive exposure: Past    Smokeless tobacco: Never   Vaping Use    Vaping status: Never Used   Substance and Sexual Activity    Alcohol use: Yes     Comment: Occassion    Drug use: Never    Sexual activity: Yes     Partners: Male     Birth control/protection: Tubal ligation, Hysterectomy, Surgical         Office Visit on 2024   Component Date Value Ref Range Status    Glucose 2024 94  65 - 99 mg/dL Final    BUN 2024 11  6 - 20 mg/dL Final    Creatinine 2024 0.78  0.57 - 1.00 mg/dL Final    Sodium 2024 140  136 - 145 mmol/L Final    Potassium 2024 4.4  3.5 - 5.2 mmol/L Final    Chloride 2024 105  98 - 107 mmol/L Final    CO2 2024 23.8  22.0 - 29.0 mmol/L Final    Calcium 2024 9.6  8.6 - 10.5 mg/dL Final    Total Protein 2024 6.9  6.0 - 8.5 g/dL Final    Albumin 2024 4.3  3.5 - 5.2 g/dL Final    ALT (SGPT) 2024 37 (H)  1 - 33 U/L Final    AST (SGOT) 2024 30  1 - 32 U/L Final    Alkaline Phosphatase 2024 112  39 - 117 U/L Final    Total Bilirubin 2024  0.2  0.0 - 1.2 mg/dL Final    Globulin 04/09/2024 2.6  gm/dL Final    A/G Ratio 04/09/2024 1.7  g/dL Final    BUN/Creatinine Ratio 04/09/2024 14.1  7.0 - 25.0 Final    Anion Gap 04/09/2024 11.2  5.0 - 15.0 mmol/L Final    eGFR 04/09/2024 99.2  >60.0 mL/min/1.73 Final   Admission on 04/04/2024, Discharged on 04/05/2024   Component Date Value Ref Range Status    QT Interval 04/04/2024 346  ms Final    QTC Interval 04/04/2024 425  ms Final    Glucose 04/04/2024 103 (H)  65 - 99 mg/dL Final    BUN 04/04/2024 10  6 - 20 mg/dL Final    Creatinine 04/04/2024 0.95  0.57 - 1.00 mg/dL Final    Sodium 04/04/2024 140  136 - 145 mmol/L Final    Potassium 04/04/2024 3.9  3.5 - 5.2 mmol/L Final    Chloride 04/04/2024 105  98 - 107 mmol/L Final    CO2 04/04/2024 24.0  22.0 - 29.0 mmol/L Final    Calcium 04/04/2024 10.1  8.6 - 10.5 mg/dL Final    Total Protein 04/04/2024 6.7  6.0 - 8.5 g/dL Final    Albumin 04/04/2024 4.3  3.5 - 5.2 g/dL Final    ALT (SGPT) 04/04/2024 34 (H)  1 - 33 U/L Final    AST (SGOT) 04/04/2024 18  1 - 32 U/L Final    Alkaline Phosphatase 04/04/2024 111  39 - 117 U/L Final    Total Bilirubin 04/04/2024 0.2  0.0 - 1.2 mg/dL Final    Globulin 04/04/2024 2.4  gm/dL Final    A/G Ratio 04/04/2024 1.8  g/dL Final    BUN/Creatinine Ratio 04/04/2024 10.5  7.0 - 25.0 Final    Anion Gap 04/04/2024 11.0  5.0 - 15.0 mmol/L Final    eGFR 04/04/2024 78.3  >60.0 mL/min/1.73 Final    proBNP 04/04/2024 112.4  0.0 - 450.0 pg/mL Final    HS Troponin T 04/04/2024 <6  <14 ng/L Final    Extra Tube 04/04/2024 Hold for add-ons.   Final    Auto resulted.    Extra Tube 04/04/2024 hold for add-on   Final    Auto resulted    Extra Tube 04/04/2024 Hold for add-ons.   Final    Auto resulted.    Extra Tube 04/04/2024 Hold for add-ons.   Final    Auto resulted    WBC 04/04/2024 6.18  3.40 - 10.80 10*3/mm3 Final    RBC 04/04/2024 4.13  3.77 - 5.28 10*6/mm3 Final    Hemoglobin 04/04/2024 11.8 (L)  12.0 - 15.9 g/dL Final    Hematocrit  04/04/2024 36.6  34.0 - 46.6 % Final    MCV 04/04/2024 88.6  79.0 - 97.0 fL Final    MCH 04/04/2024 28.6  26.6 - 33.0 pg Final    MCHC 04/04/2024 32.2  31.5 - 35.7 g/dL Final    RDW 04/04/2024 13.0  12.3 - 15.4 % Final    RDW-SD 04/04/2024 41.6  37.0 - 54.0 fl Final    MPV 04/04/2024 9.3  6.0 - 12.0 fL Final    Platelets 04/04/2024 348  140 - 450 10*3/mm3 Final    Neutrophil % 04/04/2024 58.7  42.7 - 76.0 % Final    Lymphocyte % 04/04/2024 31.6  19.6 - 45.3 % Final    Monocyte % 04/04/2024 5.3  5.0 - 12.0 % Final    Eosinophil % 04/04/2024 2.8  0.3 - 6.2 % Final    Basophil % 04/04/2024 0.5  0.0 - 1.5 % Final    Immature Grans % 04/04/2024 1.1 (H)  0.0 - 0.5 % Final    Neutrophils, Absolute 04/04/2024 3.63  1.70 - 7.00 10*3/mm3 Final    Lymphocytes, Absolute 04/04/2024 1.95  0.70 - 3.10 10*3/mm3 Final    Monocytes, Absolute 04/04/2024 0.33  0.10 - 0.90 10*3/mm3 Final    Eosinophils, Absolute 04/04/2024 0.17  0.00 - 0.40 10*3/mm3 Final    Basophils, Absolute 04/04/2024 0.03  0.00 - 0.20 10*3/mm3 Final    Immature Grans, Absolute 04/04/2024 0.07 (H)  0.00 - 0.05 10*3/mm3 Final    nRBC 04/04/2024 0.0  0.0 - 0.2 /100 WBC Final    ADENOVIRUS, PCR 04/04/2024 Not Detected  Not Detected Final    Coronavirus 229E 04/04/2024 Not Detected  Not Detected Final    Coronavirus HKU1 04/04/2024 Not Detected  Not Detected Final    Coronavirus NL63 04/04/2024 Not Detected  Not Detected Final    Coronavirus OC43 04/04/2024 Not Detected  Not Detected Final    COVID19 04/04/2024 Not Detected  Not Detected - Ref. Range Final    Human Metapneumovirus 04/04/2024 Not Detected  Not Detected Final    Human Rhinovirus/Enterovirus 04/04/2024 Not Detected  Not Detected Final    Influenza A PCR 04/04/2024 Not Detected  Not Detected Final    Influenza B PCR 04/04/2024 Not Detected  Not Detected Final    Parainfluenza Virus 1 04/04/2024 Not Detected  Not Detected Final    Parainfluenza Virus 2 04/04/2024 Not Detected  Not Detected Final     Parainfluenza Virus 3 04/04/2024 Not Detected  Not Detected Final    Parainfluenza Virus 4 04/04/2024 Not Detected  Not Detected Final    RSV, PCR 04/04/2024 Not Detected  Not Detected Final    Bordetella pertussis pcr 04/04/2024 Not Detected  Not Detected Final    Bordetella parapertussis PCR 04/04/2024 Not Detected  Not Detected Final    Chlamydophila pneumoniae PCR 04/04/2024 Not Detected  Not Detected Final    Mycoplasma pneumo by PCR 04/04/2024 Not Detected  Not Detected Final    Blood Culture 04/04/2024 No growth at 5 days   Final    Blood Culture 04/04/2024 No growth at 5 days   Final    Total Cholesterol 04/04/2024 254 (H)  0 - 200 mg/dL Final    Triglycerides 04/04/2024 110  0 - 150 mg/dL Final    HDL Cholesterol 04/04/2024 61 (H)  40 - 60 mg/dL Final    LDL Cholesterol  04/04/2024 174 (H)  0 - 100 mg/dL Final    VLDL Cholesterol 04/04/2024 19  5 - 40 mg/dL Final    LDL/HDL Ratio 04/04/2024 2.80   Final    BH CV STRESS PROTOCOL 1 04/05/2024 Pharmacologic   Final    Stage 1 04/05/2024 1.0   Final    HR Stage 1 04/05/2024 67   Final    BP Stage 1 04/05/2024 105/62   Final    Duration Min Stage 1 04/05/2024 0   Final    Duration Sec Stage 1 04/05/2024 10   Final    Stress Dose Regadenoson Stage 1 04/05/2024 0.40   Final    Stress Comments Stage 1 04/05/2024 10 sec bolus injection   Final    Stage 2 04/05/2024 2.0   Final    HR Stage 2 04/05/2024 92   Final    BP Stage 2 04/05/2024 109/68   Final    Duration Min Stage 2 04/05/2024 4   Final    Duration Sec Stage 2 04/05/2024 0   Final    Stress Comments Stage 2 04/05/2024 recovery   Final    Baseline HR 04/05/2024 67  bpm Final    Baseline BP 04/05/2024 105/62  mmHg Final    Peak HR 04/05/2024 94  bpm Final    Peak BP 04/05/2024 109/68  mmHg Final    Recovery HR 04/05/2024 85  bpm Final    Recovery BP 04/05/2024 113/64  mmHg Final    Target HR (85%) 04/05/2024 154  bpm Final    Max. Pred. HR (100%) 04/05/2024 181  bpm Final    Percent Max Pred HR 04/05/2024  51.93  % Final    Percent Target HR 04/05/2024 61  % Final    BH CV REST NUCLEAR ISOTOPE DOSE 04/05/2024 11.0  mCi Final    BH CV STRESS NUCLEAR ISOTOPE DOSE 04/05/2024 33.0  mCi Final    Nuc Stress EF 04/05/2024 80  % Final    Magnesium 04/05/2024 2.3  1.6 - 2.6 mg/dL Final    Glucose 04/05/2024 99  65 - 99 mg/dL Final    BUN 04/05/2024 11  6 - 20 mg/dL Final    Creatinine 04/05/2024 0.99  0.57 - 1.00 mg/dL Final    Sodium 04/05/2024 140  136 - 145 mmol/L Final    Potassium 04/05/2024 3.9  3.5 - 5.2 mmol/L Final    Chloride 04/05/2024 106  98 - 107 mmol/L Final    CO2 04/05/2024 23.0  22.0 - 29.0 mmol/L Final    Calcium 04/05/2024 9.5  8.6 - 10.5 mg/dL Final    BUN/Creatinine Ratio 04/05/2024 11.1  7.0 - 25.0 Final    Anion Gap 04/05/2024 11.0  5.0 - 15.0 mmol/L Final    eGFR 04/05/2024 74.5  >60.0 mL/min/1.73 Final    WBC 04/05/2024 5.89  3.40 - 10.80 10*3/mm3 Final    RBC 04/05/2024 3.83  3.77 - 5.28 10*6/mm3 Final    Hemoglobin 04/05/2024 10.9 (L)  12.0 - 15.9 g/dL Final    Hematocrit 04/05/2024 34.2  34.0 - 46.6 % Final    MCV 04/05/2024 89.3  79.0 - 97.0 fL Final    MCH 04/05/2024 28.5  26.6 - 33.0 pg Final    MCHC 04/05/2024 31.9  31.5 - 35.7 g/dL Final    RDW 04/05/2024 13.0  12.3 - 15.4 % Final    RDW-SD 04/05/2024 42.1  37.0 - 54.0 fl Final    MPV 04/05/2024 9.4  6.0 - 12.0 fL Final    Platelets 04/05/2024 285  140 - 450 10*3/mm3 Final    Neutrophil % 04/05/2024 53.5  42.7 - 76.0 % Final    Lymphocyte % 04/05/2024 36.8  19.6 - 45.3 % Final    Monocyte % 04/05/2024 5.9  5.0 - 12.0 % Final    Eosinophil % 04/05/2024 2.5  0.3 - 6.2 % Final    Basophil % 04/05/2024 0.3  0.0 - 1.5 % Final    Immature Grans % 04/05/2024 1.0 (H)  0.0 - 0.5 % Final    Neutrophils, Absolute 04/05/2024 3.14  1.70 - 7.00 10*3/mm3 Final    Lymphocytes, Absolute 04/05/2024 2.17  0.70 - 3.10 10*3/mm3 Final    Monocytes, Absolute 04/05/2024 0.35  0.10 - 0.90 10*3/mm3 Final    Eosinophils, Absolute 04/05/2024 0.15  0.00 - 0.40 10*3/mm3  Final    Basophils, Absolute 04/05/2024 0.02  0.00 - 0.20 10*3/mm3 Final    Immature Grans, Absolute 04/05/2024 0.06 (H)  0.00 - 0.05 10*3/mm3 Final    nRBC 04/05/2024 0.0  0.0 - 0.2 /100 WBC Final    Procalcitonin 04/05/2024 0.04  0.00 - 0.25 ng/mL Final    Reticulocyte % 04/05/2024 2.27 (H)  0.70 - 1.90 % Final    Reticulocyte Absolute 04/05/2024 0.0872  0.0200 - 0.1300 10*6/mm3 Final    C-Reactive Protein 04/05/2024 0.38  0.00 - 0.50 mg/dL Final    Sed Rate 04/05/2024 25 (H)  0 - 20 mm/hr Final    Hemoglobin 04/05/2024 11.1 (L)  12.0 - 15.9 g/dL Final    Hematocrit 04/05/2024 34.1  34.0 - 46.6 % Final   Admission on 03/26/2024, Discharged on 03/26/2024   Component Date Value Ref Range Status    Glucose 03/26/2024 112 (H)  65 - 99 mg/dL Final    BUN 03/26/2024 13  6 - 20 mg/dL Final    Creatinine 03/26/2024 0.86  0.57 - 1.00 mg/dL Final    Sodium 03/26/2024 136  136 - 145 mmol/L Final    Potassium 03/26/2024 3.8  3.5 - 5.2 mmol/L Final    Slight hemolysis detected by analyzer. Result may be falsely elevated.    Chloride 03/26/2024 102  98 - 107 mmol/L Final    CO2 03/26/2024 25.1  22.0 - 29.0 mmol/L Final    Calcium 03/26/2024 9.7  8.6 - 10.5 mg/dL Final    Total Protein 03/26/2024 7.1  6.0 - 8.5 g/dL Final    Albumin 03/26/2024 4.5  3.5 - 5.2 g/dL Final    ALT (SGPT) 03/26/2024 123 (H)  1 - 33 U/L Final    AST (SGOT) 03/26/2024 46 (H)  1 - 32 U/L Final    Alkaline Phosphatase 03/26/2024 134 (H)  39 - 117 U/L Final    Total Bilirubin 03/26/2024 0.3  0.0 - 1.2 mg/dL Final    Globulin 03/26/2024 2.6  gm/dL Final    A/G Ratio 03/26/2024 1.7  g/dL Final    BUN/Creatinine Ratio 03/26/2024 15.1  7.0 - 25.0 Final    Anion Gap 03/26/2024 8.9  5.0 - 15.0 mmol/L Final    eGFR 03/26/2024 88.3  >60.0 mL/min/1.73 Final    Blood Culture 03/26/2024 No growth at 5 days   Final    Blood Culture 03/26/2024 No growth at 5 days   Final    Sed Rate 03/26/2024 22 (H)  0 - 20 mm/hr Final    C-Reactive Protein 03/26/2024 1.06 (H)  0.00 -  0.50 mg/dL Final    WBC 03/26/2024 8.80  3.40 - 10.80 10*3/mm3 Final    RBC 03/26/2024 4.33  3.77 - 5.28 10*6/mm3 Final    Hemoglobin 03/26/2024 12.4  12.0 - 15.9 g/dL Final    Hematocrit 03/26/2024 37.6  34.0 - 46.6 % Final    MCV 03/26/2024 86.8  79.0 - 97.0 fL Final    MCH 03/26/2024 28.6  26.6 - 33.0 pg Final    MCHC 03/26/2024 33.0  31.5 - 35.7 g/dL Final    RDW 03/26/2024 12.9  12.3 - 15.4 % Final    RDW-SD 03/26/2024 41.0  37.0 - 54.0 fl Final    MPV 03/26/2024 9.3  6.0 - 12.0 fL Final    Platelets 03/26/2024 287  140 - 450 10*3/mm3 Final    Neutrophil % 03/26/2024 71.7  42.7 - 76.0 % Final    Lymphocyte % 03/26/2024 21.8  19.6 - 45.3 % Final    Monocyte % 03/26/2024 5.1  5.0 - 12.0 % Final    Eosinophil % 03/26/2024 1.0  0.3 - 6.2 % Final    Basophil % 03/26/2024 0.2  0.0 - 1.5 % Final    Immature Grans % 03/26/2024 0.2  0.0 - 0.5 % Final    Neutrophils, Absolute 03/26/2024 6.30  1.70 - 7.00 10*3/mm3 Final    Lymphocytes, Absolute 03/26/2024 1.92  0.70 - 3.10 10*3/mm3 Final    Monocytes, Absolute 03/26/2024 0.45  0.10 - 0.90 10*3/mm3 Final    Eosinophils, Absolute 03/26/2024 0.09  0.00 - 0.40 10*3/mm3 Final    Basophils, Absolute 03/26/2024 0.02  0.00 - 0.20 10*3/mm3 Final    Immature Grans, Absolute 03/26/2024 0.02  0.00 - 0.05 10*3/mm3 Final   Lab on 03/07/2024   Component Date Value Ref Range Status    C-Reactive Protein 03/07/2024 <0.30  0.00 - 0.50 mg/dL Final         Physical Exam  Vitals and nursing note reviewed.   Constitutional:       Appearance: Normal appearance. She is normal weight.   HENT:      Head: Normocephalic and atraumatic.   Cardiovascular:      Rate and Rhythm: Normal rate and regular rhythm.      Pulses: Normal pulses.      Heart sounds: Normal heart sounds. No murmur heard.     No friction rub. No gallop.   Pulmonary:      Effort: Pulmonary effort is normal. No respiratory distress.      Breath sounds: Normal breath sounds. No stridor. No wheezing, rhonchi or rales.   Chest:       Chest wall: No tenderness.   Abdominal:      General: Abdomen is flat. Bowel sounds are normal. There is no distension.      Palpations: Abdomen is soft. There is no mass.      Tenderness: There is no abdominal tenderness. There is no right CVA tenderness, left CVA tenderness, guarding or rebound.      Hernia: No hernia is present.   Musculoskeletal:      Left shoulder: Swelling and tenderness present. Decreased range of motion.   Skin:     General: Skin is warm and dry.      Capillary Refill: Capillary refill takes less than 2 seconds.      Coloration: Skin is not jaundiced or pale.   Neurological:      General: No focal deficit present.      Mental Status: She is alert and oriented to person, place, and time. Mental status is at baseline.      Motor: No weakness.      Coordination: Coordination normal.      Gait: Gait normal.   Psychiatric:         Mood and Affect: Mood normal.         Behavior: Behavior normal.         Thought Content: Thought content normal.         Judgment: Judgment normal.          Result Review  Data Reviewed:{ Labs  Result Review  Imaging  Med Tab  Media :23}   I have reviewed this patient's chart.  I have reviewed previous labs, previous imaging, previous medications, and previous encounters with notes that were available in this patient's chart.               Assessment and Plan {CC Problem List  Visit Diagnosis  ROS  Review (Popup)  [facility] [place]  Quality  BestPractice  Medications  SmartSets  SnapShot Encounters  Media :23}   Diagnoses and all orders for this visit:    1. Chronic low back pain, unspecified back pain laterality, unspecified whether sciatica present (Primary)  -     HYDROcodone-acetaminophen (NORCO)  MG per tablet; Take 1 tablet by mouth Every 6 (Six) Hours As Needed for Moderate Pain.  Dispense: 90 tablet; Refill: 0    2. Low hemoglobin  -     Cancel: Ambulatory Referral to Infectious Disease  -     Ambulatory Referral to Infectious Disease  -      Comprehensive Metabolic Panel    3. Reticulocytosis  -     Cancel: Ambulatory Referral to Infectious Disease  -     Ambulatory Referral to Infectious Disease    4. Elevated sedimentation rate  -     Cancel: Ambulatory Referral to Infectious Disease  -     Ambulatory Referral to Infectious Disease    5. Blood clot in vein  -     Cancel: Ambulatory Referral to Infectious Disease  -     Ambulatory Referral to Infectious Disease    6. Muscle spasm  -     Discontinue: tiZANidine (ZANAFLEX) 4 MG tablet; Take 1 tablet by mouth Every 8 (Eight) Hours As Needed for Muscle Spasms.  Dispense: 90 tablet; Refill: 1  -     tiZANidine (ZANAFLEX) 4 MG tablet; Take 1 tablet by mouth Every 8 (Eight) Hours As Needed for Muscle Spasms.  Dispense: 270 tablet; Refill: 1    7. Chronic right hip pain  -     HYDROcodone-acetaminophen (NORCO)  MG per tablet; Take 1 tablet by mouth Every 6 (Six) Hours As Needed for Moderate Pain.  Dispense: 90 tablet; Refill: 0    8. Tear of left supraspinatus tendon  -     HYDROcodone-acetaminophen (NORCO)  MG per tablet; Take 1 tablet by mouth Every 6 (Six) Hours As Needed for Moderate Pain.  Dispense: 90 tablet; Refill: 0    9. Anxiety  -     Discontinue: propranolol (INDERAL) 20 MG tablet; Take 1 tablet by mouth 2 (Two) Times a Day As Needed (ANXIETY).  Dispense: 60 tablet; Refill: 2  -     propranolol (INDERAL) 20 MG tablet; Take 1 tablet by mouth 2 (Two) Times a Day As Needed (ANXIETY).  Dispense: 180 tablet; Refill: 01        -Referral to infectious disease placed at patient's request due to multiple blood abnormalities.  -Zanaflex to help with muscle pain and spasming.  -Increase hydrocodone to 10/3/2025 during short duration of worsening left shoulder pain.  -Propranolol increased to help as needed with anxiety.  -ER red flags discussed with patient including risk versus benefit and education provided.  -Follow-up with me in 4 weeks or sooner if needed.    I spent 30 minutes caring for  Amelie on this date of service. This time includes time spent by me in the following activities:preparing for the visit, reviewing tests, obtaining and/or reviewing a separately obtained history, performing a medically appropriate examination and/or evaluation , counseling and educating the patient/family/caregiver, ordering medications, tests, or procedures, referring and communicating with other health care professionals , documenting information in the medical record, independently interpreting results and communicating that information with the patient/family/caregiver, and care coordination.    Follow Up {Instructions Charge Capture  Follow-up Communications :23}     Patient was given instructions and counseling regarding her condition or for health maintenance advice. Please see specific information pulled into the AVS (placed there by myself) if appropriate.    Return in about 4 weeks (around 5/7/2024) for Recheck.    BMI is >= 30 and <35. (Class 1 Obesity). The following options were offered after discussion;: exercise counseling/recommendations and nutrition counseling/recommendations       EDSON Cali, FNP-BC        Answers submitted by the patient for this visit:  Other (Submitted on 4/8/2024)  Please describe your symptoms.: Blood clot  Have you had these symptoms before?: No  How long have you been having these symptoms?: Greater than 2 weeks  Please list any medications you are currently taking for this condition.: Eliquis  Please describe any probable cause for these symptoms. : Left rotator cuff repair  Primary Reason for Visit (Submitted on 4/8/2024)  What is the primary reason for your visit?: Other

## 2024-04-10 LAB
ALBUMIN SERPL-MCNC: 4.3 G/DL (ref 3.5–5.2)
ALBUMIN/GLOB SERPL: 1.7 G/DL
ALP SERPL-CCNC: 112 U/L (ref 39–117)
ALT SERPL W P-5'-P-CCNC: 37 U/L (ref 1–33)
ANION GAP SERPL CALCULATED.3IONS-SCNC: 11.2 MMOL/L (ref 5–15)
AST SERPL-CCNC: 30 U/L (ref 1–32)
BILIRUB SERPL-MCNC: 0.2 MG/DL (ref 0–1.2)
BUN SERPL-MCNC: 11 MG/DL (ref 6–20)
BUN/CREAT SERPL: 14.1 (ref 7–25)
CALCIUM SPEC-SCNC: 9.6 MG/DL (ref 8.6–10.5)
CHLORIDE SERPL-SCNC: 105 MMOL/L (ref 98–107)
CO2 SERPL-SCNC: 23.8 MMOL/L (ref 22–29)
CREAT SERPL-MCNC: 0.78 MG/DL (ref 0.57–1)
EGFRCR SERPLBLD CKD-EPI 2021: 99.2 ML/MIN/1.73
GLOBULIN UR ELPH-MCNC: 2.6 GM/DL
GLUCOSE SERPL-MCNC: 94 MG/DL (ref 65–99)
POTASSIUM SERPL-SCNC: 4.4 MMOL/L (ref 3.5–5.2)
PROT SERPL-MCNC: 6.9 G/DL (ref 6–8.5)
SODIUM SERPL-SCNC: 140 MMOL/L (ref 136–145)

## 2024-04-16 ENCOUNTER — CLINICAL SUPPORT (OUTPATIENT)
Dept: FAMILY MEDICINE CLINIC | Facility: CLINIC | Age: 39
End: 2024-04-16
Payer: OTHER GOVERNMENT

## 2024-04-16 DIAGNOSIS — D64.9 LOW HEMOGLOBIN: ICD-10-CM

## 2024-04-16 DIAGNOSIS — R70.1 RETICULOCYTOSIS: ICD-10-CM

## 2024-04-16 LAB
HCT VFR BLD AUTO: 35.3 % (ref 34–46.6)
HGB BLD-MCNC: 11.5 G/DL (ref 12–15.9)
RETICS # AUTO: 0.09 10*6/MM3 (ref 0.02–0.13)
RETICS/RBC NFR AUTO: 2.12 % (ref 0.7–1.9)

## 2024-04-16 PROCEDURE — 36415 COLL VENOUS BLD VENIPUNCTURE: CPT | Performed by: REGISTERED NURSE

## 2024-04-16 PROCEDURE — 85014 HEMATOCRIT: CPT | Performed by: REGISTERED NURSE

## 2024-04-16 PROCEDURE — 85018 HEMOGLOBIN: CPT | Performed by: REGISTERED NURSE

## 2024-04-16 PROCEDURE — 85045 AUTOMATED RETICULOCYTE COUNT: CPT | Performed by: REGISTERED NURSE

## 2024-04-16 NOTE — PROGRESS NOTES
Venipuncture Blood Specimen Collection  Venipuncture performed in rt arm via venipuncture by Judi Ames with good hemostasis. Patient tolerated the procedure well without complications.   04/16/24   Judi Ames

## 2024-04-30 DIAGNOSIS — M79.7 FIBROMYALGIA: ICD-10-CM

## 2024-05-01 DIAGNOSIS — M79.7 FIBROMYALGIA: ICD-10-CM

## 2024-05-02 RX ORDER — PREGABALIN 50 MG/1
50 CAPSULE ORAL 3 TIMES DAILY
Qty: 90 CAPSULE | Refills: 0 | OUTPATIENT
Start: 2024-05-02

## 2024-05-03 RX ORDER — PREGABALIN 50 MG/1
50 CAPSULE ORAL 3 TIMES DAILY
Qty: 90 CAPSULE | Refills: 0 | Status: SHIPPED | OUTPATIENT
Start: 2024-05-03 | End: 2024-05-06 | Stop reason: SDUPTHER

## 2024-05-06 ENCOUNTER — OFFICE VISIT (OUTPATIENT)
Dept: FAMILY MEDICINE CLINIC | Facility: CLINIC | Age: 39
End: 2024-05-06
Payer: OTHER GOVERNMENT

## 2024-05-06 VITALS
HEIGHT: 70 IN | SYSTOLIC BLOOD PRESSURE: 120 MMHG | WEIGHT: 225.4 LBS | RESPIRATION RATE: 18 BRPM | HEART RATE: 81 BPM | OXYGEN SATURATION: 98 % | DIASTOLIC BLOOD PRESSURE: 88 MMHG | TEMPERATURE: 98.3 F | BODY MASS INDEX: 32.27 KG/M2

## 2024-05-06 DIAGNOSIS — R11.2 NAUSEA AND VOMITING, UNSPECIFIED VOMITING TYPE: ICD-10-CM

## 2024-05-06 DIAGNOSIS — G43.109 MIGRAINE WITH AURA AND WITHOUT STATUS MIGRAINOSUS, NOT INTRACTABLE: ICD-10-CM

## 2024-05-06 DIAGNOSIS — G25.81 RESTLESS LEG SYNDROME: ICD-10-CM

## 2024-05-06 DIAGNOSIS — K20.90 ESOPHAGITIS: ICD-10-CM

## 2024-05-06 DIAGNOSIS — I82.90 BLOOD CLOT IN VEIN: Primary | ICD-10-CM

## 2024-05-06 DIAGNOSIS — M79.7 FIBROMYALGIA: ICD-10-CM

## 2024-05-06 PROCEDURE — 99215 OFFICE O/P EST HI 40 MIN: CPT | Performed by: REGISTERED NURSE

## 2024-05-06 RX ORDER — PREGABALIN 50 MG/1
50 CAPSULE ORAL 3 TIMES DAILY
Qty: 270 CAPSULE | Refills: 0 | Status: SHIPPED | OUTPATIENT
Start: 2024-05-06

## 2024-05-06 RX ORDER — RIZATRIPTAN BENZOATE 10 MG/1
TABLET ORAL
Qty: 27 TABLET | Refills: 1 | Status: SHIPPED | OUTPATIENT
Start: 2024-05-06

## 2024-05-06 RX ORDER — ROPINIROLE 0.25 MG/1
0.25 TABLET, FILM COATED ORAL NIGHTLY
Qty: 30 TABLET | Refills: 1 | Status: SHIPPED | OUTPATIENT
Start: 2024-05-06

## 2024-05-06 RX ORDER — ONDANSETRON 4 MG/1
4 TABLET, FILM COATED ORAL EVERY 6 HOURS PRN
Qty: 360 TABLET | Refills: 1 | Status: SHIPPED | OUTPATIENT
Start: 2024-05-06

## 2024-05-06 RX ORDER — SUCRALFATE 1 G/1
1 TABLET ORAL
Qty: 270 TABLET | Refills: 1 | Status: SHIPPED | OUTPATIENT
Start: 2024-05-06 | End: 2024-06-05

## 2024-05-22 ENCOUNTER — OFFICE VISIT (OUTPATIENT)
Dept: CARDIOLOGY | Facility: CLINIC | Age: 39
End: 2024-05-22
Payer: OTHER GOVERNMENT

## 2024-05-22 VITALS
WEIGHT: 228 LBS | HEART RATE: 67 BPM | BODY MASS INDEX: 32.71 KG/M2 | DIASTOLIC BLOOD PRESSURE: 86 MMHG | OXYGEN SATURATION: 100 % | SYSTOLIC BLOOD PRESSURE: 127 MMHG

## 2024-05-22 DIAGNOSIS — D65 CONSUMPTIVE COAGULOPATHY: ICD-10-CM

## 2024-05-22 DIAGNOSIS — I82.A12 ACUTE DEEP VEIN THROMBOSIS (DVT) OF AXILLARY VEIN OF LEFT UPPER EXTREMITY: Primary | ICD-10-CM

## 2024-05-22 NOTE — LETTER
May 23, 2024       No Recipients    Patient: Amelie Henderson   YOB: 1985   Date of Visit: 5/22/2024     Dear Rianna Jarvis MD:       Thank you for referring Amelie Henderson to me for evaluation. Below are the relevant portions of my assessment and plan of care.    If you have questions, please do not hesitate to call me. I look forward to following Amelie along with you.         Sincerely,        EDSON Thompson        CC:   No Recipients    Denisa Galvan APRN  05/23/24 1246  Sign when Signing Visit  Cardiology Office Consultation      Encounter Date:  05/22/2024    Patient ID:   Amelie Henderson is a 39 y.o. female.    Reason For Consultation:  Left upper extremity DVT    History of Present Illness:  Dear  Damon Valera APRN  Amelie Henderson is a 39-year-old female with no known history of premature coronary or ischemic heart disease.  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  The patient is status post left rotator cuff repair 2/16/2024 at [facility] outpatient surgery center.  On 3/26/2024 she was.  Seen in the emergency department for continued pain as well as erythema and swelling of her left upper extremity.  She was diagnosed with DVT left upper extremity, she was started on Eliquis.  On 4/4/2024 she presented to the emergency department with complaint of transient increase in the redness and pain in her left shoulder radiating down her left arm and across to her left breast.  She reported significant pain located substernally unique from the pain she has been having related to her previous shoulder surgery.  She reported associated dyspnea as well as nausea without vomiting.  She underwent nuclear stress testing 4/5/24 that showed normal myocardial perfusion study with no evidence of ischemia, low risk study.   She had also been started on doxycycline for cellulitis of her left arm.  The patient was seen by primary care 5/6/2024 and stated she had not been told how long anticoagulation therapy would be indicated.  She presents today for further cardiology evaluation and recommendations.    Today, Amelie reports no complaints of chest pain, pressure, tightness or palpitations.  She denies any complaints of shortness of breath, dizziness or lightheadedness.  She does have some ongoing discomfort from her shoulder surgery but states it is improving.  She denies any near syncopal or syncopal episodes.  The patient was previously seen by hematologist-Dr. Smith-in January 2022 for easy bruising.      Assessment & Plan    Impressions:  Left upper extremity DVT  Recent left rotator cuff repair  Primary hypertension  Migrainous headaches  Vitamin B-12 deficiency    Recommendations:  Ms. Henderson has had recent nuclear stress testing with no evidence of prior myocardial injury, no evidence of ischemia and normal EF.  She has developed a DVT in her left upper extremity distal from her recent operative site.  The likely culprit for the DVT appears to be her surgical site.  She has been started on Eliquis and continues to report discomfort in that extremity.  At this juncture, I would recommend the patient follow-up with her hematologist or orthopedic surgeon for recommendations on anticoagulation treatment time.  Since she is still having discomfort and has been on Eliquis for over 6 weeks, I will re-Doppler that extremity and she should follow-up as recommended.        CHF Guideline Directed Medical Therapy  Beta Blocker:   ARNI/ACE/ARB:   SGLT 2 inhibitors:   Diuretics:   Aldosterone Antagonist:   Vasodilators & Nitrates:      Diagnoses and all orders for this visit:    1. Acute deep vein thrombosis (DVT) of axillary vein of left upper extremity (Primary)  -     US Venous Doppler Upper Extremity Left (duplex); Future    2. Consumptive  coagulopathy      Objective:    Vitals:  Vitals:    05/22/24 1050   BP: 127/86   Pulse: 67   SpO2: 100%   Weight: 103 kg (228 lb)       Review of Systems   Musculoskeletal:  Positive for joint pain.   All other systems reviewed and are negative.      Review of Systems:  The following systems were reviewed as they relate to the cardiovascular system: Constitutional, Eyes, ENT, Cardiovascular, Respiratory, Gastrointestinal, Integumentary, Neurological, Psychiatric, Hematologic, Endocrine, Musculoskeletal, and Genitourinary. The pertinent cardiovascular findings are reported above with all other cardiovascular points within those systems being negative.    Physical Exam:     General: Alert, cooperative, no distress, appears stated age  Head:  Normocephalic, atraumatic, mucous membranes moist  Eyes:  Conjunctiva/corneas clear, EOM's intact     Neck:  Supple,  no adenopathy;      Lungs: Clear to auscultation bilaterally, no wheezes rhonchi rales are noted  Chest wall: No tenderness  Heart::  Regular rate and rhythm, S1 and S2 normal, no murmur, rub or gallop  Musculoskeletal:   Ambulates freely without assistance  Abdomen: Soft, non-tender, nondistended bowel sounds active  Extremities: Healed surgical wounds to left shoulder.  No swelling or erythema noted to the left upper extremity  Pulses: 2+ and symmetric all extremities  Skin:  No rashes or lesions  Neuro/psych: A&O x3. CN II through XII are grossly intact with appropriate affect    History of Present Illness    Procedures    Allergies:  Allergies   Allergen Reactions   • [place] Anaphylaxis   • Codeine Hives and Itching   • Influenza Virus Vaccine Other (See Comments)     Egg allergy    • Latex Rash     Skin gets red and burns, skin starts peeling     • Meloxicam Other (See Comments)     Gastritis         Medication Review:     Current Outpatient Medications:   •  albuterol (PROVENTIL) (2.5 MG/3ML) 0.083% nebulizer solution, Take 2.5 mg by nebulization Every 4  (Four) Hours As Needed for Wheezing or Shortness of Air., Disp: 120 mL, Rfl: 1  •  albuterol sulfate  (90 Base) MCG/ACT inhaler, Inhale 2 puffs Every 4 (Four) Hours As Needed for Wheezing., Disp: 18 g, Rfl: 2  •  apixaban (ELIQUIS) 5 MG tablet tablet, Take 1 tablet by mouth Every 12 (Twelve) Hours., Disp: 180 tablet, Rfl: 1  •  busPIRone (BUSPAR) 10 MG tablet, Take 2 tablets by mouth 3 (Three) Times a Day As Needed., Disp: , Rfl:   •  Cholecalciferol 125 MCG (5000 UT) tablet, Take 1 tablet by mouth Daily., Disp: 90 tablet, Rfl: 1  •  diphenhydrAMINE (BENADRYL) 25 mg capsule, Take 1 capsule by mouth Every 6 (Six) Hours As Needed for Itching., Disp: , Rfl:   •  ferrous gluconate (FERGON) 324 MG tablet, TAKE 1 TABLET BY MOUTH EVERY DAY WITH BREAKFAST, Disp: 90 tablet, Rfl: 1  •  fluticasone (FLONASE) 50 MCG/ACT nasal spray, SPRAY 2 SPRAYS INTO THE NOSTRIL AS DIRECTED BY PROVIDER DAILY., Disp: 16 mL, Rfl: 3  •  galcanezumab-gnlm (EMGALITY) 120 MG/ML auto-injector pen, Inject  under the skin into the appropriate area as directed Every 30 (Thirty) Days., Disp: , Rfl:   •  HYDROcodone-acetaminophen (NORCO)  MG per tablet, Take 1 tablet by mouth Every 6 (Six) Hours As Needed for Moderate Pain., Disp: 90 tablet, Rfl: 0  •  hydrOXYzine (ATARAX) 50 MG tablet, MAY TAKE 1 TABLET 2 TIMES A DAY AS NEEDED FOR ITCHING, MAY ALSO TAKE 2 TABLETS AT NIGHT AS NEEDED., Disp: 120 tablet, Rfl: 3  •  l-methylfolate 7.5 MG tablet tablet, Take  by mouth Daily., Disp: , Rfl:   •  levocetirizine (XYZAL) 5 MG tablet, Take 1 tablet by mouth Every Morning., Disp: , Rfl:   •  Levomilnacipran HCl ER (Fetzima) 40 MG capsule sustained-release 24 hr, Take 40 mg by mouth Daily., Disp: 90 capsule, Rfl: 1  •  losartan (COZAAR) 50 MG tablet, Take 1 tablet by mouth Every Morning., Disp: 90 tablet, Rfl: 1  •  metoprolol tartrate (LOPRESSOR) 25 MG tablet, Take 1 tablet by mouth Daily., Disp: , Rfl:   •  MILK THISTLE PO, Take  by mouth., Disp: ,  Rfl:   •  Mirabegron ER (Myrbetriq) 50 MG tablet sustained-release 24 hour 24 hr tablet, Take 50 mg by mouth Daily., Disp: 90 tablet, Rfl: 1  •  multivitamin with minerals tablet tablet, 2 gummies po q am - smarty pants , Disp: , Rfl:   •  mupirocin (BACTROBAN) 2 % cream, Apply 1 application  topically to the appropriate area as directed 3 (Three) Times a Day., Disp: 30 g, Rfl: 1  •  naloxone (NARCAN) 4 MG/0.1ML nasal spray, Call 911. Don't prime. [place] in 1 nostril for overdose. Repeat in 2-3 minutes in other nostril if no or minimal breathing/responsiveness., Disp: 2 each, Rfl: 0  •  NON FORMULARY, Take 1 dose by mouth Daily. OSHWAGHANDHA SUPPLEMENT, Disp: , Rfl:   •  omeprazole (priLOSEC) 20 MG capsule, Take 1 capsule by mouth 2 (Two) Times a Day., Disp: , Rfl:   •  ondansetron (Zofran) 4 MG tablet, Take 1 tablet by mouth Every 6 (Six) Hours As Needed for Nausea or Vomiting., Disp: 360 tablet, Rfl: 1  •  pregabalin (Lyrica) 50 MG capsule, Take 1 capsule by mouth 3 (Three) Times a Day., Disp: 270 capsule, Rfl: 0  •  Probiotic Product (FORTIFY PROBIOTIC WOMENS EX ST PO), Take 1 capsule by mouth Daily., Disp: , Rfl:   •  promethazine (PHENERGAN) 25 MG tablet, Take 1 tablet by mouth Every 8 (Eight) Hours As Needed for Nausea or Vomiting., Disp: 30 tablet, Rfl: 1  •  propranolol (INDERAL) 20 MG tablet, Take 1 tablet by mouth 2 (Two) Times a Day As Needed (ANXIETY)., Disp: 180 tablet, Rfl: 01  •  rizatriptan (MAXALT) 10 MG tablet, TAKE 1 TABLET AT THE ONSET OF HEADACHE MAY REPEAT IN 2 HOURS MAX OF 2 TABS IN 24 HOURS, Disp: 27 tablet, Rfl: 1  •  rOPINIRole (REQUIP) 0.25 MG tablet, Take 1 tablet by mouth Every Night. Take 1 hour before bedtime., Disp: 30 tablet, Rfl: 1  •  sucralfate (CARAFATE) 1 g tablet, Take 1 tablet by mouth 3 (Three) Times a Day Before Meals for 30 days., Disp: 270 tablet, Rfl: 1  •  tiZANidine (ZANAFLEX) 4 MG tablet, Take 1 tablet by mouth Every 8 (Eight) Hours As Needed for Muscle Spasms., Disp:  270 tablet, Rfl: 1  •  traZODone (DESYREL) 50 MG tablet, TAKE 1 TABLET BY MOUTH EVERY DAY AT NIGHT, Disp: 90 tablet, Rfl: 1  •  ubrogepant (UBRELVY) 100 MG tablet, Take 1 tablet by mouth 1 (One) Time As Needed (migraines)., Disp: 90 tablet, Rfl: 1  •  Vitamin B Complex-C capsule, Take 1 capsule by mouth Daily., Disp: , Rfl:   •  vitamin C (ASCORBIC ACID) 250 MG tablet, Take 1 tablet by mouth Daily., Disp: , Rfl:   •  zonisamide (ZONEGRAN) 100 MG capsule, Take 2 capsules by mouth Every Night., Disp: 180 capsule, Rfl: 0    Family History:  Family History   Problem Relation Age of Onset   • Thyroid disease Mother    • Glaucoma Mother    • Hypertension Mother    • Transient ischemic attack Mother    • Bell's palsy Mother    • Alcohol abuse Father    • ADD / ADHD Sister    • Depression Son    • Pyloric stenosis Son    • Hypertension Maternal Grandmother    • Heart failure Maternal Grandmother    • Stroke Maternal Grandmother    • Diabetes Paternal Grandfather    • Malig Hyperthermia Neg Hx        Past Medical History:  Past Medical History:   Diagnosis Date   • Allergic    • Anemia    • Anxiety    • B12 deficiency    • Callus    • COVID     x 2    • Depression    • Dercum's disease    • Difficulty walking    • Endometriosis    • Fallen arches    • Fibromyalgia 02/15/2024   • Gastritis    • GERD (gastroesophageal reflux disease)    • Hyperlipidemia     Borderline   • Hypertension    • Hypoglycemia    • Ingrown toenail    • Migraines    • PTSD (post-traumatic stress disorder)    • Shin splints    • Stress fracture    • Vitamin D deficiency        Past surgical History:  Past Surgical History:   Procedure Laterality Date   • CARPAL TUNNEL RELEASE Right    •  SECTION      x 2    • CHOLECYSTECTOMY     • HYSTERECTOMY  2023   • KNEE SURGERY Right     x 3    • KNEE SURGERY Left     x 1    • SHOULDER ACROMIOPLASTY WITH ROTATOR CUFF REPAIR Left 2021    Procedure: LEFT SHOULDER ARTHROSCOPY WITH ROTATOR CUFF  "REPAIR AND SUBACROMIAL DECOMPRESSION- SLAP;  Surgeon: Rianna Jarvis MD;  [place] [facility] [place];  Service: Orthopedics;  Laterality: Left;   • SHOULDER ARTHROSCOPY W/ ROTATOR CUFF REPAIR Left 2024    Procedure: SHOULDER ARTHROSCOPY WITH ROTATOR CUFF REPAIR, DECOMPRESSION AND PRP INJECTION;  Surgeon: Rianna Jarvis MD;  Location: [facility] [place];  Service: Orthopedics;  Laterality: Left;       Social History:  Social History     Socioeconomic History   • Marital status:    Tobacco Use   • Smoking status: Former     Current packs/day: 0.00     Average packs/day: 0.5 packs/day for 17.0 years (8.5 ttl pk-yrs)     Types: Cigarettes     Start date: 1999     Quit date: 2016     Years since quittin.3     Passive exposure: Past   • Smokeless tobacco: Never   Vaping Use   • Vaping status: Never Used   Substance and Sexual Activity   • Alcohol use: Yes     Comment: Occassion   • Drug use: Never   • Sexual activity: Yes     Partners: Male     Birth control/protection: Tubal ligation, Hysterectomy, Surgical           NOTE: The following portions of the patient's history were reviewed and updated this visit as appropriate: allergies, current medications, past family history, past medical history, past social history, past surgical history and problem list.    EMR Dragon/Transcription:   \"Dictated utilizing Dragon dictation\".   Copied text in this note has been reviewed by me and is accurate as of 24.    "

## 2024-05-22 NOTE — PROGRESS NOTES
Cardiology Office Consultation      Encounter Date:  05/22/2024    Patient ID:   Amelie Henderson is a 39 y.o. female.    Reason For Consultation:  Left upper extremity DVT    History of Present Illness:  Dear  Damon Valera APRN  Amelie Henderson is a 39-year-old female with no known history of premature coronary or ischemic heart disease.  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  The patient is status post left rotator cuff repair 2/16/2024 at [facility] outpatient surgery center.  On 3/26/2024 she was.  Seen in the emergency department for continued pain as well as erythema and swelling of her left upper extremity.  She was diagnosed with DVT left upper extremity, she was started on Eliquis.  On 4/4/2024 she presented to the emergency department with complaint of transient increase in the redness and pain in her left shoulder radiating down her left arm and across to her left breast.  She reported significant pain located substernally unique from the pain she has been having related to her previous shoulder surgery.  She reported associated dyspnea as well as nausea without vomiting.  She underwent nuclear stress testing 4/5/24 that showed normal myocardial perfusion study with no evidence of ischemia, low risk study.  She had also been started on doxycycline for cellulitis of her left arm.  The patient was seen by primary care 5/6/2024 and stated she had not been told how long anticoagulation therapy would be indicated.  She presents today for further cardiology evaluation and recommendations.    Today, Amelie reports no complaints of chest pain, pressure, tightness or palpitations.  She denies any complaints of shortness of breath, dizziness or lightheadedness.  She does have some ongoing discomfort from her shoulder surgery but states it is  improving.  She denies any near syncopal or syncopal episodes.  The patient was previously seen by hematologist-Dr. Smith-in January 2022 for easy bruising.      Assessment & Plan    Impressions:  Left upper extremity DVT  Recent left rotator cuff repair  Primary hypertension  Migrainous headaches  Vitamin B-12 deficiency    Recommendations:  Ms. Henderson has had recent nuclear stress testing with no evidence of prior myocardial injury, no evidence of ischemia and normal EF.  She has developed a DVT in her left upper extremity distal from her recent operative site.  The likely culprit for the DVT appears to be her surgical site.  She has been started on Eliquis and continues to report discomfort in that extremity.  At this juncture, I would recommend the patient follow-up with her hematologist or orthopedic surgeon for recommendations on anticoagulation treatment time.  Since she is still having discomfort and has been on Eliquis for over 6 weeks, I will re-Doppler that extremity and she should follow-up as recommended.        CHF Guideline Directed Medical Therapy  Beta Blocker:   ARNI/ACE/ARB:   SGLT 2 inhibitors:   Diuretics:   Aldosterone Antagonist:   Vasodilators & Nitrates:      Diagnoses and all orders for this visit:    1. Acute deep vein thrombosis (DVT) of axillary vein of left upper extremity (Primary)  -     US Venous Doppler Upper Extremity Left (duplex); Future    2. Consumptive coagulopathy      Objective:    Vitals:  Vitals:    05/22/24 1050   BP: 127/86   Pulse: 67   SpO2: 100%   Weight: 103 kg (228 lb)       Review of Systems   Musculoskeletal:  Positive for joint pain.   All other systems reviewed and are negative.      Review of Systems:  The following systems were reviewed as they relate to the cardiovascular system: Constitutional, Eyes, ENT, Cardiovascular, Respiratory, Gastrointestinal, Integumentary, Neurological, Psychiatric, Hematologic, Endocrine, Musculoskeletal, and Genitourinary. The  pertinent cardiovascular findings are reported above with all other cardiovascular points within those systems being negative.    Physical Exam:     General: Alert, cooperative, no distress, appears stated age  Head:  Normocephalic, atraumatic, mucous membranes moist  Eyes:  Conjunctiva/corneas clear, EOM's intact     Neck:  Supple,  no adenopathy;      Lungs: Clear to auscultation bilaterally, no wheezes rhonchi rales are noted  Chest wall: No tenderness  Heart::  Regular rate and rhythm, S1 and S2 normal, no murmur, rub or gallop  Musculoskeletal:   Ambulates freely without assistance  Abdomen: Soft, non-tender, nondistended bowel sounds active  Extremities: Healed surgical wounds to left shoulder.  No swelling or erythema noted to the left upper extremity  Pulses: 2+ and symmetric all extremities  Skin:  No rashes or lesions  Neuro/psych: A&O x3. CN II through XII are grossly intact with appropriate affect    History of Present Illness    Procedures    Allergies:  Allergies   Allergen Reactions    Barber Anaphylaxis    Codeine Hives and Itching    Influenza Virus Vaccine Other (See Comments)     Egg allergy     Latex Rash     Skin gets red and burns, skin starts peeling      Meloxicam Other (See Comments)     Gastritis         Medication Review:     Current Outpatient Medications:     albuterol (PROVENTIL) (2.5 MG/3ML) 0.083% nebulizer solution, Take 2.5 mg by nebulization Every 4 (Four) Hours As Needed for Wheezing or Shortness of Air., Disp: 120 mL, Rfl: 1    albuterol sulfate  (90 Base) MCG/ACT inhaler, Inhale 2 puffs Every 4 (Four) Hours As Needed for Wheezing., Disp: 18 g, Rfl: 2    apixaban (ELIQUIS) 5 MG tablet tablet, Take 1 tablet by mouth Every 12 (Twelve) Hours., Disp: 180 tablet, Rfl: 1    busPIRone (BUSPAR) 10 MG tablet, Take 2 tablets by mouth 3 (Three) Times a Day As Needed., Disp: , Rfl:     Cholecalciferol 125 MCG (5000 UT) tablet, Take 1 tablet by mouth Daily., Disp: 90 tablet, Rfl: 1     diphenhydrAMINE (BENADRYL) 25 mg capsule, Take 1 capsule by mouth Every 6 (Six) Hours As Needed for Itching., Disp: , Rfl:     ferrous gluconate (FERGON) 324 MG tablet, TAKE 1 TABLET BY MOUTH EVERY DAY WITH BREAKFAST, Disp: 90 tablet, Rfl: 1    fluticasone (FLONASE) 50 MCG/ACT nasal spray, SPRAY 2 SPRAYS INTO THE NOSTRIL AS DIRECTED BY PROVIDER DAILY., Disp: 16 mL, Rfl: 3    galcanezumab-gnlm (EMGALITY) 120 MG/ML auto-injector pen, Inject  under the skin into the appropriate area as directed Every 30 (Thirty) Days., Disp: , Rfl:     HYDROcodone-acetaminophen (NORCO)  MG per tablet, Take 1 tablet by mouth Every 6 (Six) Hours As Needed for Moderate Pain., Disp: 90 tablet, Rfl: 0    hydrOXYzine (ATARAX) 50 MG tablet, MAY TAKE 1 TABLET 2 TIMES A DAY AS NEEDED FOR ITCHING, MAY ALSO TAKE 2 TABLETS AT NIGHT AS NEEDED., Disp: 120 tablet, Rfl: 3    l-methylfolate 7.5 MG tablet tablet, Take  by mouth Daily., Disp: , Rfl:     levocetirizine (XYZAL) 5 MG tablet, Take 1 tablet by mouth Every Morning., Disp: , Rfl:     Levomilnacipran HCl ER (Fetzima) 40 MG capsule sustained-release 24 hr, Take 40 mg by mouth Daily., Disp: 90 capsule, Rfl: 1    losartan (COZAAR) 50 MG tablet, Take 1 tablet by mouth Every Morning., Disp: 90 tablet, Rfl: 1    metoprolol tartrate (LOPRESSOR) 25 MG tablet, Take 1 tablet by mouth Daily., Disp: , Rfl:     MILK THISTLE PO, Take  by mouth., Disp: , Rfl:     Mirabegron ER (Myrbetriq) 50 MG tablet sustained-release 24 hour 24 hr tablet, Take 50 mg by mouth Daily., Disp: 90 tablet, Rfl: 1    multivitamin with minerals tablet tablet, 2 gummies po q am - smarty pants , Disp: , Rfl:     mupirocin (BACTROBAN) 2 % cream, Apply 1 application  topically to the appropriate area as directed 3 (Three) Times a Day., Disp: 30 g, Rfl: 1    naloxone (NARCAN) 4 MG/0.1ML nasal spray, Call 911. Don't prime. [place] in 1 nostril for overdose. Repeat in 2-3 minutes in other nostril if no or minimal  breathing/responsiveness., Disp: 2 each, Rfl: 0    NON FORMULARY, Take 1 dose by mouth Daily. [place] SUPPLEMENT, Disp: , Rfl:     omeprazole (priLOSEC) 20 MG capsule, Take 1 capsule by mouth 2 (Two) Times a Day., Disp: , Rfl:     ondansetron (Zofran) 4 MG tablet, Take 1 tablet by mouth Every 6 (Six) Hours As Needed for Nausea or Vomiting., Disp: 360 tablet, Rfl: 1    pregabalin (Lyrica) 50 MG capsule, Take 1 capsule by mouth 3 (Three) Times a Day., Disp: 270 capsule, Rfl: 0    Probiotic Product (FORTIFY PROBIOTIC WOMENS EX ST PO), Take 1 capsule by mouth Daily., Disp: , Rfl:     promethazine (PHENERGAN) 25 MG tablet, Take 1 tablet by mouth Every 8 (Eight) Hours As Needed for Nausea or Vomiting., Disp: 30 tablet, Rfl: 1    propranolol (INDERAL) 20 MG tablet, Take 1 tablet by mouth 2 (Two) Times a Day As Needed (ANXIETY)., Disp: 180 tablet, Rfl: 01    rizatriptan (MAXALT) 10 MG tablet, TAKE 1 TABLET AT THE ONSET OF HEADACHE MAY REPEAT IN 2 HOURS MAX OF 2 TABS IN 24 HOURS, Disp: 27 tablet, Rfl: 1    rOPINIRole (REQUIP) 0.25 MG tablet, Take 1 tablet by mouth Every Night. Take 1 hour before bedtime., Disp: 30 tablet, Rfl: 1    sucralfate (CARAFATE) 1 g tablet, Take 1 tablet by mouth 3 (Three) Times a Day Before Meals for 30 days., Disp: 270 tablet, Rfl: 1    tiZANidine (ZANAFLEX) 4 MG tablet, Take 1 tablet by mouth Every 8 (Eight) Hours As Needed for Muscle Spasms., Disp: 270 tablet, Rfl: 1    traZODone (DESYREL) 50 MG tablet, TAKE 1 TABLET BY MOUTH EVERY DAY AT NIGHT, Disp: 90 tablet, Rfl: 1    ubrogepant (UBRELVY) 100 MG tablet, Take 1 tablet by mouth 1 (One) Time As Needed (migraines)., Disp: 90 tablet, Rfl: 1    Vitamin B Complex-C capsule, Take 1 capsule by mouth Daily., Disp: , Rfl:     vitamin C (ASCORBIC ACID) 250 MG tablet, Take 1 tablet by mouth Daily., Disp: , Rfl:     zonisamide (ZONEGRAN) 100 MG capsule, Take 2 capsules by mouth Every Night., Disp: 180 capsule, Rfl: 0    Family History:  Family History    Problem Relation Age of Onset    Thyroid disease Mother     Glaucoma Mother     Hypertension Mother     Transient ischemic attack Mother     Bell's palsy Mother     Alcohol abuse Father     ADD / ADHD Sister     Depression Son     Pyloric stenosis Son     Hypertension Maternal Grandmother     Heart failure Maternal Grandmother     Stroke Maternal Grandmother     Diabetes Paternal Grandfather     Malig Hyperthermia Neg Hx        Past Medical History:  Past Medical History:   Diagnosis Date    Allergic     Anemia     Anxiety     B12 deficiency     Callus     COVID     x 2     Depression     Dercum's disease     Difficulty walking     Endometriosis     Fallen arches     Fibromyalgia 02/15/2024    Gastritis     GERD (gastroesophageal reflux disease)     Hyperlipidemia     Borderline    Hypertension     Hypoglycemia     Ingrown toenail     Migraines     PTSD (post-traumatic stress disorder)     Shin splints     Stress fracture     Vitamin D deficiency        Past surgical History:  Past Surgical History:   Procedure Laterality Date    CARPAL TUNNEL RELEASE Right      SECTION      x 2     CHOLECYSTECTOMY      HYSTERECTOMY  2023    KNEE SURGERY Right     x 3     KNEE SURGERY Left     x 1     SHOULDER ACROMIOPLASTY WITH ROTATOR CUFF REPAIR Left 2021    Procedure: LEFT SHOULDER ARTHROSCOPY WITH ROTATOR CUFF REPAIR AND SUBACROMIAL DECOMPRESSION- SLAP;  Surgeon: Rianna Jarvis MD;  Location: [facility];  Service: Orthopedics;  Laterality: Left;    SHOULDER ARTHROSCOPY W/ ROTATOR CUFF REPAIR Left 2024    Procedure: SHOULDER ARTHROSCOPY WITH ROTATOR CUFF REPAIR, DECOMPRESSION AND PRP INJECTION;  Surgeon: Rianna Jarvis MD;  Location: [facility];  Service: Orthopedics;  Laterality: Left;       Social History:  Social History     Socioeconomic History    Marital status:    Tobacco Use    Smoking status: Former     Current packs/day: 0.00     Average packs/day: 0.5 packs/day for  "17.0 years (8.5 ttl pk-yrs)     Types: Cigarettes     Start date: 1999     Quit date: 2016     Years since quittin.3     Passive exposure: Past    Smokeless tobacco: Never   Vaping Use    Vaping status: Never Used   Substance and Sexual Activity    Alcohol use: Yes     Comment: Occassion    Drug use: Never    Sexual activity: Yes     Partners: Male     Birth control/protection: Tubal ligation, Hysterectomy, Surgical           NOTE: The following portions of the patient's history were reviewed and updated this visit as appropriate: allergies, current medications, past family history, past medical history, past social history, past surgical history and problem list.    EMR Dragon/Transcription:   \"Dictated utilizing Dragon dictation\".   Copied text in this note has been reviewed by me and is accurate as of 24.    "

## 2024-05-24 ENCOUNTER — OFFICE (AMBULATORY)
Dept: URBAN - METROPOLITAN AREA CLINIC 64 | Facility: CLINIC | Age: 39
End: 2024-05-24
Payer: OTHER GOVERNMENT

## 2024-05-24 VITALS
HEART RATE: 78 BPM | HEIGHT: 70 IN | SYSTOLIC BLOOD PRESSURE: 127 MMHG | WEIGHT: 228 LBS | DIASTOLIC BLOOD PRESSURE: 90 MMHG

## 2024-05-24 DIAGNOSIS — R11.10 VOMITING, UNSPECIFIED: ICD-10-CM

## 2024-05-24 DIAGNOSIS — R11.0 NAUSEA: ICD-10-CM

## 2024-05-24 DIAGNOSIS — K21.9 GASTRO-ESOPHAGEAL REFLUX DISEASE WITHOUT ESOPHAGITIS: ICD-10-CM

## 2024-05-24 PROCEDURE — 99213 OFFICE O/P EST LOW 20 MIN: CPT | Performed by: INTERNAL MEDICINE

## 2024-05-24 RX ORDER — SUCRALFATE 1 G/1
TABLET ORAL
Qty: 270 | Status: COMPLETED
End: 2024-05-24 | Stop reason: NON-AVAIL

## 2024-05-28 DIAGNOSIS — I82.622 ACUTE DEEP VEIN THROMBOSIS (DVT) OF LEFT UPPER EXTREMITY, UNSPECIFIED VEIN: Primary | ICD-10-CM

## 2024-05-29 ENCOUNTER — TELEPHONE (OUTPATIENT)
Dept: ONCOLOGY | Facility: CLINIC | Age: 39
End: 2024-05-29

## 2024-05-29 NOTE — TELEPHONE ENCOUNTER
Hub staff attempted to follow warm transfer process and was unsuccessful     Caller: Amelie Henderson    Relationship to patient: Self    Best call back number: [phone]    Patient is needing: PATIENT REQUESTED A LESS THAN 24 HOUR APPT. AT THE [place] LOCATION, THEREFORE I TRIED TO CALL, I WENT AHEAD & SCHEDULED BUT IF NOT OKAY, PLEASE CALL PATIENT ASAP.

## 2024-06-06 ENCOUNTER — PATIENT ROUNDING (BHMG ONLY) (OUTPATIENT)
Dept: CARDIOLOGY | Facility: CLINIC | Age: 39
End: 2024-06-06
Payer: OTHER GOVERNMENT

## 2024-06-07 ENCOUNTER — HOSPITAL ENCOUNTER (OUTPATIENT)
Dept: CARDIOLOGY | Facility: HOSPITAL | Age: 39
Discharge: HOME | End: 2024-06-07
Payer: OTHER GOVERNMENT

## 2024-06-07 DIAGNOSIS — I82.622 ACUTE DEEP VEIN THROMBOSIS (DVT) OF LEFT UPPER EXTREMITY, UNSPECIFIED VEIN: ICD-10-CM

## 2024-06-07 LAB
BH CV UPPER VENOUS LEFT AXILLARY AUGMENT: NORMAL
BH CV UPPER VENOUS LEFT AXILLARY COMPRESS: NORMAL
BH CV UPPER VENOUS LEFT AXILLARY PHASIC: NORMAL
BH CV UPPER VENOUS LEFT AXILLARY SPONT: NORMAL
BH CV UPPER VENOUS LEFT BASILIC FOREARM COMPRESS: NORMAL
BH CV UPPER VENOUS LEFT BASILIC UPPER COMPRESS: NORMAL
BH CV UPPER VENOUS LEFT BRACHIAL COMPRESS: NORMAL
BH CV UPPER VENOUS LEFT CEPHALIC FOREARM COMPRESS: NORMAL
BH CV UPPER VENOUS LEFT CEPHALIC UPPER COMPRESS: NORMAL
BH CV UPPER VENOUS LEFT INTERNAL JUGULAR AUGMENT: NORMAL
BH CV UPPER VENOUS LEFT INTERNAL JUGULAR COMPRESS: NORMAL
BH CV UPPER VENOUS LEFT INTERNAL JUGULAR PHASIC: NORMAL
BH CV UPPER VENOUS LEFT INTERNAL JUGULAR SPONT: NORMAL
BH CV UPPER VENOUS LEFT RADIAL COMPRESS: NORMAL
BH CV UPPER VENOUS LEFT SUBCLAVIAN AUGMENT: NORMAL
BH CV UPPER VENOUS LEFT SUBCLAVIAN COMPRESS: NORMAL
BH CV UPPER VENOUS LEFT SUBCLAVIAN PHASIC: NORMAL
BH CV UPPER VENOUS LEFT SUBCLAVIAN SPONT: NORMAL
BH CV UPPER VENOUS LEFT ULNAR COMPRESS: NORMAL
BH CV UPPER VENOUS RIGHT INTERNAL JUGULAR AUGMENT: NORMAL
BH CV UPPER VENOUS RIGHT INTERNAL JUGULAR COMPRESS: NORMAL
BH CV UPPER VENOUS RIGHT INTERNAL JUGULAR PHASIC: NORMAL
BH CV UPPER VENOUS RIGHT INTERNAL JUGULAR SPONT: NORMAL
BH CV UPPER VENOUS RIGHT SUBCLAVIAN AUGMENT: NORMAL
BH CV UPPER VENOUS RIGHT SUBCLAVIAN COMPRESS: NORMAL
BH CV UPPER VENOUS RIGHT SUBCLAVIAN PHASIC: NORMAL
BH CV UPPER VENOUS RIGHT SUBCLAVIAN SPONT: NORMAL

## 2024-06-07 PROCEDURE — 93971 EXTREMITY STUDY: CPT

## 2024-06-10 ENCOUNTER — OFFICE VISIT (OUTPATIENT)
Dept: FAMILY MEDICINE CLINIC | Facility: CLINIC | Age: 39
End: 2024-06-10
Payer: OTHER GOVERNMENT

## 2024-06-10 VITALS
TEMPERATURE: 97.2 F | SYSTOLIC BLOOD PRESSURE: 120 MMHG | HEIGHT: 70 IN | RESPIRATION RATE: 18 BRPM | BODY MASS INDEX: 32.38 KG/M2 | WEIGHT: 226.2 LBS | OXYGEN SATURATION: 99 % | HEART RATE: 74 BPM | DIASTOLIC BLOOD PRESSURE: 90 MMHG

## 2024-06-10 DIAGNOSIS — M79.7 FIBROMYALGIA: ICD-10-CM

## 2024-06-10 DIAGNOSIS — Z13.1 SCREENING FOR DIABETES MELLITUS: ICD-10-CM

## 2024-06-10 DIAGNOSIS — G25.81 RESTLESS LEG SYNDROME: ICD-10-CM

## 2024-06-10 DIAGNOSIS — Z13.220 SCREENING FOR LIPID DISORDERS: ICD-10-CM

## 2024-06-10 DIAGNOSIS — M54.50 CHRONIC LOW BACK PAIN, UNSPECIFIED BACK PAIN LATERALITY, UNSPECIFIED WHETHER SCIATICA PRESENT: ICD-10-CM

## 2024-06-10 DIAGNOSIS — R53.83 FATIGUE, UNSPECIFIED TYPE: ICD-10-CM

## 2024-06-10 DIAGNOSIS — Z13.0 SCREENING FOR ENDOCRINE, METABOLIC AND IMMUNITY DISORDER: ICD-10-CM

## 2024-06-10 DIAGNOSIS — Z13.29 SCREENING FOR ENDOCRINE, METABOLIC AND IMMUNITY DISORDER: ICD-10-CM

## 2024-06-10 DIAGNOSIS — Z13.228 SCREENING FOR ENDOCRINE, METABOLIC AND IMMUNITY DISORDER: ICD-10-CM

## 2024-06-10 DIAGNOSIS — E66.01 MORBID (SEVERE) OBESITY DUE TO EXCESS CALORIES: Primary | ICD-10-CM

## 2024-06-10 DIAGNOSIS — Z13.21 ENCOUNTER FOR VITAMIN DEFICIENCY SCREENING: ICD-10-CM

## 2024-06-10 DIAGNOSIS — E55.9 VITAMIN D DEFICIENCY: ICD-10-CM

## 2024-06-10 DIAGNOSIS — Z13.29 SCREENING FOR THYROID DISORDER: ICD-10-CM

## 2024-06-10 DIAGNOSIS — E53.8 B12 DEFICIENCY: ICD-10-CM

## 2024-06-10 DIAGNOSIS — G89.29 CHRONIC LOW BACK PAIN, UNSPECIFIED BACK PAIN LATERALITY, UNSPECIFIED WHETHER SCIATICA PRESENT: ICD-10-CM

## 2024-06-10 PROCEDURE — 84402 ASSAY OF FREE TESTOSTERONE: CPT | Performed by: REGISTERED NURSE

## 2024-06-10 PROCEDURE — 82672 ASSAY OF ESTROGEN: CPT | Performed by: REGISTERED NURSE

## 2024-06-10 PROCEDURE — 82607 VITAMIN B-12: CPT | Performed by: REGISTERED NURSE

## 2024-06-10 PROCEDURE — 84466 ASSAY OF TRANSFERRIN: CPT | Performed by: REGISTERED NURSE

## 2024-06-10 PROCEDURE — 80053 COMPREHEN METABOLIC PANEL: CPT | Performed by: REGISTERED NURSE

## 2024-06-10 PROCEDURE — 82306 VITAMIN D 25 HYDROXY: CPT | Performed by: REGISTERED NURSE

## 2024-06-10 PROCEDURE — 84443 ASSAY THYROID STIM HORMONE: CPT | Performed by: REGISTERED NURSE

## 2024-06-10 PROCEDURE — 36415 COLL VENOUS BLD VENIPUNCTURE: CPT | Performed by: REGISTERED NURSE

## 2024-06-10 PROCEDURE — 85025 COMPLETE CBC W/AUTO DIFF WBC: CPT | Performed by: REGISTERED NURSE

## 2024-06-10 PROCEDURE — 82746 ASSAY OF FOLIC ACID SERUM: CPT | Performed by: REGISTERED NURSE

## 2024-06-10 PROCEDURE — 84146 ASSAY OF PROLACTIN: CPT | Performed by: REGISTERED NURSE

## 2024-06-10 PROCEDURE — 80061 LIPID PANEL: CPT | Performed by: REGISTERED NURSE

## 2024-06-10 PROCEDURE — 83001 ASSAY OF GONADOTROPIN (FSH): CPT | Performed by: REGISTERED NURSE

## 2024-06-10 PROCEDURE — 83002 ASSAY OF GONADOTROPIN (LH): CPT | Performed by: REGISTERED NURSE

## 2024-06-10 PROCEDURE — 83036 HEMOGLOBIN GLYCOSYLATED A1C: CPT | Performed by: REGISTERED NURSE

## 2024-06-10 PROCEDURE — 83540 ASSAY OF IRON: CPT | Performed by: REGISTERED NURSE

## 2024-06-10 PROCEDURE — 99215 OFFICE O/P EST HI 40 MIN: CPT | Performed by: REGISTERED NURSE

## 2024-06-10 RX ORDER — SEMAGLUTIDE 0.25 MG/.5ML
0.25 INJECTION, SOLUTION SUBCUTANEOUS WEEKLY
Qty: 2 ML | Refills: 2 | Status: SHIPPED | OUTPATIENT
Start: 2024-06-10

## 2024-06-10 RX ORDER — EPINEPHRINE 0.3 MG/.3ML
0.3 INJECTION SUBCUTANEOUS ONCE
COMMUNITY

## 2024-06-10 RX ORDER — ROPINIROLE 0.5 MG/1
0.5 TABLET, FILM COATED ORAL NIGHTLY
Qty: 90 TABLET | Refills: 1 | Status: SHIPPED | OUTPATIENT
Start: 2024-06-10

## 2024-06-10 NOTE — PROGRESS NOTES
Chief Complaint  Follow-up (Patient is here for a 1 month follow up)    Subjective    History of Present Illness {CC  Problem List  Visit  Diagnosis   Encounters  Notes  Medications  Labs  Result Review Imaging  Media :23}     Amelie Henderson presents to [facility] for Follow-up (Patient is here for a 1 month follow up).      History of Present Illness  Patient is a 39 y.o. female  presents to the clinic today for 3-month follow-up for fibromyalgia, chronic back pain, obesity, and restless leg syndrome.  Patient denies any chest pain, shortness of breath, or any fevers.  Patient denies any known exposure to COVID, flu, or any other contagious illnesses.    In regards for chronic fibromyalgia, patient is currently taking Lyrica to manage this.  Patient shares that the Lyrica does help and has no significant concerns or questions regarding her Lyrica or fibromyalgia at this time.    In regards to chronic back pain, patient has had extensive imaging and requires continued hydrocodone.  She had shoulder surgery as well as chronic back pain that she is needed this hydrocodone for.  Patient would like to continue medication treatment of hydrocodone at this time.  Patient was willing to update narcotic agreement and urine drug screen today.    In regards to vitamin deficiency, patient shares that she has a history of significant vitamin deficiencies including vitamin D, B12, and other vitamins.  Patient is requesting to have these vitamin levels checked today before she sees Dr. Smith so that she can discuss this with all of her other blood work with Dr. Smith as well.    In regards to obesity, patient has tried to get Wegovy or Zepbound covered through her insurance but has not been able to.  She is requesting a prescription be sent to compound pharmacy.  She is discussed compounding medication for Wegovy at [facility] pharmacy.  Patient was agreeable to sign compounding prescription  waiver today after discussing this waiver.  Medication has been sent over to [facility] pharmacy at patient's request.  Patient verbalized understanding.    In regards to restless leg syndrome, patient is currently taking Requip to help manage this.  Patient is currently only 0.25 mg and shares that it helps some but would like to increase her dosing to see if this is more effective.  Dosing increase has been sent to pharmacy.  Patient has no further questions in regards to restless leg syndrome or Requip at this time.       Review of Systems   Constitutional: Negative.  Negative for activity change, chills, fatigue and fever.   HENT: Negative.  Negative for congestion, dental problem, ear pain, hearing loss, rhinorrhea, sinus pain, sore throat, tinnitus and trouble swallowing.    Eyes: Negative.  Negative for pain and visual disturbance.   Respiratory: Negative.  Negative for cough, chest tightness, shortness of breath and wheezing.    Cardiovascular: Negative.  Negative for chest pain, palpitations and leg swelling.   Gastrointestinal: Negative.  Negative for abdominal pain, diarrhea, nausea and vomiting.   Endocrine: Negative.  Negative for polydipsia, polyphagia and polyuria.   Genitourinary: Negative.  Negative for difficulty urinating, dysuria, frequency and urgency.   Musculoskeletal: Negative.  Positive for back pain. Negative for arthralgias and myalgias.   Skin: Negative.  Negative for color change, pallor, rash and wound.   Allergic/Immunologic: Negative.  Negative for environmental allergies.   Neurological: Negative.  Negative for dizziness, speech difficulty, weakness, light-headedness, numbness and headaches.   Hematological: Negative.    Psychiatric/Behavioral: Negative.  Positive for sleep disturbance. Negative for confusion, decreased concentration, self-injury and suicidal ideas. The patient is not nervous/anxious.    All other systems reviewed and are negative.       Objective    "  Vital Signs:   /90 (BP Location: Left arm, Patient Position: Sitting, Cuff Size: Large Adult)   Pulse 74   Temp 97.2 °F (36.2 °C) (Temporal)   Resp 18   Ht 177.8 cm (70\")   Wt 103 kg (226 lb 3.2 oz)   SpO2 99%   BMI 32.46 kg/m²   Current Outpatient Medications on File Prior to Visit   Medication Sig Dispense Refill    albuterol (PROVENTIL) (2.5 MG/3ML) 0.083% nebulizer solution Take 2.5 mg by nebulization Every 4 (Four) Hours As Needed for Wheezing or Shortness of Air. 120 mL 1    albuterol sulfate  (90 Base) MCG/ACT inhaler Inhale 2 puffs Every 4 (Four) Hours As Needed for Wheezing. 18 g 2    busPIRone (BUSPAR) 10 MG tablet Take 2 tablets by mouth 3 (Three) Times a Day As Needed.      diphenhydrAMINE (BENADRYL) 25 mg capsule Take 1 capsule by mouth Every 6 (Six) Hours As Needed for Itching.      EPINEPHrine (EPIPEN) 0.3 MG/0.3ML solution auto-injector injection Inject 0.3 mL into the appropriate muscle as directed by prescriber 1 (One) Time.      ferrous gluconate (FERGON) 324 MG tablet TAKE 1 TABLET BY MOUTH EVERY DAY WITH BREAKFAST 90 tablet 1    fluticasone (FLONASE) 50 MCG/ACT nasal spray SPRAY 2 SPRAYS INTO THE NOSTRIL AS DIRECTED BY PROVIDER DAILY. 16 mL 3    galcanezumab-gnlm (EMGALITY) 120 MG/ML auto-injector pen Inject  under the skin into the appropriate area as directed Every 30 (Thirty) Days.      HYDROcodone-acetaminophen (NORCO)  MG per tablet Take 1 tablet by mouth Every 6 (Six) Hours As Needed for Moderate Pain. 90 tablet 0    hydrOXYzine (ATARAX) 50 MG tablet MAY TAKE 1 TABLET 2 TIMES A DAY AS NEEDED FOR ITCHING, MAY ALSO TAKE 2 TABLETS AT NIGHT AS NEEDED. 120 tablet 3    l-methylfolate 7.5 MG tablet tablet Take  by mouth Daily.      levocetirizine (XYZAL) 5 MG tablet Take 1 tablet by mouth Every Morning.      Levomilnacipran HCl ER (Fetzima) 40 MG capsule sustained-release 24 hr Take 40 mg by mouth Daily. 90 capsule 1    losartan (COZAAR) 50 MG tablet Take 1 tablet by " mouth Every Morning. 90 tablet 1    metoprolol tartrate (LOPRESSOR) 25 MG tablet Take 1 tablet by mouth Daily.      Mirabegron ER (Myrbetriq) 50 MG tablet sustained-release 24 hour 24 hr tablet Take 50 mg by mouth Daily. 90 tablet 1    mupirocin (BACTROBAN) 2 % cream Apply 1 application  topically to the appropriate area as directed 3 (Three) Times a Day. 30 g 1    naloxone (NARCAN) 4 MG/0.1ML nasal spray Call 911. Don't prime. [place] in 1 nostril for overdose. Repeat in 2-3 minutes in other nostril if no or minimal breathing/responsiveness. 2 each 0    omeprazole (priLOSEC) 20 MG capsule Take 1 capsule by mouth 2 (Two) Times a Day.      ondansetron (Zofran) 4 MG tablet Take 1 tablet by mouth Every 6 (Six) Hours As Needed for Nausea or Vomiting. 360 tablet 1    pregabalin (Lyrica) 50 MG capsule Take 1 capsule by mouth 3 (Three) Times a Day. 270 capsule 0    promethazine (PHENERGAN) 25 MG tablet Take 1 tablet by mouth Every 8 (Eight) Hours As Needed for Nausea or Vomiting. 30 tablet 1    propranolol (INDERAL) 20 MG tablet Take 1 tablet by mouth 2 (Two) Times a Day As Needed (ANXIETY). 180 tablet 01    rizatriptan (MAXALT) 10 MG tablet TAKE 1 TABLET AT THE ONSET OF HEADACHE MAY REPEAT IN 2 HOURS MAX OF 2 TABS IN 24 HOURS 27 tablet 1    tiZANidine (ZANAFLEX) 4 MG tablet Take 1 tablet by mouth Every 8 (Eight) Hours As Needed for Muscle Spasms. 270 tablet 1    traZODone (DESYREL) 50 MG tablet TAKE 1 TABLET BY MOUTH EVERY DAY AT NIGHT 90 tablet 1    ubrogepant (UBRELVY) 100 MG tablet Take 1 tablet by mouth 1 (One) Time As Needed (migraines). 90 tablet 1    [DISCONTINUED] rOPINIRole (REQUIP) 0.25 MG tablet Take 1 tablet by mouth Every Night. Take 1 hour before bedtime. 30 tablet 1    [DISCONTINUED] zonisamide (ZONEGRAN) 100 MG capsule Take 2 capsules by mouth Every Night. 180 capsule 0    apixaban (ELIQUIS) 5 MG tablet tablet Take 1 tablet by mouth Every 12 (Twelve) Hours. (Patient not taking: Reported on 6/10/2024) 180  tablet 1    Cholecalciferol 125 MCG (5000 UT) tablet Take 1 tablet by mouth Daily. (Patient not taking: Reported on 6/10/2024) 90 tablet 1    vitamin C (ASCORBIC ACID) 250 MG tablet Take 1 tablet by mouth Daily. (Patient not taking: Reported on 6/10/2024)      [DISCONTINUED] MILK THISTLE PO Take  by mouth. (Patient not taking: Reported on 6/10/2024)      [DISCONTINUED] multivitamin with minerals tablet tablet 2 gummies po q am - smarty pants  (Patient not taking: Reported on 6/10/2024)      [DISCONTINUED] NON FORMULARY Take 1 dose by mouth Daily. OSHWAGHANDHA SUPPLEMENT (Patient not taking: Reported on 6/10/2024)      [DISCONTINUED] Probiotic Product (FORTIFY PROBIOTIC WOMENS EX ST PO) Take 1 capsule by mouth Daily. (Patient not taking: Reported on 6/10/2024)      [DISCONTINUED] Vitamin B Complex-C capsule Take 1 capsule by mouth Daily. (Patient not taking: Reported on 6/10/2024)       No current facility-administered medications on file prior to visit.        Past Medical History:   Diagnosis Date    Allergic     Anemia     Anxiety     B12 deficiency     Callus     COVID     x 2     Depression     Dercum's disease     Difficulty walking     Endometriosis     Fallen [place]     Fibromyalgia 02/15/2024    Gastritis     GERD (gastroesophageal reflux disease)     Hyperlipidemia     Borderline    Hypertension     Hypoglycemia     Ingrown toenail     Migraines     PTSD (post-traumatic stress disorder)     Shin splints     Stress fracture     Vitamin D deficiency       Past Surgical History:   Procedure Laterality Date    CARPAL TUNNEL RELEASE Right      SECTION      x 2     CHOLECYSTECTOMY      HYSTERECTOMY  2023    KNEE SURGERY Right     x 3     KNEE SURGERY Left     x 1     SHOULDER ACROMIOPLASTY WITH ROTATOR CUFF REPAIR Left 2021    Procedure: LEFT SHOULDER ARTHROSCOPY WITH ROTATOR CUFF REPAIR AND SUBACROMIAL DECOMPRESSION- SLAP;  Surgeon: Rianna Jarvis MD;  Location: [facility] [place] [facility];   Service: Orthopedics;  Laterality: Left;    SHOULDER ARTHROSCOPY W/ ROTATOR CUFF REPAIR Left 2024    Procedure: SHOULDER ARTHROSCOPY WITH ROTATOR CUFF REPAIR, DECOMPRESSION AND PRP INJECTION;  Surgeon: Rianna Jarvis MD;  Location: [facility] OR [facility] [place];  Service: Orthopedics;  Laterality: Left;      Family History   Problem Relation Age of Onset    Thyroid disease Mother     Glaucoma Mother     Hypertension Mother     Transient ischemic attack Mother     Bell's palsy Mother     Alcohol abuse Father     ADD / ADHD Sister     Depression Son     Pyloric stenosis Son     Hypertension Maternal Grandmother     Heart failure Maternal Grandmother     Stroke Maternal Grandmother     Diabetes Paternal Grandfather     Malig Hyperthermia Neg Hx       Social History     Socioeconomic History    Marital status:    Tobacco Use    Smoking status: Former     Current packs/day: 0.00     Average packs/day: 0.5 packs/day for 17.0 years (8.5 ttl pk-yrs)     Types: Cigarettes     Start date: 1999     Quit date: 2016     Years since quittin.4     Passive exposure: Past    Smokeless tobacco: Never   Vaping Use    Vaping status: Never Used   Substance and Sexual Activity    Alcohol use: Yes     Comment: Occassion    Drug use: Never    Sexual activity: Yes     Partners: Male     Birth control/protection: Tubal ligation, Hysterectomy, Surgical         Hospital Outpatient Visit on 2024   Component Date Value Ref Range Status    Right Internal Jugular Spont 2024 Y   Final    Right Internal Jugular Phasic 2024 Y   Final    Right Internal Jugular Compress 2024 C   Final    Right Internal Jugular Augment 2024 Y   Final    Right Subclavian Spont 2024 Y   Final    Right Subclavian Phasic 2024 Y   Final    Right Subclavian Compress 2024 C   Final    Right Subclavian Augment 2024 Y   Final    Left Internal Jugular Spont 2024 Y   Final    Left Internal Jugular Phasic  06/07/2024 Y   Final    Left Internal Jugular Compress 06/07/2024 C   Final    Left Internal Jugular Augment 06/07/2024 Y   Final    Left Subclavian Spont 06/07/2024 Y   Final    Left Subclavian Phasic 06/07/2024 Y   Final    Left Subclavian Compress 06/07/2024 C   Final    Left Subclavian Augment 06/07/2024 Y   Final    Left Axillary Spont 06/07/2024 Y   Final    Left Axillary Phasic 06/07/2024 Y   Final    Left Axillary Compress 06/07/2024 C   Final    Left Axillary Augment 06/07/2024 Y   Final    Left Brachial Compress 06/07/2024 C   Final    Left Radial Compress 06/07/2024 C   Final    Left Ulnar Compress 06/07/2024 C   Final    Left Basilic Upper Compress 06/07/2024 C   Final    Left Basilic Forearm Compress 06/07/2024 C   Final    Left Cephalic Upper Compress 06/07/2024 C   Final    Left Cephalic Forearm Compress 06/07/2024 C   Final   Clinical Support on 04/16/2024   Component Date Value Ref Range Status    Hemoglobin 04/16/2024 11.5 (L)  12.0 - 15.9 g/dL Final    Hematocrit 04/16/2024 35.3  34.0 - 46.6 % Final    Reticulocyte % 04/16/2024 2.12 (H)  0.70 - 1.90 % Final    Reticulocyte Absolute 04/16/2024 0.0882  0.0200 - 0.1300 10*6/mm3 Final   Office Visit on 04/09/2024   Component Date Value Ref Range Status    Glucose 04/09/2024 94  65 - 99 mg/dL Final    BUN 04/09/2024 11  6 - 20 mg/dL Final    Creatinine 04/09/2024 0.78  0.57 - 1.00 mg/dL Final    Sodium 04/09/2024 140  136 - 145 mmol/L Final    Potassium 04/09/2024 4.4  3.5 - 5.2 mmol/L Final    Chloride 04/09/2024 105  98 - 107 mmol/L Final    CO2 04/09/2024 23.8  22.0 - 29.0 mmol/L Final    Calcium 04/09/2024 9.6  8.6 - 10.5 mg/dL Final    Total Protein 04/09/2024 6.9  6.0 - 8.5 g/dL Final    Albumin 04/09/2024 4.3  3.5 - 5.2 g/dL Final    ALT (SGPT) 04/09/2024 37 (H)  1 - 33 U/L Final    AST (SGOT) 04/09/2024 30  1 - 32 U/L Final    Alkaline Phosphatase 04/09/2024 112  39 - 117 U/L Final    Total Bilirubin 04/09/2024 0.2  0.0 - 1.2 mg/dL Final     Globulin 04/09/2024 2.6  gm/dL Final    A/G Ratio 04/09/2024 1.7  g/dL Final    BUN/Creatinine Ratio 04/09/2024 14.1  7.0 - 25.0 Final    Anion Gap 04/09/2024 11.2  5.0 - 15.0 mmol/L Final    eGFR 04/09/2024 99.2  >60.0 mL/min/1.73 Final   Admission on 04/04/2024, Discharged on 04/05/2024   Component Date Value Ref Range Status    QT Interval 04/04/2024 346  ms Final    QTC Interval 04/04/2024 425  ms Final    Glucose 04/04/2024 103 (H)  65 - 99 mg/dL Final    BUN 04/04/2024 10  6 - 20 mg/dL Final    Creatinine 04/04/2024 0.95  0.57 - 1.00 mg/dL Final    Sodium 04/04/2024 140  136 - 145 mmol/L Final    Potassium 04/04/2024 3.9  3.5 - 5.2 mmol/L Final    Chloride 04/04/2024 105  98 - 107 mmol/L Final    CO2 04/04/2024 24.0  22.0 - 29.0 mmol/L Final    Calcium 04/04/2024 10.1  8.6 - 10.5 mg/dL Final    Total Protein 04/04/2024 6.7  6.0 - 8.5 g/dL Final    Albumin 04/04/2024 4.3  3.5 - 5.2 g/dL Final    ALT (SGPT) 04/04/2024 34 (H)  1 - 33 U/L Final    AST (SGOT) 04/04/2024 18  1 - 32 U/L Final    Alkaline Phosphatase 04/04/2024 111  39 - 117 U/L Final    Total Bilirubin 04/04/2024 0.2  0.0 - 1.2 mg/dL Final    Globulin 04/04/2024 2.4  gm/dL Final    A/G Ratio 04/04/2024 1.8  g/dL Final    BUN/Creatinine Ratio 04/04/2024 10.5  7.0 - 25.0 Final    Anion Gap 04/04/2024 11.0  5.0 - 15.0 mmol/L Final    eGFR 04/04/2024 78.3  >60.0 mL/min/1.73 Final    proBNP 04/04/2024 112.4  0.0 - 450.0 pg/mL Final    HS Troponin T 04/04/2024 <6  <14 ng/L Final    Extra Tube 04/04/2024 Hold for add-ons.   Final    Auto resulted.    Extra Tube 04/04/2024 hold for add-on   Final    Auto resulted    Extra Tube 04/04/2024 Hold for add-ons.   Final    Auto resulted.    Extra Tube 04/04/2024 Hold for add-ons.   Final    Auto resulted    WBC 04/04/2024 6.18  3.40 - 10.80 10*3/mm3 Final    RBC 04/04/2024 4.13  3.77 - 5.28 10*6/mm3 Final    Hemoglobin 04/04/2024 11.8 (L)  12.0 - 15.9 g/dL Final    Hematocrit 04/04/2024 36.6  34.0 - 46.6 % Final     MCV 04/04/2024 88.6  79.0 - 97.0 fL Final    MCH 04/04/2024 28.6  26.6 - 33.0 pg Final    MCHC 04/04/2024 32.2  31.5 - 35.7 g/dL Final    RDW 04/04/2024 13.0  12.3 - 15.4 % Final    RDW-SD 04/04/2024 41.6  37.0 - 54.0 fl Final    MPV 04/04/2024 9.3  6.0 - 12.0 fL Final    Platelets 04/04/2024 348  140 - 450 10*3/mm3 Final    Neutrophil % 04/04/2024 58.7  42.7 - 76.0 % Final    Lymphocyte % 04/04/2024 31.6  19.6 - 45.3 % Final    Monocyte % 04/04/2024 5.3  5.0 - 12.0 % Final    Eosinophil % 04/04/2024 2.8  0.3 - 6.2 % Final    Basophil % 04/04/2024 0.5  0.0 - 1.5 % Final    Immature Grans % 04/04/2024 1.1 (H)  0.0 - 0.5 % Final    Neutrophils, Absolute 04/04/2024 3.63  1.70 - 7.00 10*3/mm3 Final    Lymphocytes, Absolute 04/04/2024 1.95  0.70 - 3.10 10*3/mm3 Final    Monocytes, Absolute 04/04/2024 0.33  0.10 - 0.90 10*3/mm3 Final    Eosinophils, Absolute 04/04/2024 0.17  0.00 - 0.40 10*3/mm3 Final    Basophils, Absolute 04/04/2024 0.03  0.00 - 0.20 10*3/mm3 Final    Immature Grans, Absolute 04/04/2024 0.07 (H)  0.00 - 0.05 10*3/mm3 Final    nRBC 04/04/2024 0.0  0.0 - 0.2 /100 WBC Final    ADENOVIRUS, PCR 04/04/2024 Not Detected  Not Detected Final    Coronavirus 229E 04/04/2024 Not Detected  Not Detected Final    Coronavirus HKU1 04/04/2024 Not Detected  Not Detected Final    Coronavirus NL63 04/04/2024 Not Detected  Not Detected Final    Coronavirus OC43 04/04/2024 Not Detected  Not Detected Final    COVID19 04/04/2024 Not Detected  Not Detected - Ref. Range Final    Human Metapneumovirus 04/04/2024 Not Detected  Not Detected Final    Human Rhinovirus/Enterovirus 04/04/2024 Not Detected  Not Detected Final    Influenza A PCR 04/04/2024 Not Detected  Not Detected Final    Influenza B PCR 04/04/2024 Not Detected  Not Detected Final    Parainfluenza Virus 1 04/04/2024 Not Detected  Not Detected Final    Parainfluenza Virus 2 04/04/2024 Not Detected  Not Detected Final    Parainfluenza Virus 3 04/04/2024 Not Detected   Not Detected Final    Parainfluenza Virus 4 04/04/2024 Not Detected  Not Detected Final    RSV, PCR 04/04/2024 Not Detected  Not Detected Final    Bordetella pertussis pcr 04/04/2024 Not Detected  Not Detected Final    Bordetella parapertussis PCR 04/04/2024 Not Detected  Not Detected Final    Chlamydophila pneumoniae PCR 04/04/2024 Not Detected  Not Detected Final    Mycoplasma pneumo by PCR 04/04/2024 Not Detected  Not Detected Final    Blood Culture 04/04/2024 No growth at 5 days   Final    Blood Culture 04/04/2024 No growth at 5 days   Final    Total Cholesterol 04/04/2024 254 (H)  0 - 200 mg/dL Final    Triglycerides 04/04/2024 110  0 - 150 mg/dL Final    HDL Cholesterol 04/04/2024 61 (H)  40 - 60 mg/dL Final    LDL Cholesterol  04/04/2024 174 (H)  0 - 100 mg/dL Final    VLDL Cholesterol 04/04/2024 19  5 - 40 mg/dL Final    LDL/HDL Ratio 04/04/2024 2.80   Final    BH CV STRESS PROTOCOL 1 04/05/2024 Pharmacologic   Final    Stage 1 04/05/2024 1.0   Final    HR Stage 1 04/05/2024 67   Final    BP Stage 1 04/05/2024 105/62   Final    Duration Min Stage 1 04/05/2024 0   Final    Duration Sec Stage 1 04/05/2024 10   Final    Stress Dose Regadenoson Stage 1 04/05/2024 0.40   Final    Stress Comments Stage 1 04/05/2024 10 sec bolus injection   Final    Stage 2 04/05/2024 2.0   Final    HR Stage 2 04/05/2024 92   Final    BP Stage 2 04/05/2024 109/68   Final    Duration Min Stage 2 04/05/2024 4   Final    Duration Sec Stage 2 04/05/2024 0   Final    Stress Comments Stage 2 04/05/2024 recovery   Final    Baseline HR 04/05/2024 67  bpm Final    Baseline BP 04/05/2024 105/62  mmHg Final    Peak HR 04/05/2024 94  bpm Final    Peak BP 04/05/2024 109/68  mmHg Final    Recovery HR 04/05/2024 85  bpm Final    Recovery BP 04/05/2024 113/64  mmHg Final    Target HR (85%) 04/05/2024 154  bpm Final    Max. Pred. HR (100%) 04/05/2024 181  bpm Final    Percent Max Pred HR 04/05/2024 51.93  % Final    Percent Target HR 04/05/2024 61   % Final     CV REST NUCLEAR ISOTOPE DOSE 04/05/2024 11.0  mCi Final    BH CV STRESS NUCLEAR ISOTOPE DOSE 04/05/2024 33.0  mCi Final    Nuc Stress EF 04/05/2024 80  % Final    Magnesium 04/05/2024 2.3  1.6 - 2.6 mg/dL Final    Glucose 04/05/2024 99  65 - 99 mg/dL Final    BUN 04/05/2024 11  6 - 20 mg/dL Final    Creatinine 04/05/2024 0.99  0.57 - 1.00 mg/dL Final    Sodium 04/05/2024 140  136 - 145 mmol/L Final    Potassium 04/05/2024 3.9  3.5 - 5.2 mmol/L Final    Chloride 04/05/2024 106  98 - 107 mmol/L Final    CO2 04/05/2024 23.0  22.0 - 29.0 mmol/L Final    Calcium 04/05/2024 9.5  8.6 - 10.5 mg/dL Final    BUN/Creatinine Ratio 04/05/2024 11.1  7.0 - 25.0 Final    Anion Gap 04/05/2024 11.0  5.0 - 15.0 mmol/L Final    eGFR 04/05/2024 74.5  >60.0 mL/min/1.73 Final    WBC 04/05/2024 5.89  3.40 - 10.80 10*3/mm3 Final    RBC 04/05/2024 3.83  3.77 - 5.28 10*6/mm3 Final    Hemoglobin 04/05/2024 10.9 (L)  12.0 - 15.9 g/dL Final    Hematocrit 04/05/2024 34.2  34.0 - 46.6 % Final    MCV 04/05/2024 89.3  79.0 - 97.0 fL Final    MCH 04/05/2024 28.5  26.6 - 33.0 pg Final    MCHC 04/05/2024 31.9  31.5 - 35.7 g/dL Final    RDW 04/05/2024 13.0  12.3 - 15.4 % Final    RDW-SD 04/05/2024 42.1  37.0 - 54.0 fl Final    MPV 04/05/2024 9.4  6.0 - 12.0 fL Final    Platelets 04/05/2024 285  140 - 450 10*3/mm3 Final    Neutrophil % 04/05/2024 53.5  42.7 - 76.0 % Final    Lymphocyte % 04/05/2024 36.8  19.6 - 45.3 % Final    Monocyte % 04/05/2024 5.9  5.0 - 12.0 % Final    Eosinophil % 04/05/2024 2.5  0.3 - 6.2 % Final    Basophil % 04/05/2024 0.3  0.0 - 1.5 % Final    Immature Grans % 04/05/2024 1.0 (H)  0.0 - 0.5 % Final    Neutrophils, Absolute 04/05/2024 3.14  1.70 - 7.00 10*3/mm3 Final    Lymphocytes, Absolute 04/05/2024 2.17  0.70 - 3.10 10*3/mm3 Final    Monocytes, Absolute 04/05/2024 0.35  0.10 - 0.90 10*3/mm3 Final    Eosinophils, Absolute 04/05/2024 0.15  0.00 - 0.40 10*3/mm3 Final    Basophils, Absolute 04/05/2024 0.02  0.00 -  0.20 10*3/mm3 Final    Immature Grans, Absolute 04/05/2024 0.06 (H)  0.00 - 0.05 10*3/mm3 Final    nRBC 04/05/2024 0.0  0.0 - 0.2 /100 WBC Final    Procalcitonin 04/05/2024 0.04  0.00 - 0.25 ng/mL Final    Reticulocyte % 04/05/2024 2.27 (H)  0.70 - 1.90 % Final    Reticulocyte Absolute 04/05/2024 0.0872  0.0200 - 0.1300 10*6/mm3 Final    C-Reactive Protein 04/05/2024 0.38  0.00 - 0.50 mg/dL Final    Sed Rate 04/05/2024 25 (H)  0 - 20 mm/hr Final    Hemoglobin 04/05/2024 11.1 (L)  12.0 - 15.9 g/dL Final    Hematocrit 04/05/2024 34.1  34.0 - 46.6 % Final   Admission on 03/26/2024, Discharged on 03/26/2024   Component Date Value Ref Range Status    Glucose 03/26/2024 112 (H)  65 - 99 mg/dL Final    BUN 03/26/2024 13  6 - 20 mg/dL Final    Creatinine 03/26/2024 0.86  0.57 - 1.00 mg/dL Final    Sodium 03/26/2024 136  136 - 145 mmol/L Final    Potassium 03/26/2024 3.8  3.5 - 5.2 mmol/L Final    Slight hemolysis detected by analyzer. Result may be falsely elevated.    Chloride 03/26/2024 102  98 - 107 mmol/L Final    CO2 03/26/2024 25.1  22.0 - 29.0 mmol/L Final    Calcium 03/26/2024 9.7  8.6 - 10.5 mg/dL Final    Total Protein 03/26/2024 7.1  6.0 - 8.5 g/dL Final    Albumin 03/26/2024 4.5  3.5 - 5.2 g/dL Final    ALT (SGPT) 03/26/2024 123 (H)  1 - 33 U/L Final    AST (SGOT) 03/26/2024 46 (H)  1 - 32 U/L Final    Alkaline Phosphatase 03/26/2024 134 (H)  39 - 117 U/L Final    Total Bilirubin 03/26/2024 0.3  0.0 - 1.2 mg/dL Final    Globulin 03/26/2024 2.6  gm/dL Final    A/G Ratio 03/26/2024 1.7  g/dL Final    BUN/Creatinine Ratio 03/26/2024 15.1  7.0 - 25.0 Final    Anion Gap 03/26/2024 8.9  5.0 - 15.0 mmol/L Final    eGFR 03/26/2024 88.3  >60.0 mL/min/1.73 Final    Blood Culture 03/26/2024 No growth at 5 days   Final    Blood Culture 03/26/2024 No growth at 5 days   Final    Sed Rate 03/26/2024 22 (H)  0 - 20 mm/hr Final    C-Reactive Protein 03/26/2024 1.06 (H)  0.00 - 0.50 mg/dL Final    WBC 03/26/2024 8.80  3.40 -  10.80 10*3/mm3 Final    RBC 03/26/2024 4.33  3.77 - 5.28 10*6/mm3 Final    Hemoglobin 03/26/2024 12.4  12.0 - 15.9 g/dL Final    Hematocrit 03/26/2024 37.6  34.0 - 46.6 % Final    MCV 03/26/2024 86.8  79.0 - 97.0 fL Final    MCH 03/26/2024 28.6  26.6 - 33.0 pg Final    MCHC 03/26/2024 33.0  31.5 - 35.7 g/dL Final    RDW 03/26/2024 12.9  12.3 - 15.4 % Final    RDW-SD 03/26/2024 41.0  37.0 - 54.0 fl Final    MPV 03/26/2024 9.3  6.0 - 12.0 fL Final    Platelets 03/26/2024 287  140 - 450 10*3/mm3 Final    Neutrophil % 03/26/2024 71.7  42.7 - 76.0 % Final    Lymphocyte % 03/26/2024 21.8  19.6 - 45.3 % Final    Monocyte % 03/26/2024 5.1  5.0 - 12.0 % Final    Eosinophil % 03/26/2024 1.0  0.3 - 6.2 % Final    Basophil % 03/26/2024 0.2  0.0 - 1.5 % Final    Immature Grans % 03/26/2024 0.2  0.0 - 0.5 % Final    Neutrophils, Absolute 03/26/2024 6.30  1.70 - 7.00 10*3/mm3 Final    Lymphocytes, Absolute 03/26/2024 1.92  0.70 - 3.10 10*3/mm3 Final    Monocytes, Absolute 03/26/2024 0.45  0.10 - 0.90 10*3/mm3 Final    Eosinophils, Absolute 03/26/2024 0.09  0.00 - 0.40 10*3/mm3 Final    Basophils, Absolute 03/26/2024 0.02  0.00 - 0.20 10*3/mm3 Final    Immature Grans, Absolute 03/26/2024 0.02  0.00 - 0.05 10*3/mm3 Final         Physical Exam  Vitals and nursing note reviewed.   Constitutional:       Appearance: Normal appearance. She is normal weight.   HENT:      Head: Normocephalic and atraumatic.   Cardiovascular:      Rate and Rhythm: Normal rate and regular rhythm.      Pulses: Normal pulses.      Heart sounds: Normal heart sounds. No murmur heard.     No friction rub. No gallop.   Pulmonary:      Effort: Pulmonary effort is normal. No respiratory distress.      Breath sounds: Normal breath sounds. No stridor. No wheezing, rhonchi or rales.   Chest:      Chest wall: No tenderness.   Abdominal:      General: Abdomen is flat. Bowel sounds are normal. There is no distension.      Palpations: Abdomen is soft. There is no mass.       Tenderness: There is no abdominal tenderness. There is no right CVA tenderness, left CVA tenderness, guarding or rebound.      Hernia: No hernia is present.   Skin:     General: Skin is warm and dry.      Capillary Refill: Capillary refill takes less than 2 seconds.      Coloration: Skin is not jaundiced or pale.   Neurological:      General: No focal deficit present.      Mental Status: She is alert and oriented to person, place, and time. Mental status is at baseline.      Motor: No weakness.      Coordination: Coordination normal.      Gait: Gait normal.   Psychiatric:         Mood and Affect: Mood normal.         Behavior: Behavior normal.         Thought Content: Thought content normal.         Judgment: Judgment normal.          Result Review  Data Reviewed:{ Labs  Result Review  Imaging  Med Tab  Media :23}   I have reviewed this patient's chart.  I have reviewed previous labs, previous imaging, previous medications, and previous encounters with notes that were available in this patient's chart.               Assessment and Plan {CC Problem List  Visit Diagnosis  ROS  Review (Popup)  [facility] [place]  Quality  BestPractice  Medications  SmartSets  SnapShot Encounters  Media :23}   Diagnoses and all orders for this visit:    1. Morbid (severe) obesity due to excess calories (Primary)  -     Semaglutide-Weight Management (Wegovy) 0.25 MG/0.5ML solution auto-injector; Inject 0.5 mL under the skin into the appropriate area as directed 1 (One) Time Per Week.  Dispense: 2 mL; Refill: 2    2. BMI 32.0-32.9,adult  -     Semaglutide-Weight Management (Wegovy) 0.25 MG/0.5ML solution auto-injector; Inject 0.5 mL under the skin into the appropriate area as directed 1 (One) Time Per Week.  Dispense: 2 mL; Refill: 2    3. Fibromyalgia    4. Chronic low back pain, unspecified back pain laterality, unspecified whether sciatica present  -     POC Urine Drug Screen, Triage    5. Restless leg syndrome  -      rOPINIRole (REQUIP) 0.5 MG tablet; Take 1 tablet by mouth Every Night. Take 1 hour before bedtime.  Dispense: 90 tablet; Refill: 1    6. Screening for thyroid disorder  -     TSH    7. Screening for endocrine, metabolic and immunity disorder  -     CBC & Differential  -     Comprehensive Metabolic Panel    8. Screening for diabetes mellitus  -     Hemoglobin A1c    9. Screening for lipid disorders  -     Lipid Panel    10. Encounter for vitamin deficiency screening  -     Vitamin B12 & Folate  -     Vitamin D,25-Hydroxy  -     Iron Profile    11. B12 deficiency  -     Vitamin B12 & Folate    12. Vitamin D deficiency  -     Vitamin D,25-Hydroxy    13. Fatigue, unspecified type  -     Vitamin B12 & Folate  -     Vitamin D,25-Hydroxy  -     Iron Profile  -     Testosterone Free Direct  -     Luteinizing hormone  -     Follicle stimulating hormone  -     Prolactin  -     Estrogens, Total        -Labs at patient's request.  -Increase Requip to 0.5 mg.  -Urine drug screen today as well as narcotic agreement up-to-date.  -Wegovy prescription sent to compounding pharmacy at patient's request.  -ER red flags discussed with patient including risk versus benefit and education provided.  -Follow-up with me in 3 months or sooner if needed.    I spent 40 minutes caring for Amelie on this date of service. This time includes time spent by me in the following activities:preparing for the visit, reviewing tests, obtaining and/or reviewing a separately obtained history, performing a medically appropriate examination and/or evaluation , counseling and educating the patient/family/caregiver, ordering medications, tests, or procedures, referring and communicating with other health care professionals , documenting information in the medical record, independently interpreting results and communicating that information with the patient/family/caregiver, and care coordination.    Follow Up {Instructions Charge Capture  Follow-up  Communications :23}     Patient was given instructions and counseling regarding her condition or for health maintenance advice. Please see specific information pulled into the AVS (placed there by myself) if appropriate.    Return in about 3 months (around 9/10/2024) for routine follow up.    BMI is >= 30 and <35. (Class 1 Obesity). The following options were offered after discussion;: exercise counseling/recommendations, nutrition counseling/recommendations, and pharmacological intervention options       EDSON Cali, FNP-BC

## 2024-06-10 NOTE — PROGRESS NOTES
Venipuncture Blood Specimen Collection  Venipuncture performed in rt arm via venipuncture by Judi Ames with good hemostasis. Patient tolerated the procedure well without complications.   06/10/24   Judi Ames

## 2024-06-11 ENCOUNTER — OFFICE VISIT (OUTPATIENT)
Dept: PODIATRY | Facility: CLINIC | Age: 39
End: 2024-06-11
Payer: OTHER GOVERNMENT

## 2024-06-11 VITALS — RESPIRATION RATE: 20 BRPM | BODY MASS INDEX: 32.35 KG/M2 | WEIGHT: 226 LBS | HEIGHT: 70 IN

## 2024-06-11 DIAGNOSIS — Q66.71 PES CAVUS OF BOTH FEET: ICD-10-CM

## 2024-06-11 DIAGNOSIS — Q66.72 PES CAVUS OF BOTH FEET: ICD-10-CM

## 2024-06-11 DIAGNOSIS — M21.861 ACQUIRED POSTERIOR EQUINUS OF BOTH LOWER EXTREMITIES: ICD-10-CM

## 2024-06-11 DIAGNOSIS — M79.672 BILATERAL FOOT PAIN: Primary | ICD-10-CM

## 2024-06-11 DIAGNOSIS — M72.2 PLANTAR FASCIITIS OF LEFT FOOT: ICD-10-CM

## 2024-06-11 DIAGNOSIS — M21.862 ACQUIRED POSTERIOR EQUINUS OF BOTH LOWER EXTREMITIES: ICD-10-CM

## 2024-06-11 DIAGNOSIS — M79.671 BILATERAL FOOT PAIN: Primary | ICD-10-CM

## 2024-06-11 LAB
25(OH)D3 SERPL-MCNC: 38.1 NG/ML (ref 30–100)
ALBUMIN SERPL-MCNC: 4.6 G/DL (ref 3.5–5.2)
ALBUMIN/GLOB SERPL: 1.4 G/DL
ALP SERPL-CCNC: 125 U/L (ref 39–117)
ALT SERPL W P-5'-P-CCNC: 41 U/L (ref 1–33)
ANION GAP SERPL CALCULATED.3IONS-SCNC: 13.6 MMOL/L (ref 5–15)
AST SERPL-CCNC: 25 U/L (ref 1–32)
BASOPHILS # BLD AUTO: 0.04 10*3/MM3 (ref 0–0.2)
BASOPHILS NFR BLD AUTO: 0.3 % (ref 0–1.5)
BILIRUB SERPL-MCNC: <0.2 MG/DL (ref 0–1.2)
BUN SERPL-MCNC: 11 MG/DL (ref 6–20)
BUN/CREAT SERPL: 13.3 (ref 7–25)
CALCIUM SPEC-SCNC: 10.4 MG/DL (ref 8.6–10.5)
CHLORIDE SERPL-SCNC: 103 MMOL/L (ref 98–107)
CHOLEST SERPL-MCNC: 227 MG/DL (ref 0–200)
CO2 SERPL-SCNC: 21.4 MMOL/L (ref 22–29)
CREAT SERPL-MCNC: 0.83 MG/DL (ref 0.57–1)
DEPRECATED RDW RBC AUTO: 36.3 FL (ref 37–54)
EGFRCR SERPLBLD CKD-EPI 2021: 91.5 ML/MIN/1.73
EOSINOPHIL # BLD AUTO: 0.05 10*3/MM3 (ref 0–0.4)
EOSINOPHIL NFR BLD AUTO: 0.4 % (ref 0.3–6.2)
ERYTHROCYTE [DISTWIDTH] IN BLOOD BY AUTOMATED COUNT: 12.3 % (ref 12.3–15.4)
FOLATE SERPL-MCNC: >20 NG/ML (ref 4.78–24.2)
FSH SERPL-ACNC: 33.9 MIU/ML
GLOBULIN UR ELPH-MCNC: 3.2 GM/DL
GLUCOSE SERPL-MCNC: 82 MG/DL (ref 65–99)
HBA1C MFR BLD: 5.6 % (ref 4.8–5.6)
HCT VFR BLD AUTO: 41.7 % (ref 34–46.6)
HDLC SERPL-MCNC: 62 MG/DL (ref 40–60)
HGB BLD-MCNC: 13.9 G/DL (ref 12–15.9)
IMM GRANULOCYTES # BLD AUTO: 0.1 10*3/MM3 (ref 0–0.05)
IMM GRANULOCYTES NFR BLD AUTO: 0.7 % (ref 0–0.5)
IRON 24H UR-MRATE: 75 MCG/DL (ref 37–145)
IRON SATN MFR SERPL: 19 % (ref 20–50)
LDLC SERPL CALC-MCNC: 153 MG/DL (ref 0–100)
LDLC/HDLC SERPL: 2.45 {RATIO}
LH SERPL-ACNC: 35.7 MIU/ML
LYMPHOCYTES # BLD AUTO: 2.56 10*3/MM3 (ref 0.7–3.1)
LYMPHOCYTES NFR BLD AUTO: 19 % (ref 19.6–45.3)
MCH RBC QN AUTO: 28 PG (ref 26.6–33)
MCHC RBC AUTO-ENTMCNC: 33.3 G/DL (ref 31.5–35.7)
MCV RBC AUTO: 83.9 FL (ref 79–97)
MONOCYTES # BLD AUTO: 0.62 10*3/MM3 (ref 0.1–0.9)
MONOCYTES NFR BLD AUTO: 4.6 % (ref 5–12)
NEUTROPHILS NFR BLD AUTO: 10.07 10*3/MM3 (ref 1.7–7)
NEUTROPHILS NFR BLD AUTO: 75 % (ref 42.7–76)
NRBC BLD AUTO-RTO: 0 /100 WBC (ref 0–0.2)
PLATELET # BLD AUTO: 426 10*3/MM3 (ref 140–450)
PMV BLD AUTO: 10.1 FL (ref 6–12)
POTASSIUM SERPL-SCNC: 4.6 MMOL/L (ref 3.5–5.2)
PROLACTIN SERPL-MCNC: 10.2 NG/ML (ref 4.79–23.3)
PROT SERPL-MCNC: 7.8 G/DL (ref 6–8.5)
RBC # BLD AUTO: 4.97 10*6/MM3 (ref 3.77–5.28)
SODIUM SERPL-SCNC: 138 MMOL/L (ref 136–145)
TIBC SERPL-MCNC: 386 MCG/DL (ref 298–536)
TRANSFERRIN SERPL-MCNC: 259 MG/DL (ref 200–360)
TRIGL SERPL-MCNC: 67 MG/DL (ref 0–150)
TSH SERPL DL<=0.05 MIU/L-ACNC: 0.43 UIU/ML (ref 0.27–4.2)
VIT B12 BLD-MCNC: 541 PG/ML (ref 211–946)
VLDLC SERPL-MCNC: 12 MG/DL (ref 5–40)
WBC NRBC COR # BLD AUTO: 13.44 10*3/MM3 (ref 3.4–10.8)

## 2024-06-11 RX ORDER — DOXYCYCLINE HYCLATE 100 MG/1
CAPSULE ORAL
COMMUNITY

## 2024-06-11 RX ORDER — CIPROFLOXACIN AND DEXAMETHASONE 3; 1 MG/ML; MG/ML
SUSPENSION/ DROPS AURICULAR (OTIC)
COMMUNITY

## 2024-06-11 RX ORDER — ARMODAFINIL 150 MG/1
TABLET ORAL
COMMUNITY

## 2024-06-12 NOTE — PROGRESS NOTES
06/11/2024  Foot and Ankle Surgery - Established Patient/Follow-up  Provider: Dr. Servando Corral DPM  Location: [facility]    Subjective:  Amelie Henderson is a 39 y.o. female.     Chief Complaint   Patient presents with    Left Foot - Follow-up, Pain    Follow-up     ABIOLA Valera more 1/8/2024       History of Present Illness  The patient presents for evaluation of left foot pain.    The patient was last evaluated in 01/2024, during which she received an injection in her left foot. Despite this, she continues to experience the same symptoms as before. She describes a sensation akin to a ripping sensation on the plantar aspect of her foot. Despite attempts at stretching exercises, she continues to experience significant tightness in her foot, with her toes pointing downwards. Previous steroid injections provided temporary relief, but the pain has since returned. She has previously engaged in physical therapy and was provided with home exercises. She has previously received a platelet-rich plasma injection for her shoulder surgery. She has consulted with a back surgeon, who recommended an ablation procedure, but pain management has advised against it. She has been diagnosed with bone spurs on her spine. Her ability to perform homesteading activities, including painting and sitting, is compromised. She lost her boot and utilizes [employer]ICS inserts.    Supplemental Information  She had rotator cuff surgery.      Allergies   Allergen Reactions    Huson Anaphylaxis    Codeine Hives and Itching    Influenza Virus Vaccine Other (See Comments)     Egg allergy     Latex Rash     Skin gets red and burns, skin starts peeling      Meloxicam Other (See Comments)     Gastritis      Sulfa Antibiotics Unknown - High Severity       Current Outpatient Medications on File Prior to Visit   Medication Sig Dispense Refill    albuterol (PROVENTIL) (2.5 MG/3ML) 0.083% nebulizer solution Take 2.5 mg by nebulization Every 4 (Four) Hours As  Needed for Wheezing or Shortness of Air. 120 mL 1    albuterol sulfate  (90 Base) MCG/ACT inhaler Inhale 2 puffs Every 4 (Four) Hours As Needed for Wheezing. 18 g 2    apixaban (ELIQUIS) 5 MG tablet tablet Take 1 tablet by mouth Every 12 (Twelve) Hours. 180 tablet 1    armodafinil (NUVIGIL) 150 MG tablet       busPIRone (BUSPAR) 10 MG tablet Take 2 tablets by mouth 3 (Three) Times a Day As Needed.      Cholecalciferol 125 MCG (5000 UT) tablet Take 1 tablet by mouth Daily. 90 tablet 1    ciprofloxacin-dexAMETHasone (CIPRODEX) 0.3-0.1 % otic suspension       diphenhydrAMINE (BENADRYL) 25 mg capsule Take 1 capsule by mouth Every 6 (Six) Hours As Needed for Itching.      doxycycline (VIBRAMYCIN) 100 MG capsule TAKE 1 CAPSULE BY MOUTH TWICE A DAY FOR 10 DAYS      EPINEPHrine (EPIPEN) 0.3 MG/0.3ML solution auto-injector injection Inject 0.3 mL into the appropriate muscle as directed by prescriber 1 (One) Time.      ferrous gluconate (FERGON) 324 MG tablet TAKE 1 TABLET BY MOUTH EVERY DAY WITH BREAKFAST 90 tablet 1    fluticasone (FLONASE) 50 MCG/ACT nasal spray SPRAY 2 SPRAYS INTO THE NOSTRIL AS DIRECTED BY PROVIDER DAILY. 16 mL 3    galcanezumab-gnlm (EMGALITY) 120 MG/ML auto-injector pen Inject  under the skin into the appropriate area as directed Every 30 (Thirty) Days.      HYDROcodone-acetaminophen (NORCO)  MG per tablet Take 1 tablet by mouth Every 6 (Six) Hours As Needed for Moderate Pain. 90 tablet 0    hydrOXYzine (ATARAX) 50 MG tablet MAY TAKE 1 TABLET 2 TIMES A DAY AS NEEDED FOR ITCHING, MAY ALSO TAKE 2 TABLETS AT NIGHT AS NEEDED. 120 tablet 3    l-methylfolate 7.5 MG tablet tablet Take  by mouth Daily.      levocetirizine (XYZAL) 5 MG tablet Take 1 tablet by mouth Every Morning.      Levomilnacipran HCl ER (Fetzima) 40 MG capsule sustained-release 24 hr Take 40 mg by mouth Daily. 90 capsule 1    losartan (COZAAR) 50 MG tablet Take 1 tablet by mouth Every Morning. 90 tablet 1    metoprolol tartrate  (LOPRESSOR) 25 MG tablet Take 1 tablet by mouth Daily.      Mirabegron ER (Myrbetriq) 50 MG tablet sustained-release 24 hour 24 hr tablet Take 50 mg by mouth Daily. 90 tablet 1    mupirocin (BACTROBAN) 2 % cream Apply 1 application  topically to the appropriate area as directed 3 (Three) Times a Day. 30 g 1    naloxone (NARCAN) 4 MG/0.1ML nasal spray Call 911. Don't prime. [place] in 1 nostril for overdose. Repeat in 2-3 minutes in other nostril if no or minimal breathing/responsiveness. 2 each 0    omeprazole (priLOSEC) 20 MG capsule Take 1 capsule by mouth 2 (Two) Times a Day.      ondansetron (Zofran) 4 MG tablet Take 1 tablet by mouth Every 6 (Six) Hours As Needed for Nausea or Vomiting. 360 tablet 1    pregabalin (Lyrica) 50 MG capsule Take 1 capsule by mouth 3 (Three) Times a Day. 270 capsule 0    promethazine (PHENERGAN) 25 MG tablet Take 1 tablet by mouth Every 8 (Eight) Hours As Needed for Nausea or Vomiting. 30 tablet 1    propranolol (INDERAL) 20 MG tablet Take 1 tablet by mouth 2 (Two) Times a Day As Needed (ANXIETY). 180 tablet 01    rizatriptan (MAXALT) 10 MG tablet TAKE 1 TABLET AT THE ONSET OF HEADACHE MAY REPEAT IN 2 HOURS MAX OF 2 TABS IN 24 HOURS 27 tablet 1    rOPINIRole (REQUIP) 0.5 MG tablet Take 1 tablet by mouth Every Night. Take 1 hour before bedtime. 90 tablet 1    Semaglutide-Weight Management (Wegovy) 0.25 MG/0.5ML solution auto-injector Inject 0.5 mL under the skin into the appropriate area as directed 1 (One) Time Per Week. 2 mL 2    tiZANidine (ZANAFLEX) 4 MG tablet Take 1 tablet by mouth Every 8 (Eight) Hours As Needed for Muscle Spasms. 270 tablet 1    traZODone (DESYREL) 50 MG tablet TAKE 1 TABLET BY MOUTH EVERY DAY AT NIGHT 90 tablet 1    ubrogepant (UBRELVY) 100 MG tablet Take 1 tablet by mouth 1 (One) Time As Needed (migraines). 90 tablet 1    vitamin C (ASCORBIC ACID) 250 MG tablet Take 1 tablet by mouth Daily.       No current facility-administered medications on file prior to  "visit.       Objective   Resp 20   Ht 177.8 cm (70\")   Wt 103 kg (226 lb)   LMP 02/01/2023 (Approximate)   BMI 32.43 kg/m²     Foot/Ankle Exam  Physical Exam  GENERAL  Orientation:  AAOx3  Affect:  appropriate     VASCULAR      Right Foot Vascularity   Normal vascular exam    Dorsalis pedis:  2+  Posterior tibial:  2+  Skin temperature:  warm  Edema grading:  None  CFT:  < 3 seconds  Pedal hair growth:  Present  Varicosities:  none      Left Foot Vascularity   Normal vascular exam    Dorsalis pedis:  2+  Posterior tibial:  2+  Skin temperature:  warm  Edema grading:  None  CFT:  < 3 seconds  Pedal hair growth:  Present  Varicosities:  none     NEUROLOGIC      Right Foot Neurologic   Light touch sensation: normal  Hot/Cold sensation: normal  Achilles reflex:  2+      Left Foot Neurologic   Light touch sensation: normal  Hot/Cold sensation:  normal  Achilles reflex:  2+     MUSCULOSKELETAL      Right Foot Musculoskeletal   Arch:  Normal      Left Foot Musculoskeletal   Arch:  Normal     MUSCLE STRENGTH      Right Foot Muscle Strength   Normal strength    Foot dorsiflexion:  5  Foot plantar flexion:  5  Foot inversion:  5  Foot eversion:  5      Left Foot Muscle Strength   Normal strength    Foot dorsiflexion:  5  Foot plantar flexion:  5  Foot inversion:  5  Foot eversion:  5     DERMATOLOGIC       Right Foot Dermatologic   Skin  Right foot skin is intact.   Nails comment:  Nails 1-5      Left Foot Dermatologic   Skin  Left foot skin is intact.   Nails comment:  Nails 1-5     TESTS      Right Foot Tests   Anterior drawer: negative  Varus tilt: negative      Left Foot Tests   Anterior drawer: negative  Varus tilt: negative     Right foot additional comments: Significant discomfort with palpation involving the plantar medial calcaneal tuberosity and abductor hallucis muscle belly. Pes cavus foot structure. Moderate equinus contracture with knee extended, flexed with soft tissue rigidity. No obvious deformity or " instability.     01/30/2024: Incurvation involving the medial borders of both great toes.     Left foot additional comments: 12/19/2023  Significant pain with palpation involving the plantar aspect of the left heel. Moderate equinus contracture. No significant deformity. No progressive deformity or instability.     01/30/2024: Continued discomfort with palpation involving the left first metatarsal. Continued pain involving the plantar aspect of the left calcaneus. Significant equinus contracture. Incurvation involving the medial borders of both great toes.    6/11/24: Significant discomfort with palpation involving the plantar aspect of the left heel greater than right.  She continues to have significant equinus contracture involving both lower extremities.  No additional issues or concerns today      Results      Assessment & Plan   Diagnoses and all orders for this visit:    1. Bilateral foot pain (Primary)    2. Plantar fasciitis of left foot  -     Case Request; Standing  -     CBC (No Diff); Future  -     Basic Metabolic Panel; Future  -     XR Chest 2 View; Future  -     ECG 12 Lead; Future  -     ceFAZolin (ANCEF) 2,000 mg in sodium chloride 0.9 % 100 mL IVPB  -     Case Request    3. Acquired posterior equinus of both lower extremities  -     Case Request; Standing  -     CBC (No Diff); Future  -     Basic Metabolic Panel; Future  -     XR Chest 2 View; Future  -     ECG 12 Lead; Future  -     ceFAZolin (ANCEF) 2,000 mg in sodium chloride 0.9 % 100 mL IVPB  -     Case Request    4. Pes cavus of both feet    Other orders  -     Follow Anesthesia Guidelines / Protocol; Future  -     Follow Anesthesia Guidelines / Protocol; Standing  -     Verify / Perform Chlorhexidine Skin Prep; Standing  -     Obtain Informed Consent; Future  -     Provide NPO Instructions to Patient; Future  -     Chlorhexidine Skin Prep; Future  -     Place Sequential Compression Device; Standing  -     Maintain Sequential Compression  Device; Standing      Assessment & Plan    Patient returns with continued pain involving the left heel.  She states that the left is significantly worse than the right.  She has been wearing the inserts and performing at home exercises.  Patient was dealing with rotator cuff surgery recovery and states that she is gradually starting to increase her activity and noticing more discomfort.  Clinically, she does have significant equinus contracture which I feel is causing the majority of her issues involving the plantar aspect of the heel.  We discussed the biomechanics and etiology of her symptoms.  She noticed temporary improvement after previous steroid injection.  She is concerned that she has not noticed any significant improvement with conservative care and would like to discuss surgical options.  I did review gastrocnemius recession and endoscopic plantar fasciotomy for symptom management.  We discussed the procedure, risk, goals, and recovery with her at length.  She understands that she will require nonweightbearing and decreased overall activity after surgery.  Patient is tired of dealing with the pain and would like to proceed with surgery when possible.  All questions were answered to patient's satisfaction.  Greater than 30 minutes was spent before, during, and after evaluation for patient care.             Patient or patient representative verbalized consent for the use of Ambient Listening during the visit with  KAY Corral DPM for chart documentation. 6/12/2024  06:28 EDT    KAY Corral DPM

## 2024-06-13 PROBLEM — M21.861 ACQUIRED POSTERIOR EQUINUS OF BOTH LOWER EXTREMITIES: Status: ACTIVE | Noted: 2024-06-11

## 2024-06-13 PROBLEM — M72.2 PLANTAR FASCIITIS OF LEFT FOOT: Status: ACTIVE | Noted: 2024-06-11

## 2024-06-13 PROBLEM — M21.862 ACQUIRED POSTERIOR EQUINUS OF BOTH LOWER EXTREMITIES: Status: ACTIVE | Noted: 2024-06-11

## 2024-06-13 LAB — TESTOST FREE SERPL-MCNC: 1.5 PG/ML (ref 0–4.2)

## 2024-06-14 LAB — ESTROGEN SERPL-MCNC: 72 PG/ML

## 2024-06-19 DIAGNOSIS — M25.551 CHRONIC RIGHT HIP PAIN: ICD-10-CM

## 2024-06-19 DIAGNOSIS — G89.29 CHRONIC LOW BACK PAIN, UNSPECIFIED BACK PAIN LATERALITY, UNSPECIFIED WHETHER SCIATICA PRESENT: ICD-10-CM

## 2024-06-19 DIAGNOSIS — M54.50 CHRONIC LOW BACK PAIN, UNSPECIFIED BACK PAIN LATERALITY, UNSPECIFIED WHETHER SCIATICA PRESENT: ICD-10-CM

## 2024-06-19 DIAGNOSIS — M75.102 TEAR OF LEFT SUPRASPINATUS TENDON: ICD-10-CM

## 2024-06-19 DIAGNOSIS — G89.29 CHRONIC RIGHT HIP PAIN: ICD-10-CM

## 2024-06-19 RX ORDER — HYDROCODONE BITARTRATE AND ACETAMINOPHEN 10; 325 MG/1; MG/1
1 TABLET ORAL EVERY 6 HOURS PRN
Qty: 90 TABLET | Refills: 0 | Status: SHIPPED | OUTPATIENT
Start: 2024-06-19

## 2024-06-19 NOTE — TELEPHONE ENCOUNTER
Rx Refill Note  Requested Prescriptions     Pending Prescriptions Disp Refills    HYDROcodone-acetaminophen (NORCO)  MG per tablet 90 tablet 0     Sig: Take 1 tablet by mouth Every 6 (Six) Hours As Needed for Moderate Pain.      Last office visit with prescribing clinician: 6/10/2024   Last telemedicine visit with prescribing clinician: Visit date not found   Next office visit with prescribing clinician: 7/15/2024     CSA 05/23/2022  UDS  06/10/2024    CONTROLLED SUBSTANCE AGREEMENT - SCAN - CONTROLLED SUBSTANCE AGREEMENT/05/23/22 (05/23/2022)     REFERRAL/PRE-AUTH MRN - SCAN - INSPECT/[facility] [place]/ 06/19/2024 (06/19/2024)     Cyndy Pond MA  06/19/24, 16:09 EDT

## 2024-06-20 ENCOUNTER — TELEPHONE (OUTPATIENT)
Dept: ONCOLOGY | Facility: CLINIC | Age: 39
End: 2024-06-20
Payer: OTHER GOVERNMENT

## 2024-06-20 ENCOUNTER — ANESTHESIA EVENT (OUTPATIENT)
Dept: PERIOP | Facility: HOSPITAL | Age: 39
End: 2024-06-20
Payer: OTHER GOVERNMENT

## 2024-06-20 NOTE — TELEPHONE ENCOUNTER
Called Pt to let her know we do recommend a f/up with the provider but if she decides not that is up to her. I let her know that the NP will be seeing Pt's in [place]. Please call if we can romain a f/up with her. Pt v/u

## 2024-06-20 NOTE — ANESTHESIA PREPROCEDURE EVALUATION
Anesthesia Evaluation     NPO Solid Status: > 8 hours  NPO Liquid Status: > 8 hours           Airway   Mallampati: I  TM distance: >3 FB  Neck ROM: full  No difficulty expected  Dental - normal exam     Pulmonary - normal exam   (+) asthma,  Cardiovascular - normal exam    (+) hypertension, hyperlipidemia      Neuro/Psych  (+) headaches, numbness, psychiatric history  GI/Hepatic/Renal/Endo    (+) GERD    Musculoskeletal     Abdominal  - normal exam    Bowel sounds: normal.   Substance History      OB/GYN          Other   arthritis,       Other Comment: History Comments History Comments  Anxiety  Depression   Anemia  Hypertension   Migraines  GERD (gastroesophageal reflux disease)   Gastritis  Allergic   B12 deficiency  PTSD (post-traumatic stress disorder)   Endometriosis  Dercum's disease   COVID x 2 Hypoglycemia   Hyperlipidemia Borderline Vitamin D deficiency   Callus  Shin splints   Stress fracture  Ingrown toenail   Fallen arches  Difficulty walking   Fibromyalgia  Plantar fasciitis of left foot   Arthritis         ROS/Med Hx Other: WEGOVY NOT STARTED YET  SHOULDER 2/2024 MAC3 GRI  STRESS TEST 4/2024 NEG EF 70  HYSTERECTOMY              Anesthesia Plan    ASA 3     general     intravenous induction     Anesthetic plan, risks, benefits, and alternatives have been provided, discussed and informed consent has been obtained with: patient.  Pre-procedure education provided  Plan discussed with CRNA.    CODE STATUS:

## 2024-06-21 ENCOUNTER — HOSPITAL ENCOUNTER (OUTPATIENT)
Facility: HOSPITAL | Age: 39
Setting detail: HOSPITAL OUTPATIENT SURGERY
Discharge: HOME | End: 2024-06-21
Attending: PODIATRIST | Admitting: PODIATRIST
Payer: OTHER GOVERNMENT

## 2024-06-21 ENCOUNTER — ANESTHESIA (OUTPATIENT)
Dept: PERIOP | Facility: HOSPITAL | Age: 39
End: 2024-06-21
Payer: OTHER GOVERNMENT

## 2024-06-21 VITALS
TEMPERATURE: 97.5 F | HEIGHT: 70 IN | DIASTOLIC BLOOD PRESSURE: 96 MMHG | SYSTOLIC BLOOD PRESSURE: 145 MMHG | WEIGHT: 231 LBS | BODY MASS INDEX: 33.07 KG/M2 | HEART RATE: 52 BPM | RESPIRATION RATE: 11 BRPM | OXYGEN SATURATION: 94 %

## 2024-06-21 DIAGNOSIS — M21.862 ACQUIRED POSTERIOR EQUINUS OF BOTH LOWER EXTREMITIES: ICD-10-CM

## 2024-06-21 DIAGNOSIS — M72.2 PLANTAR FASCIITIS OF LEFT FOOT: ICD-10-CM

## 2024-06-21 DIAGNOSIS — N32.81 OVERACTIVE BLADDER: ICD-10-CM

## 2024-06-21 DIAGNOSIS — M21.861 ACQUIRED POSTERIOR EQUINUS OF BOTH LOWER EXTREMITIES: ICD-10-CM

## 2024-06-21 PROCEDURE — 25810000003 SODIUM CHLORIDE 0.9 % SOLUTION

## 2024-06-21 PROCEDURE — 29893 ENDOSCOPIC PLANTR FASCIOTOMY: CPT | Performed by: PODIATRIST

## 2024-06-21 PROCEDURE — 25010000002 MAGNESIUM SULFATE PER 500 MG OF MAGNESIUM

## 2024-06-21 PROCEDURE — 25010000002 HYDROMORPHONE 1 MG/ML SOLUTION

## 2024-06-21 PROCEDURE — 25010000002 MIDAZOLAM PER 1 MG

## 2024-06-21 PROCEDURE — 27687 REVISION OF CALF TENDON: CPT | Performed by: PODIATRIST

## 2024-06-21 PROCEDURE — 25010000002 SUGAMMADEX 200 MG/2ML SOLUTION

## 2024-06-21 PROCEDURE — 25010000002 LIDOCAINE 1 % SOLUTION 20 ML VIAL: Performed by: PODIATRIST

## 2024-06-21 PROCEDURE — 25010000002 CEFAZOLIN PER 500 MG: Performed by: PODIATRIST

## 2024-06-21 PROCEDURE — 25010000002 BUPIVACAINE (PF) 0.5 % SOLUTION 10 ML VIAL: Performed by: PODIATRIST

## 2024-06-21 PROCEDURE — 25010000002 ONDANSETRON PER 1 MG

## 2024-06-21 PROCEDURE — 25010000002 FENTANYL CITRATE (PF) 100 MCG/2ML SOLUTION

## 2024-06-21 PROCEDURE — 25010000002 DEXAMETHASONE PER 1 MG

## 2024-06-21 PROCEDURE — 25010000002 PROPOFOL 200 MG/20ML EMULSION

## 2024-06-21 PROCEDURE — 25810000003 LACTATED RINGERS PER 1000 ML: Performed by: ANESTHESIOLOGY

## 2024-06-21 RX ORDER — PROPOFOL 10 MG/ML
INJECTION, EMULSION INTRAVENOUS AS NEEDED
Status: DISCONTINUED | OUTPATIENT
Start: 2024-06-21 | End: 2024-06-21 | Stop reason: SURG

## 2024-06-21 RX ORDER — MEPERIDINE HYDROCHLORIDE 25 MG/ML
12.5 INJECTION INTRAMUSCULAR; INTRAVENOUS; SUBCUTANEOUS
Status: DISCONTINUED | OUTPATIENT
Start: 2024-06-21 | End: 2024-06-21 | Stop reason: HOSPADM

## 2024-06-21 RX ORDER — EPHEDRINE SULFATE 5 MG/ML
5 INJECTION INTRAVENOUS ONCE AS NEEDED
Status: DISCONTINUED | OUTPATIENT
Start: 2024-06-21 | End: 2024-06-21 | Stop reason: HOSPADM

## 2024-06-21 RX ORDER — DEXAMETHASONE SODIUM PHOSPHATE 4 MG/ML
INJECTION, SOLUTION INTRA-ARTICULAR; INTRALESIONAL; INTRAMUSCULAR; INTRAVENOUS; SOFT TISSUE AS NEEDED
Status: DISCONTINUED | OUTPATIENT
Start: 2024-06-21 | End: 2024-06-21 | Stop reason: SURG

## 2024-06-21 RX ORDER — NALOXONE HCL 0.4 MG/ML
0.4 VIAL (ML) INJECTION AS NEEDED
Status: DISCONTINUED | OUTPATIENT
Start: 2024-06-21 | End: 2024-06-21 | Stop reason: HOSPADM

## 2024-06-21 RX ORDER — LIDOCAINE HYDROCHLORIDE 10 MG/ML
0.5 INJECTION, SOLUTION INFILTRATION; PERINEURAL ONCE AS NEEDED
Status: DISCONTINUED | OUTPATIENT
Start: 2024-06-21 | End: 2024-06-21 | Stop reason: HOSPADM

## 2024-06-21 RX ORDER — IPRATROPIUM BROMIDE AND ALBUTEROL SULFATE 2.5; .5 MG/3ML; MG/3ML
3 SOLUTION RESPIRATORY (INHALATION) ONCE AS NEEDED
Status: DISCONTINUED | OUTPATIENT
Start: 2024-06-21 | End: 2024-06-21 | Stop reason: HOSPADM

## 2024-06-21 RX ORDER — ONDANSETRON 2 MG/ML
4 INJECTION INTRAMUSCULAR; INTRAVENOUS ONCE AS NEEDED
Status: DISCONTINUED | OUTPATIENT
Start: 2024-06-21 | End: 2024-06-21 | Stop reason: HOSPADM

## 2024-06-21 RX ORDER — MIDAZOLAM HYDROCHLORIDE 1 MG/ML
INJECTION INTRAMUSCULAR; INTRAVENOUS AS NEEDED
Status: DISCONTINUED | OUTPATIENT
Start: 2024-06-21 | End: 2024-06-21 | Stop reason: SURG

## 2024-06-21 RX ORDER — ROCURONIUM BROMIDE 10 MG/ML
INJECTION, SOLUTION INTRAVENOUS AS NEEDED
Status: DISCONTINUED | OUTPATIENT
Start: 2024-06-21 | End: 2024-06-21 | Stop reason: SURG

## 2024-06-21 RX ORDER — SODIUM CHLORIDE, SODIUM LACTATE, POTASSIUM CHLORIDE, CALCIUM CHLORIDE 600; 310; 30; 20 MG/100ML; MG/100ML; MG/100ML; MG/100ML
1000 INJECTION, SOLUTION INTRAVENOUS CONTINUOUS
Status: DISCONTINUED | OUTPATIENT
Start: 2024-06-21 | End: 2024-06-21 | Stop reason: HOSPADM

## 2024-06-21 RX ORDER — DEXMEDETOMIDINE HYDROCHLORIDE 100 UG/ML
INJECTION, SOLUTION INTRAVENOUS AS NEEDED
Status: DISCONTINUED | OUTPATIENT
Start: 2024-06-21 | End: 2024-06-21 | Stop reason: SURG

## 2024-06-21 RX ORDER — LABETALOL HYDROCHLORIDE 5 MG/ML
5 INJECTION, SOLUTION INTRAVENOUS
Status: DISCONTINUED | OUTPATIENT
Start: 2024-06-21 | End: 2024-06-21 | Stop reason: HOSPADM

## 2024-06-21 RX ORDER — DIPHENHYDRAMINE HYDROCHLORIDE 50 MG/ML
12.5 INJECTION INTRAMUSCULAR; INTRAVENOUS ONCE AS NEEDED
Status: DISCONTINUED | OUTPATIENT
Start: 2024-06-21 | End: 2024-06-21 | Stop reason: HOSPADM

## 2024-06-21 RX ORDER — ONDANSETRON 2 MG/ML
INJECTION INTRAMUSCULAR; INTRAVENOUS AS NEEDED
Status: DISCONTINUED | OUTPATIENT
Start: 2024-06-21 | End: 2024-06-21 | Stop reason: SURG

## 2024-06-21 RX ORDER — MAGNESIUM SULFATE HEPTAHYDRATE 500 MG/ML
INJECTION, SOLUTION INTRAMUSCULAR; INTRAVENOUS AS NEEDED
Status: DISCONTINUED | OUTPATIENT
Start: 2024-06-21 | End: 2024-06-21 | Stop reason: SURG

## 2024-06-21 RX ORDER — SODIUM CHLORIDE 0.9 % (FLUSH) 0.9 %
10 SYRINGE (ML) INJECTION AS NEEDED
Status: DISCONTINUED | OUTPATIENT
Start: 2024-06-21 | End: 2024-06-21 | Stop reason: HOSPADM

## 2024-06-21 RX ORDER — LIDOCAINE HYDROCHLORIDE 20 MG/ML
INJECTION, SOLUTION EPIDURAL; INFILTRATION; INTRACAUDAL; PERINEURAL AS NEEDED
Status: DISCONTINUED | OUTPATIENT
Start: 2024-06-21 | End: 2024-06-21 | Stop reason: SURG

## 2024-06-21 RX ORDER — HYDRALAZINE HYDROCHLORIDE 20 MG/ML
5 INJECTION INTRAMUSCULAR; INTRAVENOUS
Status: DISCONTINUED | OUTPATIENT
Start: 2024-06-21 | End: 2024-06-21 | Stop reason: HOSPADM

## 2024-06-21 RX ORDER — SODIUM CHLORIDE 9 MG/ML
INJECTION, SOLUTION INTRAVENOUS CONTINUOUS PRN
Status: DISCONTINUED | OUTPATIENT
Start: 2024-06-21 | End: 2024-06-21 | Stop reason: SURG

## 2024-06-21 RX ORDER — FENTANYL CITRATE 50 UG/ML
INJECTION, SOLUTION INTRAMUSCULAR; INTRAVENOUS AS NEEDED
Status: DISCONTINUED | OUTPATIENT
Start: 2024-06-21 | End: 2024-06-21 | Stop reason: SURG

## 2024-06-21 RX ORDER — OXYCODONE HYDROCHLORIDE 5 MG/1
10 TABLET ORAL EVERY 4 HOURS PRN
Status: COMPLETED | OUTPATIENT
Start: 2024-06-21 | End: 2024-06-21

## 2024-06-21 RX ORDER — DIPHENHYDRAMINE HYDROCHLORIDE 50 MG/ML
12.5 INJECTION INTRAMUSCULAR; INTRAVENOUS
Status: DISCONTINUED | OUTPATIENT
Start: 2024-06-21 | End: 2024-06-21 | Stop reason: HOSPADM

## 2024-06-21 RX ORDER — FLUMAZENIL 0.1 MG/ML
0.2 INJECTION INTRAVENOUS AS NEEDED
Status: DISCONTINUED | OUTPATIENT
Start: 2024-06-21 | End: 2024-06-21 | Stop reason: HOSPADM

## 2024-06-21 RX ORDER — OXYCODONE HYDROCHLORIDE 5 MG/1
5 TABLET ORAL ONCE AS NEEDED
Status: DISCONTINUED | OUTPATIENT
Start: 2024-06-21 | End: 2024-06-21 | Stop reason: HOSPADM

## 2024-06-21 RX ADMIN — SODIUM CHLORIDE 2000 MG: 900 INJECTION INTRAVENOUS at 09:52

## 2024-06-21 RX ADMIN — ROCURONIUM BROMIDE 50 MG: 10 INJECTION, SOLUTION INTRAVENOUS at 10:04

## 2024-06-21 RX ADMIN — DEXAMETHASONE SODIUM PHOSPHATE 8 MG: 4 INJECTION, SOLUTION INTRAMUSCULAR; INTRAVENOUS at 10:10

## 2024-06-21 RX ADMIN — FENTANYL CITRATE 100 MCG: 50 INJECTION, SOLUTION INTRAMUSCULAR; INTRAVENOUS at 11:05

## 2024-06-21 RX ADMIN — SUGAMMADEX 200 MG: 100 INJECTION, SOLUTION INTRAVENOUS at 10:54

## 2024-06-21 RX ADMIN — OXYCODONE HYDROCHLORIDE 10 MG: 5 TABLET ORAL at 11:51

## 2024-06-21 RX ADMIN — MAGNESIUM SULFATE HEPTAHYDRATE 2 G: 500 INJECTION, SOLUTION INTRAMUSCULAR; INTRAVENOUS at 10:33

## 2024-06-21 RX ADMIN — HYDROMORPHONE HYDROCHLORIDE 1 MG: 1 INJECTION, SOLUTION INTRAMUSCULAR; INTRAVENOUS; SUBCUTANEOUS at 11:33

## 2024-06-21 RX ADMIN — FENTANYL CITRATE 50 MCG: 50 INJECTION, SOLUTION INTRAMUSCULAR; INTRAVENOUS at 10:24

## 2024-06-21 RX ADMIN — FENTANYL CITRATE 50 MCG: 50 INJECTION, SOLUTION INTRAMUSCULAR; INTRAVENOUS at 10:01

## 2024-06-21 RX ADMIN — HYDROMORPHONE HYDROCHLORIDE 1 MG: 1 INJECTION, SOLUTION INTRAMUSCULAR; INTRAVENOUS; SUBCUTANEOUS at 12:10

## 2024-06-21 RX ADMIN — DEXMEDETOMIDINE HYDROCHLORIDE 6 MCG: 100 INJECTION, SOLUTION INTRAVENOUS at 10:40

## 2024-06-21 RX ADMIN — SODIUM CHLORIDE: 9 INJECTION, SOLUTION INTRAVENOUS at 09:56

## 2024-06-21 RX ADMIN — SODIUM CHLORIDE, POTASSIUM CHLORIDE, SODIUM LACTATE AND CALCIUM CHLORIDE 1000 ML: 600; 310; 30; 20 INJECTION, SOLUTION INTRAVENOUS at 08:42

## 2024-06-21 RX ADMIN — MIDAZOLAM 2 MG: 1 INJECTION INTRAMUSCULAR; INTRAVENOUS at 09:56

## 2024-06-21 RX ADMIN — DEXMEDETOMIDINE HYDROCHLORIDE 4 MCG: 100 INJECTION, SOLUTION INTRAVENOUS at 10:33

## 2024-06-21 RX ADMIN — PROPOFOL 200 MG: 10 INJECTION, EMULSION INTRAVENOUS at 10:03

## 2024-06-21 RX ADMIN — ONDANSETRON 4 MG: 2 INJECTION INTRAMUSCULAR; INTRAVENOUS at 10:54

## 2024-06-21 RX ADMIN — LIDOCAINE HYDROCHLORIDE 100 MG: 20 INJECTION, SOLUTION EPIDURAL; INFILTRATION; INTRACAUDAL; PERINEURAL at 10:01

## 2024-06-21 NOTE — ANESTHESIA PROCEDURE NOTES
Airway  Urgency: elective    Date/Time: 6/21/2024 10:07 AM  Airway not difficult    General Information and Staff    Patient location during procedure: OR  Anesthesiologist: Gibson Josue MD  CRNA/CAA: Amelie Barr CRNA    Indications and Patient Condition  Indications for airway management: airway protection    Preoxygenated: yes  MILS maintained throughout  Mask difficulty assessment: 2 - vent by mask + OA or adjuvant +/- NMBA    Final Airway Details  Final airway type: endotracheal airway      Successful airway: ETT  Cuffed: yes   Successful intubation technique: direct laryngoscopy  Blade: Vasques  Blade size: 3  ETT size (mm): 7.0  Cormack-Lehane Classification: grade I - full view of glottis  Placement verified by: chest auscultation   Cuff volume (mL): 7  Measured from: gums  ETT/EBT to gums (cm): 21  Number of attempts at approach: 1  Assessment: lips, teeth, and gum same as pre-op and atraumatic intubation

## 2024-06-21 NOTE — OP NOTE
Operative Note   Foot and Ankle Surgery   Provider: Dr. Servando Corral   Location: [facility]      Procedure:  1.  Gastrocnemius recession, left lower extremity  2.  Endoscopic plantar fasciotomy, left    Pre-operative Diagnosis:   1.  Plantar fasciitis, left  2.  Acquired equinus contracture, left lower extremity    Post-operative Diagnosis: Same    Surgeon: Servando Corral    Assistant: Eris French, PGY 2    Anesthesia: General    Implants: None    Findings: No unexpected findings    Specimen: None    Blood Loss: Less than 5cc    Complications: None    Post Op Plan: Discharge home.  Strict nonweightbearing activity.  Follow-up with me in 2 weeks    Summary:    Patient is a 39-year-old female that has been seen in office for chronic pain involving the left heel.  On evaluation, she has been diagnosed with plantar fasciitis as well as equinus contracture.  She has undergone conservative care efforts without any significant improvement.  She is tired of dealing with the pain and limitation.  We did discuss gastrocnemius recession along with endoscopic plantar fasciotomy.  She understands that she will require nonweightbearing and decreased overall activity.  Patient agrees and is willing to proceed.    Procedure, risks, complications, and goals were discussed with the patient at bedside.  Risks include but are not limited to infection, complications from anesthesia (including death), chronic pain or numbness, hematoma/seroma, deep vein thrombosis, wound complications, and potential for additional surgical procedures.  Patient understands and elects to proceed with surgery at this time. Informed consent was obtained before proceeding to the operating suite.  All questions were answered to the patient's satisfaction. No guarantees or assurances were given or implied.    Procedure:     Patient was brought to the operating room and placed under general anesthesia on the [facility].  Once adequate general anesthesia  was obtained, a pneumatic tourniquet was placed about the patient's operative limb. Patient was then transitioned to the operating room table in a prone position ensuring to offload all bony prominences and appendages.  The operative limb was scrubbed prepped and draped in usual sterile fashion.  A formal timeout was conducted prior skin incision.  The operative limb was elevated and extenuated and the pneumatic tourniquet was inflated to 300 mmHg.     Attention was directed to the exterior calf region at the myotendinous junction. A linear incision was performed. Blunt dissection was performed to the deep fascia. The lesser saphenous vein and sural nerve were retracted. A stab incision to the deep fascia was performed. The gastrocnemius aponeurosis was identified and transected from a medial to lateral orientation without complication.  Care was taken to preserve the posterior deep fascia and sural nerve during the transection. A significant release to the equinus contracture was noted on the table. The incision was irrigated with normal saline and closed with a 2-0 vicryl to the deep fascia, 3-0 vicryl to the subcutaneous tissues and skin staples to the skin.     Attention was then directed to the heel.  A small stab incision was performed to the medial plantar aspect of the heel close to the level of the plantar fascia origin.  Blunt dissection was performed to the level of the ligament.  The plantar fascial ligament was identified and undermined with a [place].  The trocar and cannula were then introduced into the medial portal and a lateral exit portal was obtained.  All stab incision was performed allowing for exit of the trocar.  The trocar was removed and the 2.7 mm arthroscope was introduced into the lateral portal.  The plantar fascial ligament was directly visualized and the foot with the hallux was placed into dorsiflexion allowing for maximal tension on the plantar fascial ligament.  Approximately 50% of  the ligament was transected utilizing a triangle blade.  The resection was checked multiple times with a blunt probe to ensure adequate release.  The muscle belly of the flexor digitorum longus was identified.  The wound was irrigated with copious amounts of normal saline.  All instrumentation was removed.  A postoperative block was performed utilizing 20 cc of half percent Marcaine plain.  The incisions were closed with a 3-0 nylon.      The wounds were dressed with Xeroform and sterile compressive dressings.  The tourniquet was released and prompt hyperemic response was noted to all digits of the operative foot.  The limb was placed into a well-padded posterior splint.  Patient tolerated the procedure and anesthesia well. She was transferred from the operating room to the recovery room with vital signs stable and neurovascular status unchanged to the operative extremity.      Dr. Servando Corral DPM  [facility]  [phone]    Note is dictated utilizing voice recognition software. Unfortunately this leads to occasional typographical errors. I apologize in advance if the situation occurs. If questions occur please do not hesitate to call our office.     no

## 2024-06-21 NOTE — ANESTHESIA PROCEDURE NOTES
Peripheral IV    Patient location during procedure: OR  Start time: 6/21/2024 10:27 AM  Line placed for Fluids/Medication Admin.  Performed By   CRNA/CAA: Amelie Barr CRNA  Preanesthetic Checklist  Completed: patient identified, IV checked, site marked, risks and benefits discussed, surgical consent, monitors and equipment checked, pre-op evaluation and timeout performed  Peripheral IV Prep   Patient position: supine   Prep: alcohol swabs  Patient monitoring: heart rate, cardiac monitor and continuous pulse ox  Peripheral IV Procedure   Laterality:right  Location:  Hand  Catheter size: 20 G          Post Assessment   Dressing Type: tape and transparent.    IV Dressing/Site: clean, dry and intact

## 2024-06-23 NOTE — ANESTHESIA POSTPROCEDURE EVALUATION
Patient: Amelie Henderson    Procedure Summary       Date: 06/21/24 Room / Location: [facility] OR 12 / [facility] MAIN OR    Anesthesia Start: 0956 Anesthesia Stop: 1118    Procedures:       RECESSION GASTROCNEMIUS (Left)      ENDOSCOPIC PLANTAR FASCIOTOMY (Left: Foot) Diagnosis:       Plantar fasciitis of left foot      Acquired posterior equinus of both lower extremities      (Plantar fasciitis of left foot [M72.2])      (Acquired posterior equinus of both lower extremities [M21.861, M21.862])    Surgeons: KAY Corral DPM Provider: Gibson Josue MD    Anesthesia Type: general ASA Status: 3            Anesthesia Type: general    Vitals  Vitals Value Taken Time   /85 06/21/24 1223   Temp 97.5 °F (36.4 °C) 06/21/24 1208   Pulse 72 06/21/24 1224   Resp 13 06/21/24 1222   SpO2 92 % 06/21/24 1224   Vitals shown include unfiled device data.        Post Anesthesia Care and Evaluation    Patient location during evaluation: PACU  Patient participation: complete - patient participated  Level of consciousness: awake  Pain scale: See nurse's notes for pain score.  Pain management: adequate    Airway patency: patent  Anesthetic complications: No anesthetic complications  PONV Status: none  Cardiovascular status: acceptable  Respiratory status: acceptable and spontaneous ventilation  Hydration status: acceptable    Comments: Patient seen and examined postoperatively; vital signs stable; SpO2 greater than or equal to 90%; cardiopulmonary status stable; nausea/vomiting adequately controlled; pain adequately controlled; no apparent anesthesia complications; patient discharged from anesthesia care when discharge criteria were met

## 2024-06-24 ENCOUNTER — PATIENT MESSAGE (OUTPATIENT)
Dept: PODIATRY | Facility: CLINIC | Age: 39
End: 2024-06-24

## 2024-06-24 RX ORDER — METHYLPREDNISOLONE 4 MG/1
TABLET ORAL
Qty: 21 TABLET | Refills: 0 | Status: SHIPPED | OUTPATIENT
Start: 2024-06-24

## 2024-06-24 RX ORDER — MIRABEGRON 50 MG/1
50 TABLET, FILM COATED, EXTENDED RELEASE ORAL DAILY
Qty: 90 TABLET | Refills: 3 | Status: SHIPPED | OUTPATIENT
Start: 2024-06-24

## 2024-07-05 ENCOUNTER — APPOINTMENT (OUTPATIENT)
Dept: GENERAL RADIOLOGY | Facility: HOSPITAL | Age: 39
End: 2024-07-05
Payer: OTHER GOVERNMENT

## 2024-07-05 ENCOUNTER — HOSPITAL ENCOUNTER (OUTPATIENT)
Facility: HOSPITAL | Age: 39
Discharge: HOME | End: 2024-07-05
Attending: EMERGENCY MEDICINE
Payer: OTHER GOVERNMENT

## 2024-07-05 VITALS
SYSTOLIC BLOOD PRESSURE: 153 MMHG | HEART RATE: 99 BPM | TEMPERATURE: 97.5 F | DIASTOLIC BLOOD PRESSURE: 88 MMHG | RESPIRATION RATE: 20 BRPM | HEIGHT: 70 IN | OXYGEN SATURATION: 100 % | WEIGHT: 220 LBS | BODY MASS INDEX: 31.5 KG/M2
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
## 2024-07-05 DIAGNOSIS — M25.462 SWELLING OF LEFT KNEE JOINT: ICD-10-CM

## 2024-07-05 PROCEDURE — 73090 X-RAY EXAM OF FOREARM: CPT

## 2024-07-05 PROCEDURE — 73502 X-RAY EXAM HIP UNI 2-3 VIEWS: CPT

## 2024-07-05 PROCEDURE — 99214 OFFICE O/P EST MOD 30 MIN: CPT

## 2024-07-05 PROCEDURE — G0463 HOSPITAL OUTPT CLINIC VISIT: HCPCS

## 2024-07-05 PROCEDURE — 73560 X-RAY EXAM OF KNEE 1 OR 2: CPT

## 2024-07-05 PROCEDURE — 73610 X-RAY EXAM OF ANKLE: CPT

## 2024-07-05 NOTE — FSED PROVIDER NOTE
Subjective   History of Present Illness  39-year-old female reports she is recovering from surgery to her left plantar fascia, reports she is scheduled to see her surgeon for follow-up this coming Monday reports that she is using a scooter to ambulate reports that yesterday she flipped her scooter fell forward onto her right arm right hip but also struck her left knee and left ankle on the floor.  She thinks that she broke her fall with her wrist and is not reporting any pain to her hands or wrist.  She denies hitting her head or having loss of consciousness.        Review of Systems   All other systems reviewed and are negative.      Past Medical History:   Diagnosis Date    Allergic     Anemia     Anxiety     Arthritis     B12 deficiency     Callus     COVID     x 2     Depression     Dercum's disease     Difficulty walking     Endometriosis     Fallen arches     Fibromyalgia 02/15/2024    Gastritis     GERD (gastroesophageal reflux disease)     Hyperlipidemia     Borderline    Hypertension     Hypoglycemia     Ingrown toenail     Migraines     Plantar fasciitis of left foot 2024    PTSD (post-traumatic stress disorder)     Shin splints     Stress fracture     Vitamin D deficiency        Allergies   Allergen Reactions    [place] Anaphylaxis    Codeine Hives and Itching    Influenza Virus Vaccine Other (See Comments)     Egg allergy     Latex Rash     Skin gets red and burns, skin starts peeling      Meloxicam Other (See Comments)     Gastritis      Sulfa Antibiotics Unknown - High Severity       Past Surgical History:   Procedure Laterality Date    CARPAL TUNNEL RELEASE Right      SECTION      x 2     CHOLECYSTECTOMY      HYSTERECTOMY  2023    KNEE SURGERY Right     x 3     KNEE SURGERY Left     x 1     PLANTAR FASCIA RELEASE Left 2024    Procedure: ENDOSCOPIC PLANTAR FASCIOTOMY;  Surgeon: KAY Corral DPM;  Location: [facility] MAIN OR;  Service: Podiatry;  Laterality: Left;     RECESSION GASTROCNEMIUS Left 2024    Procedure: RECESSION GASTROCNEMIUS;  Surgeon: KAY Corral DPM;  Location: [facility] MAIN OR;  Service: Podiatry;  Laterality: Left;    SHOULDER ACROMIOPLASTY WITH ROTATOR CUFF REPAIR Left 2021    Procedure: LEFT SHOULDER ARTHROSCOPY WITH ROTATOR CUFF REPAIR AND SUBACROMIAL DECOMPRESSION- SLAP;  Surgeon: Rianna Jarvis MD;  Location: [facility] [place] [place];  Service: Orthopedics;  Laterality: Left;    SHOULDER ARTHROSCOPY W/ ROTATOR CUFF REPAIR Left 2024    Procedure: SHOULDER ARTHROSCOPY WITH ROTATOR CUFF REPAIR, DECOMPRESSION AND PRP INJECTION;  Surgeon: Rianna Jarvis MD;  [facility] [place];  Service: Orthopedics;  Laterality: Left;       Family History   Problem Relation Age of Onset    Thyroid disease Mother     Glaucoma Mother     Hypertension Mother     Transient ischemic attack Mother     Bell's palsy Mother     Alcohol abuse Father     ADD / ADHD Sister     Depression Son     Pyloric stenosis Son     Hypertension Maternal Grandmother     Heart failure Maternal Grandmother     Stroke Maternal Grandmother     Diabetes Paternal Grandfather     Malig Hyperthermia Neg Hx        Social History     Socioeconomic History    Marital status:    Tobacco Use    Smoking status: Former     Current packs/day: 0.00     Average packs/day: 0.5 packs/day for 17.0 years (8.5 ttl pk-yrs)     Types: Cigarettes     Start date: 1999     Quit date: 2016     Years since quittin.5     Passive exposure: Past    Smokeless tobacco: Never   Vaping Use    Vaping status: Never Used   Substance and Sexual Activity    Alcohol use: Yes     Comment: Occassion    Drug use: Never    Sexual activity: Yes     Partners: Male     Birth control/protection: Tubal ligation, Hysterectomy, Surgical           Objective   Physical Exam  Vitals and nursing note reviewed.   Constitutional:       Appearance: Normal appearance.   HENT:      Head: Normocephalic and atraumatic.       Right Ear: Ear canal normal.      Left Ear: Ear canal normal.      Nose: Nose normal.      Mouth/Throat:      Mouth: Mucous membranes are moist.      Pharynx: Oropharynx is clear.   Eyes:      Extraocular Movements: Extraocular movements intact.   Cardiovascular:      Rate and Rhythm: Normal rate.      Pulses: Normal pulses.   Pulmonary:      Effort: Pulmonary effort is normal.   Abdominal:      General: Abdomen is flat.      Palpations: Abdomen is soft.   Musculoskeletal:      Comments: Purple bruising noted to the left knee I removed the patient's left supportive boot and there is an Ace wrap in place with no evidence of shadow drainage.  Capillary refills less than 3 seconds to the toes of the left foot.  Patient has superficial bruising to the right forearm she is able to supinate and pronate the right wrist without any distress.   Neurological:      General: No focal deficit present.      Mental Status: She is alert.         Procedures           ED Course  ED Course as of 07/05/24 1401   Fri Jul 05, 2024   1107 Right forearm x-ray  Impression:  Negative.   [WF]   1109 Left knee  Impression:  No acute process.   [WF]   1109 Right hip and pelvis  Impression:  No significant findings.   [WF]   1111 Left ankle x-ray  Impression:  No acute bony injury.   [WF]      ED Course User Index  [WF] Valentin Dalal Jr., APRN                                           Medical Decision Making  Patient is requesting imaging as she has a follow-up with her surgeon on Monday.  Will obtain x-rays of the right forearm right hip left knee and left ankle.    I have updated the patient and her  on x-ray findings which are negative.  Patient will be discharged home with an Ace wrap for compression of the left knee I am advising elevation of the left leg is much as possible to reduce swelling of the left knee we discussed that swelling of the left knee is most likely finding its way down to the left ankle and foot causing  discomfort.  Patient has follow-up with surgery on Monday for assessment of her left foot.  Will discharge home recommending rest ice compression and elevation of the left knee    Problems Addressed:  Fall, initial encounter: complicated acute illness or injury  Swelling of left knee joint: complicated acute illness or injury    Amount and/or Complexity of Data Reviewed  Radiology: ordered.        Final diagnoses:   Fall, initial encounter   Swelling of left knee joint       ED Disposition  ED Disposition       ED Disposition   Discharge    Condition   Stable    Comment   --               Damon Valera, APRN  [address] [facility] [place] [id number] [phone]               Medication List      No changes were made to your prescriptions during this visit.

## 2024-07-05 NOTE — DISCHARGE INSTRUCTIONS
Resume all home medications    Recommending rest ice compression elevation of the left knee    Use Ace wrap to help reduce swelling recommend icing 2 to 3 minutes at a time several times daily for the next 2 to 3 days    Follow-up with surgeon on Monday for assessment of surgical site to left foot    Follow-up with family doctor as needed return to ER for worsening symptoms

## 2024-07-08 ENCOUNTER — OFFICE VISIT (OUTPATIENT)
Dept: PODIATRY | Facility: CLINIC | Age: 39
End: 2024-07-08
Payer: OTHER GOVERNMENT

## 2024-07-08 VITALS
WEIGHT: 220 LBS | BODY MASS INDEX: 31.5 KG/M2 | HEIGHT: 70 IN | HEART RATE: 73 BPM | OXYGEN SATURATION: 97 % | RESPIRATION RATE: 20 BRPM

## 2024-07-08 DIAGNOSIS — M72.2 PLANTAR FASCIITIS OF LEFT FOOT: Primary | ICD-10-CM

## 2024-07-08 DIAGNOSIS — M21.862 ACQUIRED POSTERIOR EQUINUS OF BOTH LOWER EXTREMITIES: ICD-10-CM

## 2024-07-08 DIAGNOSIS — M21.861 ACQUIRED POSTERIOR EQUINUS OF BOTH LOWER EXTREMITIES: ICD-10-CM

## 2024-07-08 PROCEDURE — 99024 POSTOP FOLLOW-UP VISIT: CPT | Performed by: PODIATRIST

## 2024-07-08 NOTE — PROGRESS NOTES
07/08/2024  Foot and Ankle Surgery - Established Patient/Follow-up  Provider: Dr. Servando Corral DPM  Location: [facility]    Subjective:  Amelie Henderson is a 39 y.o. female.     Chief Complaint   Patient presents with    Left Ankle - Follow-up     Dr. Corral 06/21/2024  1.  Gastrocnemius recession, left lower extremity  2.  Endoscopic plantar fasciotomy, left      Follow-up     ABIOLA Valera last pcp visit 06/01/2024       History of Present Illness  The patient presents for postoperative follow-up.    The patient reports experiencing discomfort in the surgical area. She has been self-administering tizanidine every 8 hours and diclofenac.      Allergies   Allergen Reactions    [place]orton Anaphylaxis    Codeine Hives and Itching    Influenza Virus Vaccine Other (See Comments)     Egg allergy     Latex Rash     Skin gets red and burns, skin starts peeling      Meloxicam Other (See Comments)     Gastritis      Sulfa Antibiotics Unknown - High Severity       Current Outpatient Medications on File Prior to Visit   Medication Sig Dispense Refill    albuterol (PROVENTIL) (2.5 MG/3ML) 0.083% nebulizer solution Take 2.5 mg by nebulization Every 4 (Four) Hours As Needed for Wheezing or Shortness of Air. 120 mL 1    albuterol sulfate  (90 Base) MCG/ACT inhaler Inhale 2 puffs Every 4 (Four) Hours As Needed for Wheezing. 18 g 2    apixaban (ELIQUIS) 5 MG tablet tablet Take 1 tablet by mouth Every 12 (Twelve) Hours. 180 tablet 1    armodafinil (NUVIGIL) 150 MG tablet       busPIRone (BUSPAR) 10 MG tablet Take 2 tablets by mouth 3 (Three) Times a Day As Needed.      Cholecalciferol 125 MCG (5000 UT) tablet Take 1 tablet by mouth Daily. 90 tablet 1    ciprofloxacin-dexAMETHasone (CIPRODEX) 0.3-0.1 % otic suspension       diclofenac (VOLTAREN) 50 MG EC tablet TAKE 1 TABLET TWICE A DAY AS NEEDED FOR PAIN (ARTHRALGIA) 180 tablet 3    diphenhydrAMINE (BENADRYL) 25 mg capsule Take 1 capsule by mouth Every 6 (Six) Hours As Needed  for Itching.      doxycycline (VIBRAMYCIN) 100 MG capsule TAKE 1 CAPSULE BY MOUTH TWICE A DAY FOR 10 DAYS      EPINEPHrine (EPIPEN) 0.3 MG/0.3ML solution auto-injector injection Inject 0.3 mL into the appropriate muscle as directed by prescriber 1 (One) Time.      ferrous gluconate (FERGON) 324 MG tablet TAKE 1 TABLET BY MOUTH EVERY DAY WITH BREAKFAST 90 tablet 1    fluticasone (FLONASE) 50 MCG/ACT nasal spray SPRAY 2 SPRAYS INTO THE NOSTRIL AS DIRECTED BY PROVIDER DAILY. 16 mL 3    galcanezumab-gnlm (EMGALITY) 120 MG/ML auto-injector pen Inject  under the skin into the appropriate area as directed Every 30 (Thirty) Days.      HYDROcodone-acetaminophen (NORCO)  MG per tablet Take 1 tablet by mouth Every 6 (Six) Hours As Needed for Moderate Pain. 90 tablet 0    hydrOXYzine (ATARAX) 50 MG tablet MAY TAKE 1 TABLET 2 TIMES A DAY AS NEEDED FOR ITCHING, MAY ALSO TAKE 2 TABLETS AT NIGHT AS NEEDED. 120 tablet 3    l-methylfolate 7.5 MG tablet tablet Take  by mouth Daily.      levocetirizine (XYZAL) 5 MG tablet Take 1 tablet by mouth Every Morning.      Levomilnacipran HCl ER (Fetzima) 40 MG capsule sustained-release 24 hr Take 40 mg by mouth Daily. 90 capsule 1    losartan (COZAAR) 50 MG tablet Take 1 tablet by mouth Every Morning. 90 tablet 1    methylPREDNISolone (MEDROL) 4 MG dose pack Take as directed on package instructions. 21 tablet 0    metoprolol tartrate (LOPRESSOR) 25 MG tablet Take 1 tablet by mouth Daily.      mupirocin (BACTROBAN) 2 % cream Apply 1 application  topically to the appropriate area as directed 3 (Three) Times a Day. 30 g 1    Myrbetriq 50 MG tablet sustained-release 24 hour 24 hr tablet TAKE 1 TABLET DAILY 90 tablet 3    naloxone (NARCAN) 4 MG/0.1ML nasal spray Call 911. Don't prime. [place]win in 1 nostril for overdose. Repeat in 2-3 minutes in other nostril if no or minimal breathing/responsiveness. 2 each 0    omeprazole (priLOSEC) 20 MG capsule Take 1 capsule by mouth 2 (Two) Times a Day.    "   ondansetron (Zofran) 4 MG tablet Take 1 tablet by mouth Every 6 (Six) Hours As Needed for Nausea or Vomiting. 360 tablet 1    pregabalin (Lyrica) 50 MG capsule Take 1 capsule by mouth 3 (Three) Times a Day. 270 capsule 0    promethazine (PHENERGAN) 25 MG tablet Take 1 tablet by mouth Every 8 (Eight) Hours As Needed for Nausea or Vomiting. 30 tablet 1    propranolol (INDERAL) 20 MG tablet Take 1 tablet by mouth 2 (Two) Times a Day As Needed (ANXIETY). 180 tablet 01    rizatriptan (MAXALT) 10 MG tablet TAKE 1 TABLET AT THE ONSET OF HEADACHE MAY REPEAT IN 2 HOURS MAX OF 2 TABS IN 24 HOURS 27 tablet 1    rOPINIRole (REQUIP) 0.5 MG tablet Take 1 tablet by mouth Every Night. Take 1 hour before bedtime. 90 tablet 1    Semaglutide-Weight Management (Wegovy) 0.25 MG/0.5ML solution auto-injector Inject 0.5 mL under the skin into the appropriate area as directed 1 (One) Time Per Week. 2 mL 2    tiZANidine (ZANAFLEX) 4 MG tablet Take 1 tablet by mouth Every 8 (Eight) Hours As Needed for Muscle Spasms. 270 tablet 1    traZODone (DESYREL) 50 MG tablet TAKE 1 TABLET BY MOUTH EVERY DAY AT NIGHT 90 tablet 1    ubrogepant (UBRELVY) 100 MG tablet Take 1 tablet by mouth 1 (One) Time As Needed (migraines). 90 tablet 1    vitamin C (ASCORBIC ACID) 250 MG tablet Take 1 tablet by mouth Daily.       No current facility-administered medications on file prior to visit.       Objective   Pulse 73   Resp 20   Ht 177.8 cm (70\")   Wt 99.8 kg (220 lb)   LMP 02/01/2023 (Approximate)   SpO2 97%   BMI 31.57 kg/m²     Foot/Ankle Exam  Physical Exam  GENERAL  Orientation:  AAOx3  Affect:  appropriate     VASCULAR      Right Foot Vascularity   Normal vascular exam    Dorsalis pedis:  2+  Posterior tibial:  2+  Skin temperature:  warm  Edema grading:  None  CFT:  < 3 seconds  Pedal hair growth:  Present  Varicosities:  none      Left Foot Vascularity   Normal vascular exam    Dorsalis pedis:  2+  Posterior tibial:  2+  Skin temperature:  " warm  Edema grading:  None  CFT:  < 3 seconds  Pedal hair growth:  Present  Varicosities:  none     NEUROLOGIC      Right Foot Neurologic   Light touch sensation: normal  Hot/Cold sensation: normal  Achilles reflex:  2+      Left Foot Neurologic   Light touch sensation: normal  Hot/Cold sensation:  normal  Achilles reflex:  2+     MUSCULOSKELETAL      Right Foot Musculoskeletal   Arch:  Normal      Left Foot Musculoskeletal   Arch:  Normal     MUSCLE STRENGTH      Right Foot Muscle Strength   Normal strength    Foot dorsiflexion:  5  Foot plantar flexion:  5  Foot inversion:  5  Foot eversion:  5      Left Foot Muscle Strength   Normal strength    Foot dorsiflexion:  5  Foot plantar flexion:  5  Foot inversion:  5  Foot eversion:  5     DERMATOLOGIC       Right Foot Dermatologic   Skin  Right foot skin is intact.   Nails comment:  Nails 1-5      Left Foot Dermatologic   Skin  Left foot skin is intact.   Nails comment:  Nails 1-5     TESTS      Right Foot Tests   Anterior drawer: negative  Varus tilt: negative      Left Foot Tests   Anterior drawer: negative  Varus tilt: negative     Right foot additional comments: Significant discomfort with palpation involving the plantar medial calcaneal tuberosity and abductor hallucis muscle belly. Pes cavus foot structure. Moderate equinus contracture with knee extended, flexed with soft tissue rigidity. No obvious deformity or instability.     01/30/2024: Incurvation involving the medial borders of both great toes.     Left foot additional comments: 12/19/2023  Significant pain with palpation involving the plantar aspect of the left heel. Moderate equinus contracture. No significant deformity. No progressive deformity or instability.     01/30/2024: Continued discomfort with palpation involving the left first metatarsal. Continued pain involving the plantar aspect of the left calcaneus. Significant equinus contracture. Incurvation involving the medial borders of both great  toes.     6/11/24: Significant discomfort with palpation involving the plantar aspect of the left heel greater than right.  She continues to have significant equinus contracture involving both lower extremities.  No additional issues or concerns today    7/8/24: Incision sites are dry and stable with intact sutures.  No evidence of dehiscence or infection.  No other complicating features.      Results      Assessment & Plan   Diagnoses and all orders for this visit:    1. Plantar fasciitis of left foot (Primary)    2. Acquired posterior equinus of both lower extremities      Assessment & Plan    The patient's bruising, pain, spasms, cramping, numbness, and tingling are all within normal parameters. The sutures will be removed today. The patient is advised to shower, bathe, and soak the surgical area. Gentle massages on the posterior aspect of the surgical area are recommended. The patient is advised to use a tennis ball and massage the area. The use of a knee scooter can be initiated to facilitate weight-bearing in the CAM boot. The patient is advised to wear the boot for an additional 2 weeks.    Follow-up  A follow-up appointment is scheduled for 2 weeks from now.             Patient or patient representative verbalized consent for the use of Ambient Listening during the visit with  KAY Corral DPM for chart documentation. 7/8/2024  09:29 EDT    KAY Corral DPM

## 2024-07-15 ENCOUNTER — OFFICE VISIT (OUTPATIENT)
Dept: FAMILY MEDICINE CLINIC | Facility: CLINIC | Age: 39
End: 2024-07-15
Payer: OTHER GOVERNMENT

## 2024-07-15 VITALS
BODY MASS INDEX: 33.61 KG/M2 | RESPIRATION RATE: 16 BRPM | HEIGHT: 70 IN | SYSTOLIC BLOOD PRESSURE: 146 MMHG | DIASTOLIC BLOOD PRESSURE: 97 MMHG | HEART RATE: 92 BPM | TEMPERATURE: 97.8 F | OXYGEN SATURATION: 98 % | WEIGHT: 234.8 LBS

## 2024-07-15 DIAGNOSIS — E66.01 MORBID (SEVERE) OBESITY DUE TO EXCESS CALORIES: ICD-10-CM

## 2024-07-15 DIAGNOSIS — F41.9 ANXIETY: Primary | ICD-10-CM

## 2024-07-15 DIAGNOSIS — R21 FACIAL RASH: ICD-10-CM

## 2024-07-15 PROCEDURE — 99214 OFFICE O/P EST MOD 30 MIN: CPT | Performed by: REGISTERED NURSE

## 2024-07-15 RX ORDER — ESTRADIOL 10 UG/1
TABLET VAGINAL
COMMUNITY
Start: 2024-07-01

## 2024-07-15 RX ORDER — BUSPIRONE HYDROCHLORIDE 10 MG/1
20 TABLET ORAL 3 TIMES DAILY PRN
Qty: 540 TABLET | Refills: 1 | Status: SHIPPED | OUTPATIENT
Start: 2024-07-15

## 2024-07-15 NOTE — PROGRESS NOTES
Chief Complaint  Med Refill (4 wk medication follow up ) and Follow-up (Plantar fasciitis surgery on 6/28/2024)    Subjective    History of Present Illness {CC  Problem List  Visit  Diagnosis   Encounters  Notes  Medications  Labs  Result Review Imaging  Media :23}     Amelie Henderson presents to [facility] for Med Refill (4 wk medication follow up ) and Follow-up (Plantar fasciitis surgery on 6/28/2024).      History of Present Illness  Patient is a 39 y.o. female  presents to the clinic today for 4 week follow up for obesity with concerns of facial rash greater than 1 month.  Patient denies any chest pain, shortness of breath, or any fevers.  Patient denies any known exposure to COVID, flu, or any other contagious illnesses.    In regards to obesity, patient started Wegovy at her last visit.  She shares that this has been helpful but she does voice that she has significant nausea that never goes away.  She feels that she has needed to take her antinausea medication to make it more tolerable.  Patient is interested in switching to Zepbound to see if this is a more tolerable option for her.  Patient denies any other concerns in regards to this at this time.  New prescription for Zepbound will be sent over to [facility]'s compounding pharmacy at patient's request.    In regards to facial rash, patient shares that she has been dealing with an intermittent facial rash for approximately a month or longer.  She is concerned that this may be chronic rosacea.  We discussed options and patient would like to move forward with a dermatology referral to discuss this intermittent facial rash.    Rash  This is a new problem. The current episode started 1 to 4 weeks ago. The problem has been coming and going since onset. The affected locations include the face. The rash is characterized by blistering, pain, swelling and itchiness. She was exposed to emotional stress. Associated symptoms  "include anorexia, congestion, facial edema, fatigue, rhinorrhea and vomiting. Pertinent negatives include no cough, diarrhea, eye pain, fever, nail changes, shortness of breath or sore throat.   Additional comments: I think congestion and runny nose are sinus related, appetite and vomiting are wegovy related       Review of Systems   Constitutional:  Positive for fatigue. Negative for activity change, chills and fever.   HENT:  Positive for congestion and rhinorrhea. Negative for dental problem, ear pain, hearing loss, sinus pain, sore throat, tinnitus and trouble swallowing.    Eyes: Negative.  Negative for pain and visual disturbance.   Respiratory: Negative.  Negative for cough, chest tightness, shortness of breath and wheezing.    Cardiovascular: Negative.  Negative for chest pain, palpitations and leg swelling.   Gastrointestinal:  Positive for anorexia and vomiting. Negative for abdominal pain, diarrhea and nausea.   Endocrine: Negative.  Negative for polydipsia, polyphagia and polyuria.   Genitourinary: Negative.  Negative for difficulty urinating, dysuria, frequency and urgency.   Musculoskeletal: Negative.  Negative for arthralgias, back pain and myalgias.   Skin:  Positive for rash. Negative for color change, nail changes, pallor and wound.   Allergic/Immunologic: Negative.  Negative for environmental allergies.   Neurological: Negative.  Negative for dizziness, speech difficulty, weakness, light-headedness, numbness and headaches.   Hematological: Negative.    Psychiatric/Behavioral: Negative.  Negative for confusion, decreased concentration, self-injury and suicidal ideas. The patient is not nervous/anxious.    All other systems reviewed and are negative.       Objective     Vital Signs:   /97 (BP Location: Right arm, Patient Position: Sitting, Cuff Size: Adult)   Pulse 92   Temp 97.8 °F (36.6 °C) (Oral)   Resp 16   Ht 177.8 cm (70\")   Wt 107 kg (234 lb 12.8 oz)   SpO2 98%   BMI 33.69 kg/m² "   Current Outpatient Medications on File Prior to Visit   Medication Sig Dispense Refill    albuterol (PROVENTIL) (2.5 MG/3ML) 0.083% nebulizer solution Take 2.5 mg by nebulization Every 4 (Four) Hours As Needed for Wheezing or Shortness of Air. 120 mL 1    albuterol sulfate  (90 Base) MCG/ACT inhaler Inhale 2 puffs Every 4 (Four) Hours As Needed for Wheezing. 18 g 2    Cholecalciferol 125 MCG (5000 UT) tablet Take 1 tablet by mouth Daily. 90 tablet 1    diclofenac (VOLTAREN) 50 MG EC tablet TAKE 1 TABLET TWICE A DAY AS NEEDED FOR PAIN (ARTHRALGIA) 180 tablet 3    diphenhydrAMINE (BENADRYL) 25 mg capsule Take 1 capsule by mouth Every 6 (Six) Hours As Needed for Itching.      EPINEPHrine (EPIPEN) 0.3 MG/0.3ML solution auto-injector injection Inject 0.3 mL into the appropriate muscle as directed by prescriber 1 (One) Time.      ferrous gluconate (FERGON) 324 MG tablet TAKE 1 TABLET BY MOUTH EVERY DAY WITH BREAKFAST 90 tablet 1    fluticasone (FLONASE) 50 MCG/ACT nasal spray SPRAY 2 SPRAYS INTO THE NOSTRIL AS DIRECTED BY PROVIDER DAILY. 16 mL 3    galcanezumab-gnlm (EMGALITY) 120 MG/ML auto-injector pen Inject  under the skin into the appropriate area as directed Every 30 (Thirty) Days.      HYDROcodone-acetaminophen (NORCO)  MG per tablet Take 1 tablet by mouth Every 6 (Six) Hours As Needed for Moderate Pain. 90 tablet 0    hydrOXYzine (ATARAX) 50 MG tablet MAY TAKE 1 TABLET 2 TIMES A DAY AS NEEDED FOR ITCHING, MAY ALSO TAKE 2 TABLETS AT NIGHT AS NEEDED. 120 tablet 3    l-methylfolate 7.5 MG tablet tablet Take  by mouth Daily.      levocetirizine (XYZAL) 5 MG tablet Take 1 tablet by mouth Every Morning.      Levomilnacipran HCl ER (Fetzima) 40 MG capsule sustained-release 24 hr Take 40 mg by mouth Daily. 90 capsule 1    losartan (COZAAR) 50 MG tablet Take 1 tablet by mouth Every Morning. 90 tablet 1    metoprolol tartrate (LOPRESSOR) 25 MG tablet Take 1 tablet by mouth Daily.      mupirocin (BACTROBAN) 2  % cream Apply 1 application  topically to the appropriate area as directed 3 (Three) Times a Day. 30 g 1    Myrbetriq 50 MG tablet sustained-release 24 hour 24 hr tablet TAKE 1 TABLET DAILY 90 tablet 3    omeprazole (priLOSEC) 20 MG capsule Take 1 capsule by mouth 2 (Two) Times a Day.      ondansetron (Zofran) 4 MG tablet Take 1 tablet by mouth Every 6 (Six) Hours As Needed for Nausea or Vomiting. 360 tablet 1    pregabalin (Lyrica) 50 MG capsule Take 1 capsule by mouth 3 (Three) Times a Day. 270 capsule 0    promethazine (PHENERGAN) 25 MG tablet Take 1 tablet by mouth Every 8 (Eight) Hours As Needed for Nausea or Vomiting. 30 tablet 1    propranolol (INDERAL) 20 MG tablet Take 1 tablet by mouth 2 (Two) Times a Day As Needed (ANXIETY). 180 tablet 01    rizatriptan (MAXALT) 10 MG tablet TAKE 1 TABLET AT THE ONSET OF HEADACHE MAY REPEAT IN 2 HOURS MAX OF 2 TABS IN 24 HOURS 27 tablet 1    rOPINIRole (REQUIP) 0.5 MG tablet Take 1 tablet by mouth Every Night. Take 1 hour before bedtime. 90 tablet 1    tiZANidine (ZANAFLEX) 4 MG tablet Take 1 tablet by mouth Every 8 (Eight) Hours As Needed for Muscle Spasms. 270 tablet 1    traZODone (DESYREL) 50 MG tablet TAKE 1 TABLET BY MOUTH EVERY DAY AT NIGHT 90 tablet 1    ubrogepant (UBRELVY) 100 MG tablet Take 1 tablet by mouth 1 (One) Time As Needed (migraines). 90 tablet 1    vitamin C (ASCORBIC ACID) 250 MG tablet Take 1 tablet by mouth Daily.      Yuvafem 10 MCG tablet vaginal tablet 1 (ONE) TABLET AT BEDTIME, ON SUNDAY NIGHTS      [DISCONTINUED] busPIRone (BUSPAR) 10 MG tablet Take 2 tablets by mouth 3 (Three) Times a Day As Needed.      [DISCONTINUED] Semaglutide-Weight Management (Wegovy) 0.25 MG/0.5ML solution auto-injector Inject 0.5 mL under the skin into the appropriate area as directed 1 (One) Time Per Week. 2 mL 2    apixaban (ELIQUIS) 5 MG tablet tablet Take 1 tablet by mouth Every 12 (Twelve) Hours. 180 tablet 1    armodafinil (NUVIGIL) 150 MG tablet        ciprofloxacin-dexAMETHasone (CIPRODEX) 0.3-0.1 % otic suspension       doxycycline (VIBRAMYCIN) 100 MG capsule TAKE 1 CAPSULE BY MOUTH TWICE A DAY FOR 10 DAYS      methylPREDNISolone (MEDROL) 4 MG dose pack Take as directed on package instructions. 21 tablet 0    naloxone (NARCAN) 4 MG/0.1ML nasal spray Call 911. Don't prime. [place] in 1 nostril for overdose. Repeat in 2-3 minutes in other nostril if no or minimal breathing/responsiveness. 2 each 0     No current facility-administered medications on file prior to visit.        Past Medical History:   Diagnosis Date    Allergic     Anemia     Anxiety     Arthritis     B12 deficiency     Callus     CHF (congestive heart failure) 2005    COVID     x 2     Deep vein thrombosis 3/30/24    Depression     Dercum's disease     Difficulty walking     Endometriosis     Fallen arches     Fibromyalgia 02/15/2024    Gastritis     GERD (gastroesophageal reflux disease)     High arches     Hyperlipidemia     Borderline    Hypertension     Hypoglycemia     Ingrown toenail     Liver disease     Fatty liver disease    Migraines     Plantar fasciitis of left foot 2024    PTSD (post-traumatic stress disorder)     Shin splints     Stress fracture     Vitamin D deficiency       Past Surgical History:   Procedure Laterality Date    CARPAL TUNNEL RELEASE Right      SECTION      x 2     CHOLECYSTECTOMY      HYSTERECTOMY  2023    KNEE SURGERY Right     x 3     KNEE SURGERY Left     x 1     PLANTAR FASCIA RELEASE Left 2024    Procedure: ENDOSCOPIC PLANTAR FASCIOTOMY;  Surgeon: KAY Corral DPM;  Location: [facility] MAIN OR;  Service: Podiatry;  Laterality: Left;    RECESSION GASTROCNEMIUS Left 2024    Procedure: RECESSION GASTROCNEMIUS;  Surgeon: KAY Corral DPM;  Location: [facility] MAIN OR;  Service: Podiatry;  Laterality: Left;    SHOULDER ACROMIOPLASTY WITH ROTATOR CUFF REPAIR Left 2021    Procedure: LEFT SHOULDER ARTHROSCOPY WITH ROTATOR  CUFF REPAIR AND SUBACROMIAL DECOMPRESSION- SLAP;  Surgeon: Rianna Jarvis MD;  Location[place] [facility];  Service: Orthopedics;  Laterality: Left;    SHOULDER ARTHROSCOPY W/ ROTATOR CUFF REPAIR Left 2024    Procedure: SHOULDER ARTHROSCOPY WITH ROTATOR CUFF REPAIR, DECOMPRESSION AND PRP INJECTION;  Surgeon: Rianna Jarvis MD;  [place] [facility] [place] [facility];  Service: Orthopedics;  Laterality: Left;      Family History   Problem Relation Age of Onset    Thyroid disease Mother     Glaucoma Mother     Hypertension Mother     Transient ischemic attack Mother     Bell's palsy Mother     Alcohol abuse Father     ADD / ADHD Sister     Depression Son     Pyloric stenosis Son     Hypertension Maternal Grandmother     Heart failure Maternal Grandmother     Stroke Maternal Grandmother     Diabetes Paternal Grandfather     Malig Hyperthermia Neg Hx       Social History     Socioeconomic History    Marital status:    Tobacco Use    Smoking status: Former     Current packs/day: 0.00     Average packs/day: 0.5 packs/day for 17.0 years (8.5 ttl pk-yrs)     Types: Cigarettes     Start date: 1999     Quit date: 2016     Years since quittin.5     Passive exposure: Past    Smokeless tobacco: Never   Vaping Use    Vaping status: Never Used   Substance and Sexual Activity    Alcohol use: Yes     Alcohol/week: 11.0 standard drinks of alcohol     Types: 3 Glasses of wine, 8 Cans of beer per week     Comment: Don't usually drink wine and beer in the same week    Drug use: Never    Sexual activity: Yes     Partners: Male     Birth control/protection: Tubal ligation, Hysterectomy, Surgical         Office Visit on 06/10/2024   Component Date Value Ref Range Status    Glucose 06/10/2024 82  65 - 99 mg/dL Final    BUN 06/10/2024 11  6 - 20 mg/dL Final    Creatinine 06/10/2024 0.83  0.57 - 1.00 mg/dL Final    Sodium 06/10/2024 138  136 - 145 mmol/L Final    Potassium 06/10/2024 4.6  3.5 - 5.2 mmol/L Final    Chloride  06/10/2024 103  98 - 107 mmol/L Final    CO2 06/10/2024 21.4 (L)  22.0 - 29.0 mmol/L Final    Calcium 06/10/2024 10.4  8.6 - 10.5 mg/dL Final    Total Protein 06/10/2024 7.8  6.0 - 8.5 g/dL Final    Albumin 06/10/2024 4.6  3.5 - 5.2 g/dL Final    ALT (SGPT) 06/10/2024 41 (H)  1 - 33 U/L Final    AST (SGOT) 06/10/2024 25  1 - 32 U/L Final    Alkaline Phosphatase 06/10/2024 125 (H)  39 - 117 U/L Final    Total Bilirubin 06/10/2024 <0.2  0.0 - 1.2 mg/dL Final    Globulin 06/10/2024 3.2  gm/dL Final    A/G Ratio 06/10/2024 1.4  g/dL Final    BUN/Creatinine Ratio 06/10/2024 13.3  7.0 - 25.0 Final    Anion Gap 06/10/2024 13.6  5.0 - 15.0 mmol/L Final    eGFR 06/10/2024 91.5  >60.0 mL/min/1.73 Final    Hemoglobin A1C 06/10/2024 5.60  4.80 - 5.60 % Final    Folate 06/10/2024 >20.00  4.78 - 24.20 ng/mL Final    Vitamin B-12 06/10/2024 541  211 - 946 pg/mL Final    25 Hydroxy, Vitamin D 06/10/2024 38.1  30.0 - 100.0 ng/ml Final    Iron 06/10/2024 75  37 - 145 mcg/dL Final    Iron Saturation (TSAT) 06/10/2024 19 (L)  20 - 50 % Final    Transferrin 06/10/2024 259  200 - 360 mg/dL Final    TIBC 06/10/2024 386  298 - 536 mcg/dL Final    Total Cholesterol 06/10/2024 227 (H)  0 - 200 mg/dL Final    Triglycerides 06/10/2024 67  0 - 150 mg/dL Final    HDL Cholesterol 06/10/2024 62 (H)  40 - 60 mg/dL Final    LDL Cholesterol  06/10/2024 153 (H)  0 - 100 mg/dL Final    VLDL Cholesterol 06/10/2024 12  5 - 40 mg/dL Final    LDL/HDL Ratio 06/10/2024 2.45   Final    TSH 06/10/2024 0.431  0.270 - 4.200 uIU/mL Final    Methamphetaine Screen, Urine 06/10/2024 Negative  Negative Final    POC Amphetamines 06/10/2024 Negative  Negative Final    Barbiturates Screen 06/10/2024 Negative  Negative Final    Benzodiazepine Screen 06/10/2024 Negative  Negative Final    Cocaine Screen 06/10/2024 Negative  Negative Final    Methadone Screen 06/10/2024 Negative  Negative Final    Opiate Screen 06/10/2024 Negative  Negative Final    Oxycodone, Screen  06/10/2024 Negative  Negative Final    Phencyclidine (PCP) Screen 06/10/2024 Negative  Negative Final    Propoxyphene Screen 06/10/2024 Negative  Negative Final    THC, Screen 06/10/2024 Negative  Negative Final    Tricyclic Antidepressants Screen 06/10/2024 Negative  Negative Final    Testosterone, Free 06/10/2024 1.5  0.0 - 4.2 pg/mL Final    LH 06/10/2024 35.70  mIU/mL Final    FSH 06/10/2024 33.90  mIU/mL Final    Prolactin 06/10/2024 10.20  4.79 - 23.30 ng/mL Final    Estrogen 06/10/2024 72  pg/mL Final                             Prepubertal             < 40                           Female Cycle:                             1-10 Days         16 - 328                             11-20 Days        34 - 501                             21-30 Days        48 - 350                             Post-Menopausal   40 - 244    WBC 06/10/2024 13.44 (H)  3.40 - 10.80 10*3/mm3 Final    RBC 06/10/2024 4.97  3.77 - 5.28 10*6/mm3 Final    Hemoglobin 06/10/2024 13.9  12.0 - 15.9 g/dL Final    Hematocrit 06/10/2024 41.7  34.0 - 46.6 % Final    MCV 06/10/2024 83.9  79.0 - 97.0 fL Final    MCH 06/10/2024 28.0  26.6 - 33.0 pg Final    MCHC 06/10/2024 33.3  31.5 - 35.7 g/dL Final    RDW 06/10/2024 12.3  12.3 - 15.4 % Final    RDW-SD 06/10/2024 36.3 (L)  37.0 - 54.0 fl Final    MPV 06/10/2024 10.1  6.0 - 12.0 fL Final    Platelets 06/10/2024 426  140 - 450 10*3/mm3 Final    Neutrophil % 06/10/2024 75.0  42.7 - 76.0 % Final    Lymphocyte % 06/10/2024 19.0 (L)  19.6 - 45.3 % Final    Monocyte % 06/10/2024 4.6 (L)  5.0 - 12.0 % Final    Eosinophil % 06/10/2024 0.4  0.3 - 6.2 % Final    Basophil % 06/10/2024 0.3  0.0 - 1.5 % Final    Immature Grans % 06/10/2024 0.7 (H)  0.0 - 0.5 % Final    Neutrophils, Absolute 06/10/2024 10.07 (H)  1.70 - 7.00 10*3/mm3 Final    Lymphocytes, Absolute 06/10/2024 2.56  0.70 - 3.10 10*3/mm3 Final    Monocytes, Absolute 06/10/2024 0.62  0.10 - 0.90 10*3/mm3 Final    Eosinophils, Absolute 06/10/2024 0.05   0.00 - 0.40 10*3/mm3 Final    Basophils, Absolute 06/10/2024 0.04  0.00 - 0.20 10*3/mm3 Final    Immature Grans, Absolute 06/10/2024 0.10 (H)  0.00 - 0.05 10*3/mm3 Final    nRBC 06/10/2024 0.0  0.0 - 0.2 /100 WBC Final   Hospital Outpatient Visit on 06/07/2024   Component Date Value Ref Range Status    Right Internal Jugular Spont 06/07/2024 Y   Final    Right Internal Jugular Phasic 06/07/2024 Y   Final    Right Internal Jugular Compress 06/07/2024 C   Final    Right Internal Jugular Augment 06/07/2024 Y   Final    Right Subclavian Spont 06/07/2024 Y   Final    Right Subclavian Phasic 06/07/2024 Y   Final    Right Subclavian Compress 06/07/2024 C   Final    Right Subclavian Augment 06/07/2024 Y   Final    Left Internal Jugular Spont 06/07/2024 Y   Final    Left Internal Jugular Phasic 06/07/2024 Y   Final    Left Internal Jugular Compress 06/07/2024 C   Final    Left Internal Jugular Augment 06/07/2024 Y   Final    Left Subclavian Spont 06/07/2024 Y   Final    Left Subclavian Phasic 06/07/2024 Y   Final    Left Subclavian Compress 06/07/2024 C   Final    Left Subclavian Augment 06/07/2024 Y   Final    Left Axillary Spont 06/07/2024 Y   Final    Left Axillary Phasic 06/07/2024 Y   Final    Left Axillary Compress 06/07/2024 C   Final    Left Axillary Augment 06/07/2024 Y   Final    Left Brachial Compress 06/07/2024 C   Final    Left Radial Compress 06/07/2024 C   Final    Left Ulnar Compress 06/07/2024 C   Final    Left Basilic Upper Compress 06/07/2024 C   Final    Left Basilic Forearm Compress 06/07/2024 C   Final    Left Cephalic Upper Compress 06/07/2024 C   Final    Left Cephalic Forearm Compress 06/07/2024 C   Final   Clinical Support on 04/16/2024   Component Date Value Ref Range Status    Hemoglobin 04/16/2024 11.5 (L)  12.0 - 15.9 g/dL Final    Hematocrit 04/16/2024 35.3  34.0 - 46.6 % Final    Reticulocyte % 04/16/2024 2.12 (H)  0.70 - 1.90 % Final    Reticulocyte Absolute 04/16/2024 0.0882  0.0200 - 0.1300  10*6/mm3 Final         Physical Exam  Vitals and nursing note reviewed.   Constitutional:       Appearance: Normal appearance. She is normal weight.   HENT:      Head: Normocephalic and atraumatic.   Cardiovascular:      Rate and Rhythm: Normal rate and regular rhythm.      Pulses: Normal pulses.      Heart sounds: Normal heart sounds. No murmur heard.     No friction rub. No gallop.   Pulmonary:      Effort: Pulmonary effort is normal. No respiratory distress.      Breath sounds: Normal breath sounds. No stridor. No wheezing, rhonchi or rales.   Chest:      Chest wall: No tenderness.   Abdominal:      General: Abdomen is flat. Bowel sounds are normal. There is no distension.      Palpations: Abdomen is soft. There is no mass.      Tenderness: There is no abdominal tenderness. There is no right CVA tenderness, left CVA tenderness, guarding or rebound.      Hernia: No hernia is present.   Musculoskeletal:      Left foot: Decreased range of motion.        Legs:    Skin:     General: Skin is warm and dry.      Capillary Refill: Capillary refill takes less than 2 seconds.      Coloration: Skin is not jaundiced or pale.   Neurological:      General: No focal deficit present.      Mental Status: She is alert and oriented to person, place, and time. Mental status is at baseline.      Motor: No weakness.      Coordination: Coordination normal.      Gait: Gait normal.   Psychiatric:         Mood and Affect: Mood normal.         Behavior: Behavior normal.         Thought Content: Thought content normal.         Judgment: Judgment normal.          Result Review  Data Reviewed:{ Labs  Result Review  Imaging  Med Tab  Media :23}   I have reviewed this patient's chart.  I have reviewed previous labs, previous imaging, previous medications, and previous encounters with notes that were available in this patient's chart.               Assessment and Plan {CC Problem List  Visit Diagnosis  ROS  Review (Popup)  [facility]  Maintenance  Quality  BestPractice  Medications  SmartSets  SnapShot Encounters  Media :23}   Diagnoses and all orders for this visit:    1. Anxiety (Primary)  -     busPIRone (BUSPAR) 10 MG tablet; Take 2 tablets by mouth 3 (Three) Times a Day As Needed (anxiety).  Dispense: 540 tablet; Refill: 1    2. BMI 32.0-32.9,adult  -     Tirzepatide-Weight Management (ZEPBOUND) 2.5 MG/0.5ML solution auto-injector; Inject 0.5 mL under the skin into the appropriate area as directed 1 (One) Time Per Week.  Dispense: 2 mL; Refill: 2    3. Morbid (severe) obesity due to excess calories  -     Tirzepatide-Weight Management (ZEPBOUND) 2.5 MG/0.5ML solution auto-injector; Inject 0.5 mL under the skin into the appropriate area as directed 1 (One) Time Per Week.  Dispense: 2 mL; Refill: 2    4. Facial rash  -     Ambulatory Referral to Dermatology        -Referral to dermatology for facial rash.  -Change weight loss medication to Zepbound to see if this is more tolerable and potentially cause less GI upset.  -BuSpar 90-day supply sent in at patient's request.  -ER red flags discussed with patient including risk versus benefit and education provided.  -Follow-up with me in 4 weeks to touch base on prescription changed to Zepbound.    I spent 30 minutes caring for Amelie on this date of service. This time includes time spent by me in the following activities:preparing for the visit, reviewing tests, obtaining and/or reviewing a separately obtained history, performing a medically appropriate examination and/or evaluation , counseling and educating the patient/family/caregiver, ordering medications, tests, or procedures, referring and communicating with other health care professionals , documenting information in the medical record, independently interpreting results and communicating that information with the patient/family/caregiver, and care coordination.    Follow Up {Instructions Charge Capture  Follow-up Communications  :23}     Patient was given instructions and counseling regarding her condition or for health maintenance advice. Please see specific information pulled into the AVS (placed there by myself) if appropriate.    Return in about 4 weeks (around 8/12/2024) for Recheck.    BMI is >= 30 and <35. (Class 1 Obesity). The following options were offered after discussion;: exercise counseling/recommendations and nutrition counseling/recommendations       EDSON Cali, FNP-BC

## 2024-07-23 ENCOUNTER — OFFICE VISIT (OUTPATIENT)
Dept: PODIATRY | Facility: CLINIC | Age: 39
End: 2024-07-23
Payer: OTHER GOVERNMENT

## 2024-07-23 VITALS — OXYGEN SATURATION: 99 % | WEIGHT: 220 LBS | HEART RATE: 87 BPM | BODY MASS INDEX: 31.5 KG/M2 | HEIGHT: 70 IN

## 2024-07-23 DIAGNOSIS — M21.862 ACQUIRED POSTERIOR EQUINUS OF BOTH LOWER EXTREMITIES: ICD-10-CM

## 2024-07-23 DIAGNOSIS — M72.2 PLANTAR FASCIITIS OF LEFT FOOT: Primary | ICD-10-CM

## 2024-07-23 DIAGNOSIS — M21.861 ACQUIRED POSTERIOR EQUINUS OF BOTH LOWER EXTREMITIES: ICD-10-CM

## 2024-07-23 PROCEDURE — 99024 POSTOP FOLLOW-UP VISIT: CPT | Performed by: PODIATRIST

## 2024-07-24 NOTE — PROGRESS NOTES
2024  Foot and Ankle Surgery - Established Patient/Follow-up  Provider: Dr. Servando Corral DPM  Location: [facility]    Subjective:  Amelie Henderson is a 39 y.o. female.     Chief Complaint   Patient presents with    Left Ankle - Follow-up     Dr. Corral 2024  1.  Gastrocnemius recession, left lower extremity  2.  Endoscopic plantar fasciotomy, left      Post-op     ABIOLA aguero       History of Present Illness  The patient presents for postoperative follow-up.    She is making slow progress, although she had a challenging day yesterday. She experienced severe heel pain yesterday, which she attributes to wearing uneven footwear for a . Her mobility has improved significantly, allowing her to sit and cook for extended periods. She has procured a new pair of [employer] shoes.    Supplemental Information  Post-surgery, she had petechiae all over her face, her lip was busted in 2 spots, and she had a black eye.      Allergies   Allergen Reactions    [place] Anaphylaxis    Codeine Hives and Itching    Influenza Virus Vaccine Other (See Comments)     Egg allergy     Latex Rash     Skin gets red and burns, skin starts peeling      Meloxicam Other (See Comments)     Gastritis      Sulfa Antibiotics Unknown - High Severity       Current Outpatient Medications on File Prior to Visit   Medication Sig Dispense Refill    albuterol (PROVENTIL) (2.5 MG/3ML) 0.083% nebulizer solution Take 2.5 mg by nebulization Every 4 (Four) Hours As Needed for Wheezing or Shortness of Air. 120 mL 1    albuterol sulfate  (90 Base) MCG/ACT inhaler Inhale 2 puffs Every 4 (Four) Hours As Needed for Wheezing. 18 g 2    busPIRone (BUSPAR) 10 MG tablet Take 2 tablets by mouth 3 (Three) Times a Day As Needed (anxiety). 540 tablet 1    Cholecalciferol 125 MCG (5000 UT) tablet Take 1 tablet by mouth Daily. 90 tablet 1    ciprofloxacin-dexAMETHasone (CIPRODEX) 0.3-0.1 % otic suspension       diclofenac (VOLTAREN) 50 MG EC tablet  TAKE 1 TABLET TWICE A DAY AS NEEDED FOR PAIN (ARTHRALGIA) 180 tablet 3    diphenhydrAMINE (BENADRYL) 25 mg capsule Take 1 capsule by mouth Every 6 (Six) Hours As Needed for Itching.      EPINEPHrine (EPIPEN) 0.3 MG/0.3ML solution auto-injector injection Inject 0.3 mL into the appropriate muscle as directed by prescriber 1 (One) Time.      ferrous gluconate (FERGON) 324 MG tablet TAKE 1 TABLET BY MOUTH EVERY DAY WITH BREAKFAST 90 tablet 1    fluticasone (FLONASE) 50 MCG/ACT nasal spray SPRAY 2 SPRAYS INTO THE NOSTRIL AS DIRECTED BY PROVIDER DAILY. 16 mL 3    galcanezumab-gnlm (EMGALITY) 120 MG/ML auto-injector pen Inject  under the skin into the appropriate area as directed Every 30 (Thirty) Days.      HYDROcodone-acetaminophen (NORCO)  MG per tablet Take 1 tablet by mouth Every 6 (Six) Hours As Needed for Moderate Pain. 90 tablet 0    hydrOXYzine (ATARAX) 50 MG tablet MAY TAKE 1 TABLET 2 TIMES A DAY AS NEEDED FOR ITCHING, MAY ALSO TAKE 2 TABLETS AT NIGHT AS NEEDED. 120 tablet 3    l-methylfolate 7.5 MG tablet tablet Take  by mouth Daily.      levocetirizine (XYZAL) 5 MG tablet Take 1 tablet by mouth Every Morning.      Levomilnacipran HCl ER (Fetzima) 40 MG capsule sustained-release 24 hr Take 40 mg by mouth Daily. 90 capsule 1    losartan (COZAAR) 50 MG tablet Take 1 tablet by mouth Every Morning. 90 tablet 1    metoprolol tartrate (LOPRESSOR) 25 MG tablet Take 1 tablet by mouth Daily.      mupirocin (BACTROBAN) 2 % cream Apply 1 application  topically to the appropriate area as directed 3 (Three) Times a Day. 30 g 1    Myrbetriq 50 MG tablet sustained-release 24 hour 24 hr tablet TAKE 1 TABLET DAILY 90 tablet 3    omeprazole (priLOSEC) 20 MG capsule Take 1 capsule by mouth 2 (Two) Times a Day.      ondansetron (Zofran) 4 MG tablet Take 1 tablet by mouth Every 6 (Six) Hours As Needed for Nausea or Vomiting. 360 tablet 1    pregabalin (Lyrica) 50 MG capsule Take 1 capsule by mouth 3 (Three) Times a Day. 270  "capsule 0    promethazine (PHENERGAN) 25 MG tablet Take 1 tablet by mouth Every 8 (Eight) Hours As Needed for Nausea or Vomiting. 30 tablet 1    propranolol (INDERAL) 20 MG tablet Take 1 tablet by mouth 2 (Two) Times a Day As Needed (ANXIETY). 180 tablet 01    rizatriptan (MAXALT) 10 MG tablet TAKE 1 TABLET AT THE ONSET OF HEADACHE MAY REPEAT IN 2 HOURS MAX OF 2 TABS IN 24 HOURS 27 tablet 1    rOPINIRole (REQUIP) 0.5 MG tablet Take 1 tablet by mouth Every Night. Take 1 hour before bedtime. 90 tablet 1    Tirzepatide-Weight Management (ZEPBOUND) 2.5 MG/0.5ML solution auto-injector Inject 0.5 mL under the skin into the appropriate area as directed 1 (One) Time Per Week. 2 mL 2    tiZANidine (ZANAFLEX) 4 MG tablet Take 1 tablet by mouth Every 8 (Eight) Hours As Needed for Muscle Spasms. 270 tablet 1    traZODone (DESYREL) 50 MG tablet TAKE 1 TABLET BY MOUTH EVERY DAY AT NIGHT 90 tablet 1    ubrogepant (UBRELVY) 100 MG tablet Take 1 tablet by mouth 1 (One) Time As Needed (migraines). 90 tablet 1    vitamin C (ASCORBIC ACID) 250 MG tablet Take 1 tablet by mouth Daily.      Yuvafem 10 MCG tablet vaginal tablet 1 (ONE) TABLET AT BEDTIME, ON SUNDAY NIGHTS      naloxone (NARCAN) 4 MG/0.1ML nasal spray Call 911. Don't prime. [place] in 1 nostril for overdose. Repeat in 2-3 minutes in other nostril if no or minimal breathing/responsiveness. 2 each 0     No current facility-administered medications on file prior to visit.       Objective   Pulse 87   Ht 177.8 cm (70\")   Wt 99.8 kg (220 lb)   LMP 02/01/2023 (Approximate)   SpO2 99%   BMI 31.57 kg/m²     Foot/Ankle Exam  Physical Exam  GENERAL  Orientation:  AAOx3  Affect:  appropriate     VASCULAR      Right Foot Vascularity   Normal vascular exam    Dorsalis pedis:  2+  Posterior tibial:  2+  Skin temperature:  warm  Edema grading:  None  CFT:  < 3 seconds  Pedal hair growth:  Present  Varicosities:  none      Left Foot Vascularity   Normal vascular exam    Dorsalis pedis:  " 2+  Posterior tibial:  2+  Skin temperature:  warm  Edema grading:  None  CFT:  < 3 seconds  Pedal hair growth:  Present  Varicosities:  none     NEUROLOGIC      Right Foot Neurologic   Light touch sensation: normal  Hot/Cold sensation: normal  Achilles reflex:  2+      Left Foot Neurologic   Light touch sensation: normal  Hot/Cold sensation:  normal  Achilles reflex:  2+     MUSCULOSKELETAL      Right Foot Musculoskeletal   Arch:  Normal      Left Foot Musculoskeletal   Arch:  Normal     MUSCLE STRENGTH      Right Foot Muscle Strength   Normal strength    Foot dorsiflexion:  5  Foot plantar flexion:  5  Foot inversion:  5  Foot eversion:  5      Left Foot Muscle Strength   Normal strength    Foot dorsiflexion:  5  Foot plantar flexion:  5  Foot inversion:  5  Foot eversion:  5     DERMATOLOGIC       Right Foot Dermatologic   Skin  Right foot skin is intact.   Nails comment:  Nails 1-5      Left Foot Dermatologic   Skin  Left foot skin is intact.   Nails comment:  Nails 1-5     TESTS      Right Foot Tests   Anterior drawer: negative  Varus tilt: negative      Left Foot Tests   Anterior drawer: negative  Varus tilt: negative     Right foot additional comments: Significant discomfort with palpation involving the plantar medial calcaneal tuberosity and abductor hallucis muscle belly. Pes cavus foot structure. Moderate equinus contracture with knee extended, flexed with soft tissue rigidity. No obvious deformity or instability.     01/30/2024: Incurvation involving the medial borders of both great toes.     Left foot additional comments: 12/19/2023  Significant pain with palpation involving the plantar aspect of the left heel. Moderate equinus contracture. No significant deformity. No progressive deformity or instability.     01/30/2024: Continued discomfort with palpation involving the left first metatarsal. Continued pain involving the plantar aspect of the left calcaneus. Significant equinus contracture. Incurvation  involving the medial borders of both great toes.     6/11/24: Significant discomfort with palpation involving the plantar aspect of the left heel greater than right.  She continues to have significant equinus contracture involving both lower extremities.  No additional issues or concerns today     7/8/24: Incision sites are dry and stable with intact sutures.  No evidence of dehiscence or infection.  No other complicating features.    7/23/24: Incision sites are well-healed.  Less swelling involving the lower extremity.  No complications.  Range of motion and strength is slowly improving.      Results      Assessment & Plan   Diagnoses and all orders for this visit:    1. Plantar fasciitis of left foot (Primary)    2. Acquired posterior equinus of both lower extremities      Assessment & Plan    From a skin perspective, the healing process is progressing as expected. The leg swelling has decreased, indicating a decrease in tightness, discomfort, and intermittent pain. She was advised to persist with her exercises and massage regimen. The boot should be gradually discontinued. As she gradually increases her activity level, she should avoid overexertion. A referral for physical therapy was made. Rest, ice, compression, elevation, and anti-inflammatories were recommended for pain management.    Follow-up  A follow-up appointment is scheduled for 4 weeks from now.             Patient or patient representative verbalized consent for the use of Ambient Listening during the visit with  KAY Corral DPM for chart documentation. 7/24/2024  07:53 EDT    KAY Corral DPM

## 2024-08-03 ENCOUNTER — PATIENT MESSAGE (OUTPATIENT)
Dept: FAMILY MEDICINE CLINIC | Facility: CLINIC | Age: 39
End: 2024-08-03
Payer: OTHER GOVERNMENT

## 2024-08-03 DIAGNOSIS — M62.838 MUSCLE SPASM: ICD-10-CM

## 2024-08-03 DIAGNOSIS — U07.1 ACUTE COVID-19: Primary | ICD-10-CM

## 2024-08-05 RX ORDER — TIZANIDINE 4 MG/1
4 TABLET ORAL EVERY 8 HOURS PRN
Qty: 90 TABLET | Refills: 1 | Status: SHIPPED | OUTPATIENT
Start: 2024-08-05

## 2024-08-20 ENCOUNTER — OFFICE VISIT (OUTPATIENT)
Dept: PODIATRY | Facility: CLINIC | Age: 39
End: 2024-08-20
Payer: OTHER GOVERNMENT

## 2024-08-20 VITALS — WEIGHT: 220 LBS | BODY MASS INDEX: 31.5 KG/M2 | HEIGHT: 70 IN | RESPIRATION RATE: 20 BRPM

## 2024-08-20 DIAGNOSIS — M21.861 ACQUIRED POSTERIOR EQUINUS OF BOTH LOWER EXTREMITIES: ICD-10-CM

## 2024-08-20 DIAGNOSIS — M21.862 ACQUIRED POSTERIOR EQUINUS OF BOTH LOWER EXTREMITIES: ICD-10-CM

## 2024-08-20 DIAGNOSIS — M72.2 PLANTAR FASCIITIS OF LEFT FOOT: Primary | ICD-10-CM

## 2024-08-20 PROCEDURE — 99024 POSTOP FOLLOW-UP VISIT: CPT | Performed by: PODIATRIST

## 2024-08-21 NOTE — PROGRESS NOTES
08/20/2024  Foot and Ankle Surgery - Established Patient/Follow-up  Provider: Dr. Servando Corral DPM  Location: [facility]    Subjective:  Amelie Henderson is a 39 y.o. female.     Chief Complaint   Patient presents with    Left Ankle - Post-op     Dr. Corral 06/21/2024  1.  Gastrocnemius recession, left lower extremity  2.  Endoscopic plantar fasciotomy, left      Post-op     ABIOLA Valera aprn 7/23/2024       History of Present Illness  The patient presents for a 2-month follow-up.    She experienced foot swelling after overexertion, which has since subsided. She attempted to alleviate the discomfort by elevating and icing her foot. 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 She alternated between wearing a tennis shoe and a boot for three days before deciding to stick with the tennis shoe. She is considering using a stationary bike for exercise and has ordered a hot tub for aqua therapy, hoping it will benefit her hip, back, and foot. She is scheduled to start physical therapy next week.    She had a COVID-19 infection.      Allergies   Allergen Reactions    [place] Anaphylaxis    Codeine Hives and Itching    Influenza Virus Vaccine Other (See Comments)     Egg allergy     Latex Rash     Skin gets red and burns, skin starts peeling      Meloxicam Other (See Comments)     Gastritis      Sulfa Antibiotics Unknown - High Severity       Current Outpatient Medications on File Prior to Visit   Medication Sig Dispense Refill    albuterol (PROVENTIL) (2.5 MG/3ML) 0.083% nebulizer solution Take 2.5 mg by nebulization Every 4 (Four) Hours As Needed for Wheezing or Shortness of Air. 120 mL 1    albuterol sulfate  (90 Base) MCG/ACT inhaler Inhale 2 puffs Every 4 (Four) Hours As Needed for Wheezing. 18 g 2    busPIRone (BUSPAR) 10 MG tablet Take 2 tablets by mouth 3 (Three) Times a Day As Needed (anxiety). 540 tablet 1     Cholecalciferol 125 MCG (5000 UT) tablet Take 1 tablet by mouth Daily. 90 tablet 1    ciprofloxacin-dexAMETHasone (CIPRODEX) 0.3-0.1 % otic suspension       diclofenac (VOLTAREN) 50 MG EC tablet TAKE 1 TABLET TWICE A DAY AS NEEDED FOR PAIN (ARTHRALGIA) 180 tablet 3    diphenhydrAMINE (BENADRYL) 25 mg capsule Take 1 capsule by mouth Every 6 (Six) Hours As Needed for Itching.      EPINEPHrine (EPIPEN) 0.3 MG/0.3ML solution auto-injector injection Inject 0.3 mL into the appropriate muscle as directed by prescriber 1 (One) Time.      ferrous gluconate (FERGON) 324 MG tablet TAKE 1 TABLET BY MOUTH EVERY DAY WITH BREAKFAST 90 tablet 1    fluticasone (FLONASE) 50 MCG/ACT nasal spray SPRAY 2 SPRAYS INTO THE NOSTRIL AS DIRECTED BY PROVIDER DAILY. 16 mL 3    galcanezumab-gnlm (EMGALITY) 120 MG/ML auto-injector pen Inject  under the skin into the appropriate area as directed Every 30 (Thirty) Days.      HYDROcodone-acetaminophen (NORCO)  MG per tablet Take 1 tablet by mouth Every 6 (Six) Hours As Needed for Moderate Pain. 90 tablet 0    hydrOXYzine (ATARAX) 50 MG tablet MAY TAKE 1 TABLET 2 TIMES A DAY AS NEEDED FOR ITCHING, MAY ALSO TAKE 2 TABLETS AT NIGHT AS NEEDED. 120 tablet 3    l-methylfolate 7.5 MG tablet tablet Take  by mouth Daily.      levocetirizine (XYZAL) 5 MG tablet Take 1 tablet by mouth Every Morning.      Levomilnacipran HCl ER (Fetzima) 40 MG capsule sustained-release 24 hr Take 40 mg by mouth Daily. 90 capsule 1    losartan (COZAAR) 50 MG tablet Take 1 tablet by mouth Every Morning. 90 tablet 1    metoprolol tartrate (LOPRESSOR) 25 MG tablet Take 1 tablet by mouth Daily.      mupirocin (BACTROBAN) 2 % cream Apply 1 application  topically to the appropriate area as directed 3 (Three) Times a Day. 30 g 1    Myrbetriq 50 MG tablet sustained-release 24 hour 24 hr tablet TAKE 1 TABLET DAILY 90 tablet 3    omeprazole (priLOSEC) 20 MG capsule Take 1 capsule by mouth 2 (Two) Times a Day.      ondansetron  "(Zofran) 4 MG tablet Take 1 tablet by mouth Every 6 (Six) Hours As Needed for Nausea or Vomiting. 360 tablet 1    pregabalin (Lyrica) 50 MG capsule Take 1 capsule by mouth 3 (Three) Times a Day. 270 capsule 0    promethazine (PHENERGAN) 25 MG tablet Take 1 tablet by mouth Every 8 (Eight) Hours As Needed for Nausea or Vomiting. 30 tablet 1    propranolol (INDERAL) 20 MG tablet Take 1 tablet by mouth 2 (Two) Times a Day As Needed (ANXIETY). 180 tablet 01    rizatriptan (MAXALT) 10 MG tablet TAKE 1 TABLET AT THE ONSET OF HEADACHE MAY REPEAT IN 2 HOURS MAX OF 2 TABS IN 24 HOURS 27 tablet 1    rOPINIRole (REQUIP) 0.5 MG tablet Take 1 tablet by mouth Every Night. Take 1 hour before bedtime. 90 tablet 1    Tirzepatide-Weight Management (ZEPBOUND) 2.5 MG/0.5ML solution auto-injector Inject 0.5 mL under the skin into the appropriate area as directed 1 (One) Time Per Week. 2 mL 2    tiZANidine (ZANAFLEX) 4 MG tablet TAKE 1 TABLET BY MOUTH EVERY 8 HOURS AS NEEDED FOR MUSCLE SPASMS. 90 tablet 1    traZODone (DESYREL) 50 MG tablet TAKE 1 TABLET BY MOUTH EVERY DAY AT NIGHT 90 tablet 1    ubrogepant (UBRELVY) 100 MG tablet Take 1 tablet by mouth 1 (One) Time As Needed (migraines). 90 tablet 1    vitamin C (ASCORBIC ACID) 250 MG tablet Take 1 tablet by mouth Daily.      Yuvafem 10 MCG tablet vaginal tablet 1 (ONE) TABLET AT BEDTIME, ON SUNDAY NIGHTS      naloxone (NARCAN) 4 MG/0.1ML nasal spray Call 911. Don't prime. [place] in 1 nostril for overdose. Repeat in 2-3 minutes in other nostril if no or minimal breathing/responsiveness. 2 each 0     No current facility-administered medications on file prior to visit.       Objective   Resp 20   Ht 177.8 cm (70\")   Wt 99.8 kg (220 lb)   LMP 02/01/2023 (Approximate)   BMI 31.57 kg/m²     Foot/Ankle Exam  Physical Exam  GENERAL  Orientation:  AAOx3  Affect:  appropriate     VASCULAR      Right Foot Vascularity   Normal vascular exam    Dorsalis pedis:  2+  Posterior tibial:  2+  Skin " temperature:  warm  Edema grading:  None  CFT:  < 3 seconds  Pedal hair growth:  Present  Varicosities:  none      Left Foot Vascularity   Normal vascular exam    Dorsalis pedis:  2+  Posterior tibial:  2+  Skin temperature:  warm  Edema grading:  None  CFT:  < 3 seconds  Pedal hair growth:  Present  Varicosities:  none     NEUROLOGIC      Right Foot Neurologic   Light touch sensation: normal  Hot/Cold sensation: normal  Achilles reflex:  2+      Left Foot Neurologic   Light touch sensation: normal  Hot/Cold sensation:  normal  Achilles reflex:  2+     MUSCULOSKELETAL      Right Foot Musculoskeletal   Arch:  Normal      Left Foot Musculoskeletal   Arch:  Normal     MUSCLE STRENGTH      Right Foot Muscle Strength   Normal strength    Foot dorsiflexion:  5  Foot plantar flexion:  5  Foot inversion:  5  Foot eversion:  5      Left Foot Muscle Strength   Normal strength    Foot dorsiflexion:  5  Foot plantar flexion:  5  Foot inversion:  5  Foot eversion:  5     DERMATOLOGIC       Right Foot Dermatologic   Skin  Right foot skin is intact.   Nails comment:  Nails 1-5      Left Foot Dermatologic   Skin  Left foot skin is intact.   Nails comment:  Nails 1-5     TESTS      Right Foot Tests   Anterior drawer: negative  Varus tilt: negative      Left Foot Tests   Anterior drawer: negative  Varus tilt: negative     Right foot additional comments: Significant discomfort with palpation involving the plantar medial calcaneal tuberosity and abductor hallucis muscle belly. Pes cavus foot structure. Moderate equinus contracture with knee extended, flexed with soft tissue rigidity. No obvious deformity or instability.     01/30/2024: Incurvation involving the medial borders of both great toes.     Left foot additional comments: 12/19/2023  Significant pain with palpation involving the plantar aspect of the left heel. Moderate equinus contracture. No significant deformity. No progressive deformity or instability.     01/30/2024:  Continued discomfort with palpation involving the left first metatarsal. Continued pain involving the plantar aspect of the left calcaneus. Significant equinus contracture. Incurvation involving the medial borders of both great toes.     6/11/24: Significant discomfort with palpation involving the plantar aspect of the left heel greater than right.  She continues to have significant equinus contracture involving both lower extremities.  No additional issues or concerns today     7/8/24: Incision sites are dry and stable with intact sutures.  No evidence of dehiscence or infection.  No other complicating features.     7/23/24: Incision sites are well-healed.  Less swelling involving the lower extremity.  No complications.  Range of motion and strength is slowly improving.    8/20/24: Continued soft tissue rigidity to the deep aspect of the incision sites.  Mild discomfort.  No signs of inflammation.  No significant pain with palpation.      Results      Assessment & Plan   Diagnoses and all orders for this visit:    1. Plantar fasciitis of left foot (Primary)    2. Acquired posterior equinus of both lower extremities      Assessment & Plan    The presence of scar tissue is noted, although the overall condition has improved compared to the previous visit. She was reassured that her current symptoms are expected and do not indicate any complications or setbacks. She was advised to wear regular tennis shoes with arch support and continue daily stretching exercises at home. The use of a boot was discouraged in favor of normalizing activities gradually. Low-impact exercises such as biking, swimming, and using an elliptical were recommended to supplement physical therapy. She was also encouraged to massage the incision areas and maintain manual therapy.    Follow-up  The patient will follow up in 2 months.             Patient or patient representative verbalized consent for the use of Ambient Listening during the visit  with  KAY Corral DPM for chart documentation. 8/21/2024  06:51 EDT    KAY Corral DPM

## 2024-08-26 ENCOUNTER — OFFICE (AMBULATORY)
Age: 39
End: 2024-08-26
Payer: OTHER GOVERNMENT

## 2024-08-26 ENCOUNTER — OFFICE (AMBULATORY)
Dept: URBAN - METROPOLITAN AREA PATHOLOGY 19 | Facility: PATHOLOGY | Age: 39
End: 2024-08-26
Payer: OTHER GOVERNMENT

## 2024-08-26 ENCOUNTER — ON CAMPUS - OUTPATIENT (AMBULATORY)
Age: 39
End: 2024-08-26
Payer: OTHER GOVERNMENT

## 2024-08-26 ENCOUNTER — ON CAMPUS - OUTPATIENT (AMBULATORY)
Dept: URBAN - METROPOLITAN AREA HOSPITAL 2 | Facility: HOSPITAL | Age: 39
End: 2024-08-26
Payer: OTHER GOVERNMENT

## 2024-08-26 VITALS
DIASTOLIC BLOOD PRESSURE: 96 MMHG | WEIGHT: 234.2 LBS | SYSTOLIC BLOOD PRESSURE: 154 MMHG | RESPIRATION RATE: 18 BRPM | HEIGHT: 70 IN | OXYGEN SATURATION: 100 % | RESPIRATION RATE: 16 BRPM | HEART RATE: 93 BPM | HEART RATE: 92 BPM | HEART RATE: 95 BPM | WEIGHT: 234.2 LBS | HEART RATE: 89 BPM | OXYGEN SATURATION: 98 % | HEART RATE: 92 BPM | HEART RATE: 93 BPM | HEIGHT: 70 IN | RESPIRATION RATE: 15 BRPM | HEART RATE: 85 BPM | RESPIRATION RATE: 18 BRPM | SYSTOLIC BLOOD PRESSURE: 149 MMHG | SYSTOLIC BLOOD PRESSURE: 149 MMHG | SYSTOLIC BLOOD PRESSURE: 154 MMHG | SYSTOLIC BLOOD PRESSURE: 167 MMHG | HEART RATE: 85 BPM | RESPIRATION RATE: 18 BRPM | HEART RATE: 92 BPM | SYSTOLIC BLOOD PRESSURE: 154 MMHG | OXYGEN SATURATION: 100 % | HEART RATE: 95 BPM | RESPIRATION RATE: 15 BRPM | RESPIRATION RATE: 15 BRPM | SYSTOLIC BLOOD PRESSURE: 179 MMHG | SYSTOLIC BLOOD PRESSURE: 146 MMHG | OXYGEN SATURATION: 98 % | HEART RATE: 89 BPM | SYSTOLIC BLOOD PRESSURE: 146 MMHG | DIASTOLIC BLOOD PRESSURE: 96 MMHG | SYSTOLIC BLOOD PRESSURE: 179 MMHG | OXYGEN SATURATION: 98 % | SYSTOLIC BLOOD PRESSURE: 149 MMHG | HEART RATE: 101 BPM | DIASTOLIC BLOOD PRESSURE: 104 MMHG | HEART RATE: 101 BPM | HEART RATE: 89 BPM | DIASTOLIC BLOOD PRESSURE: 96 MMHG | HEIGHT: 70 IN | DIASTOLIC BLOOD PRESSURE: 115 MMHG | RESPIRATION RATE: 16 BRPM | RESPIRATION RATE: 16 BRPM | HEART RATE: 93 BPM | HEART RATE: 95 BPM | SYSTOLIC BLOOD PRESSURE: 179 MMHG | DIASTOLIC BLOOD PRESSURE: 104 MMHG | OXYGEN SATURATION: 100 % | OXYGEN SATURATION: 100 % | OXYGEN SATURATION: 100 % | WEIGHT: 234.2 LBS | DIASTOLIC BLOOD PRESSURE: 115 MMHG | SYSTOLIC BLOOD PRESSURE: 167 MMHG | DIASTOLIC BLOOD PRESSURE: 115 MMHG | DIASTOLIC BLOOD PRESSURE: 96 MMHG | HEART RATE: 85 BPM | RESPIRATION RATE: 17 BRPM | WEIGHT: 234.2 LBS | DIASTOLIC BLOOD PRESSURE: 81 MMHG | RESPIRATION RATE: 15 BRPM | SYSTOLIC BLOOD PRESSURE: 179 MMHG | HEART RATE: 85 BPM | SYSTOLIC BLOOD PRESSURE: 149 MMHG | HEART RATE: 89 BPM | DIASTOLIC BLOOD PRESSURE: 88 MMHG | OXYGEN SATURATION: 100 % | DIASTOLIC BLOOD PRESSURE: 88 MMHG | SYSTOLIC BLOOD PRESSURE: 146 MMHG | SYSTOLIC BLOOD PRESSURE: 179 MMHG | SYSTOLIC BLOOD PRESSURE: 154 MMHG | HEIGHT: 70 IN | SYSTOLIC BLOOD PRESSURE: 167 MMHG | DIASTOLIC BLOOD PRESSURE: 88 MMHG | SYSTOLIC BLOOD PRESSURE: 146 MMHG | DIASTOLIC BLOOD PRESSURE: 115 MMHG | RESPIRATION RATE: 18 BRPM | HEART RATE: 92 BPM | WEIGHT: 234.2 LBS | OXYGEN SATURATION: 100 % | HEIGHT: 70 IN | DIASTOLIC BLOOD PRESSURE: 104 MMHG | SYSTOLIC BLOOD PRESSURE: 179 MMHG | HEART RATE: 89 BPM | HEART RATE: 101 BPM | RESPIRATION RATE: 17 BRPM | OXYGEN SATURATION: 98 % | SYSTOLIC BLOOD PRESSURE: 179 MMHG | DIASTOLIC BLOOD PRESSURE: 81 MMHG | RESPIRATION RATE: 16 BRPM | HEART RATE: 95 BPM | TEMPERATURE: 97.8 F | DIASTOLIC BLOOD PRESSURE: 104 MMHG | DIASTOLIC BLOOD PRESSURE: 88 MMHG | SYSTOLIC BLOOD PRESSURE: 167 MMHG | SYSTOLIC BLOOD PRESSURE: 167 MMHG | HEART RATE: 95 BPM | SYSTOLIC BLOOD PRESSURE: 149 MMHG | DIASTOLIC BLOOD PRESSURE: 104 MMHG | TEMPERATURE: 97.8 F | TEMPERATURE: 97.8 F | RESPIRATION RATE: 17 BRPM | HEART RATE: 92 BPM | HEART RATE: 85 BPM | HEART RATE: 85 BPM | WEIGHT: 234.2 LBS | HEIGHT: 70 IN | SYSTOLIC BLOOD PRESSURE: 149 MMHG | RESPIRATION RATE: 18 BRPM | DIASTOLIC BLOOD PRESSURE: 96 MMHG | RESPIRATION RATE: 17 BRPM | TEMPERATURE: 97.8 F | DIASTOLIC BLOOD PRESSURE: 96 MMHG | OXYGEN SATURATION: 98 % | HEART RATE: 93 BPM | RESPIRATION RATE: 15 BRPM | DIASTOLIC BLOOD PRESSURE: 88 MMHG | DIASTOLIC BLOOD PRESSURE: 88 MMHG | DIASTOLIC BLOOD PRESSURE: 115 MMHG | RESPIRATION RATE: 17 BRPM | TEMPERATURE: 97.8 F | SYSTOLIC BLOOD PRESSURE: 167 MMHG | TEMPERATURE: 97.8 F | RESPIRATION RATE: 18 BRPM | RESPIRATION RATE: 16 BRPM | HEART RATE: 89 BPM | HEART RATE: 93 BPM | RESPIRATION RATE: 16 BRPM | HEART RATE: 101 BPM | DIASTOLIC BLOOD PRESSURE: 81 MMHG | RESPIRATION RATE: 17 BRPM | HEART RATE: 95 BPM | SYSTOLIC BLOOD PRESSURE: 154 MMHG | RESPIRATION RATE: 18 BRPM | HEART RATE: 101 BPM | RESPIRATION RATE: 15 BRPM | HEART RATE: 93 BPM | RESPIRATION RATE: 15 BRPM | DIASTOLIC BLOOD PRESSURE: 81 MMHG | DIASTOLIC BLOOD PRESSURE: 115 MMHG | HEART RATE: 92 BPM | HEART RATE: 93 BPM | OXYGEN SATURATION: 98 % | HEART RATE: 92 BPM | DIASTOLIC BLOOD PRESSURE: 104 MMHG | RESPIRATION RATE: 16 BRPM | RESPIRATION RATE: 17 BRPM | DIASTOLIC BLOOD PRESSURE: 115 MMHG | HEART RATE: 85 BPM | HEART RATE: 89 BPM | SYSTOLIC BLOOD PRESSURE: 154 MMHG | SYSTOLIC BLOOD PRESSURE: 149 MMHG | DIASTOLIC BLOOD PRESSURE: 96 MMHG | DIASTOLIC BLOOD PRESSURE: 81 MMHG | HEART RATE: 101 BPM | DIASTOLIC BLOOD PRESSURE: 81 MMHG | SYSTOLIC BLOOD PRESSURE: 146 MMHG | SYSTOLIC BLOOD PRESSURE: 154 MMHG | SYSTOLIC BLOOD PRESSURE: 146 MMHG | DIASTOLIC BLOOD PRESSURE: 104 MMHG | DIASTOLIC BLOOD PRESSURE: 81 MMHG | DIASTOLIC BLOOD PRESSURE: 88 MMHG | WEIGHT: 234.2 LBS | HEART RATE: 95 BPM | SYSTOLIC BLOOD PRESSURE: 167 MMHG | TEMPERATURE: 97.8 F | HEIGHT: 70 IN | SYSTOLIC BLOOD PRESSURE: 146 MMHG | HEART RATE: 101 BPM | OXYGEN SATURATION: 98 %

## 2024-08-26 DIAGNOSIS — K29.50 UNSPECIFIED CHRONIC GASTRITIS WITHOUT BLEEDING: ICD-10-CM

## 2024-08-26 DIAGNOSIS — K21.9 GASTRO-ESOPHAGEAL REFLUX DISEASE WITHOUT ESOPHAGITIS: ICD-10-CM

## 2024-08-26 DIAGNOSIS — R11.10 VOMITING, UNSPECIFIED: ICD-10-CM

## 2024-08-26 PROBLEM — K31.89 OTHER DISEASES OF STOMACH AND DUODENUM: Status: ACTIVE | Noted: 2024-08-26

## 2024-08-26 LAB
GI HISTOLOGY: A. STOMACH ANTRUM: (no result)
GI HISTOLOGY: PDF REPORT: (no result)

## 2024-08-26 PROCEDURE — 43450 DILATE ESOPHAGUS 1/MULT PASS: CPT | Performed by: INTERNAL MEDICINE

## 2024-08-26 PROCEDURE — 88305 TISSUE EXAM BY PATHOLOGIST: CPT | Performed by: PATHOLOGY

## 2024-08-26 PROCEDURE — 43239 EGD BIOPSY SINGLE/MULTIPLE: CPT | Performed by: INTERNAL MEDICINE

## 2024-09-05 DIAGNOSIS — F41.8 MIXED ANXIETY DEPRESSIVE DISORDER: ICD-10-CM

## 2024-09-05 RX ORDER — LEVOMILNACIPRAN HYDROCHLORIDE 40 MG/1
1 CAPSULE, EXTENDED RELEASE ORAL DAILY
Qty: 90 CAPSULE | Refills: 3 | Status: SHIPPED | OUTPATIENT
Start: 2024-09-05

## 2024-09-06 ENCOUNTER — OFFICE VISIT (OUTPATIENT)
Dept: FAMILY MEDICINE CLINIC | Facility: CLINIC | Age: 39
End: 2024-09-06
Payer: OTHER GOVERNMENT

## 2024-09-06 VITALS
RESPIRATION RATE: 18 BRPM | HEART RATE: 96 BPM | OXYGEN SATURATION: 98 % | DIASTOLIC BLOOD PRESSURE: 100 MMHG | TEMPERATURE: 98.6 F | WEIGHT: 232.2 LBS | HEIGHT: 70 IN | SYSTOLIC BLOOD PRESSURE: 160 MMHG | BODY MASS INDEX: 33.24 KG/M2

## 2024-09-06 DIAGNOSIS — M79.601 PAIN OF RIGHT UPPER EXTREMITY: ICD-10-CM

## 2024-09-06 DIAGNOSIS — Z12.31 ENCOUNTER FOR SCREENING MAMMOGRAM FOR MALIGNANT NEOPLASM OF BREAST: ICD-10-CM

## 2024-09-06 DIAGNOSIS — M25.531 RIGHT WRIST PAIN: Primary | ICD-10-CM

## 2024-09-06 DIAGNOSIS — R20.0 RIGHT ARM NUMBNESS: ICD-10-CM

## 2024-09-06 DIAGNOSIS — F41.9 ANXIETY: ICD-10-CM

## 2024-09-06 PROCEDURE — 99214 OFFICE O/P EST MOD 30 MIN: CPT | Performed by: REGISTERED NURSE

## 2024-09-06 RX ORDER — ZONISAMIDE 100 MG/1
400 CAPSULE ORAL DAILY
COMMUNITY
Start: 2024-08-26

## 2024-09-06 RX ORDER — HYDROXYZINE HYDROCHLORIDE 50 MG/1
TABLET, FILM COATED ORAL
Qty: 60 TABLET | Refills: 0 | Status: SHIPPED | OUTPATIENT
Start: 2024-09-06

## 2024-09-06 RX ORDER — HYDROXYZINE HYDROCHLORIDE 50 MG/1
TABLET, FILM COATED ORAL
Qty: 120 TABLET | Refills: 3 | Status: SHIPPED | OUTPATIENT
Start: 2024-09-06 | End: 2024-09-06 | Stop reason: SDUPTHER

## 2024-09-06 NOTE — PROGRESS NOTES
Chief Complaint  Follow-up    Subjective    History of Present Illness {  Problem List  Visit  Diagnosis   Encounters  Notes  Medications  Labs  Result Review Imaging  Media :23}     Amelie Henderson presents to [facility] for Follow-up.      History of Present Illness  Patient is a 39 y.o. female who presents to the clinic today for 3-month follow-up for chronic pain and anxiety with concerns of right wrist pain greater than 1 week.  Patient denies any chest pain, shortness of breath, or any fevers.  Patient denies any known exposure to COVID, flu, or any other contagious illnesses.    In regards to anxiety, patient is currently taking Fetzima, BuSpar, and propranolol to help manage this.  Patient denies any homicidal or suicidal ideations at this time.  She shares that the meds do help with her anxiety but it is still not well-managed.  Patient denies any other needs at this time.    In regards to chronic pain, patient is currently taking hydrocodone as needed for pain management.  Patient's last narcotic agreement and urine drug screen was Adrienne 10, 2024.  Patient shares that she tolerates the medication okay and has no concerns with her chronic pain or hydrocodone at this time.    In regards to the wrist pain, patient shares that has wrist pain on right arm after having GI procedure completed approximately a week or so ago.  She shares that the GI procedure was an upper endoscopy with for stretching of esophagus. She has had pain, numbness, and bruising since procedure.  She shares that the nursing staff had difficulty with placing her IV ensure that it had infiltrated and leaked fluid at both sites.  She shares that anesthesiologist shared that her IV site was leaking but continued to use it anyway.  Patient shares that it was very painful and now she has a significant decrease in sensation with an increase in pain to her right wrist and arm.  Patient would like to further  "investigate trauma with imaging.       Review of Systems   Constitutional: Negative.  Negative for activity change, chills, fatigue and fever.   HENT: Negative.  Negative for congestion, dental problem, ear pain, hearing loss, rhinorrhea, sinus pain, sore throat, tinnitus and trouble swallowing.    Eyes: Negative.  Negative for pain and visual disturbance.   Respiratory: Negative.  Negative for cough, chest tightness, shortness of breath and wheezing.    Cardiovascular: Negative.  Negative for chest pain, palpitations and leg swelling.   Gastrointestinal: Negative.  Negative for abdominal pain, diarrhea, nausea and vomiting.   Endocrine: Negative.  Negative for polydipsia, polyphagia and polyuria.   Genitourinary: Negative.  Negative for difficulty urinating, dysuria, frequency and urgency.   Musculoskeletal:  Positive for arthralgias, back pain and myalgias.   Skin:  Positive for color change (Significant bruising to right arm over sites of previous IV). Negative for pallor, rash and wound.   Allergic/Immunologic: Negative.  Negative for environmental allergies.   Neurological: Negative.  Negative for dizziness, speech difficulty, weakness, light-headedness, numbness and headaches.   Hematological: Negative.    Psychiatric/Behavioral: Negative.  Negative for confusion, decreased concentration, self-injury and suicidal ideas. The patient is not nervous/anxious.    All other systems reviewed and are negative.       Objective     Vital Signs:   /100 (BP Location: Left arm, Patient Position: Sitting, Cuff Size: Large Adult)   Pulse 96   Temp 98.6 °F (37 °C) (Oral)   Resp 18   Ht 177.8 cm (70\")   Wt 105 kg (232 lb 3.2 oz)   SpO2 98%   BMI 33.32 kg/m²   Current Outpatient Medications on File Prior to Visit   Medication Sig Dispense Refill    albuterol (PROVENTIL) (2.5 MG/3ML) 0.083% nebulizer solution Take 2.5 mg by nebulization Every 4 (Four) Hours As Needed for Wheezing or Shortness of Air. 120 mL 1    " albuterol sulfate  (90 Base) MCG/ACT inhaler Inhale 2 puffs Every 4 (Four) Hours As Needed for Wheezing. 18 g 2    busPIRone (BUSPAR) 10 MG tablet Take 2 tablets by mouth 3 (Three) Times a Day As Needed (anxiety). 540 tablet 1    Cholecalciferol 125 MCG (5000 UT) tablet Take 1 tablet by mouth Daily. 90 tablet 1    diclofenac (VOLTAREN) 50 MG EC tablet TAKE 1 TABLET TWICE A DAY AS NEEDED FOR PAIN (ARTHRALGIA) 180 tablet 3    diphenhydrAMINE (BENADRYL) 25 mg capsule Take 1 capsule by mouth Every 6 (Six) Hours As Needed for Itching.      EPINEPHrine (EPIPEN) 0.3 MG/0.3ML solution auto-injector injection Inject 0.3 mL into the appropriate muscle as directed by prescriber 1 (One) Time.      ferrous gluconate (FERGON) 324 MG tablet TAKE 1 TABLET BY MOUTH EVERY DAY WITH BREAKFAST 90 tablet 1    Fetzima 40 MG capsule sustained-release 24 hr TAKE 1 CAPSULE DAILY 90 capsule 3    fluticasone (FLONASE) 50 MCG/ACT nasal spray SPRAY 2 SPRAYS INTO THE NOSTRIL AS DIRECTED BY PROVIDER DAILY. 16 mL 3    galcanezumab-gnlm (EMGALITY) 120 MG/ML auto-injector pen Inject  under the skin into the appropriate area as directed Every 30 (Thirty) Days.      HYDROcodone-acetaminophen (NORCO)  MG per tablet Take 1 tablet by mouth Every 6 (Six) Hours As Needed for Moderate Pain. 90 tablet 0    l-methylfolate 7.5 MG tablet tablet Take  by mouth Daily.      levocetirizine (XYZAL) 5 MG tablet Take 1 tablet by mouth Every Morning.      losartan (COZAAR) 50 MG tablet Take 1 tablet by mouth Every Morning. 90 tablet 1    metoprolol tartrate (LOPRESSOR) 25 MG tablet Take 1 tablet by mouth Daily.      mupirocin (BACTROBAN) 2 % cream Apply 1 application  topically to the appropriate area as directed 3 (Three) Times a Day. 30 g 1    Myrbetriq 50 MG tablet sustained-release 24 hour 24 hr tablet TAKE 1 TABLET DAILY 90 tablet 3    omeprazole (priLOSEC) 20 MG capsule Take 1 capsule by mouth 2 (Two) Times a Day.      ondansetron (Zofran) 4 MG tablet  Take 1 tablet by mouth Every 6 (Six) Hours As Needed for Nausea or Vomiting. 360 tablet 1    pregabalin (Lyrica) 50 MG capsule Take 1 capsule by mouth 3 (Three) Times a Day. 270 capsule 0    promethazine (PHENERGAN) 25 MG tablet Take 1 tablet by mouth Every 8 (Eight) Hours As Needed for Nausea or Vomiting. 30 tablet 1    propranolol (INDERAL) 20 MG tablet Take 1 tablet by mouth 2 (Two) Times a Day As Needed (ANXIETY). 180 tablet 01    rizatriptan (MAXALT) 10 MG tablet TAKE 1 TABLET AT THE ONSET OF HEADACHE MAY REPEAT IN 2 HOURS MAX OF 2 TABS IN 24 HOURS 27 tablet 1    rOPINIRole (REQUIP) 0.5 MG tablet Take 1 tablet by mouth Every Night. Take 1 hour before bedtime. 90 tablet 1    tiZANidine (ZANAFLEX) 4 MG tablet TAKE 1 TABLET BY MOUTH EVERY 8 HOURS AS NEEDED FOR MUSCLE SPASMS. 90 tablet 1    traZODone (DESYREL) 50 MG tablet TAKE 1 TABLET BY MOUTH EVERY DAY AT NIGHT 90 tablet 1    ubrogepant (UBRELVY) 100 MG tablet Take 1 tablet by mouth 1 (One) Time As Needed (migraines). 90 tablet 1    vitamin C (ASCORBIC ACID) 250 MG tablet Take 1 tablet by mouth Daily.      Yuvafem 10 MCG tablet vaginal tablet 1 (ONE) TABLET AT BEDTIME, ON SUNDAY NIGHTS      zonisamide (ZONEGRAN) 100 MG capsule Take 4 capsules by mouth Daily.      [DISCONTINUED] hydrOXYzine (ATARAX) 50 MG tablet MAY TAKE 1 TABLET 2 TIMES A DAY AS NEEDED FOR ITCHING, MAY ALSO TAKE 2 TABLETS AT NIGHT AS NEEDED. 120 tablet 3    Tirzepatide-Weight Management (ZEPBOUND) 2.5 MG/0.5ML solution auto-injector Inject 0.5 mL under the skin into the appropriate area as directed 1 (One) Time Per Week. (Patient not taking: Reported on 9/6/2024) 2 mL 2    [DISCONTINUED] ciprofloxacin-dexAMETHasone (CIPRODEX) 0.3-0.1 % otic suspension  (Patient not taking: Reported on 9/6/2024)      [DISCONTINUED] naloxone (NARCAN) 4 MG/0.1ML nasal spray Call 911. Don't prime. [place] in 1 nostril for overdose. Repeat in 2-3 minutes in other nostril if no or minimal breathing/responsiveness. 2  each 0     No current facility-administered medications on file prior to visit.        Past Medical History:   Diagnosis Date    Allergic     Anemia     Anxiety     Arthritis     B12 deficiency     Callus     CHF (congestive heart failure) 2005    COVID     x 2     Deep vein thrombosis 3/30/24    Depression     Dercum's disease     Difficulty walking     Endometriosis     Fallen arches     Fibromyalgia 02/15/2024    Gastritis     GERD (gastroesophageal reflux disease)     High arches     Hyperlipidemia     Borderline    Hypertension     Hypoglycemia     Ingrown toenail     Liver disease     Fatty liver disease    Migraines     Plantar fasciitis of left foot 2024    PTSD (post-traumatic stress disorder)     Shin splints     Stress fracture     Vitamin D deficiency       Past Surgical History:   Procedure Laterality Date    CARPAL TUNNEL RELEASE Right      SECTION      x 2     CHOLECYSTECTOMY      HYSTERECTOMY  2023    KNEE SURGERY Right     x 3     KNEE SURGERY Left     x 1     PLANTAR FASCIA RELEASE Left 2024    Procedure: ENDOSCOPIC PLANTAR FASCIOTOMY;  Surgeon: KAY Corral DPM;  Location: [facility];  Service: Podiatry;  Laterality: Left;    RECESSION GASTROCNEMIUS Left 2024    Procedure: RECESSION GASTROCNEMIUS;  Surgeon: KAY Corral DPM;  Location: [facility] OR;  Service: Podiatry;  Laterality: Left;    SHOULDER ACROMIOPLASTY WITH ROTATOR CUFF REPAIR Left 2021    Procedure: LEFT SHOULDER ARTHROSCOPY WITH ROTATOR CUFF REPAIR AND SUBACROMIAL DECOMPRESSION- SLAP;  Surgeon: Rianna Jarvis MD;  Location: [facility] OR;  Service: Orthopedics;  Laterality: Left;    SHOULDER ARTHROSCOPY W/ ROTATOR CUFF REPAIR Left 2024    Procedure: SHOULDER ARTHROSCOPY WITH ROTATOR CUFF REPAIR, DECOMPRESSION AND PRP INJECTION;  Surgeon: Rianna Jarvis MD;  Location: [facility];  Service: Orthopedics;  Laterality: Left;      Family History   Problem Relation  Age of Onset    Thyroid disease Mother     Glaucoma Mother     Hypertension Mother     Transient ischemic attack Mother     Bell's palsy Mother     Alcohol abuse Father     ADD / ADHD Sister     Depression Son     Pyloric stenosis Son     Hypertension Maternal Grandmother     Heart failure Maternal Grandmother     Stroke Maternal Grandmother     Diabetes Paternal Grandfather     Malig Hyperthermia Neg Hx       Social History     Socioeconomic History    Marital status:    Tobacco Use    Smoking status: Former     Current packs/day: 0.00     Average packs/day: 0.5 packs/day for 17.0 years (8.5 ttl pk-yrs)     Types: Cigarettes     Start date: 1999     Quit date: 2016     Years since quittin.6     Passive exposure: Past    Smokeless tobacco: Never   Vaping Use    Vaping status: Never Used   Substance and Sexual Activity    Alcohol use: Yes     Alcohol/week: 11.0 standard drinks of alcohol     Types: 3 Glasses of wine, 8 Cans of beer per week     Comment: Don't usually drink wine and beer in the same week    Drug use: Never    Sexual activity: Yes     Partners: Male     Birth control/protection: Tubal ligation, Hysterectomy, Surgical         Office Visit on 06/10/2024   Component Date Value Ref Range Status    Glucose 06/10/2024 82  65 - 99 mg/dL Final    BUN 06/10/2024 11  6 - 20 mg/dL Final    Creatinine 06/10/2024 0.83  0.57 - 1.00 mg/dL Final    Sodium 06/10/2024 138  136 - 145 mmol/L Final    Potassium 06/10/2024 4.6  3.5 - 5.2 mmol/L Final    Chloride 06/10/2024 103  98 - 107 mmol/L Final    CO2 06/10/2024 21.4 (L)  22.0 - 29.0 mmol/L Final    Calcium 06/10/2024 10.4  8.6 - 10.5 mg/dL Final    Total Protein 06/10/2024 7.8  6.0 - 8.5 g/dL Final    Albumin 06/10/2024 4.6  3.5 - 5.2 g/dL Final    ALT (SGPT) 06/10/2024 41 (H)  1 - 33 U/L Final    AST (SGOT) 06/10/2024 25  1 - 32 U/L Final    Alkaline Phosphatase 06/10/2024 125 (H)  39 - 117 U/L Final    Total Bilirubin 06/10/2024 <0.2  0.0 - 1.2  mg/dL Final    Globulin 06/10/2024 3.2  gm/dL Final    A/G Ratio 06/10/2024 1.4  g/dL Final    BUN/Creatinine Ratio 06/10/2024 13.3  7.0 - 25.0 Final    Anion Gap 06/10/2024 13.6  5.0 - 15.0 mmol/L Final    eGFR 06/10/2024 91.5  >60.0 mL/min/1.73 Final    Hemoglobin A1C 06/10/2024 5.60  4.80 - 5.60 % Final    Folate 06/10/2024 >20.00  4.78 - 24.20 ng/mL Final    Vitamin B-12 06/10/2024 541  211 - 946 pg/mL Final    25 Hydroxy, Vitamin D 06/10/2024 38.1  30.0 - 100.0 ng/ml Final    Iron 06/10/2024 75  37 - 145 mcg/dL Final    Iron Saturation (TSAT) 06/10/2024 19 (L)  20 - 50 % Final    Transferrin 06/10/2024 259  200 - 360 mg/dL Final    TIBC 06/10/2024 386  298 - 536 mcg/dL Final    Total Cholesterol 06/10/2024 227 (H)  0 - 200 mg/dL Final    Triglycerides 06/10/2024 67  0 - 150 mg/dL Final    HDL Cholesterol 06/10/2024 62 (H)  40 - 60 mg/dL Final    LDL Cholesterol  06/10/2024 153 (H)  0 - 100 mg/dL Final    VLDL Cholesterol 06/10/2024 12  5 - 40 mg/dL Final    LDL/HDL Ratio 06/10/2024 2.45   Final    TSH 06/10/2024 0.431  0.270 - 4.200 uIU/mL Final    Methamphetaine Screen, Urine 06/10/2024 Negative  Negative Final    POC Amphetamines 06/10/2024 Negative  Negative Final    Barbiturates Screen 06/10/2024 Negative  Negative Final    Benzodiazepine Screen 06/10/2024 Negative  Negative Final    Cocaine Screen 06/10/2024 Negative  Negative Final    Methadone Screen 06/10/2024 Negative  Negative Final    Opiate Screen 06/10/2024 Negative  Negative Final    Oxycodone, Screen 06/10/2024 Negative  Negative Final    Phencyclidine (PCP) Screen 06/10/2024 Negative  Negative Final    Propoxyphene Screen 06/10/2024 Negative  Negative Final    THC, Screen 06/10/2024 Negative  Negative Final    Tricyclic Antidepressants Screen 06/10/2024 Negative  Negative Final    Testosterone, Free 06/10/2024 1.5  0.0 - 4.2 pg/mL Final    LH 06/10/2024 35.70  mIU/mL Final    FSH 06/10/2024 33.90  mIU/mL Final    Prolactin 06/10/2024 10.20  4.79  - 23.30 ng/mL Final    Estrogen 06/10/2024 72  pg/mL Final                             Prepubertal             < 40                           Female Cycle:                             1-10 Days         16 - 328                             11-20 Days        34 - 501                             21-30 Days        48 - 350                             Post-Menopausal   40 - 244    WBC 06/10/2024 13.44 (H)  3.40 - 10.80 10*3/mm3 Final    RBC 06/10/2024 4.97  3.77 - 5.28 10*6/mm3 Final    Hemoglobin 06/10/2024 13.9  12.0 - 15.9 g/dL Final    Hematocrit 06/10/2024 41.7  34.0 - 46.6 % Final    MCV 06/10/2024 83.9  79.0 - 97.0 fL Final    MCH 06/10/2024 28.0  26.6 - 33.0 pg Final    MCHC 06/10/2024 33.3  31.5 - 35.7 g/dL Final    RDW 06/10/2024 12.3  12.3 - 15.4 % Final    RDW-SD 06/10/2024 36.3 (L)  37.0 - 54.0 fl Final    MPV 06/10/2024 10.1  6.0 - 12.0 fL Final    Platelets 06/10/2024 426  140 - 450 10*3/mm3 Final    Neutrophil % 06/10/2024 75.0  42.7 - 76.0 % Final    Lymphocyte % 06/10/2024 19.0 (L)  19.6 - 45.3 % Final    Monocyte % 06/10/2024 4.6 (L)  5.0 - 12.0 % Final    Eosinophil % 06/10/2024 0.4  0.3 - 6.2 % Final    Basophil % 06/10/2024 0.3  0.0 - 1.5 % Final    Immature Grans % 06/10/2024 0.7 (H)  0.0 - 0.5 % Final    Neutrophils, Absolute 06/10/2024 10.07 (H)  1.70 - 7.00 10*3/mm3 Final    Lymphocytes, Absolute 06/10/2024 2.56  0.70 - 3.10 10*3/mm3 Final    Monocytes, Absolute 06/10/2024 0.62  0.10 - 0.90 10*3/mm3 Final    Eosinophils, Absolute 06/10/2024 0.05  0.00 - 0.40 10*3/mm3 Final    Basophils, Absolute 06/10/2024 0.04  0.00 - 0.20 10*3/mm3 Final    Immature Grans, Absolute 06/10/2024 0.10 (H)  0.00 - 0.05 10*3/mm3 Final    nRBC 06/10/2024 0.0  0.0 - 0.2 /100 WBC Final   Hospital Outpatient Visit on 06/07/2024   Component Date Value Ref Range Status    Right Internal Jugular Spont 06/07/2024 Y   Final    Right Internal Jugular Phasic 06/07/2024 Y   Final    Right Internal Jugular Compress 06/07/2024 C    Final    Right Internal Jugular Augment 06/07/2024 Y   Final    Right Subclavian Spont 06/07/2024 Y   Final    Right Subclavian Phasic 06/07/2024 Y   Final    Right Subclavian Compress 06/07/2024 C   Final    Right Subclavian Augment 06/07/2024 Y   Final    Left Internal Jugular Spont 06/07/2024 Y   Final    Left Internal Jugular Phasic 06/07/2024 Y   Final    Left Internal Jugular Compress 06/07/2024 C   Final    Left Internal Jugular Augment 06/07/2024 Y   Final    Left Subclavian Spont 06/07/2024 Y   Final    Left Subclavian Phasic 06/07/2024 Y   Final    Left Subclavian Compress 06/07/2024 C   Final    Left Subclavian Augment 06/07/2024 Y   Final    Left Axillary Spont 06/07/2024 Y   Final    Left Axillary Phasic 06/07/2024 Y   Final    Left Axillary Compress 06/07/2024 C   Final    Left Axillary Augment 06/07/2024 Y   Final    Left Brachial Compress 06/07/2024 C   Final    Left Radial Compress 06/07/2024 C   Final    Left Ulnar Compress 06/07/2024 C   Final    Left Basilic Upper Compress 06/07/2024 C   Final    Left Basilic Forearm Compress 06/07/2024 C   Final    Left Cephalic Upper Compress 06/07/2024 C   Final    Left Cephalic Forearm Compress 06/07/2024 C   Final         Physical Exam  Skin:                Result Review  Data Reviewed:{ Labs  Result Review  Imaging  Med Tab  Media :23}   I have reviewed this patient's chart.  I have reviewed previous labs, previous imaging, previous medications, and previous encounters with notes that were available in this patient's chart.               Assessment and Plan {CC Problem List  Visit Diagnosis  ROS  Review (Popup)  [facility] Maintenance  Quality  BestPractice  Medications  SmartSets  SnapShot Encounters  Media :23}   Diagnoses and all orders for this visit:    1. Right wrist pain (Primary)  -     XR Hand 3+ View Right; Future  -     MRI Wrist Right With & Without Contrast; Future    2. Encounter for screening mammogram for malignant neoplasm of  breast    3. Right arm numbness  -     XR Hand 3+ View Right; Future  -     MRI Wrist Right With & Without Contrast; Future    4. Pain of right upper extremity  -     XR Hand 3+ View Right; Future  -     MRI Wrist Right With & Without Contrast; Future    5. Anxiety  -     Discontinue: hydrOXYzine (ATARAX) 50 MG tablet; MAY TAKE 1 TABLET 2 TIMES A DAY AS NEEDED FOR ITCHING, MAY ALSO TAKE 2 TABLETS AT NIGHT AS NEEDED.  Dispense: 120 tablet; Refill: 3  -     hydrOXYzine (ATARAX) 50 MG tablet; MAY TAKE 1 TABLET 2 TIMES A DAY AS NEEDED FOR ITCHING, MAY ALSO TAKE 2 TABLETS AT NIGHT AS NEEDED.  Dispense: 60 tablet; Refill: 0        -Imaging a right arm and hand to rule out cause of numbness and tingling after injury during surgery.  -Patient shares that she will follow-up with her hand surgeon as well to let him know what she is experiencing.  -ER red flags discussed with patient including risk versus benefit and education provided.  -Follow-up with me in 4 weeks or sooner if needed.    I spent 30 minutes caring for Amelie on this date of service. This time includes time spent by me in the following activities:preparing for the visit, reviewing tests, obtaining and/or reviewing a separately obtained history, performing a medically appropriate examination and/or evaluation , counseling and educating the patient/family/caregiver, ordering medications, tests, or procedures, referring and communicating with other health care professionals , documenting information in the medical record, independently interpreting results and communicating that information with the patient/family/caregiver, and care coordination.    Follow Up {Instructions Charge Capture  Follow-up Communications :23}     Patient was given instructions and counseling regarding her condition or for health maintenance advice. Please see specific information pulled into the AVS (placed there by myself) if appropriate.    Return in about 4 weeks (around  10/4/2024) for Recheck.    BMI is >= 30 and <35. (Class 1 Obesity). The following options were offered after discussion;: exercise counseling/recommendations and nutrition counseling/recommendations       EDSON Cali, FNP-BC

## 2024-09-17 DIAGNOSIS — G25.81 RESTLESS LEG SYNDROME: ICD-10-CM

## 2024-09-17 RX ORDER — ROPINIROLE 0.5 MG/1
0.5 TABLET, FILM COATED ORAL NIGHTLY
Qty: 90 TABLET | Refills: 1 | Status: SHIPPED | OUTPATIENT
Start: 2024-09-17

## 2024-09-18 ENCOUNTER — TELEPHONE (OUTPATIENT)
Dept: FAMILY MEDICINE CLINIC | Facility: CLINIC | Age: 39
End: 2024-09-18

## 2024-09-18 NOTE — TELEPHONE ENCOUNTER
Caller: Santiago Amelie    Relationship: Self    Best call back number: [phone]    Which medication are you concerned about: PAIN MANAGEMENT PRESCRIBED XANEX TO [facility]/pharmacy #[id number] - [place], [place] - [address] [place] [address] AT [facility] - [phone]  - [phone]  [phone]  BUT PATIENT DID NOT .     AMELIE IS VERY WORRIED THAT HER NARCOTIC AGREEMENT WILL MAKE HER LOOSE HER PRIMARY CARE PROVIDER. PLEASE CALL PATIENT TO ADVISE

## 2024-09-18 NOTE — TELEPHONE ENCOUNTER
After speaking with the patient she told me that she was having a back procedure done tomorrow morning and they had sent her in 2 tablets of valium 10 mg each for her to take before this procedure due to she will be awake and they can't due an epidural without paying out of pocket $100 dollars and she will have to have this done again in a week and that was going to cost her $200 she cannot afford this.     After speaking with provider Damon Valera he advised me to let patient know since this is for a procedure it was ok for her to pick the script up but to let patient know he wanted her   [place] to watch her closely when she takes this and to watch her breathing due to other medications she is taking to make sure she does not have any issues .     I spoke with patient again and let her know what PCP advised and she voiced understanding, I also let her know if in the future she has any more procedures and they call in medications for her to let us know in advanced what it is and how much. She voiced understanding.

## 2024-09-23 ENCOUNTER — OFFICE (AMBULATORY)
Age: 39
End: 2024-09-23

## 2024-09-23 ENCOUNTER — OFFICE (AMBULATORY)
Dept: URBAN - METROPOLITAN AREA CLINIC 64 | Facility: CLINIC | Age: 39
End: 2024-09-23

## 2024-09-23 VITALS
WEIGHT: 231 LBS | DIASTOLIC BLOOD PRESSURE: 86 MMHG | SYSTOLIC BLOOD PRESSURE: 126 MMHG | SYSTOLIC BLOOD PRESSURE: 126 MMHG | DIASTOLIC BLOOD PRESSURE: 86 MMHG | HEIGHT: 70 IN | WEIGHT: 231 LBS | WEIGHT: 231 LBS | SYSTOLIC BLOOD PRESSURE: 126 MMHG | HEART RATE: 75 BPM | SYSTOLIC BLOOD PRESSURE: 126 MMHG | HEIGHT: 70 IN | SYSTOLIC BLOOD PRESSURE: 126 MMHG | DIASTOLIC BLOOD PRESSURE: 86 MMHG | DIASTOLIC BLOOD PRESSURE: 86 MMHG | HEART RATE: 75 BPM | HEIGHT: 70 IN | HEIGHT: 70 IN | HEIGHT: 70 IN | HEIGHT: 70 IN | DIASTOLIC BLOOD PRESSURE: 86 MMHG | DIASTOLIC BLOOD PRESSURE: 86 MMHG | WEIGHT: 231 LBS | SYSTOLIC BLOOD PRESSURE: 126 MMHG | WEIGHT: 231 LBS | WEIGHT: 231 LBS | HEART RATE: 75 BPM | WEIGHT: 231 LBS | HEART RATE: 75 BPM | HEART RATE: 75 BPM | SYSTOLIC BLOOD PRESSURE: 126 MMHG | DIASTOLIC BLOOD PRESSURE: 86 MMHG | HEART RATE: 75 BPM | HEART RATE: 75 BPM | HEIGHT: 70 IN

## 2024-09-23 DIAGNOSIS — R94.5 ABNORMAL RESULTS OF LIVER FUNCTION STUDIES: ICD-10-CM

## 2024-09-23 DIAGNOSIS — K21.9 GASTRO-ESOPHAGEAL REFLUX DISEASE WITHOUT ESOPHAGITIS: ICD-10-CM

## 2024-09-23 PROCEDURE — 99213 OFFICE O/P EST LOW 20 MIN: CPT | Performed by: INTERNAL MEDICINE

## 2024-09-23 RX ORDER — OMEPRAZOLE 40 MG/1
80 CAPSULE, DELAYED RELEASE ORAL
Qty: 60 | Refills: 12 | Status: ACTIVE
Start: 2024-09-23

## 2024-10-04 ENCOUNTER — HOSPITAL ENCOUNTER (OUTPATIENT)
Dept: MRI IMAGING | Facility: HOSPITAL | Age: 39
Discharge: HOME | End: 2024-10-04
Admitting: REGISTERED NURSE
Payer: OTHER GOVERNMENT

## 2024-10-22 ENCOUNTER — OFFICE VISIT (OUTPATIENT)
Dept: FAMILY MEDICINE CLINIC | Facility: CLINIC | Age: 39
End: 2024-10-22
Payer: OTHER GOVERNMENT

## 2024-10-22 VITALS — OXYGEN SATURATION: 99 % | HEART RATE: 111 BPM | TEMPERATURE: 98.4 F

## 2024-10-22 DIAGNOSIS — R05.1 ACUTE COUGH: ICD-10-CM

## 2024-10-22 DIAGNOSIS — F41.8 MIXED ANXIETY DEPRESSIVE DISORDER: Primary | ICD-10-CM

## 2024-10-22 DIAGNOSIS — J02.9 SORE THROAT: ICD-10-CM

## 2024-10-22 LAB
EXPIRATION DATE: NORMAL
EXPIRATION DATE: NORMAL
FLUAV AG UPPER RESP QL IA.RAPID: NOT DETECTED
FLUBV AG UPPER RESP QL IA.RAPID: NOT DETECTED
INTERNAL CONTROL: NORMAL
INTERNAL CONTROL: NORMAL
Lab: NORMAL
Lab: NORMAL
S PYO RRNA THROAT QL PROBE: NEGATIVE
SARS-COV-2 AG UPPER RESP QL IA.RAPID: NOT DETECTED

## 2024-10-22 PROCEDURE — 87428 SARSCOV & INF VIR A&B AG IA: CPT | Performed by: REGISTERED NURSE

## 2024-10-22 PROCEDURE — 87651 STREP A DNA AMP PROBE: CPT | Performed by: REGISTERED NURSE

## 2024-10-22 PROCEDURE — 99213 OFFICE O/P EST LOW 20 MIN: CPT | Performed by: REGISTERED NURSE

## 2024-10-22 RX ORDER — ARIPIPRAZOLE 2 MG/1
2 TABLET ORAL DAILY
Qty: 30 TABLET | Refills: 1 | Status: SHIPPED | OUTPATIENT
Start: 2024-10-22

## 2024-10-22 NOTE — PROGRESS NOTES
Chief Complaint  Cough, Sore Throat, Sinusitis (Duration 4 days), and Depression    Subjective    History of Present Illness {CC  Problem List  Visit  Diagnosis   Encounters  Notes  Medications  Labs  Result Review Imaging  Media :23}     Amelie Henderson presents to [facility] for Cough, Sore Throat, Sinusitis (Duration 4 days), and Depression.      History of Present Illness  Patient is a 39 y.o. female who presents to the clinic today for 3-month follow-up for depression with concerns of cough and sore throat x 4 days.  Patient denies any chest pain, shortness of breath, or any fevers.  Patient denies any known exposure to COVID, flu, or any other contagious illnesses.    In regards to depression, patient is currently taking Fetzima to manage this.  She shares that the medication has worked very well but over the last couple months she has started feeling more depressed.  She shares that she feels this most days now.  She has tried extensive antidepressants in the past that tend to work for period of time and then quit being effective.  We discussed adding a mood stabilizer on board or secondary mental health medications such as Vraylar but insurance does not cover this.  We discussed options and patient is in agreement with adding Abilify to her current Fetzima to see if this is helpful.  Patient denies any concerns or issues at this time.    Patient was negative for COVID, flu, and strep in clinic today.  She shares that she does feel it is likely her allergies as she is not ran a fever or had any other significant issues.  Patient reports no one else within the household has been sick.  If symptoms worsen or fail to improve we can discuss sending an antibiotic or retesting.       Review of Systems   Constitutional: Negative.  Negative for activity change, chills, fatigue and fever.   HENT: Negative.  Positive for sore throat. Negative for congestion, dental problem, ear  pain, hearing loss, rhinorrhea, sinus pain, tinnitus and trouble swallowing.    Eyes: Negative.  Negative for pain and visual disturbance.   Respiratory: Negative.  Positive for cough. Negative for chest tightness, shortness of breath and wheezing.    Cardiovascular: Negative.  Negative for chest pain, palpitations and leg swelling.   Gastrointestinal: Negative.  Negative for abdominal pain, diarrhea, nausea and vomiting.   Endocrine: Negative.  Negative for polydipsia, polyphagia and polyuria.   Genitourinary: Negative.  Negative for difficulty urinating, dysuria, frequency and urgency.   Musculoskeletal: Negative.  Negative for arthralgias, back pain and myalgias.   Skin: Negative.  Negative for color change, pallor, rash and wound.   Allergic/Immunologic: Negative.  Negative for environmental allergies.   Neurological: Negative.  Negative for dizziness, speech difficulty, weakness, light-headedness, numbness and headaches.   Hematological: Negative.    Psychiatric/Behavioral: Negative.  Negative for confusion, decreased concentration, self-injury and suicidal ideas. The patient is not nervous/anxious.    All other systems reviewed and are negative.       Objective     Vital Signs:   Pulse 111   Temp 98.4 °F (36.9 °C) (Oral)   SpO2 99%   Current Outpatient Medications on File Prior to Visit   Medication Sig Dispense Refill    albuterol (PROVENTIL) (2.5 MG/3ML) 0.083% nebulizer solution Take 2.5 mg by nebulization Every 4 (Four) Hours As Needed for Wheezing or Shortness of Air. 120 mL 1    albuterol sulfate  (90 Base) MCG/ACT inhaler Inhale 2 puffs Every 4 (Four) Hours As Needed for Wheezing. 18 g 2    busPIRone (BUSPAR) 10 MG tablet Take 2 tablets by mouth 3 (Three) Times a Day As Needed (anxiety). 540 tablet 1    Cholecalciferol 125 MCG (5000 UT) tablet Take 1 tablet by mouth Daily. 90 tablet 1    diclofenac (VOLTAREN) 50 MG EC tablet TAKE 1 TABLET TWICE A DAY AS NEEDED FOR PAIN (ARTHRALGIA) 180 tablet 3     diphenhydrAMINE (BENADRYL) 25 mg capsule Take 1 capsule by mouth Every 6 (Six) Hours As Needed for Itching.      EPINEPHrine (EPIPEN) 0.3 MG/0.3ML solution auto-injector injection Inject 0.3 mL into the appropriate muscle as directed by prescriber 1 (One) Time.      ferrous gluconate (FERGON) 324 MG tablet TAKE 1 TABLET BY MOUTH EVERY DAY WITH BREAKFAST 90 tablet 1    Fetzima 40 MG capsule sustained-release 24 hr TAKE 1 CAPSULE DAILY 90 capsule 3    fluticasone (FLONASE) 50 MCG/ACT nasal spray SPRAY 2 SPRAYS INTO THE NOSTRIL AS DIRECTED BY PROVIDER DAILY. 16 mL 3    galcanezumab-gnlm (EMGALITY) 120 MG/ML auto-injector pen Inject  under the skin into the appropriate area as directed Every 30 (Thirty) Days.      HYDROcodone-acetaminophen (NORCO)  MG per tablet Take 1 tablet by mouth Every 6 (Six) Hours As Needed for Moderate Pain. 90 tablet 0    hydrOXYzine (ATARAX) 50 MG tablet MAY TAKE 1 TABLET 2 TIMES A DAY AS NEEDED FOR ITCHING, MAY ALSO TAKE 2 TABLETS AT NIGHT AS NEEDED. 60 tablet 0    l-methylfolate 7.5 MG tablet tablet Take  by mouth Daily.      levocetirizine (XYZAL) 5 MG tablet Take 1 tablet by mouth Every Morning.      losartan (COZAAR) 50 MG tablet Take 1 tablet by mouth Every Morning. 90 tablet 1    metoprolol tartrate (LOPRESSOR) 25 MG tablet Take 1 tablet by mouth Daily.      mupirocin (BACTROBAN) 2 % cream Apply 1 application  topically to the appropriate area as directed 3 (Three) Times a Day. 30 g 1    Myrbetriq 50 MG tablet sustained-release 24 hour 24 hr tablet TAKE 1 TABLET DAILY 90 tablet 3    omeprazole (priLOSEC) 20 MG capsule Take 1 capsule by mouth 2 (Two) Times a Day.      ondansetron (Zofran) 4 MG tablet Take 1 tablet by mouth Every 6 (Six) Hours As Needed for Nausea or Vomiting. 360 tablet 1    pregabalin (Lyrica) 50 MG capsule Take 1 capsule by mouth 3 (Three) Times a Day. 270 capsule 0    promethazine (PHENERGAN) 25 MG tablet Take 1 tablet by mouth Every 8 (Eight) Hours As Needed  for Nausea or Vomiting. 30 tablet 1    propranolol (INDERAL) 20 MG tablet Take 1 tablet by mouth 2 (Two) Times a Day As Needed (ANXIETY). 180 tablet 01    rizatriptan (MAXALT) 10 MG tablet TAKE 1 TABLET AT THE ONSET OF HEADACHE MAY REPEAT IN 2 HOURS MAX OF 2 TABS IN 24 HOURS 27 tablet 1    rOPINIRole (REQUIP) 0.5 MG tablet Take 1 tablet by mouth Every Night. Take 1 hour before bedtime. 90 tablet 1    Tirzepatide-Weight Management (ZEPBOUND) 2.5 MG/0.5ML solution auto-injector Inject 0.5 mL under the skin into the appropriate area as directed 1 (One) Time Per Week. (Patient not taking: Reported on 9/6/2024) 2 mL 2    tiZANidine (ZANAFLEX) 4 MG tablet TAKE 1 TABLET BY MOUTH EVERY 8 HOURS AS NEEDED FOR MUSCLE SPASMS. 90 tablet 1    traZODone (DESYREL) 50 MG tablet TAKE 1 TABLET BY MOUTH EVERY DAY AT NIGHT 90 tablet 1    ubrogepant (UBRELVY) 100 MG tablet Take 1 tablet by mouth 1 (One) Time As Needed (migraines). 90 tablet 1    vitamin C (ASCORBIC ACID) 250 MG tablet Take 1 tablet by mouth Daily.      Yuvafem 10 MCG tablet vaginal tablet 1 (ONE) TABLET AT BEDTIME, ON SUNDAY NIGHTS      zonisamide (ZONEGRAN) 100 MG capsule Take 4 capsules by mouth Daily.       No current facility-administered medications on file prior to visit.        Past Medical History:   Diagnosis Date    Allergic     Anemia     Anxiety     Arthritis     B12 deficiency     Callus     CHF (congestive heart failure) 5/18/2005    COVID     x 2     Deep vein thrombosis 3/30/24    Depression     Dercum's disease     Difficulty walking     Endometriosis     Fallen arches     Fibromyalgia 02/15/2024    Gastritis     GERD (gastroesophageal reflux disease)     High arches     Hyperlipidemia 2021    Borderline    Hypertension     Hypoglycemia     Ingrown toenail     Liver disease     Fatty liver disease    Migraines     Plantar fasciitis of left foot 06/11/2024    PTSD (post-traumatic stress disorder)     Shin splints     Stress fracture     Vitamin D  2020      Past Surgical History:   Procedure Laterality Date    CARPAL TUNNEL RELEASE Right      SECTION      x 2     CHOLECYSTECTOMY      HYSTERECTOMY  2023    KNEE SURGERY Right     x 3     KNEE SURGERY Left     x 1     PLANTAR FASCIA RELEASE Left 2024    Procedure: ENDOSCOPIC PLANTAR FASCIOTOMY;  Surgeon: KAY Corral DPM;  Location: [facility];  Service: Podiatry;  Laterality: Left;    RECESSION GASTROCNEMIUS Left 2024    Procedure: RECESSION GASTROCNEMIUS;  Surgeon: KAY Corral DPM;  Location: [facility];  Service: Podiatry;  Laterality: Left;    SHOULDER ACROMIOPLASTY WITH ROTATOR CUFF REPAIR Left 2021    Procedure: LEFT SHOULDER ARTHROSCOPY WITH ROTATOR CUFF REPAIR AND SUBACROMIAL DECOMPRESSION- SLAP;  Surgeon: Rianna Jarvis MD;  Location: [facility];  Service: Orthopedics;  Laterality: Left;    SHOULDER ARTHROSCOPY W/ ROTATOR CUFF REPAIR Left 2024    Procedure: SHOULDER ARTHROSCOPY WITH ROTATOR CUFF REPAIR, DECOMPRESSION AND PRP INJECTION;  Surgeon: Rianna Jarvis MD;  Location: [facility];  Service: Orthopedics;  Laterality: Left;      Family History   Problem Relation Age of Onset    Thyroid disease Mother     Glaucoma Mother     Hypertension Mother     Transient ischemic attack Mother     Bell's palsy Mother     Alcohol abuse Father     ADD / ADHD Sister     Depression Son     Pyloric stenosis Son     Hypertension Maternal Grandmother     Heart failure Maternal Grandmother     Stroke Maternal Grandmother     Diabetes Paternal Grandfather     Malig Hyperthermia Neg Hx       Social History     Socioeconomic History    Marital status:    Tobacco Use    Smoking status: Former     Current packs/day: 0.00     Average packs/day: 0.5 packs/day for 17.0 years (8.5 ttl pk-yrs)     Types: Cigarettes     Start date: 1999     Quit date: 2016     Years since quittin.8     Passive exposure: Past    Smokeless tobacco: Never    Vaping Use    Vaping status: Never Used   Substance and Sexual Activity    Alcohol use: Yes     Alcohol/week: 11.0 standard drinks of alcohol     Types: 3 Glasses of wine, 8 Cans of beer per week     Comment: Don't usually drink wine and beer in the same week    Drug use: Never    Sexual activity: Yes     Partners: Male     Birth control/protection: Tubal ligation, Hysterectomy, Surgical         Office Visit on 10/22/2024   Component Date Value Ref Range Status    SARS Antigen 10/22/2024 Not Detected  Not Detected, Presumptive Negative Final    Influenza A Antigen GINA 10/22/2024 Not Detected  Not Detected Final    Influenza B Antigen GINA 10/22/2024 Not Detected  Not Detected Final    Internal Control 10/22/2024 Passed  Passed Final    Lot Number 10/22/2024 [id number]   Final    Expiration Date 10/22/2024 09/04/2025   Final    POC Strep A, Molecular 10/22/2024 Negative  Negative Final    Internal Control 10/22/2024 Passed  Passed Final    Lot Number 10/22/2024 [id number]   Final    Expiration Date 10/22/2024 07/10/25   Final         Physical Exam  Vitals and nursing note reviewed.   Constitutional:       Appearance: Normal appearance. She is normal weight.   HENT:      Head: Normocephalic and atraumatic.   Cardiovascular:      Rate and Rhythm: Normal rate and regular rhythm.      Pulses: Normal pulses.      Heart sounds: Normal heart sounds. No murmur heard.     No friction rub. No gallop.   Pulmonary:      Effort: Pulmonary effort is normal. No respiratory distress.      Breath sounds: Normal breath sounds. No stridor. No wheezing, rhonchi or rales.   Chest:      Chest wall: No tenderness.   Abdominal:      General: Abdomen is flat. Bowel sounds are normal. There is no distension.      Palpations: Abdomen is soft. There is no mass.      Tenderness: There is no abdominal tenderness. There is no right CVA tenderness, left CVA tenderness, guarding or rebound.      Hernia: No hernia is present.   Skin:     General: Skin  is warm and dry.      Capillary Refill: Capillary refill takes less than 2 seconds.      Coloration: Skin is not jaundiced or pale.   Neurological:      General: No focal deficit present.      Mental Status: She is alert and oriented to person, place, and time. Mental status is at baseline.      Motor: No weakness.      Coordination: Coordination normal.      Gait: Gait normal.   Psychiatric:         Mood and Affect: Mood normal.         Behavior: Behavior normal.         Thought Content: Thought content normal.         Judgment: Judgment normal.          Result Review  Data Reviewed:{ Labs  Result Review  Imaging  Med Tab  Media :23}   I have reviewed this patient's chart.  I have reviewed previous labs, previous imaging, previous medications, and previous encounters with notes that were available in this patient's chart.               Assessment and Plan {CC Problem List  Visit Diagnosis  ROS  Review (Popup)  [facility]  Quality  BestPractice  Medications  SmartSets  SnapShot Encounters  Media :23}   Diagnoses and all orders for this visit:    1. Mixed anxiety depressive disorder (Primary)  -     ARIPiprazole (Abilify) 2 MG tablet; Take 1 tablet by mouth Daily.  Dispense: 30 tablet; Refill: 1    2. Acute cough  -     POCT SARS-CoV-2 + Flu Antigen GINA    3. Sore throat  -     POCT Strep A, molecular        -COVID and flu are negative today in clinic.  -Strep is negative in clinic today.  -Add Abilify to current Fetzima.  Patient has limited options available that she has not tried yet.  -ER red flags discussed with patient including risk versus benefit and education provided.  -Follow-up with me in 2 weeks to see how Abilify is helping.    I spent 20 minutes caring for Amelie on this date of service. This time includes time spent by me in the following activities:preparing for the visit, reviewing tests, obtaining and/or reviewing a separately obtained history, performing a medically  appropriate examination and/or evaluation , counseling and educating the patient/family/caregiver, ordering medications, tests, or procedures, referring and communicating with other health care professionals , documenting information in the medical record, independently interpreting results and communicating that information with the patient/family/caregiver, and care coordination.    Follow Up {Instructions Charge Capture  Follow-up Communications :23}     Patient was given instructions and counseling regarding her condition or for health maintenance advice. Please see specific information pulled into the AVS (placed there by myself) if appropriate.    Return in about 2 weeks (around 11/5/2024) for Recheck.    BMI is >= 30 and <35. (Class 1 Obesity). The following options were offered after discussion;: none (medical contraindication)       EDSON Cali, FNP-BC

## 2024-11-05 DIAGNOSIS — M79.7 FIBROMYALGIA: ICD-10-CM

## 2024-11-05 RX ORDER — PREGABALIN 50 MG/1
50 CAPSULE ORAL 3 TIMES DAILY
Qty: 270 CAPSULE | Refills: 0 | Status: SHIPPED | OUTPATIENT
Start: 2024-11-05

## 2024-11-05 NOTE — TELEPHONE ENCOUNTER
Rx Refill Note  Requested Prescriptions     Pending Prescriptions Disp Refills    pregabalin (LYRICA) 50 MG capsule [Pharmacy Med Name: PREGABALIN CAPS 50MG] 270 capsule 0     Sig: TAKE 1 CAPSULE THREE TIMES A DAY      Last office visit with prescribing clinician: 10/22/2024   Last telemedicine visit with prescribing clinician: Visit date not found   Next office visit with prescribing clinician: 11/12/2024     UDS   06/10/2024  CSA   06/10/2024    CONTROLLED SUBSTANCE AGREEMENT - SCAN - [facility]/Controlled Substance Agreement/6/10/24 (06/10/2024)     INSPECT - SCAN - INSPECT/ [facility]/ 11/05/2024 (11/05/2024)     Cyndy Pond MA  11/05/24, 10:27 EST

## 2024-11-12 ENCOUNTER — OFFICE VISIT (OUTPATIENT)
Dept: FAMILY MEDICINE CLINIC | Facility: CLINIC | Age: 39
End: 2024-11-12
Payer: OTHER GOVERNMENT

## 2024-11-12 VITALS
HEIGHT: 70 IN | WEIGHT: 224.6 LBS | HEART RATE: 80 BPM | OXYGEN SATURATION: 100 % | DIASTOLIC BLOOD PRESSURE: 88 MMHG | SYSTOLIC BLOOD PRESSURE: 160 MMHG | BODY MASS INDEX: 32.16 KG/M2 | RESPIRATION RATE: 18 BRPM

## 2024-11-12 DIAGNOSIS — G43.109 MIGRAINE WITH AURA AND WITHOUT STATUS MIGRAINOSUS, NOT INTRACTABLE: ICD-10-CM

## 2024-11-12 DIAGNOSIS — I10 PRIMARY HYPERTENSION: ICD-10-CM

## 2024-11-12 DIAGNOSIS — D50.9 IRON DEFICIENCY ANEMIA, UNSPECIFIED IRON DEFICIENCY ANEMIA TYPE: ICD-10-CM

## 2024-11-12 DIAGNOSIS — E16.2 HYPOGLYCEMIA: ICD-10-CM

## 2024-11-12 DIAGNOSIS — J30.89 ENVIRONMENTAL AND SEASONAL ALLERGIES: Primary | ICD-10-CM

## 2024-11-12 DIAGNOSIS — Z12.31 ENCOUNTER FOR SCREENING MAMMOGRAM FOR MALIGNANT NEOPLASM OF BREAST: ICD-10-CM

## 2024-11-12 DIAGNOSIS — F51.01 PRIMARY INSOMNIA: ICD-10-CM

## 2024-11-12 DIAGNOSIS — Z87.892 HISTORY OF ANAPHYLAXIS: ICD-10-CM

## 2024-11-12 DIAGNOSIS — Z13.220 SCREENING FOR LIPID DISORDERS: ICD-10-CM

## 2024-11-12 DIAGNOSIS — Z13.29 SCREENING FOR THYROID DISORDER: ICD-10-CM

## 2024-11-12 DIAGNOSIS — F41.9 ANXIETY: ICD-10-CM

## 2024-11-12 DIAGNOSIS — Z13.1 SCREENING FOR DIABETES MELLITUS: ICD-10-CM

## 2024-11-12 DIAGNOSIS — M62.838 MUSCLE SPASM: ICD-10-CM

## 2024-11-12 PROCEDURE — 99214 OFFICE O/P EST MOD 30 MIN: CPT | Performed by: REGISTERED NURSE

## 2024-11-12 PROCEDURE — 84466 ASSAY OF TRANSFERRIN: CPT | Performed by: REGISTERED NURSE

## 2024-11-12 PROCEDURE — 80061 LIPID PANEL: CPT | Performed by: REGISTERED NURSE

## 2024-11-12 PROCEDURE — 83540 ASSAY OF IRON: CPT | Performed by: REGISTERED NURSE

## 2024-11-12 PROCEDURE — 84443 ASSAY THYROID STIM HORMONE: CPT | Performed by: REGISTERED NURSE

## 2024-11-12 PROCEDURE — 85025 COMPLETE CBC W/AUTO DIFF WBC: CPT | Performed by: REGISTERED NURSE

## 2024-11-12 PROCEDURE — 83036 HEMOGLOBIN GLYCOSYLATED A1C: CPT | Performed by: REGISTERED NURSE

## 2024-11-12 PROCEDURE — 80053 COMPREHEN METABOLIC PANEL: CPT | Performed by: REGISTERED NURSE

## 2024-11-12 PROCEDURE — 82043 UR ALBUMIN QUANTITATIVE: CPT | Performed by: REGISTERED NURSE

## 2024-11-12 PROCEDURE — 36415 COLL VENOUS BLD VENIPUNCTURE: CPT | Performed by: REGISTERED NURSE

## 2024-11-12 RX ORDER — LOSARTAN POTASSIUM 50 MG/1
50 TABLET ORAL EVERY MORNING
Qty: 90 TABLET | Refills: 1 | OUTPATIENT
Start: 2024-11-12

## 2024-11-12 RX ORDER — LEVOCETIRIZINE DIHYDROCHLORIDE 5 MG/1
5 TABLET, FILM COATED ORAL EVERY MORNING
Qty: 90 TABLET | Refills: 1 | Status: SHIPPED | OUTPATIENT
Start: 2024-11-12

## 2024-11-12 RX ORDER — PROPRANOLOL HCL 20 MG
20 TABLET ORAL 2 TIMES DAILY PRN
Qty: 180 TABLET | Refills: 1 | Status: SHIPPED | OUTPATIENT
Start: 2024-11-12

## 2024-11-12 RX ORDER — FERROUS GLUCONATE 324(38)MG
1 TABLET ORAL
Qty: 90 TABLET | Refills: 1 | Status: SHIPPED | OUTPATIENT
Start: 2024-11-12

## 2024-11-12 RX ORDER — METOPROLOL TARTRATE 25 MG/1
25 TABLET, FILM COATED ORAL 2 TIMES DAILY
Qty: 180 TABLET | Refills: 1 | Status: SHIPPED | OUTPATIENT
Start: 2024-11-12

## 2024-11-12 RX ORDER — RIZATRIPTAN BENZOATE 10 MG/1
TABLET ORAL
Qty: 90 TABLET | Refills: 1 | Status: SHIPPED | OUTPATIENT
Start: 2024-11-12

## 2024-11-12 RX ORDER — LOSARTAN POTASSIUM 50 MG/1
50 TABLET ORAL EVERY MORNING
Qty: 90 TABLET | Refills: 1 | Status: SHIPPED | OUTPATIENT
Start: 2024-11-12

## 2024-11-12 RX ORDER — TRAZODONE HYDROCHLORIDE 50 MG/1
50 TABLET, FILM COATED ORAL
Qty: 90 TABLET | Refills: 1 | Status: SHIPPED | OUTPATIENT
Start: 2024-11-12

## 2024-11-12 RX ORDER — EPINEPHRINE 0.3 MG/.3ML
0.3 INJECTION SUBCUTANEOUS ONCE
Qty: 2 EACH | Refills: 1 | Status: SHIPPED | OUTPATIENT
Start: 2024-11-12 | End: 2024-11-12

## 2024-11-12 NOTE — PROGRESS NOTES
Venipuncture Blood Specimen Collection  Venipuncture performed in RT ARM by Judi Ames with good hemostasis. Patient tolerated the procedure well without complications.   11/12/24   Judi Ames

## 2024-11-12 NOTE — PROGRESS NOTES
Chief Complaint  Follow-up, Anxiety, and Depression    Subjective    History of Present Illness {CC  Problem List  Visit  Diagnosis   Encounters  Notes  Medications  Labs  Result Review Imaging  Media :23}     Amelie Henderson presents to [facility] for Follow-up, Anxiety, and Depression.      History of Present Illness  Patient is a 39 y.o. female who presents to the clinic today for 1 month follow-up for depression with anxiety, hair loss greater than 3 months, and multiple other chronic concerns.  Patient denies any chest pain, shortness of breath, or any fevers.  Patient denies any known exposure to COVID, flu, or any other contagious illnesses.       History of Present Illness  In regards to depression, at our last visit approximately 1 month ago patient started Abilify in addition to her Fetzima for mood stabilizer.  Patient shares that she is doing okay with this.  She denies any homicidal or suicidal ideations at this time.  Patient is okay with remaining at current dose.    In regards to hair loss, she has been experiencing significant hair loss and easy bruising, with bruises appearing all over her body, including her chest. She believes that stress is contributing to her hair loss.  Patient also reports that she has taken a testosterone replacement, of which I do not prescribe.  I do recommend she follow-up with dermatology to further investigate her hair loss.  The testosterone replacement was only to help with hot flashes and I have strongly recommended patient stop taking this.  If her hot flashes return or are significant we can discuss alternative options such as the possibility of Veozah.    Patient is requesting refills for several medications, including propranolol, tizanidine (which she takes two tablets at night and occasionally one during the day), rizatriptan, Xyzal 5 mg, losartan, metoprolol (which she takes twice daily), trazodone, and ferrous gluconate.  "Additionally, she needs a replacement for her EpiPen, which she used once in the past.    She has been attending physical therapy and has become more active, which has improved her energy levels. She has lost weight, dropping from 240 to 216 pounds, and believes this increased mobility is beneficial.       Review of Systems   Constitutional: Negative.  Negative for activity change, chills, fatigue and fever.   HENT: Negative.  Negative for congestion, dental problem, ear pain, hearing loss, rhinorrhea, sinus pain, sore throat, tinnitus and trouble swallowing.    Eyes: Negative.  Negative for pain and visual disturbance.   Respiratory: Negative.  Negative for cough, chest tightness, shortness of breath and wheezing.    Cardiovascular: Negative.  Negative for chest pain, palpitations and leg swelling.   Gastrointestinal: Negative.  Negative for abdominal pain, diarrhea, nausea and vomiting.   Endocrine: Negative.  Negative for polydipsia, polyphagia and polyuria.   Genitourinary: Negative.  Negative for difficulty urinating, dysuria, frequency and urgency.   Musculoskeletal: Negative.  Negative for arthralgias, back pain and myalgias.   Skin: Negative.  Negative for color change, pallor, rash and wound.   Allergic/Immunologic: Negative.  Negative for environmental allergies.   Neurological: Negative.  Negative for dizziness, speech difficulty, weakness, light-headedness, numbness and headaches.   Hematological: Negative.    Psychiatric/Behavioral:  Negative for confusion, decreased concentration, self-injury and suicidal ideas. The patient is nervous/anxious.    All other systems reviewed and are negative.       Objective     Vital Signs:   /88 (BP Location: Left arm, Patient Position: Sitting, Cuff Size: Large Adult)   Pulse 80   Resp 18   Ht 177.8 cm (70\")   Wt 102 kg (224 lb 9.6 oz)   SpO2 100%   BMI 32.23 kg/m²   Current Outpatient Medications on File Prior to Visit   Medication Sig Dispense Refill    " albuterol (PROVENTIL) (2.5 MG/3ML) 0.083% nebulizer solution Take 2.5 mg by nebulization Every 4 (Four) Hours As Needed for Wheezing or Shortness of Air. 120 mL 1    albuterol sulfate  (90 Base) MCG/ACT inhaler Inhale 2 puffs Every 4 (Four) Hours As Needed for Wheezing. 18 g 2    ARIPiprazole (Abilify) 2 MG tablet Take 1 tablet by mouth Daily. 30 tablet 1    busPIRone (BUSPAR) 10 MG tablet Take 2 tablets by mouth 3 (Three) Times a Day As Needed (anxiety). 540 tablet 1    Cholecalciferol 125 MCG (5000 UT) tablet Take 1 tablet by mouth Daily. 90 tablet 1    diclofenac (VOLTAREN) 50 MG EC tablet TAKE 1 TABLET TWICE A DAY AS NEEDED FOR PAIN (ARTHRALGIA) 180 tablet 3    diphenhydrAMINE (BENADRYL) 25 mg capsule Take 1 capsule by mouth Every 6 (Six) Hours As Needed for Itching.      Fetzima 40 MG capsule sustained-release 24 hr TAKE 1 CAPSULE DAILY 90 capsule 3    fluticasone (FLONASE) 50 MCG/ACT nasal spray SPRAY 2 SPRAYS INTO THE NOSTRIL AS DIRECTED BY PROVIDER DAILY. 16 mL 3    galcanezumab-gnlm (EMGALITY) 120 MG/ML auto-injector pen Inject  under the skin into the appropriate area as directed Every 30 (Thirty) Days.      HYDROcodone-acetaminophen (NORCO)  MG per tablet Take 1 tablet by mouth Every 6 (Six) Hours As Needed for Moderate Pain. 90 tablet 0    hydrOXYzine (ATARAX) 50 MG tablet MAY TAKE 1 TABLET 2 TIMES A DAY AS NEEDED FOR ITCHING, MAY ALSO TAKE 2 TABLETS AT NIGHT AS NEEDED. 60 tablet 0    l-methylfolate 7.5 MG tablet tablet Take  by mouth Daily.      mupirocin (BACTROBAN) 2 % cream Apply 1 application  topically to the appropriate area as directed 3 (Three) Times a Day. 30 g 1    Myrbetriq 50 MG tablet sustained-release 24 hour 24 hr tablet TAKE 1 TABLET DAILY 90 tablet 3    omeprazole (priLOSEC) 20 MG capsule Take 1 capsule by mouth 2 (Two) Times a Day.      ondansetron (Zofran) 4 MG tablet Take 1 tablet by mouth Every 6 (Six) Hours As Needed for Nausea or Vomiting. 360 tablet 1    pregabalin  (LYRICA) 50 MG capsule TAKE 1 CAPSULE THREE TIMES A  capsule 0    promethazine (PHENERGAN) 25 MG tablet Take 1 tablet by mouth Every 8 (Eight) Hours As Needed for Nausea or Vomiting. 30 tablet 1    rOPINIRole (REQUIP) 0.5 MG tablet Take 1 tablet by mouth Every Night. Take 1 hour before bedtime. 90 tablet 1    Tirzepatide-Weight Management (ZEPBOUND) 2.5 MG/0.5ML solution auto-injector Inject 0.5 mL under the skin into the appropriate area as directed 1 (One) Time Per Week. 2 mL 2    ubrogepant (UBRELVY) 100 MG tablet Take 1 tablet by mouth 1 (One) Time As Needed (migraines). 90 tablet 1    vitamin C (ASCORBIC ACID) 250 MG tablet Take 1 tablet by mouth Daily.      Yuvafem 10 MCG tablet vaginal tablet 1 (ONE) TABLET AT BEDTIME, ON SUNDAY NIGHTS      zonisamide (ZONEGRAN) 100 MG capsule Take 4 capsules by mouth Daily.      [DISCONTINUED] EPINEPHrine (EPIPEN) 0.3 MG/0.3ML solution auto-injector injection Inject 0.3 mL into the appropriate muscle as directed by prescriber 1 (One) Time.      [DISCONTINUED] ferrous gluconate (FERGON) 324 MG tablet TAKE 1 TABLET BY MOUTH EVERY DAY WITH BREAKFAST 90 tablet 1    [DISCONTINUED] levocetirizine (XYZAL) 5 MG tablet Take 1 tablet by mouth Every Morning.      [DISCONTINUED] losartan (COZAAR) 50 MG tablet Take 1 tablet by mouth Every Morning. 90 tablet 1    [DISCONTINUED] metoprolol tartrate (LOPRESSOR) 25 MG tablet Take 1 tablet by mouth Daily.      [DISCONTINUED] propranolol (INDERAL) 20 MG tablet Take 1 tablet by mouth 2 (Two) Times a Day As Needed (ANXIETY). 180 tablet 01    [DISCONTINUED] rizatriptan (MAXALT) 10 MG tablet TAKE 1 TABLET AT THE ONSET OF HEADACHE MAY REPEAT IN 2 HOURS MAX OF 2 TABS IN 24 HOURS 27 tablet 1    [DISCONTINUED] tiZANidine (ZANAFLEX) 4 MG tablet TAKE 1 TABLET BY MOUTH EVERY 8 HOURS AS NEEDED FOR MUSCLE SPASMS. 90 tablet 1    [DISCONTINUED] traZODone (DESYREL) 50 MG tablet TAKE 1 TABLET BY MOUTH EVERY DAY AT NIGHT 90 tablet 1     No current  facility-administered medications on file prior to visit.        Past Medical History:   Diagnosis Date    Allergic     Anemia     Anxiety     Arthritis     B12 deficiency     Callus     CHF (congestive heart failure) 2005    COVID     x 2     Deep vein thrombosis 3/30/24    Depression     Dercum's disease     Difficulty walking     Endometriosis     Fallen arches     Fibromyalgia 02/15/2024    Gastritis     GERD (gastroesophageal reflux disease)     High arches     Hyperlipidemia     Borderline    Hypertension     Hypoglycemia     Ingrown toenail     Liver disease     Fatty liver disease    Migraines     Plantar fasciitis of left foot 2024    PTSD (post-traumatic stress disorder)     Shin splints     Stress fracture     Vitamin D deficiency       Past Surgical History:   Procedure Laterality Date    CARPAL TUNNEL RELEASE Right      SECTION      x 2     CHOLECYSTECTOMY      HYSTERECTOMY  2023    KNEE SURGERY Right     x 3     KNEE SURGERY Left     x 1     PLANTAR FASCIA RELEASE Left 2024    Procedure: ENDOSCOPIC PLANTAR FASCIOTOMY;  Surgeon: KAY Corral DPM;  Location: [facility] OR;  Service: Podiatry;  Laterality: Left;    RECESSION GASTROCNEMIUS Left 2024    Procedure: RECESSION GASTROCNEMIUS;  Surgeon: KAY Corral DPM;  Location[facility] MAIN OR;  Service: Podiatry;  Laterality: Left;    SHOULDER ACROMIOPLASTY WITH ROTATOR CUFF REPAIR Left 2021    Procedure: LEFT SHOULDER ARTHROSCOPY WITH ROTATOR CUFF REPAIR AND SUBACROMIAL DECOMPRESSION- SLAP;  Surgeon: Rianna Jarvis MD;  [place] [facility];  Service: Orthopedics;  Laterality: Left;    SHOULDER ARTHROSCOPY W/ ROTATOR CUFF REPAIR Left 2024    Procedure: SHOULDER ARTHROSCOPY WITH ROTATOR CUFF REPAIR, DECOMPRESSION AND PRP INJECTION;  Surgeon: Rianna Jarvis MD;  Location: [facility];  Service: Orthopedics;  Laterality: Left;      Family History   Problem Relation Age of Onset     Thyroid disease Mother     Glaucoma Mother     Hypertension Mother     Transient ischemic attack Mother     Bell's palsy Mother     Alcohol abuse Father     ADD / ADHD Sister     Depression Son     Pyloric stenosis Son     Hypertension Maternal Grandmother     Heart failure Maternal Grandmother     Stroke Maternal Grandmother     Diabetes Paternal Grandfather     Malig Hyperthermia Neg Hx       Social History     Socioeconomic History    Marital status:    Tobacco Use    Smoking status: Former     Current packs/day: 0.00     Average packs/day: 0.5 packs/day for 17.0 years (8.5 ttl pk-yrs)     Types: Cigarettes     Start date: 1999     Quit date: 2016     Years since quittin.8     Passive exposure: Past    Smokeless tobacco: Never   Vaping Use    Vaping status: Never Used   Substance and Sexual Activity    Alcohol use: Yes     Alcohol/week: 11.0 standard drinks of alcohol     Types: 3 Glasses of wine, 8 Cans of beer per week     Comment: Don't usually drink wine and beer in the same week    Drug use: Never    Sexual activity: Yes     Partners: Male     Birth control/protection: Tubal ligation, Hysterectomy, Surgical         Office Visit on 10/22/2024   Component Date Value Ref Range Status    SARS Antigen 10/22/2024 Not Detected  Not Detected, Presumptive Negative Final    Influenza A Antigen GINA 10/22/2024 Not Detected  Not Detected Final    Influenza B Antigen GINA 10/22/2024 Not Detected  Not Detected Final    Internal Control 10/22/2024 Passed  Passed Final    Lot Number 10/22/2024 [id number]   Final    Expiration Date 10/22/2024 2025   Final    POC Strep A, Molecular 10/22/2024 Negative  Negative Final    Internal Control 10/22/2024 Passed  Passed Final    Lot Number 10/22/2024 [id number]   Final    Expiration Date 10/22/2024 07/10/25   Final         Physical Exam  Vitals and nursing note reviewed.   Constitutional:       Appearance: Normal appearance. She is normal weight.   HENT:      Head:  Normocephalic and atraumatic.   Cardiovascular:      Rate and Rhythm: Normal rate and regular rhythm.      Pulses: Normal pulses.      Heart sounds: Normal heart sounds. No murmur heard.     No friction rub. No gallop.   Pulmonary:      Effort: Pulmonary effort is normal. No respiratory distress.      Breath sounds: Normal breath sounds. No stridor. No wheezing, rhonchi or rales.   Chest:      Chest wall: No tenderness.   Abdominal:      General: Abdomen is flat. Bowel sounds are normal. There is no distension.      Palpations: Abdomen is soft. There is no mass.      Tenderness: There is no abdominal tenderness. There is no right CVA tenderness, left CVA tenderness, guarding or rebound.      Hernia: No hernia is present.   Skin:     General: Skin is warm and dry.      Capillary Refill: Capillary refill takes less than 2 seconds.      Coloration: Skin is not jaundiced or pale.   Neurological:      General: No focal deficit present.      Mental Status: She is alert and oriented to person, place, and time. Mental status is at baseline.      Motor: No weakness.      Coordination: Coordination normal.      Gait: Gait normal.   Psychiatric:         Mood and Affect: Mood normal.         Behavior: Behavior normal.         Thought Content: Thought content normal.         Judgment: Judgment normal.          Physical Exam         Result Review  Data Reviewed:{ Labs  Result Review  Imaging  Med Tab  Media :23}   I have reviewed this patient's chart.  I have reviewed previous labs, previous imaging, previous medications, and previous encounters with notes that were available in this patient's chart.    Results                Assessment and Plan {CC Problem List  Visit Diagnosis  ROS  Review (Popup)  [facility]  Quality  BestPractice  Medications  SmartSets  SnapShot Encounters  Media :23}   Diagnoses and all orders for this visit:    1. Environmental and seasonal allergies (Primary)  -     levocetirizine  (XYZAL) 5 MG tablet; Take 1 tablet by mouth Every Morning.  Dispense: 90 tablet; Refill: 1    2. Encounter for screening mammogram for malignant neoplasm of breast    3. Iron deficiency anemia, unspecified iron deficiency anemia type  -     Iron Profile  -     ferrous gluconate (FERGON) 324 MG tablet; Take 1 tablet by mouth Daily With Breakfast.  Dispense: 90 tablet; Refill: 1    4. Anxiety  -     propranolol (INDERAL) 20 MG tablet; Take 1 tablet by mouth 2 (Two) Times a Day As Needed (ANXIETY).  Dispense: 180 tablet; Refill: 01    5. Muscle spasm  -     tiZANidine (ZANAFLEX) 4 MG tablet; Take 1 tablet by mouth Every 8 (Eight) Hours As Needed for Muscle Spasms.  Dispense: 270 tablet; Refill: 1    6. Migraine with aura and without status migrainosus, not intractable  -     rizatriptan (MAXALT) 10 MG tablet; TAKE 1 TABLET AT THE ONSET OF HEADACHE MAY REPEAT IN 2 HOURS MAX OF 2 TABS IN 24 HOURS  Dispense: 90 tablet; Refill: 1    7. Primary hypertension  -     CBC & Differential  -     Comprehensive Metabolic Panel  -     losartan (COZAAR) 50 MG tablet; Take 1 tablet by mouth Every Morning.  Dispense: 90 tablet; Refill: 1    8. Primary insomnia  -     traZODone (DESYREL) 50 MG tablet; Take 1 tablet by mouth every night at bedtime.  Dispense: 90 tablet; Refill: 1    9. History of anaphylaxis  -     EPINEPHrine (EPIPEN) 0.3 MG/0.3ML solution auto-injector injection; Inject 0.3 mL into the appropriate muscle as directed by prescriber 1 (One) Time for 1 dose.  Dispense: 2 each; Refill: 1    10. Screening for diabetes mellitus  -     Hemoglobin A1c    11. Hypoglycemia  -     Hemoglobin A1c  -     MicroAlbumin, Urine, Random - Urine, Clean Catch    12. Screening for lipid disorders  -     Lipid Panel    13. Screening for thyroid disorder  -     TSH    Other orders  -     metoprolol tartrate (LOPRESSOR) 25 MG tablet; Take 1 tablet by mouth 2 (Two) Times a Day.  Dispense: 180 tablet; Refill: 1        Assessment & Plan  1.  Depression.  She is currently on Fetzima with Abilify. An iron panel, CBC, and CMP will be ordered to evaluate her symptoms. She is advised to continue her current medication regimen.     2. Anxiety.  She is currently on propranolol. A prescription for propranolol has been refilled.    3. Hair loss.  A referral to a dermatologist has been recommended for further evaluation of her hair loss. Discontinuation of testosterone injections has been suggested as it may be contributing to her hair loss.    4. Anemia.  An iron panel, CBC, and CMP will be ordered to evaluate her symptoms.    5. Hypertension.  She is currently on losartan and metoprolol. Prescriptions for losartan and metoprolol have been refilled. She is advised to continue her current medication regimen.    6. Migraine.  She is currently on rizatriptan. A prescription for rizatriptan has been refilled.    7. Muscle spasms.  She is currently on tizanidine. A prescription for tizanidine has been refilled.    8. Allergies.  She is currently on Xyzal (levocetirizine). A prescription for Xyzal has been refilled.    9. Insomnia.  She is currently on trazodone. A prescription for trazodone has been refilled.    10. Anaphylaxis.  A prescription for an EpiPen has been provided.       -ER red flags discussed with patient including risk versus benefit and education provided.  -Follow-up with me in 3 months or sooner if needed.    I spent 30 minutes caring for Amelie on this date of service. This time includes time spent by me in the following activities:preparing for the visit, reviewing tests, obtaining and/or reviewing a separately obtained history, performing a medically appropriate examination and/or evaluation , counseling and educating the patient/family/caregiver, ordering medications, tests, or procedures, referring and communicating with other health care professionals , documenting information in the medical record, independently interpreting results and  communicating that information with the patient/family/caregiver, and care coordination.    Follow Up {Instructions Charge Capture  Follow-up Communications :23}     Patient was given instructions and counseling regarding her condition or for health maintenance advice. Please see specific information pulled into the AVS (placed there by myself) if appropriate.    Return in about 3 months (around 2/12/2025) for routine follow up.    BMI is >= 30 and <35. (Class 1 Obesity). The following options were offered after discussion;: exercise counseling/recommendations and nutrition counseling/recommendations         EDSON Cali, [facility]      Patient or patient representative verbalized consent for the use of Ambient Listening during the visit with  EDSON Cali for chart documentation. 11/12/2024  14:14 EST

## 2024-11-13 LAB
ALBUMIN SERPL-MCNC: 4.6 G/DL (ref 3.5–5.2)
ALBUMIN UR-MCNC: <1.2 MG/DL
ALBUMIN/GLOB SERPL: 1.9 G/DL
ALP SERPL-CCNC: 127 U/L (ref 39–117)
ALT SERPL W P-5'-P-CCNC: 32 U/L (ref 1–33)
ANION GAP SERPL CALCULATED.3IONS-SCNC: 12 MMOL/L (ref 5–15)
AST SERPL-CCNC: 23 U/L (ref 1–32)
BASOPHILS # BLD AUTO: 0.04 10*3/MM3 (ref 0–0.2)
BASOPHILS NFR BLD AUTO: 0.5 % (ref 0–1.5)
BILIRUB SERPL-MCNC: 0.3 MG/DL (ref 0–1.2)
BUN SERPL-MCNC: 15 MG/DL (ref 6–20)
BUN/CREAT SERPL: 14 (ref 7–25)
CALCIUM SPEC-SCNC: 9.9 MG/DL (ref 8.6–10.5)
CHLORIDE SERPL-SCNC: 104 MMOL/L (ref 98–107)
CHOLEST SERPL-MCNC: 242 MG/DL (ref 0–200)
CO2 SERPL-SCNC: 22 MMOL/L (ref 22–29)
CREAT SERPL-MCNC: 1.07 MG/DL (ref 0.57–1)
DEPRECATED RDW RBC AUTO: 45.4 FL (ref 37–54)
EGFRCR SERPLBLD CKD-EPI 2021: 67.5 ML/MIN/1.73
EOSINOPHIL # BLD AUTO: 0.2 10*3/MM3 (ref 0–0.4)
EOSINOPHIL NFR BLD AUTO: 2.5 % (ref 0.3–6.2)
ERYTHROCYTE [DISTWIDTH] IN BLOOD BY AUTOMATED COUNT: 14.8 % (ref 12.3–15.4)
GLOBULIN UR ELPH-MCNC: 2.4 GM/DL
GLUCOSE SERPL-MCNC: 82 MG/DL (ref 65–99)
HBA1C MFR BLD: 5.5 % (ref 4.8–5.6)
HCT VFR BLD AUTO: 41 % (ref 34–46.6)
HDLC SERPL-MCNC: 56 MG/DL (ref 40–60)
HGB BLD-MCNC: 13.6 G/DL (ref 12–15.9)
IMM GRANULOCYTES # BLD AUTO: 0.04 10*3/MM3 (ref 0–0.05)
IMM GRANULOCYTES NFR BLD AUTO: 0.5 % (ref 0–0.5)
IRON 24H UR-MRATE: 87 MCG/DL (ref 37–145)
IRON SATN MFR SERPL: 23 % (ref 20–50)
LDLC SERPL CALC-MCNC: 168 MG/DL (ref 0–100)
LDLC/HDLC SERPL: 2.95 {RATIO}
LYMPHOCYTES # BLD AUTO: 1.84 10*3/MM3 (ref 0.7–3.1)
LYMPHOCYTES NFR BLD AUTO: 22.6 % (ref 19.6–45.3)
MCH RBC QN AUTO: 28.2 PG (ref 26.6–33)
MCHC RBC AUTO-ENTMCNC: 33.2 G/DL (ref 31.5–35.7)
MCV RBC AUTO: 85.1 FL (ref 79–97)
MONOCYTES # BLD AUTO: 0.46 10*3/MM3 (ref 0.1–0.9)
MONOCYTES NFR BLD AUTO: 5.7 % (ref 5–12)
NEUTROPHILS NFR BLD AUTO: 5.55 10*3/MM3 (ref 1.7–7)
NEUTROPHILS NFR BLD AUTO: 68.2 % (ref 42.7–76)
NRBC BLD AUTO-RTO: 0 /100 WBC (ref 0–0.2)
PLATELET # BLD AUTO: 317 10*3/MM3 (ref 140–450)
PMV BLD AUTO: 10.1 FL (ref 6–12)
POTASSIUM SERPL-SCNC: 4.5 MMOL/L (ref 3.5–5.2)
PROT SERPL-MCNC: 7 G/DL (ref 6–8.5)
RBC # BLD AUTO: 4.82 10*6/MM3 (ref 3.77–5.28)
SODIUM SERPL-SCNC: 138 MMOL/L (ref 136–145)
TIBC SERPL-MCNC: 374 MCG/DL (ref 298–536)
TRANSFERRIN SERPL-MCNC: 251 MG/DL (ref 200–360)
TRIGL SERPL-MCNC: 104 MG/DL (ref 0–150)
TSH SERPL DL<=0.05 MIU/L-ACNC: 0.63 UIU/ML (ref 0.27–4.2)
VLDLC SERPL-MCNC: 18 MG/DL (ref 5–40)
WBC NRBC COR # BLD AUTO: 8.13 10*3/MM3 (ref 3.4–10.8)

## 2024-11-19 DIAGNOSIS — F41.9 ANXIETY: ICD-10-CM

## 2024-11-19 RX ORDER — PROPRANOLOL HCL 20 MG
20 TABLET ORAL 2 TIMES DAILY PRN
Qty: 60 TABLET | Refills: 2 | OUTPATIENT
Start: 2024-11-19

## 2024-12-03 ENCOUNTER — TELEPHONE (OUTPATIENT)
Dept: FAMILY MEDICINE CLINIC | Facility: CLINIC | Age: 39
End: 2024-12-03

## 2024-12-03 NOTE — TELEPHONE ENCOUNTER
Hub staff attempted to follow warm transfer process and was unsuccessful     Caller: STAN TRUJILLO    Relationship to patient: Emergency Contact    Best call back number: [phone]    Patient is needing: STATED PAPERWORK WAS DROPPED OFF FOR FMLA, REGARDING THE CARE OF HIS WIFE, THIS WAS DROPPED OFF TWO WEEKS AGO, NEEDS TO KNOW IF IT IS DONE AND READY TO BE PICKED UP OR IF THIS HAS BEEN FAXED ON?    PLEASE ADVISE, THIS IS  TIME SENSITIVE PAPERWORK

## 2024-12-03 NOTE — TELEPHONE ENCOUNTER
HUB TO RELAY MESSAGE BELOW     IF PATIENT CALLS ADVISE HER PAPERWORK IS BEING FILLED OUT AND WE WILL CALL HER WHEN IT IS DONE.

## 2024-12-16 ENCOUNTER — HOSPITAL ENCOUNTER (OUTPATIENT)
Facility: HOSPITAL | Age: 39
Discharge: HOME | End: 2024-12-16
Attending: EMERGENCY MEDICINE | Admitting: EMERGENCY MEDICINE
Payer: OTHER GOVERNMENT

## 2024-12-16 ENCOUNTER — APPOINTMENT (OUTPATIENT)
Dept: GENERAL RADIOLOGY | Facility: HOSPITAL | Age: 39
End: 2024-12-16
Payer: OTHER GOVERNMENT

## 2024-12-16 VITALS
HEIGHT: 71 IN | SYSTOLIC BLOOD PRESSURE: 161 MMHG | BODY MASS INDEX: 31.4 KG/M2 | OXYGEN SATURATION: 100 % | TEMPERATURE: 98.3 F | HEART RATE: 107 BPM | DIASTOLIC BLOOD PRESSURE: 111 MMHG | WEIGHT: 224.3 LBS | RESPIRATION RATE: 18 BRPM

## 2024-12-16 DIAGNOSIS — S66.911A STRAIN OF RIGHT WRIST, INITIAL ENCOUNTER: Primary | ICD-10-CM

## 2024-12-16 DIAGNOSIS — S86.912A STRAIN OF LEFT KNEE, INITIAL ENCOUNTER: ICD-10-CM

## 2024-12-16 PROCEDURE — 73562 X-RAY EXAM OF KNEE 3: CPT

## 2024-12-16 PROCEDURE — 73110 X-RAY EXAM OF WRIST: CPT

## 2024-12-16 PROCEDURE — 25010000002 KETOROLAC TROMETHAMINE PER 15 MG

## 2024-12-16 PROCEDURE — G0463 HOSPITAL OUTPT CLINIC VISIT: HCPCS

## 2024-12-16 RX ORDER — KETOROLAC TROMETHAMINE 30 MG/ML
30 INJECTION, SOLUTION INTRAMUSCULAR; INTRAVENOUS ONCE
Status: COMPLETED | OUTPATIENT
Start: 2024-12-16 | End: 2024-12-16

## 2024-12-16 RX ADMIN — KETOROLAC TROMETHAMINE 30 MG: 30 INJECTION, SOLUTION INTRAMUSCULAR; INTRAVENOUS at 12:19

## 2024-12-16 NOTE — Clinical Note
[facility] [place] [id number] [place] [address] [place] [address]  Phone: [phone]    Amelie Henderson was seen and treated in our emergency department on 12/16/2024.  She may return to work on 12/19/2024.         Thank you for choosing [facility].    Valentin Dalal Jr., EDSON

## 2024-12-16 NOTE — FSED PROVIDER NOTE
Subjective   History of Present Illness  39-year-old female reports this past Friday she was taking a dog to the vet when the 85 pound dog pulled and she ended up falling on her left knee.  She reports that she strained her right wrist as well.  She reports that she was very active over the weekend and that she is still having pain in these areas.        Review of Systems   All other systems reviewed and are negative.      Past Medical History:   Diagnosis Date    Allergic     Anemia     Anxiety     Arthritis     B12 deficiency     Callus     CHF (congestive heart failure) 2005    COVID     x 2     Deep vein thrombosis 3/30/24    Depression     Dercum's disease     Difficulty walking     Endometriosis     Fallen arches     Fibromyalgia 02/15/2024    Gastritis     GERD (gastroesophageal reflux disease)     High arches     Hyperlipidemia     Borderline    Hypertension     Hypoglycemia     Ingrown toenail     Liver disease     Fatty liver disease    Migraines     Plantar fasciitis of left foot 2024    PTSD (post-traumatic stress disorder)     Shin splints     Stress fracture     Vitamin D deficiency        Allergies   Allergen Reactions    Brun[place] Anaphylaxis    Codeine Hives and Itching    Influenza Virus Vaccine Other (See Comments)     Egg allergy     Latex Rash     Skin gets red and burns, skin starts peeling      Meloxicam Other (See Comments)     Gastritis      Sulfa Antibiotics Unknown - High Severity       Past Surgical History:   Procedure Laterality Date    CARPAL TUNNEL RELEASE Right      SECTION      x 2     CHOLECYSTECTOMY      HYSTERECTOMY  2023    KNEE SURGERY Right     x 3     KNEE SURGERY Left     x 1     PLANTAR FASCIA RELEASE Left 2024    Procedure: ENDOSCOPIC PLANTAR FASCIOTOMY;  Surgeon: KAY Corral DPM;  Location: [facility] MAIN OR;  Service: Podiatry;  Laterality: Left;    RECESSION GASTROCNEMIUS Left 2024    Procedure: RECESSION GASTROCNEMIUS;  Surgeon:  KAY Corral DPM;  Location: [facility] MAIN OR;  Service: Podiatry;  Laterality: Left;    SHOULDER ACROMIOPLASTY WITH ROTATOR CUFF REPAIR Left 2021    Procedure: LEFT SHOULDER ARTHROSCOPY WITH ROTATOR CUFF REPAIR AND SUBACROMIAL DECOMPRESSION- SLAP;  Surgeon: Rianna Jarvis MD;  Location: [facility] [place] OR;  Service: Orthopedics;  Laterality: Left;    SHOULDER ARTHROSCOPY W/ ROTATOR CUFF REPAIR Left 2024    Procedure: SHOULDER ARTHROSCOPY WITH ROTATOR CUFF REPAIR, DECOMPRESSION AND PRP INJECTION;  Surgeon: Rianna Jarvis MD;  Location:  [facility];  Service: Orthopedics;  Laterality: Left;       Family History   Problem Relation Age of Onset    Thyroid disease Mother     Glaucoma Mother     Hypertension Mother     Transient ischemic attack Mother     Bell's palsy Mother     Alcohol abuse Father     ADD / ADHD Sister     Depression Son     Pyloric stenosis Son     Hypertension Maternal Grandmother     Heart failure Maternal Grandmother     Stroke Maternal Grandmother     Diabetes Paternal Grandfather     Malig Hyperthermia Neg Hx        Social History     Socioeconomic History    Marital status:    Tobacco Use    Smoking status: Former     Current packs/day: 0.00     Average packs/day: 0.5 packs/day for 17.0 years (8.5 ttl pk-yrs)     Types: Cigarettes     Start date: 1999     Quit date: 2016     Years since quittin.9     Passive exposure: Past    Smokeless tobacco: Never   Vaping Use    Vaping status: Never Used   Substance and Sexual Activity    Alcohol use: Yes     Alcohol/week: 11.0 standard drinks of alcohol     Types: 3 Glasses of wine, 8 Cans of beer per week     Comment: Don't usually drink wine and beer in the same week    Drug use: Never    Sexual activity: Yes     Partners: Male     Birth control/protection: Tubal ligation, Hysterectomy, Surgical           Objective   Physical Exam  Vitals and nursing note reviewed.   Constitutional:       General: She is not in acute  distress.     Appearance: Normal appearance. She is not ill-appearing or toxic-appearing.   HENT:      Head: Normocephalic and atraumatic.      Nose: Nose normal.      Mouth/Throat:      Mouth: Mucous membranes are moist.      Pharynx: Oropharynx is clear.   Eyes:      Extraocular Movements: Extraocular movements intact.      Conjunctiva/sclera: Conjunctivae normal.      Pupils: Pupils are equal, round, and reactive to light.   Cardiovascular:      Rate and Rhythm: Normal rate.      Pulses: Normal pulses.      Heart sounds: Normal heart sounds.   Pulmonary:      Effort: Pulmonary effort is normal. No respiratory distress.      Breath sounds: Normal breath sounds.   Abdominal:      General: Abdomen is flat. Bowel sounds are normal.      Palpations: Abdomen is soft.   Musculoskeletal:      Cervical back: Normal range of motion.      Comments: No signs of bruising redness or swelling to the right wrist, patient is guarding the right wrist with limited movement reports tenderness with flexion and extension   Skin:     General: Skin is warm and dry.      Capillary Refill: Capillary refill takes less than 2 seconds.   Neurological:      General: No focal deficit present.      Mental Status: She is alert and oriented to person, place, and time. Mental status is at baseline.         Procedures           ED Course  ED Course as of 12/16/24 1229   Mon Dec 16, 2024   1140 Left knee Impression:  No acute fracture or traumatic malalignment identified.   [WF]   1146 Right wrist x-ray  Impression:  No acute osseous abnormality of the right wrist.   [WF]      ED Course User Index  [WF] Valentin Dalal Jr., APRN                                           Medical Decision Making  The x-rays of the right wrist and left knee are negative for bony abnormality    I have offered the patient a thumb spica and left knee brace and she is agreeable to this.  Will also give an injection of Toradol for pain.  Patient has adverse reaction to  meloxicam which is gastritis.  Through shared decision making we will order one-time dose of Toradol to help with inflammation here in ER    Problems Addressed:  Strain of left knee, initial encounter: complicated acute illness or injury  Strain of right wrist, initial encounter: complicated acute illness or injury    Amount and/or Complexity of Data Reviewed  Radiology: ordered.    Risk  Prescription drug management.        Final diagnoses:   Strain of right wrist, initial encounter   Strain of left knee, initial encounter       ED Disposition  ED Disposition       ED Disposition   Discharge    Condition   Stable    Comment   --               Lambert Thomas MD  [address] [place] [id number] [phone]               Medication List      No changes were made to your prescriptions during this visit.

## 2024-12-16 NOTE — DISCHARGE INSTRUCTIONS
If you are able recommend starting tomorrow alternated between high-dose ibuprofen 600 800 mg and high-dose Tylenol 500 mg 3 times daily to help with right wrist left knee    Recommend wearing the thumb spica and left knee brace for the next 4 to 5 days to immobilize these areas and help reduction in inflammation and pain    Follow-up with orthopedic surgeon that she been referred to orthopedist of your choosing    Return to ER for worsening symptoms

## 2024-12-20 ENCOUNTER — OFFICE VISIT (OUTPATIENT)
Dept: FAMILY MEDICINE CLINIC | Facility: CLINIC | Age: 39
End: 2024-12-20
Payer: OTHER GOVERNMENT

## 2024-12-20 VITALS
WEIGHT: 227 LBS | SYSTOLIC BLOOD PRESSURE: 142 MMHG | OXYGEN SATURATION: 100 % | DIASTOLIC BLOOD PRESSURE: 99 MMHG | TEMPERATURE: 98.2 F | HEART RATE: 92 BPM | HEIGHT: 71 IN | BODY MASS INDEX: 31.78 KG/M2
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
## 2024-12-20 DIAGNOSIS — G89.29 CHRONIC RIGHT HIP PAIN: ICD-10-CM

## 2024-12-20 DIAGNOSIS — M54.50 CHRONIC LOW BACK PAIN, UNSPECIFIED BACK PAIN LATERALITY, UNSPECIFIED WHETHER SCIATICA PRESENT: Primary | ICD-10-CM

## 2024-12-20 DIAGNOSIS — G89.29 CHRONIC LOW BACK PAIN, UNSPECIFIED BACK PAIN LATERALITY, UNSPECIFIED WHETHER SCIATICA PRESENT: Primary | ICD-10-CM

## 2024-12-20 DIAGNOSIS — R51.9 NONINTRACTABLE HEADACHE, UNSPECIFIED CHRONICITY PATTERN, UNSPECIFIED HEADACHE TYPE: ICD-10-CM

## 2024-12-20 DIAGNOSIS — M25.551 CHRONIC RIGHT HIP PAIN: ICD-10-CM

## 2024-12-20 RX ORDER — HYDROCODONE BITARTRATE AND ACETAMINOPHEN 10; 325 MG/1; MG/1
1 TABLET ORAL EVERY 6 HOURS PRN
Qty: 90 TABLET | Refills: 0 | Status: SHIPPED | OUTPATIENT
Start: 2024-12-20

## 2024-12-20 RX ORDER — METOPROLOL SUCCINATE 25 MG/1
1 TABLET, EXTENDED RELEASE ORAL DAILY
COMMUNITY

## 2024-12-20 RX ORDER — EPINEPHRINE 0.3 MG/.3ML
INJECTION SUBCUTANEOUS
COMMUNITY
Start: 2024-11-12

## 2024-12-20 NOTE — PROGRESS NOTES
Chief Complaint  Follow-up (Did not see dermatology, believes she has a concussion. Dog tripped her, 12/13/24. Pt notes confusion, falling asleep randomly while doing activities. )    Subjective    History of Present Illness {CC  Problem List  Visit  Diagnosis   Encounters  Notes  Medications  Labs  Result Review Imaging  Media :23}     Amelie Henderson presents to [facility] for Follow-up (Did not see dermatology, believes she has a concussion. Dog tripped her, 12/13/24. Pt notes confusion, falling asleep randomly while doing activities. ).      History of Present Illness  Patient is a 39 y.o. female who presents to the clinic today for 3-month visit for pain management and concerns of fall with head injury x 1 week.  Patient denies any chest pain, shortness of breath, or any fevers.  Patient denies any known exposure to COVID, flu, or any other contagious illnesses.       History of Present Illness  In regards to chronic pain, patient is currently taking hydrocodone to manage this.  She has a significant past medical history of chronic low back and chronic hip pain.  Patient does try to use it sparingly when possible.  She denies any concerns with her hydrocodone or her chronic pain at this time.    She reports an incident involving her dog, which resulted in a fall. During the fall, she sustained injuries to her meniscus and wrist, and her jaw impacted the floor. She experienced pain in her jaw and face, which she initially dismissed. However, on the following Monday, she woke up at 4:30 AM with severe nausea and vomiting. She also reported symptoms of brain fog, confusion, excessive sleepiness, headaches, dizziness, and balance issues. These symptoms have been persistent, leading to missed appointments due to her inability to drive. She does not report any bruising but mentions a transient red eri. She also reports difficulty in bending her left knee, which she attributes to  "the fall. Additionally, she mentions that her dog stepped on the inside of her knee, causing further discomfort.    MEDICATIONS  Current: Hydralazine, trazodone, Myrbetriq, propranolol, Abilify       Review of Systems   Constitutional: Negative.  Negative for activity change, chills, fatigue and fever.   HENT:  Negative for congestion, dental problem, ear pain, hearing loss, rhinorrhea, sinus pain, sore throat, tinnitus and trouble swallowing.    Eyes: Negative.  Negative for pain and visual disturbance.   Respiratory: Negative.  Negative for cough, chest tightness, shortness of breath and wheezing.    Cardiovascular: Negative.  Negative for chest pain, palpitations and leg swelling.   Gastrointestinal: Negative.  Negative for abdominal pain, diarrhea, nausea and vomiting.   Endocrine: Negative.  Negative for polydipsia, polyphagia and polyuria.   Genitourinary: Negative.  Negative for difficulty urinating, dysuria, frequency and urgency.   Musculoskeletal: Negative.  Positive for arthralgias and joint swelling. Negative for back pain and myalgias.   Skin: Negative.  Negative for color change, pallor, rash and wound.   Allergic/Immunologic: Negative.  Negative for environmental allergies.   Neurological:  Positive for headaches. Negative for dizziness, speech difficulty, weakness, light-headedness and numbness.   Hematological: Negative.    Psychiatric/Behavioral: Negative.  Negative for confusion, decreased concentration, self-injury and suicidal ideas. The patient is not nervous/anxious.    All other systems reviewed and are negative.       Objective     Vital Signs:   /99 (BP Location: Left arm, Patient Position: Sitting, Cuff Size: Adult)   Pulse 92   Temp 98.2 °F (36.8 °C)   Ht 179.1 cm (70.5\")   Wt 103 kg (227 lb)   SpO2 100%   BMI 32.11 kg/m²   Current Outpatient Medications on File Prior to Visit   Medication Sig Dispense Refill    albuterol (PROVENTIL) (2.5 MG/3ML) 0.083% nebulizer solution Take " 2.5 mg by nebulization Every 4 (Four) Hours As Needed for Wheezing or Shortness of Air. 120 mL 1    albuterol sulfate  (90 Base) MCG/ACT inhaler Inhale 2 puffs Every 4 (Four) Hours As Needed for Wheezing. 18 g 2    ARIPiprazole (Abilify) 2 MG tablet Take 1 tablet by mouth Daily. 30 tablet 1    busPIRone (BUSPAR) 10 MG tablet Take 2 tablets by mouth 3 (Three) Times a Day As Needed (anxiety). 540 tablet 1    Cholecalciferol 125 MCG (5000 UT) tablet Take 1 tablet by mouth Daily. 90 tablet 1    diphenhydrAMINE (BENADRYL) 25 mg capsule Take 1 capsule by mouth Every 6 (Six) Hours As Needed for Itching.      EPINEPHrine (EPIPEN) 0.3 MG/0.3ML solution auto-injector injection       ferrous gluconate (FERGON) 324 MG tablet Take 1 tablet by mouth Daily With Breakfast. 90 tablet 1    Fetzima 40 MG capsule sustained-release 24 hr TAKE 1 CAPSULE DAILY 90 capsule 3    fluticasone (FLONASE) 50 MCG/ACT nasal spray SPRAY 2 SPRAYS INTO THE NOSTRIL AS DIRECTED BY PROVIDER DAILY. 16 mL 3    galcanezumab-gnlm (EMGALITY) 120 MG/ML auto-injector pen Inject  under the skin into the appropriate area as directed Every 30 (Thirty) Days.      hydrOXYzine (ATARAX) 50 MG tablet MAY TAKE 1 TABLET 2 TIMES A DAY AS NEEDED FOR ITCHING, MAY ALSO TAKE 2 TABLETS AT NIGHT AS NEEDED. 60 tablet 0    l-methylfolate 7.5 MG tablet tablet Take  by mouth Daily.      levocetirizine (XYZAL) 5 MG tablet Take 1 tablet by mouth Every Morning. 90 tablet 1    losartan (COZAAR) 50 MG tablet Take 1 tablet by mouth Every Morning. 90 tablet 1    metoprolol succinate XL (TOPROL-XL) 25 MG 24 hr tablet Take 1 tablet by mouth Daily.      mupirocin (BACTROBAN) 2 % cream Apply 1 application  topically to the appropriate area as directed 3 (Three) Times a Day. 30 g 1    Myrbetriq 50 MG tablet sustained-release 24 hour 24 hr tablet TAKE 1 TABLET DAILY 90 tablet 3    omeprazole (priLOSEC) 20 MG capsule Take 1 capsule by mouth 2 (Two) Times a Day.      ondansetron (Zofran) 4  MG tablet Take 1 tablet by mouth Every 6 (Six) Hours As Needed for Nausea or Vomiting. 360 tablet 1    pregabalin (LYRICA) 50 MG capsule TAKE 1 CAPSULE THREE TIMES A  capsule 0    promethazine (PHENERGAN) 25 MG tablet Take 1 tablet by mouth Every 8 (Eight) Hours As Needed for Nausea or Vomiting. 30 tablet 1    propranolol (INDERAL) 20 MG tablet Take 1 tablet by mouth 2 (Two) Times a Day As Needed (ANXIETY). 180 tablet 01    rizatriptan (MAXALT) 10 MG tablet TAKE 1 TABLET AT THE ONSET OF HEADACHE MAY REPEAT IN 2 HOURS MAX OF 2 TABS IN 24 HOURS 90 tablet 1    rOPINIRole (REQUIP) 0.5 MG tablet Take 1 tablet by mouth Every Night. Take 1 hour before bedtime. 90 tablet 1    tiZANidine (ZANAFLEX) 4 MG tablet Take 1 tablet by mouth Every 8 (Eight) Hours As Needed for Muscle Spasms. 270 tablet 1    traZODone (DESYREL) 50 MG tablet Take 1 tablet by mouth every night at bedtime. 90 tablet 1    ubrogepant (UBRELVY) 100 MG tablet Take 1 tablet by mouth 1 (One) Time As Needed (migraines). 90 tablet 1    vitamin C (ASCORBIC ACID) 250 MG tablet Take 1 tablet by mouth Daily.      Yuvafem 10 MCG tablet vaginal tablet 1 (ONE) TABLET AT BEDTIME, ON SUNDAY NIGHTS      zonisamide (ZONEGRAN) 100 MG capsule Take 4 capsules by mouth Daily.      [DISCONTINUED] HYDROcodone-acetaminophen (NORCO)  MG per tablet Take 1 tablet by mouth Every 6 (Six) Hours As Needed for Moderate Pain. 90 tablet 0    diclofenac (VOLTAREN) 50 MG EC tablet TAKE 1 TABLET TWICE A DAY AS NEEDED FOR PAIN (ARTHRALGIA) (Patient not taking: Reported on 12/20/2024) 180 tablet 3    [DISCONTINUED] metoprolol tartrate (LOPRESSOR) 25 MG tablet Take 1 tablet by mouth 2 (Two) Times a Day. 180 tablet 1    [DISCONTINUED] Tirzepatide-Weight Management (ZEPBOUND) 2.5 MG/0.5ML solution auto-injector Inject 0.5 mL under the skin into the appropriate area as directed 1 (One) Time Per Week. (Patient not taking: Reported on 12/20/2024) 2 mL 2     No current  facility-administered medications on file prior to visit.        Past Medical History:   Diagnosis Date    Allergic     Anemia     Anxiety     Arthritis     B12 deficiency     Callus     CHF (congestive heart failure) 2005    COVID     x 2     Deep vein thrombosis 3/30/24    Depression     Dercum's disease     Difficulty walking     Endometriosis     Fallen arches     Fibromyalgia 02/15/2024    Gastritis     GERD (gastroesophageal reflux disease)     High arches     Hyperlipidemia     Borderline    Hypertension     Hypoglycemia     Ingrown toenail     Liver disease     Fatty liver disease    Migraines     Plantar fasciitis of left foot 2024    PTSD (post-traumatic stress disorder)     Shin splints     Stress fracture     Vitamin D deficiency       Past Surgical History:   Procedure Laterality Date    CARPAL TUNNEL RELEASE Right      SECTION      x 2     CHOLECYSTECTOMY      HYSTERECTOMY  2023    KNEE SURGERY Right     x 3     KNEE SURGERY Left     x 1     PLANTAR FASCIA RELEASE Left 2024    Procedure: ENDOSCOPIC PLANTAR FASCIOTOMY;  Surgeon: KAY Corral DPM;  Location: [facility] OR;  Service: Podiatry;  Laterality: Left;    RECESSION GASTROCNEMIUS Left 2024    Procedure: RECESSION GASTROCNEMIUS;  Surgeon: KAY Corral DPM;  Location: [facility] MAIN OR;  Service: Podiatry;  Laterality: Left;    SHOULDER ACROMIOPLASTY WITH ROTATOR CUFF REPAIR Left 2021    Procedure: LEFT SHOULDER ARTHROSCOPY WITH ROTATOR CUFF REPAIR AND SUBACROMIAL DECOMPRESSION- SLAP;  Surgeon: Rianna Jarvis MD;  [place] [facility];  Service: Orthopedics;  Laterality: Left;    SHOULDER ARTHROSCOPY W/ ROTATOR CUFF REPAIR Left 2024    Procedure: SHOULDER ARTHROSCOPY WITH ROTATOR CUFF REPAIR, DECOMPRESSION AND PRP INJECTION;  Surgeon: Rianna Jarvis MD;  Location: [facility];  Service: Orthopedics;  Laterality: Left;      Family History   Problem Relation Age of Onset     Thyroid disease Mother     Glaucoma Mother     Hypertension Mother     Transient ischemic attack Mother     Bell's palsy Mother     Alcohol abuse Father     ADD / ADHD Sister     Depression Son     Pyloric stenosis Son     Hypertension Maternal Grandmother     Heart failure Maternal Grandmother     Stroke Maternal Grandmother     Diabetes Paternal Grandfather     Malig Hyperthermia Neg Hx       Social History     Socioeconomic History    Marital status:    Tobacco Use    Smoking status: Former     Current packs/day: 0.00     Average packs/day: 0.5 packs/day for 17.0 years (8.5 ttl pk-yrs)     Types: Cigarettes     Start date: 1999     Quit date: 2016     Years since quittin.9     Passive exposure: Past    Smokeless tobacco: Never   Vaping Use    Vaping status: Never Used   Substance and Sexual Activity    Alcohol use: Yes     Alcohol/week: 11.0 standard drinks of alcohol     Types: 3 Glasses of wine, 8 Cans of beer per week     Comment: Don't usually drink wine and beer in the same week    Drug use: Never    Sexual activity: Yes     Partners: Male     Birth control/protection: Tubal ligation, Hysterectomy, Surgical         Office Visit on 2024   Component Date Value Ref Range Status    Iron 2024 87  37 - 145 mcg/dL Final    Iron Saturation (TSAT) 2024 23  20 - 50 % Final    Transferrin 2024 251  200 - 360 mg/dL Final    TIBC 2024 374  298 - 536 mcg/dL Final    Glucose 2024 82  65 - 99 mg/dL Final    BUN 2024 15  6 - 20 mg/dL Final    Creatinine 2024 1.07 (H)  0.57 - 1.00 mg/dL Final    Sodium 2024 138  136 - 145 mmol/L Final    Potassium 2024 4.5  3.5 - 5.2 mmol/L Final    Chloride 2024 104  98 - 107 mmol/L Final    CO2 2024 22.0  22.0 - 29.0 mmol/L Final    Calcium 2024 9.9  8.6 - 10.5 mg/dL Final    Total Protein 2024 7.0  6.0 - 8.5 g/dL Final    Albumin 2024 4.6  3.5 - 5.2 g/dL Final    ALT (SGPT)  11/12/2024 32  1 - 33 U/L Final    AST (SGOT) 11/12/2024 23  1 - 32 U/L Final    Alkaline Phosphatase 11/12/2024 127 (H)  39 - 117 U/L Final    Total Bilirubin 11/12/2024 0.3  0.0 - 1.2 mg/dL Final    Globulin 11/12/2024 2.4  gm/dL Final    A/G Ratio 11/12/2024 1.9  g/dL Final    BUN/Creatinine Ratio 11/12/2024 14.0  7.0 - 25.0 Final    Anion Gap 11/12/2024 12.0  5.0 - 15.0 mmol/L Final    eGFR 11/12/2024 67.5  >60.0 mL/min/1.73 Final    Hemoglobin A1C 11/12/2024 5.50  4.80 - 5.60 % Final    Microalbumin, Urine 11/12/2024 <1.2  mg/dL Final    Total Cholesterol 11/12/2024 242 (H)  0 - 200 mg/dL Final    Triglycerides 11/12/2024 104  0 - 150 mg/dL Final    HDL Cholesterol 11/12/2024 56  40 - 60 mg/dL Final    LDL Cholesterol  11/12/2024 168 (H)  0 - 100 mg/dL Final    VLDL Cholesterol 11/12/2024 18  5 - 40 mg/dL Final    LDL/HDL Ratio 11/12/2024 2.95   Final    TSH 11/12/2024 0.631  0.270 - 4.200 uIU/mL Final    WBC 11/12/2024 8.13  3.40 - 10.80 10*3/mm3 Final    RBC 11/12/2024 4.82  3.77 - 5.28 10*6/mm3 Final    Hemoglobin 11/12/2024 13.6  12.0 - 15.9 g/dL Final    Hematocrit 11/12/2024 41.0  34.0 - 46.6 % Final    MCV 11/12/2024 85.1  79.0 - 97.0 fL Final    MCH 11/12/2024 28.2  26.6 - 33.0 pg Final    MCHC 11/12/2024 33.2  31.5 - 35.7 g/dL Final    RDW 11/12/2024 14.8  12.3 - 15.4 % Final    RDW-SD 11/12/2024 45.4  37.0 - 54.0 fl Final    MPV 11/12/2024 10.1  6.0 - 12.0 fL Final    Platelets 11/12/2024 317  140 - 450 10*3/mm3 Final    Neutrophil % 11/12/2024 68.2  42.7 - 76.0 % Final    Lymphocyte % 11/12/2024 22.6  19.6 - 45.3 % Final    Monocyte % 11/12/2024 5.7  5.0 - 12.0 % Final    Eosinophil % 11/12/2024 2.5  0.3 - 6.2 % Final    Basophil % 11/12/2024 0.5  0.0 - 1.5 % Final    Immature Grans % 11/12/2024 0.5  0.0 - 0.5 % Final    Neutrophils, Absolute 11/12/2024 5.55  1.70 - 7.00 10*3/mm3 Final    Lymphocytes, Absolute 11/12/2024 1.84  0.70 - 3.10 10*3/mm3 Final    Monocytes, Absolute 11/12/2024 0.46  0.10 -  0.90 10*3/mm3 Final    Eosinophils, Absolute 11/12/2024 0.20  0.00 - 0.40 10*3/mm3 Final    Basophils, Absolute 11/12/2024 0.04  0.00 - 0.20 10*3/mm3 Final    Immature Grans, Absolute 11/12/2024 0.04  0.00 - 0.05 10*3/mm3 Final    nRBC 11/12/2024 0.0  0.0 - 0.2 /100 WBC Final   Office Visit on 10/22/2024   Component Date Value Ref Range Status    SARS Antigen 10/22/2024 Not Detected  Not Detected, Presumptive Negative Final    Influenza A Antigen GINA 10/22/2024 Not Detected  Not Detected Final    Influenza B Antigen GINA 10/22/2024 Not Detected  Not Detected Final    Internal Control 10/22/2024 Passed  Passed Final    Lot Number 10/22/2024 [id number]   Final    Expiration Date 10/22/2024 09/04/2025   Final    POC Strep A, Molecular 10/22/2024 Negative  Negative Final    Internal Control 10/22/2024 Passed  Passed Final    Lot Number 10/22/2024 [id number]   Final    Expiration Date 10/22/2024 07/10/25   Final         Physical Exam  Vitals and nursing note reviewed.   Constitutional:       Appearance: Normal appearance. She is normal weight.   HENT:      Head: Normocephalic and atraumatic.   Cardiovascular:      Rate and Rhythm: Normal rate and regular rhythm.      Pulses: Normal pulses.      Heart sounds: Normal heart sounds. No murmur heard.     No friction rub. No gallop.   Pulmonary:      Effort: Pulmonary effort is normal. No respiratory distress.      Breath sounds: Normal breath sounds. No stridor. No wheezing, rhonchi or rales.   Chest:      Chest wall: No tenderness.   Abdominal:      General: Abdomen is flat. Bowel sounds are normal. There is no distension.      Palpations: Abdomen is soft. There is no mass.      Tenderness: There is no abdominal tenderness. There is no right CVA tenderness, left CVA tenderness, guarding or rebound.      Hernia: No hernia is present.   Skin:     General: Skin is warm and dry.      Capillary Refill: Capillary refill takes less than 2 seconds.      Coloration: Skin is not jaundiced  or pale.   Neurological:      General: No focal deficit present.      Mental Status: She is alert and oriented to person, place, and time. Mental status is at baseline.      Motor: No tremor, atrophy or seizure activity.      Coordination: Coordination is intact. Coordination normal.      Gait: Gait is intact. Gait normal.      Comments: Pupils are equal, round, and reactive to light with appropriate accommodation.   Psychiatric:         Mood and Affect: Mood normal.         Behavior: Behavior normal.         Thought Content: Thought content normal.         Judgment: Judgment normal.          Physical Exam         Result Review  Data Reviewed:{ Labs  Result Review  Imaging  Med Tab  Media :23}   I have reviewed this patient's chart.  I have reviewed previous labs, previous imaging, previous medications, and previous encounters with notes that were available in this patient's chart.    Results                Assessment and Plan {CC Problem List  Visit Diagnosis  ROS  Review (Popup)  [facility]  [facility]  Medications  SmartSets  SnapShot Encounters  Media :23}   Diagnoses and all orders for this visit:    1. Chronic low back pain, unspecified back pain laterality, unspecified whether sciatica present (Primary)  -     HYDROcodone-acetaminophen (NORCO)  MG per tablet; Take 1 tablet by mouth Every 6 (Six) Hours As Needed for Moderate Pain.  Dispense: 90 tablet; Refill: 0    2. Nonintractable headache, unspecified chronicity pattern, unspecified headache type  -     CT Head Without Contrast; Future    3. Injury due to fall, initial encounter  -     CT Head Without Contrast; Future    4. Chronic right hip pain  -     HYDROcodone-acetaminophen (NORCO)  MG per tablet; Take 1 tablet by mouth Every 6 (Six) Hours As Needed for Moderate Pain.  Dispense: 90 tablet; Refill: 0        Assessment & Plan  1. Concussion.  The clinical presentation, including symptoms of nausea, vomiting,  brain fog, confusion, excessive sleepiness, headache, dizziness, and off-balance sensation, is consistent with a concussion. A CT scan of the head will be ordered to further evaluate the condition. She is advised to avoid driving and strenuous activities until the results are available. If the CT scan shows any abnormalities, further neurological evaluation will be considered.       -ER red flags discussed with patient including risk versus benefit and education provided.  -Follow-up with me in 4 weeks or sooner if needed.    I spent 20 minutes caring for Amelie on this date of service. This time includes time spent by me in the following activities:preparing for the visit, reviewing tests, obtaining and/or reviewing a separately obtained history, performing a medically appropriate examination and/or evaluation , counseling and educating the patient/family/caregiver, ordering medications, tests, or procedures, referring and communicating with other health care professionals , documenting information in the medical record, independently interpreting results and communicating that information with the patient/family/caregiver, and care coordination.    Follow Up {Instructions Charge Capture  Follow-up Communications :23}     Patient was given instructions and counseling regarding her condition or for health maintenance advice. Please see specific information pulled into the AVS (placed there by myself) if appropriate.    Return in about 4 weeks (around 1/17/2025), or if symptoms worsen or fail to improve, for Recheck.    BMI is >= 30 and <35. (Class 1 Obesity). The following options were offered after discussion;: exercise counseling/recommendations and nutrition counseling/recommendations         EDSON Cali, [facility]      Patient or patient representative verbalized consent for the use of Ambient Listening during the visit with  EDSON Cali for chart documentation. 12/20/2024  12:28  EST

## 2025-01-03 ENCOUNTER — HOSPITAL ENCOUNTER (OUTPATIENT)
Dept: CT IMAGING | Facility: HOSPITAL | Age: 40
Discharge: HOME | End: 2025-01-03
Admitting: REGISTERED NURSE
Payer: OTHER GOVERNMENT
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
## 2025-01-03 DIAGNOSIS — R51.9 NONINTRACTABLE HEADACHE, UNSPECIFIED CHRONICITY PATTERN, UNSPECIFIED HEADACHE TYPE: ICD-10-CM

## 2025-01-03 PROCEDURE — 70450 CT HEAD/BRAIN W/O DYE: CPT

## 2025-01-13 ENCOUNTER — CLINICAL SUPPORT (OUTPATIENT)
Dept: FAMILY MEDICINE CLINIC | Facility: CLINIC | Age: 40
End: 2025-01-13
Payer: OTHER GOVERNMENT

## 2025-01-13 VITALS
HEART RATE: 63 BPM | HEIGHT: 70 IN | BODY MASS INDEX: 32.5 KG/M2 | TEMPERATURE: 98.4 F | WEIGHT: 227 LBS | OXYGEN SATURATION: 98 %

## 2025-01-13 DIAGNOSIS — R09.81 SINUS CONGESTION: ICD-10-CM

## 2025-01-13 DIAGNOSIS — J01.90 ACUTE NON-RECURRENT SINUSITIS, UNSPECIFIED LOCATION: ICD-10-CM

## 2025-01-13 DIAGNOSIS — R05.1 ACUTE COUGH: Primary | ICD-10-CM

## 2025-01-13 DIAGNOSIS — R50.9 FEVER, UNSPECIFIED FEVER CAUSE: ICD-10-CM

## 2025-01-13 LAB
EXPIRATION DATE: NORMAL
EXPIRATION DATE: NORMAL
FLUAV AG UPPER RESP QL IA.RAPID: NOT DETECTED
FLUBV AG UPPER RESP QL IA.RAPID: NOT DETECTED
INTERNAL CONTROL: NORMAL
INTERNAL CONTROL: NORMAL
Lab: NORMAL
Lab: NORMAL
RSV AG SPEC QL: NEGATIVE
SARS-COV-2 AG UPPER RESP QL IA.RAPID: NOT DETECTED

## 2025-01-13 PROCEDURE — 0202U NFCT DS 22 TRGT SARS-COV-2: CPT | Performed by: REGISTERED NURSE

## 2025-01-13 PROCEDURE — 87428 SARSCOV & INF VIR A&B AG IA: CPT | Performed by: REGISTERED NURSE

## 2025-01-13 PROCEDURE — 87807 RSV ASSAY W/OPTIC: CPT | Performed by: REGISTERED NURSE

## 2025-01-22 ENCOUNTER — OFFICE VISIT (OUTPATIENT)
Dept: FAMILY MEDICINE CLINIC | Facility: CLINIC | Age: 40
End: 2025-01-22
Payer: OTHER GOVERNMENT

## 2025-01-22 VITALS
OXYGEN SATURATION: 100 % | TEMPERATURE: 97.5 F | RESPIRATION RATE: 18 BRPM | WEIGHT: 228.2 LBS | HEIGHT: 70 IN | SYSTOLIC BLOOD PRESSURE: 153 MMHG | DIASTOLIC BLOOD PRESSURE: 93 MMHG | HEART RATE: 101 BPM | BODY MASS INDEX: 32.67 KG/M2

## 2025-01-22 DIAGNOSIS — F41.8 MIXED ANXIETY DEPRESSIVE DISORDER: ICD-10-CM

## 2025-01-22 DIAGNOSIS — R23.2 HOT FLASHES: Primary | ICD-10-CM

## 2025-01-22 DIAGNOSIS — Z13.220 SCREENING FOR LIPID DISORDERS: ICD-10-CM

## 2025-01-22 DIAGNOSIS — Z13.0 SCREENING FOR ENDOCRINE, METABOLIC AND IMMUNITY DISORDER: ICD-10-CM

## 2025-01-22 DIAGNOSIS — Z13.29 SCREENING FOR ENDOCRINE, METABOLIC AND IMMUNITY DISORDER: ICD-10-CM

## 2025-01-22 DIAGNOSIS — Z13.1 SCREENING FOR DIABETES MELLITUS: ICD-10-CM

## 2025-01-22 DIAGNOSIS — Z13.29 SCREENING FOR THYROID DISORDER: ICD-10-CM

## 2025-01-22 DIAGNOSIS — F51.01 PRIMARY INSOMNIA: ICD-10-CM

## 2025-01-22 DIAGNOSIS — G43.109 MIGRAINE WITH AURA AND WITHOUT STATUS MIGRAINOSUS, NOT INTRACTABLE: ICD-10-CM

## 2025-01-22 DIAGNOSIS — Z13.228 SCREENING FOR ENDOCRINE, METABOLIC AND IMMUNITY DISORDER: ICD-10-CM

## 2025-01-22 PROCEDURE — 99215 OFFICE O/P EST HI 40 MIN: CPT | Performed by: REGISTERED NURSE

## 2025-01-22 RX ORDER — CLONIDINE HYDROCHLORIDE 0.1 MG/1
0.1 TABLET ORAL 2 TIMES DAILY
Qty: 60 TABLET | Refills: 1 | Status: SHIPPED | OUTPATIENT
Start: 2025-01-22

## 2025-01-22 RX ORDER — TRAZODONE HYDROCHLORIDE 50 MG/1
50 TABLET, FILM COATED ORAL
Qty: 30 TABLET | Refills: 2 | Status: SHIPPED | OUTPATIENT
Start: 2025-01-22

## 2025-01-22 RX ORDER — ARIPIPRAZOLE 2 MG/1
2 TABLET ORAL DAILY
Qty: 90 TABLET | Refills: 1 | Status: SHIPPED | OUTPATIENT
Start: 2025-01-22

## 2025-01-22 NOTE — PROGRESS NOTES
Chief Complaint  Fall (Follow up ) and Insomnia (Pharmacy having trouble getting Trazodone through mail order pharmacy)    Subjective    History of Present Illness {CC  Problem List  Visit  Diagnosis   Encounters  Notes  Medications  Labs  Result Review Imaging  Media :23}     Amelie Henderson presents to [facility] for Fall (Follow up ) and Insomnia (Pharmacy having trouble getting Trazodone through mail order pharmacy).      History of Present Illness  Patient is a 39 year old female, who comes in today for 1 month follow-up for hot flashes, insomnia, and migraines. Patient denies any chest pain, shortness of breath, or any fevers.  Patient denies any known exposure to COVID, flu, or any other contagious illnesses.       History of Present Illness  In regards to hot flashes, she reports persistent hot flashes despite being on testosterone therapy. She is currently experiencing a hot flash during this visit. She has a history of hysterectomy and is considering the use of estradiol to manage her symptoms.  We discussed alternative safer options such as clonidine to help with the vasomotor symptoms from hot flashes.  Patient is agreeable to try this option.  She has discontinued testosterone at this time.    In regards to insomnia, patient was previously taking trazodone to help with her insomnia.  This does not fully control her insomnia and she would like to try getting Quviviq approved through insurance again, as it did not cover the last time she tried.  She is seeking a solution for her sleep disturbances. She expresses a desire to avoid gabapentin due to its side effects, including an imbalance in her posture. She also mentions that her physical therapist has recommended a book on fibromyalgia management, which emphasizes the importance of rest in managing brain fog and pain.  Patient will move forward with trying to get Quviviq and have better sleep hygiene.    In regards to  "migraines, she is currently under the care of Dr. Peralta at [facility] for her migraines but is interested in exploring other options.  Referral to Dr. Seipel will be placed instead of her request.  Patient denies any other concerns in regards to her migraines at this time.    MEDICATIONS  Current: losartan, trazodone, Abilify       Review of Systems   Constitutional: Negative.  Negative for activity change, chills, fatigue and fever.   HENT:  Negative for congestion, dental problem, ear pain, hearing loss, rhinorrhea, sinus pain, sore throat, tinnitus and trouble swallowing.    Eyes: Negative.  Negative for pain and visual disturbance.   Respiratory: Negative.  Negative for cough, chest tightness, shortness of breath and wheezing.    Cardiovascular: Negative.  Negative for chest pain, palpitations and leg swelling.   Gastrointestinal: Negative.  Negative for abdominal pain, diarrhea, nausea and vomiting.   Endocrine: Negative.  Negative for polydipsia, polyphagia and polyuria.   Genitourinary: Negative.  Negative for difficulty urinating, dysuria, frequency and urgency.   Musculoskeletal: Negative.  Negative for arthralgias, back pain and myalgias.   Skin: Negative.  Negative for color change, pallor, rash and wound.   Allergic/Immunologic: Negative.  Negative for environmental allergies.   Neurological:  Positive for headaches. Negative for dizziness, speech difficulty, weakness, light-headedness and numbness.   Hematological: Negative.    Psychiatric/Behavioral:  Positive for sleep disturbance. Negative for confusion, decreased concentration, self-injury and suicidal ideas. The patient is nervous/anxious.    All other systems reviewed and are negative.       Objective     Vital Signs:   /93 (BP Location: Left arm, Patient Position: Sitting, Cuff Size: Large Adult)   Pulse 101   Temp 97.5 °F (36.4 °C) (Temporal)   Resp 18   Ht 177.8 cm (70\")   Wt 104 kg (228 lb 3.2 oz)   SpO2 100%   BMI 32.74 " kg/m²   Current Outpatient Medications on File Prior to Visit   Medication Sig Dispense Refill    albuterol (PROVENTIL) (2.5 MG/3ML) 0.083% nebulizer solution Take 2.5 mg by nebulization Every 4 (Four) Hours As Needed for Wheezing or Shortness of Air. 120 mL 1    albuterol sulfate  (90 Base) MCG/ACT inhaler Inhale 2 puffs Every 4 (Four) Hours As Needed for Wheezing. 18 g 2    busPIRone (BUSPAR) 10 MG tablet Take 2 tablets by mouth 3 (Three) Times a Day As Needed (anxiety). 540 tablet 1    Cholecalciferol 125 MCG (5000 UT) tablet Take 1 tablet by mouth Daily. 90 tablet 1    diclofenac (VOLTAREN) 50 MG EC tablet TAKE 1 TABLET TWICE A DAY AS NEEDED FOR PAIN (ARTHRALGIA) 180 tablet 3    diphenhydrAMINE (BENADRYL) 25 mg capsule Take 1 capsule by mouth Every 6 (Six) Hours As Needed for Itching.      EPINEPHrine (EPIPEN) 0.3 MG/0.3ML solution auto-injector injection       ferrous gluconate (FERGON) 324 MG tablet Take 1 tablet by mouth Daily With Breakfast. 90 tablet 1    Fetzima 40 MG capsule sustained-release 24 hr TAKE 1 CAPSULE DAILY 90 capsule 3    fluticasone (FLONASE) 50 MCG/ACT nasal spray SPRAY 2 SPRAYS INTO THE NOSTRIL AS DIRECTED BY PROVIDER DAILY. 16 mL 3    galcanezumab-gnlm (EMGALITY) 120 MG/ML auto-injector pen Inject  under the skin into the appropriate area as directed Every 30 (Thirty) Days.      HYDROcodone-acetaminophen (NORCO)  MG per tablet Take 1 tablet by mouth Every 6 (Six) Hours As Needed for Moderate Pain. 90 tablet 0    hydrOXYzine (ATARAX) 50 MG tablet MAY TAKE 1 TABLET 2 TIMES A DAY AS NEEDED FOR ITCHING, MAY ALSO TAKE 2 TABLETS AT NIGHT AS NEEDED. 60 tablet 0    l-methylfolate 7.5 MG tablet tablet Take  by mouth Daily.      levocetirizine (XYZAL) 5 MG tablet Take 1 tablet by mouth Every Morning. 90 tablet 1    losartan (COZAAR) 50 MG tablet Take 1 tablet by mouth Every Morning. 90 tablet 1    metoprolol succinate XL (TOPROL-XL) 25 MG 24 hr tablet Take 1 tablet by mouth Daily.       mupirocin (BACTROBAN) 2 % cream Apply 1 application  topically to the appropriate area as directed 3 (Three) Times a Day. (Patient taking differently: Apply 1 Application topically to the appropriate area as directed As Needed.) 30 g 1    Myrbetriq 50 MG tablet sustained-release 24 hour 24 hr tablet TAKE 1 TABLET DAILY 90 tablet 3    omeprazole (priLOSEC) 20 MG capsule Take 1 capsule by mouth 2 (Two) Times a Day.      ondansetron (Zofran) 4 MG tablet Take 1 tablet by mouth Every 6 (Six) Hours As Needed for Nausea or Vomiting. 360 tablet 1    pregabalin (LYRICA) 50 MG capsule TAKE 1 CAPSULE THREE TIMES A  capsule 0    promethazine (PHENERGAN) 25 MG tablet Take 1 tablet by mouth Every 8 (Eight) Hours As Needed for Nausea or Vomiting. 30 tablet 1    propranolol (INDERAL) 20 MG tablet Take 1 tablet by mouth 2 (Two) Times a Day As Needed (ANXIETY). 180 tablet 01    rizatriptan (MAXALT) 10 MG tablet TAKE 1 TABLET AT THE ONSET OF HEADACHE MAY REPEAT IN 2 HOURS MAX OF 2 TABS IN 24 HOURS 90 tablet 1    rOPINIRole (REQUIP) 0.5 MG tablet Take 1 tablet by mouth Every Night. Take 1 hour before bedtime. 90 tablet 1    tiZANidine (ZANAFLEX) 4 MG tablet Take 1 tablet by mouth Every 8 (Eight) Hours As Needed for Muscle Spasms. 270 tablet 1    ubrogepant (UBRELVY) 100 MG tablet Take 1 tablet by mouth 1 (One) Time As Needed (migraines). 90 tablet 1    vitamin C (ASCORBIC ACID) 250 MG tablet Take 1 tablet by mouth Daily.      Yuvafem 10 MCG tablet vaginal tablet 1 (ONE) TABLET AT BEDTIME, ON SUNDAY NIGHTS      zonisamide (ZONEGRAN) 100 MG capsule Take 4 capsules by mouth Daily.       No current facility-administered medications on file prior to visit.        Past Medical History:   Diagnosis Date    Allergic     Anemia     Anxiety     Arthritis     B12 deficiency     Callus     CHF (congestive heart failure) 5/18/2005    COVID     x 2     Deep vein thrombosis 3/30/24    Depression     Dercum's disease     Difficulty walking      Endometriosis     Fallen arches     Fibromyalgia 02/15/2024    Gastritis     GERD (gastroesophageal reflux disease)     High arches     Hyperlipidemia     Borderline    Hypertension     Hypoglycemia     Ingrown toenail     Liver disease     Fatty liver disease    Migraines     Plantar fasciitis of left foot 2024    PTSD (post-traumatic stress disorder)     Shin splints     Stress fracture     Vitamin D deficiency       Past Surgical History:   Procedure Laterality Date    CARPAL TUNNEL RELEASE Right      SECTION      x 2     CHOLECYSTECTOMY      HYSTERECTOMY  2023    KNEE SURGERY Right     x 3     KNEE SURGERY Left     x 1     PLANTAR FASCIA RELEASE Left 2024    Procedure: ENDOSCOPIC PLANTAR FASCIOTOMY;  Surgeon: KAY Corral DPM;  Location: [facility] MAIN OR;  Service: Podiatry;  Laterality: Left;    RECESSION GASTROCNEMIUS Left 2024    Procedure: RECESSION GASTROCNEMIUS;  Surgeon: KAY Corral DPM;  Location: [facility] MAIN OR;  Service: Podiatry;  Laterality: Left;    SHOULDER ACROMIOPLASTY WITH ROTATOR CUFF REPAIR Left 2021    Procedure: LEFT SHOULDER ARTHROSCOPY WITH ROTATOR CUFF REPAIR AND SUBACROMIAL DECOMPRESSION- SLAP;  Surgeon: Rianna Jarvis MD;  [place] [facility];  Service: Orthopedics;  Laterality: Left;    SHOULDER ARTHROSCOPY W/ ROTATOR CUFF REPAIR Left 2024    Procedure: SHOULDER ARTHROSCOPY WITH ROTATOR CUFF REPAIR, DECOMPRESSION AND PRP INJECTION;  Surgeon: Rianna Jarvis MD;  Location: [facility];  Service: Orthopedics;  Laterality: Left;      Family History   Problem Relation Age of Onset    Thyroid disease Mother     Glaucoma Mother     Hypertension Mother     Transient ischemic attack Mother     Bell's palsy Mother     Alcohol abuse Father     ADD / ADHD Sister     Depression Son     Pyloric stenosis Son     Hypertension Maternal Grandmother     Heart failure Maternal Grandmother     Stroke Maternal Grandmother     Diabetes  Paternal Grandfather     Malig Hyperthermia Neg Hx       Social History     Socioeconomic History    Marital status:    Tobacco Use    Smoking status: Former     Current packs/day: 0.00     Average packs/day: 0.5 packs/day for 17.0 years (8.5 ttl pk-yrs)     Types: Cigarettes     Start date: 1999     Quit date: 2016     Years since quittin.0     Passive exposure: Past    Smokeless tobacco: Never   Vaping Use    Vaping status: Never Used   Substance and Sexual Activity    Alcohol use: Yes     Alcohol/week: 11.0 standard drinks of alcohol     Types: 3 Glasses of wine, 8 Cans of beer per week     Comment: Don't usually drink wine and beer in the same week    Drug use: Never    Sexual activity: Yes     Partners: Male     Birth control/protection: Tubal ligation, Hysterectomy, Surgical         Appointment on 2025   Component Date Value Ref Range Status    SARS Antigen 2025 Not Detected  Not Detected, Presumptive Negative Final    Influenza A Antigen GINA 2025 Not Detected  Not Detected Final    Influenza B Antigen GINA 2025 Not Detected  Not Detected Final    Internal Control 2025 Passed  Passed Final    Lot Number [id number]   Final    Expiration Date 2025 10,232,025   Final   Clinical Support on 2025   Component Date Value Ref Range Status    SARS Antigen 2025 Not Detected  Not Detected, Presumptive Negative Final    Influenza A Antigen GINA 2025 Not Detected  Not Detected Final    Influenza B Antigen GINA 2025 Not Detected  Not Detected Final    Internal Control 2025 Passed  Passed Final    Lot Number [id number]   Final    Expiration Date 2025 102,325   Final    Respiratory Syncytial Virus 2025 NEGATIVE   Final    Internal Control 2025 Passed  Passed Final    Lot Number [id number]   Final    Expiration Date 2025   Final   Appointment on 2025   Component Date Value Ref  Range Status    ADENOVIRUS, PCR 01/13/2025 Not Detected  Not Detected Final    Coronavirus 229E 01/13/2025 Not Detected  Not Detected Final    Coronavirus HKU1 01/13/2025 Not Detected  Not Detected Final    Coronavirus NL63 01/13/2025 Not Detected  Not Detected Final    Coronavirus OC43 01/13/2025 Not Detected  Not Detected Final    COVID19 01/13/2025 Not Detected  Not Detected - Ref. Range Final    Human Metapneumovirus 01/13/2025 Not Detected  Not Detected Final    Human Rhinovirus/Enterovirus 01/13/2025 Not Detected  Not Detected Final    Influenza A PCR 01/13/2025 Not Detected  Not Detected Final    Influenza B PCR 01/13/2025 Not Detected  Not Detected Final    Parainfluenza Virus 1 01/13/2025 Not Detected  Not Detected Final    Parainfluenza Virus 2 01/13/2025 Not Detected  Not Detected Final    Parainfluenza Virus 3 01/13/2025 Not Detected  Not Detected Final    Parainfluenza Virus 4 01/13/2025 Not Detected  Not Detected Final    RSV, PCR 01/13/2025 Not Detected  Not Detected Final    Bordetella pertussis pcr 01/13/2025 Not Detected  Not Detected Final    Bordetella parapertussis PCR 01/13/2025 Not Detected  Not Detected Final    Chlamydophila pneumoniae PCR 01/13/2025 Not Detected  Not Detected Final    Mycoplasma pneumo by PCR 01/13/2025 Not Detected  Not Detected Final   Office Visit on 11/12/2024   Component Date Value Ref Range Status    Iron 11/12/2024 87  37 - 145 mcg/dL Final    Iron Saturation (TSAT) 11/12/2024 23  20 - 50 % Final    Transferrin 11/12/2024 251  200 - 360 mg/dL Final    TIBC 11/12/2024 374  298 - 536 mcg/dL Final    Glucose 11/12/2024 82  65 - 99 mg/dL Final    BUN 11/12/2024 15  6 - 20 mg/dL Final    Creatinine 11/12/2024 1.07 (H)  0.57 - 1.00 mg/dL Final    Sodium 11/12/2024 138  136 - 145 mmol/L Final    Potassium 11/12/2024 4.5  3.5 - 5.2 mmol/L Final    Chloride 11/12/2024 104  98 - 107 mmol/L Final    CO2 11/12/2024 22.0  22.0 - 29.0 mmol/L Final    Calcium 11/12/2024 9.9  8.6 -  10.5 mg/dL Final    Total Protein 11/12/2024 7.0  6.0 - 8.5 g/dL Final    Albumin 11/12/2024 4.6  3.5 - 5.2 g/dL Final    ALT (SGPT) 11/12/2024 32  1 - 33 U/L Final    AST (SGOT) 11/12/2024 23  1 - 32 U/L Final    Alkaline Phosphatase 11/12/2024 127 (H)  39 - 117 U/L Final    Total Bilirubin 11/12/2024 0.3  0.0 - 1.2 mg/dL Final    Globulin 11/12/2024 2.4  gm/dL Final    A/G Ratio 11/12/2024 1.9  g/dL Final    BUN/Creatinine Ratio 11/12/2024 14.0  7.0 - 25.0 Final    Anion Gap 11/12/2024 12.0  5.0 - 15.0 mmol/L Final    eGFR 11/12/2024 67.5  >60.0 mL/min/1.73 Final    Hemoglobin A1C 11/12/2024 5.50  4.80 - 5.60 % Final    Microalbumin, Urine 11/12/2024 <1.2  mg/dL Final    Total Cholesterol 11/12/2024 242 (H)  0 - 200 mg/dL Final    Triglycerides 11/12/2024 104  0 - 150 mg/dL Final    HDL Cholesterol 11/12/2024 56  40 - 60 mg/dL Final    LDL Cholesterol  11/12/2024 168 (H)  0 - 100 mg/dL Final    VLDL Cholesterol 11/12/2024 18  5 - 40 mg/dL Final    LDL/HDL Ratio 11/12/2024 2.95   Final    TSH 11/12/2024 0.631  0.270 - 4.200 uIU/mL Final    WBC 11/12/2024 8.13  3.40 - 10.80 10*3/mm3 Final    RBC 11/12/2024 4.82  3.77 - 5.28 10*6/mm3 Final    Hemoglobin 11/12/2024 13.6  12.0 - 15.9 g/dL Final    Hematocrit 11/12/2024 41.0  34.0 - 46.6 % Final    MCV 11/12/2024 85.1  79.0 - 97.0 fL Final    MCH 11/12/2024 28.2  26.6 - 33.0 pg Final    MCHC 11/12/2024 33.2  31.5 - 35.7 g/dL Final    RDW 11/12/2024 14.8  12.3 - 15.4 % Final    RDW-SD 11/12/2024 45.4  37.0 - 54.0 fl Final    MPV 11/12/2024 10.1  6.0 - 12.0 fL Final    Platelets 11/12/2024 317  140 - 450 10*3/mm3 Final    Neutrophil % 11/12/2024 68.2  42.7 - 76.0 % Final    Lymphocyte % 11/12/2024 22.6  19.6 - 45.3 % Final    Monocyte % 11/12/2024 5.7  5.0 - 12.0 % Final    Eosinophil % 11/12/2024 2.5  0.3 - 6.2 % Final    Basophil % 11/12/2024 0.5  0.0 - 1.5 % Final    Immature Grans % 11/12/2024 0.5  0.0 - 0.5 % Final    Neutrophils, Absolute 11/12/2024 5.55  1.70 -  7.00 10*3/mm3 Final    Lymphocytes, Absolute 11/12/2024 1.84  0.70 - 3.10 10*3/mm3 Final    Monocytes, Absolute 11/12/2024 0.46  0.10 - 0.90 10*3/mm3 Final    Eosinophils, Absolute 11/12/2024 0.20  0.00 - 0.40 10*3/mm3 Final    Basophils, Absolute 11/12/2024 0.04  0.00 - 0.20 10*3/mm3 Final    Immature Grans, Absolute 11/12/2024 0.04  0.00 - 0.05 10*3/mm3 Final    nRBC 11/12/2024 0.0  0.0 - 0.2 /100 WBC Final         Physical Exam     Physical Exam         Result Review  Data Reviewed:{ Labs  Result Review  Imaging  Med Tab  Media :23}   I have reviewed this patient's chart.  I have reviewed previous labs, previous imaging, previous medications, and previous encounters with notes that were available in this patient's chart.    Results  Laboratory Studies  Estrogen level is within the normal range.              Assessment and Plan {CC Problem List  Visit Diagnosis  ROS  Review (Popup)  [facility] Maintenance  Quality  BestPractice  Medications  SmartSets  SnapShot Encounters  Media :23}   Diagnoses and all orders for this visit:    1. Hot flashes (Primary)  -     cloNIDine (Catapres) 0.1 MG tablet; Take 1 tablet by mouth 2 (Two) Times a Day.  Dispense: 60 tablet; Refill: 1  -     Estrogens, Total; Future    2. Mixed anxiety depressive disorder  -     ARIPiprazole (Abilify) 2 MG tablet; Take 1 tablet by mouth Daily.  Dispense: 90 tablet; Refill: 1    3. Primary insomnia  -     traZODone (DESYREL) 50 MG tablet; Take 1 tablet by mouth every night at bedtime.  Dispense: 30 tablet; Refill: 2  -     Daridorexant HCl 25 MG tablet; Take 25 mg by mouth Daily.  Dispense: 90 tablet; Refill: 1    4. Migraine with aura and without status migrainosus, not intractable  -     Ambulatory Referral to Neurology    5. Screening for endocrine, metabolic and immunity disorder  -     CBC & Differential; Future  -     Comprehensive Metabolic Panel; Future    6. Screening for lipid disorders  -     Lipid Panel; Future    7.  Screening for diabetes mellitus  -     Hemoglobin A1c; Future    8. Screening for thyroid disorder  -     TSH; Future        Assessment & Plan  1. Hot flashes.  Her estrogen levels are within the normal range, suggesting that the hot flashes may not be due to estrogen deficiency. Clonidine was discussed as a potential treatment option, which can be taken either twice daily or as needed. The risks associated with estradiol, including blood clots and stroke, were also discussed. A prescription for clonidine 0.1 mg will be sent to the local [facility] pharmacy. She will continue her losartan regimen. If clonidine proves ineffective, a re-evaluation of her estrogen levels will be considered.    2. Sleep disturbances.  A prescription for QUVIVIQ 25 to 50 mg, to be taken once daily approximately 30 minutes before bedtime, will be sent to [facility]. She is advised to ensure a minimum of 7 hours of sleep to establish a healthy sleep rhythm. A refill of trazodone will be sent to the local [facility] pharmacy for use until QUVIVIQ is approved.    3. Migraines.  A referral to a new neurologist will be made for further evaluation and management of her migraines.    4. Medication management.  A refill for Abilify will be sent to [facility].    5. Health maintenance.  A cholesterol panel will be ordered, to be conducted next week or at her earliest convenience.    PROCEDURE  The patient has a history of hysterectomy.         -ER red flags discussed with patient including risk versus benefit and education provided.  -Follow-up with me in 4 to 8 weeks or sooner if needed.    I spent 40 minutes caring for Amelie on this date of service. This time includes time spent by me in the following activities:preparing for the visit, reviewing tests, obtaining and/or reviewing a separately obtained history, performing a medically appropriate examination and/or evaluation , counseling and educating the patient/family/caregiver, ordering  medications, tests, or procedures, referring and communicating with other health care professionals , documenting information in the medical record, independently interpreting results and communicating that information with the patient/family/caregiver, and care coordination.    Follow Up {Instructions Charge Capture  Follow-up Communications :23}     Patient was given instructions and counseling regarding her condition or for health maintenance advice. Please see specific information pulled into the AVS (placed there by myself) if appropriate.    Return in about 6 weeks (around 3/5/2025) for routine follow up.    BMI is >= 30 and <35. (Class 1 Obesity). The following options were offered after discussion;: exercise counseling/recommendations and nutrition counseling/recommendations         EDSON Cali, [facility]      Patient or patient representative verbalized consent for the use of Ambient Listening during the visit with  EDSON Cali for chart documentation. 1/27/2025  16:30 EST

## 2025-03-24 ENCOUNTER — OFFICE VISIT (OUTPATIENT)
Dept: FAMILY MEDICINE CLINIC | Facility: CLINIC | Age: 40
End: 2025-03-24
Payer: OTHER GOVERNMENT

## 2025-03-24 VITALS
HEART RATE: 99 BPM | RESPIRATION RATE: 18 BRPM | WEIGHT: 223 LBS | BODY MASS INDEX: 31.92 KG/M2 | HEIGHT: 70 IN | OXYGEN SATURATION: 100 % | TEMPERATURE: 98 F | DIASTOLIC BLOOD PRESSURE: 72 MMHG | SYSTOLIC BLOOD PRESSURE: 147 MMHG

## 2025-03-24 DIAGNOSIS — Z13.0 SCREENING FOR ENDOCRINE, METABOLIC AND IMMUNITY DISORDER: ICD-10-CM

## 2025-03-24 DIAGNOSIS — M25.551 CHRONIC RIGHT HIP PAIN: ICD-10-CM

## 2025-03-24 DIAGNOSIS — G89.29 CHRONIC LOW BACK PAIN, UNSPECIFIED BACK PAIN LATERALITY, UNSPECIFIED WHETHER SCIATICA PRESENT: Primary | ICD-10-CM

## 2025-03-24 DIAGNOSIS — Z13.220 SCREENING FOR LIPID DISORDERS: ICD-10-CM

## 2025-03-24 DIAGNOSIS — G89.29 CHRONIC RIGHT HIP PAIN: ICD-10-CM

## 2025-03-24 DIAGNOSIS — Z13.228 SCREENING FOR ENDOCRINE, METABOLIC AND IMMUNITY DISORDER: ICD-10-CM
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
## 2025-03-24 DIAGNOSIS — Z13.1 SCREENING FOR DIABETES MELLITUS: ICD-10-CM

## 2025-03-24 DIAGNOSIS — Z13.29 SCREENING FOR ENDOCRINE, METABOLIC AND IMMUNITY DISORDER: ICD-10-CM

## 2025-03-24 DIAGNOSIS — Z13.29 SCREENING FOR THYROID DISORDER: ICD-10-CM

## 2025-03-24 DIAGNOSIS — M54.50 CHRONIC LOW BACK PAIN, UNSPECIFIED BACK PAIN LATERALITY, UNSPECIFIED WHETHER SCIATICA PRESENT: Primary | ICD-10-CM

## 2025-03-24 DIAGNOSIS — G43.109 MIGRAINE WITH AURA AND WITHOUT STATUS MIGRAINOSUS, NOT INTRACTABLE: ICD-10-CM

## 2025-03-24 DIAGNOSIS — H53.9 VISION CHANGES: ICD-10-CM

## 2025-03-24 DIAGNOSIS — R23.2 HOT FLASHES: ICD-10-CM

## 2025-03-24 LAB
BASOPHILS # BLD AUTO: 0.04 10*3/MM3 (ref 0–0.2)
BASOPHILS NFR BLD AUTO: 0.5 % (ref 0–1.5)
DEPRECATED RDW RBC AUTO: 42 FL (ref 37–54)
EOSINOPHIL # BLD AUTO: 0.11 10*3/MM3 (ref 0–0.4)
EOSINOPHIL NFR BLD AUTO: 1.3 % (ref 0.3–6.2)
ERYTHROCYTE [DISTWIDTH] IN BLOOD BY AUTOMATED COUNT: 13.9 % (ref 12.3–15.4)
HBA1C MFR BLD: 5.4 % (ref 4.8–5.6)
HCT VFR BLD AUTO: 42.7 % (ref 34–46.6)
HGB BLD-MCNC: 14 G/DL (ref 12–15.9)
IMM GRANULOCYTES # BLD AUTO: 0.03 10*3/MM3 (ref 0–0.05)
IMM GRANULOCYTES NFR BLD AUTO: 0.4 % (ref 0–0.5)
LYMPHOCYTES # BLD AUTO: 2.08 10*3/MM3 (ref 0.7–3.1)
LYMPHOCYTES NFR BLD AUTO: 24.9 % (ref 19.6–45.3)
MCH RBC QN AUTO: 27.7 PG (ref 26.6–33)
MCHC RBC AUTO-ENTMCNC: 32.8 G/DL (ref 31.5–35.7)
MCV RBC AUTO: 84.6 FL (ref 79–97)
MONOCYTES # BLD AUTO: 0.41 10*3/MM3 (ref 0.1–0.9)
MONOCYTES NFR BLD AUTO: 4.9 % (ref 5–12)
NEUTROPHILS NFR BLD AUTO: 5.68 10*3/MM3 (ref 1.7–7)
NEUTROPHILS NFR BLD AUTO: 68 % (ref 42.7–76)
NRBC BLD AUTO-RTO: 0 /100 WBC (ref 0–0.2)
PLATELET # BLD AUTO: 303 10*3/MM3 (ref 140–450)
PMV BLD AUTO: 9.8 FL (ref 6–12)
RBC # BLD AUTO: 5.05 10*6/MM3 (ref 3.77–5.28)
WBC NRBC COR # BLD AUTO: 8.35 10*3/MM3 (ref 3.4–10.8)

## 2025-03-24 PROCEDURE — 85025 COMPLETE CBC W/AUTO DIFF WBC: CPT | Performed by: REGISTERED NURSE

## 2025-03-24 PROCEDURE — 80061 LIPID PANEL: CPT | Performed by: REGISTERED NURSE

## 2025-03-24 PROCEDURE — 84443 ASSAY THYROID STIM HORMONE: CPT | Performed by: REGISTERED NURSE

## 2025-03-24 PROCEDURE — 83036 HEMOGLOBIN GLYCOSYLATED A1C: CPT | Performed by: REGISTERED NURSE

## 2025-03-24 PROCEDURE — 80053 COMPREHEN METABOLIC PANEL: CPT | Performed by: REGISTERED NURSE

## 2025-03-24 PROCEDURE — 82672 ASSAY OF ESTROGEN: CPT | Performed by: REGISTERED NURSE

## 2025-03-24 RX ORDER — CLONIDINE HYDROCHLORIDE 0.1 MG/1
0.1 TABLET ORAL 2 TIMES DAILY
Qty: 180 TABLET | Refills: 1 | Status: SHIPPED | OUTPATIENT
Start: 2025-03-24 | End: 2025-04-14

## 2025-03-24 RX ORDER — HYDROCODONE BITARTRATE AND ACETAMINOPHEN 10; 325 MG/1; MG/1
1 TABLET ORAL EVERY 8 HOURS PRN
Qty: 90 TABLET | Refills: 0 | Status: SHIPPED | OUTPATIENT
Start: 2025-03-24

## 2025-03-24 NOTE — PROGRESS NOTES
Chief Complaint  medication follow up and Allergies    Subjective    History of Present Illness {CC  Problem List  Visit  Diagnosis   Encounters  Notes  Medications  Labs  Result Review Imaging  Media :23}     Amelie Henderson presents to [facility] for medication follow up and Allergies.      History of Present Illness  Patient is a 39 y.o. female who presents to the clinic today for 3-month follow-up for chronic pain.  Patient denies any chest pain, shortness of breath, or any fevers.  Patient denies any known exposure to COVID, flu, or any other contagious illnesses.       History of Present Illness  In regards to chronic pain, patient has chronic low back and right hip pain.  She takes intermittent hydrocodone to help with this.  Patient's last narcotic agreement was on June 2024 and last urine drug screen was on Adrienne 10, 2024.  Patient shares that the hydrocodone has helped with her mobility and tolerance of the pain.  She denies any concerns or issues with her hydrocodone or chronic back pain at this time.    In regards to hot flashes, she is currently on a regimen of clonidine to manage her hot flashes.  Patient shares that her hot flashes have been less since stopping the testosterone.  She has not received a testosterone injection in the past 4 to 5 months. She reports that her hair loss has ceased, but she experiences increased growth of other body hair.    Regards to insomnia, she continues to struggle with sleep, experiencing intermittent insomnia. She is aware that discontinuing Zonegran could potentially lead to an increase in headaches and migraines. She has not attempted to take Zonegran during the day.    She has a history of concussions and underwent a scan following her last concussion. She reports changes in her vision and persistent headaches, which she attributes to her last concussion. She has an upcoming appointment with a neurologist in 06/2025 or 07/2025.  "She also reports that her migraine medication is not as effective as it was prior to her last concussion.    Current: Zonegran, clonidine, hydrocodone       Review of Systems   Constitutional: Negative.  Negative for activity change, chills, fatigue and fever.   HENT:  Negative for congestion, dental problem, ear pain, hearing loss, rhinorrhea, sinus pain, sore throat, tinnitus and trouble swallowing.    Eyes: Negative.  Negative for pain and visual disturbance.   Respiratory: Negative.  Negative for cough, chest tightness, shortness of breath and wheezing.    Cardiovascular: Negative.  Negative for chest pain, palpitations and leg swelling.   Gastrointestinal: Negative.  Negative for abdominal pain, diarrhea, nausea and vomiting.   Endocrine: Negative.  Negative for polydipsia, polyphagia and polyuria.   Genitourinary: Negative.  Negative for difficulty urinating, dysuria, frequency and urgency.   Musculoskeletal:  Positive for arthralgias, back pain and myalgias.   Skin: Negative.  Negative for color change, pallor, rash and wound.   Allergic/Immunologic: Negative.  Negative for environmental allergies.   Neurological:  Positive for headaches. Negative for dizziness, speech difficulty, weakness, light-headedness and numbness.   Hematological: Negative.    Psychiatric/Behavioral:  Positive for sleep disturbance. Negative for confusion, decreased concentration, self-injury and suicidal ideas. The patient is not nervous/anxious.    All other systems reviewed and are negative.       Objective     Vital Signs:   /72 (BP Location: Left arm, Patient Position: Sitting, Cuff Size: Adult)   Pulse 99   Temp 98 °F (36.7 °C) (Temporal)   Resp 18   Ht 177.8 cm (70\")   Wt 101 kg (223 lb)   SpO2 100%   BMI 32.00 kg/m²   Current Outpatient Medications on File Prior to Visit   Medication Sig Dispense Refill    albuterol (PROVENTIL) (2.5 MG/3ML) 0.083% nebulizer solution Take 2.5 mg by nebulization Every 4 (Four) Hours " As Needed for Wheezing or Shortness of Air. 120 mL 1    albuterol sulfate  (90 Base) MCG/ACT inhaler Inhale 2 puffs Every 4 (Four) Hours As Needed for Wheezing. 18 g 2    ARIPiprazole (Abilify) 2 MG tablet Take 1 tablet by mouth Daily. 90 tablet 1    busPIRone (BUSPAR) 10 MG tablet Take 2 tablets by mouth 3 (Three) Times a Day As Needed (anxiety). 540 tablet 1    Daridorexant HCl 25 MG tablet Take 25 mg by mouth Daily. 90 tablet 1    diclofenac (VOLTAREN) 50 MG EC tablet TAKE 1 TABLET TWICE A DAY AS NEEDED FOR PAIN (ARTHRALGIA) 180 tablet 3    diphenhydrAMINE (BENADRYL) 25 mg capsule Take 1 capsule by mouth Every 6 (Six) Hours As Needed for Itching.      EPINEPHrine (EPIPEN) 0.3 MG/0.3ML solution auto-injector injection       Fetzima 40 MG capsule sustained-release 24 hr TAKE 1 CAPSULE DAILY 90 capsule 3    fluticasone (FLONASE) 50 MCG/ACT nasal spray SPRAY 2 SPRAYS INTO THE NOSTRIL AS DIRECTED BY PROVIDER DAILY. 16 mL 3    galcanezumab-gnlm (EMGALITY) 120 MG/ML auto-injector pen Inject  under the skin into the appropriate area as directed Every 30 (Thirty) Days.      hydrOXYzine (ATARAX) 50 MG tablet MAY TAKE 1 TABLET 2 TIMES A DAY AS NEEDED FOR ITCHING, MAY ALSO TAKE 2 TABLETS AT NIGHT AS NEEDED. 60 tablet 0    l-methylfolate 7.5 MG tablet tablet Take  by mouth Daily.      losartan (COZAAR) 50 MG tablet Take 1 tablet by mouth Every Morning. 90 tablet 1    metoprolol succinate XL (TOPROL-XL) 25 MG 24 hr tablet Take 1 tablet by mouth Daily.      mupirocin (BACTROBAN) 2 % cream Apply 1 application  topically to the appropriate area as directed 3 (Three) Times a Day. (Patient taking differently: Apply 1 Application topically to the appropriate area as directed As Needed.) 30 g 1    Myrbetriq 50 MG tablet sustained-release 24 hour 24 hr tablet TAKE 1 TABLET DAILY 90 tablet 3    omeprazole (priLOSEC) 20 MG capsule Take 1 capsule by mouth 2 (Two) Times a Day.      ondansetron (Zofran) 4 MG tablet Take 1 tablet by  mouth Every 6 (Six) Hours As Needed for Nausea or Vomiting. 360 tablet 1    pregabalin (LYRICA) 50 MG capsule TAKE 1 CAPSULE THREE TIMES A  capsule 0    promethazine (PHENERGAN) 25 MG tablet Take 1 tablet by mouth Every 8 (Eight) Hours As Needed for Nausea or Vomiting. 30 tablet 1    propranolol (INDERAL) 20 MG tablet Take 1 tablet by mouth 2 (Two) Times a Day As Needed (ANXIETY). 180 tablet 01    rizatriptan (MAXALT) 10 MG tablet TAKE 1 TABLET AT THE ONSET OF HEADACHE MAY REPEAT IN 2 HOURS MAX OF 2 TABS IN 24 HOURS 90 tablet 1    rOPINIRole (REQUIP) 0.5 MG tablet Take 1 tablet by mouth Every Night. Take 1 hour before bedtime. 90 tablet 1    tiZANidine (ZANAFLEX) 4 MG tablet Take 1 tablet by mouth Every 8 (Eight) Hours As Needed for Muscle Spasms. 270 tablet 1    traZODone (DESYREL) 50 MG tablet Take 1 tablet by mouth every night at bedtime. 30 tablet 2    ubrogepant (UBRELVY) 100 MG tablet Take 1 tablet by mouth 1 (One) Time As Needed (migraines). 90 tablet 1    Yuvafem 10 MCG tablet vaginal tablet 1 (ONE) TABLET AT BEDTIME, ON SUNDAY NIGHTS      zonisamide (ZONEGRAN) 100 MG capsule Take 4 capsules by mouth Daily.      Cholecalciferol 125 MCG (5000 UT) tablet Take 1 tablet by mouth Daily. (Patient not taking: Reported on 3/24/2025) 90 tablet 1    ferrous gluconate (FERGON) 324 MG tablet Take 1 tablet by mouth Daily With Breakfast. (Patient not taking: Reported on 3/24/2025) 90 tablet 1    levocetirizine (XYZAL) 5 MG tablet Take 1 tablet by mouth Every Morning. 90 tablet 1    vitamin C (ASCORBIC ACID) 250 MG tablet Take 1 tablet by mouth Daily. (Patient not taking: Reported on 3/24/2025)       No current facility-administered medications on file prior to visit.        Past Medical History:   Diagnosis Date    Allergic     Anemia     Anxiety     Arthritis     B12 deficiency     Callus     CHF (congestive heart failure) 5/18/2005    COVID     x 2     Deep vein thrombosis 3/30/24    Depression     Dercum's  disease     Difficulty walking     Endometriosis     Fallen arches     Fibromyalgia 02/15/2024    Gastritis     GERD (gastroesophageal reflux disease)     High arches     Hyperlipidemia     Borderline    Hypertension     Hypoglycemia     Ingrown toenail     Liver disease     Fatty liver disease    Migraines     Plantar fasciitis of left foot 2024    PTSD (post-traumatic stress disorder)     Shin splints     Stress fracture     Vitamin D deficiency       Past Surgical History:   Procedure Laterality Date    CARPAL TUNNEL RELEASE Right      SECTION      x 2     CHOLECYSTECTOMY      HYSTERECTOMY  2023    KNEE SURGERY Right     x 3     KNEE SURGERY Left     x 1     PLANTAR FASCIA RELEASE Left 2024    Procedure: ENDOSCOPIC PLANTAR FASCIOTOMY;  Surgeon: KAY Corral DPM;  Location: [facility] MAIN OR;  Service: Podiatry;  Laterality: Left;    RECESSION GASTROCNEMIUS Left 2024    Procedure: RECESSION GASTROCNEMIUS;  Surgeon: KAY Corral DPM;  Location: [facility] MAIN OR;  Service: Podiatry;  Laterality: Left;    SHOULDER ACROMIOPLASTY WITH ROTATOR CUFF REPAIR Left 2021    Procedure: LEFT SHOULDER ARTHROSCOPY WITH ROTATOR CUFF REPAIR AND SUBACROMIAL DECOMPRESSION- SLAP;  Surgeon: Rianna Jarvis MD;  [place] [facility]  Service: Orthopedics;  Laterality: Left;    SHOULDER ARTHROSCOPY W/ ROTATOR CUFF REPAIR Left 2024    Procedure: SHOULDER ARTHROSCOPY WITH ROTATOR CUFF REPAIR, DECOMPRESSION AND PRP INJECTION;  Surgeon: Rianna Jarvis MD;  Location: [facility];  Service: Orthopedics;  Laterality: Left;      Family History   Problem Relation Age of Onset    Thyroid disease Mother     Glaucoma Mother     Hypertension Mother     Transient ischemic attack Mother     Bell's palsy Mother     Alcohol abuse Father     ADD / ADHD Sister     Depression Son     Pyloric stenosis Son     Hypertension Maternal Grandmother     Heart failure Maternal Grandmother     Stroke  Maternal Grandmother     Diabetes Paternal Grandfather     Malig Hyperthermia Neg Hx       Social History     Socioeconomic History    Marital status:    Tobacco Use    Smoking status: Former     Current packs/day: 0.00     Average packs/day: 0.5 packs/day for 17.0 years (8.5 ttl pk-yrs)     Types: Cigarettes     Start date: 1999     Quit date: 2016     Years since quittin.2     Passive exposure: Past    Smokeless tobacco: Never   Vaping Use    Vaping status: Never Used   Substance and Sexual Activity    Alcohol use: Yes     Alcohol/week: 11.0 standard drinks of alcohol     Types: 3 Glasses of wine, 8 Cans of beer per week     Comment: Don't usually drink wine and beer in the same week    Drug use: Never    Sexual activity: Yes     Partners: Male     Birth control/protection: Tubal ligation, Hysterectomy, Surgical         Office Visit on 2025   Component Date Value Ref Range Status    Glucose 2025 89  65 - 99 mg/dL Final    BUN 2025 16  6 - 20 mg/dL Final    Creatinine 2025 0.89  0.57 - 1.00 mg/dL Final    Sodium 2025 137  136 - 145 mmol/L Final    Potassium 2025 4.1  3.5 - 5.2 mmol/L Final    Chloride 2025 101  98 - 107 mmol/L Final    CO2 2025 23.8  22.0 - 29.0 mmol/L Final    Calcium 2025 9.7  8.6 - 10.5 mg/dL Final    Total Protein 2025 7.5  6.0 - 8.5 g/dL Final    Albumin 2025 4.6  3.5 - 5.2 g/dL Final    ALT (SGPT) 2025 35 (H)  1 - 33 U/L Final    AST (SGOT) 2025 27  1 - 32 U/L Final    Alkaline Phosphatase 2025 151 (H)  39 - 117 U/L Final    Total Bilirubin 2025 0.2  0.0 - 1.2 mg/dL Final    Globulin 2025 2.9  gm/dL Final    A/G Ratio 2025 1.6  g/dL Final    BUN/Creatinine Ratio 2025 18.0  7.0 - 25.0 Final    Anion Gap 2025 12.2  5.0 - 15.0 mmol/L Final    eGFR 2025 84.2  >60.0 mL/min/1.73 Final    Total Cholesterol 2025 279 (H)  0 - 200 mg/dL Final    Triglycerides  03/24/2025 103  0 - 150 mg/dL Final    HDL Cholesterol 03/24/2025 74 (H)  40 - 60 mg/dL Final    LDL Cholesterol  03/24/2025 187 (H)  0 - 100 mg/dL Final    VLDL Cholesterol 03/24/2025 18  5 - 40 mg/dL Final    LDL/HDL Ratio 03/24/2025 2.49   Final    Hemoglobin A1C 03/24/2025 5.40  4.80 - 5.60 % Final    TSH 03/24/2025 1.380  0.270 - 4.200 uIU/mL Final    Estrogen 03/24/2025 40  pg/mL Final                             Prepubertal             < 40                           Female Cycle:                             1-10 Days         16 - 328                             11-20 Days        34 - 501                             21-30 Days        48 - 350                             Post-Menopausal   40 - 244    WBC 03/24/2025 8.35  3.40 - 10.80 10*3/mm3 Final    RBC 03/24/2025 5.05  3.77 - 5.28 10*6/mm3 Final    Hemoglobin 03/24/2025 14.0  12.0 - 15.9 g/dL Final    Hematocrit 03/24/2025 42.7  34.0 - 46.6 % Final    MCV 03/24/2025 84.6  79.0 - 97.0 fL Final    MCH 03/24/2025 27.7  26.6 - 33.0 pg Final    MCHC 03/24/2025 32.8  31.5 - 35.7 g/dL Final    RDW 03/24/2025 13.9  12.3 - 15.4 % Final    RDW-SD 03/24/2025 42.0  37.0 - 54.0 fl Final    MPV 03/24/2025 9.8  6.0 - 12.0 fL Final    Platelets 03/24/2025 303  140 - 450 10*3/mm3 Final    Neutrophil % 03/24/2025 68.0  42.7 - 76.0 % Final    Lymphocyte % 03/24/2025 24.9  19.6 - 45.3 % Final    Monocyte % 03/24/2025 4.9 (L)  5.0 - 12.0 % Final    Eosinophil % 03/24/2025 1.3  0.3 - 6.2 % Final    Basophil % 03/24/2025 0.5  0.0 - 1.5 % Final    Immature Grans % 03/24/2025 0.4  0.0 - 0.5 % Final    Neutrophils, Absolute 03/24/2025 5.68  1.70 - 7.00 10*3/mm3 Final    Lymphocytes, Absolute 03/24/2025 2.08  0.70 - 3.10 10*3/mm3 Final    Monocytes, Absolute 03/24/2025 0.41  0.10 - 0.90 10*3/mm3 Final    Eosinophils, Absolute 03/24/2025 0.11  0.00 - 0.40 10*3/mm3 Final    Basophils, Absolute 03/24/2025 0.04  0.00 - 0.20 10*3/mm3 Final    Immature Grans, Absolute 03/24/2025 0.03  0.00  - 0.05 10*3/mm3 Final    nRBC 03/24/2025 0.0  0.0 - 0.2 /100 WBC Final   Appointment on 01/17/2025   Component Date Value Ref Range Status    SARS Antigen 01/17/2025 Not Detected  Not Detected, Presumptive Negative Final    Influenza A Antigen GINA 01/17/2025 Not Detected  Not Detected Final    Influenza B Antigen GINA 01/17/2025 Not Detected  Not Detected Final    Internal Control 01/17/2025 Passed  Passed Final    Lot Number 01/17/2025 [id number]   Final    Expiration Date 01/17/2025 10,232,025   Final   Clinical Support on 01/13/2025   Component Date Value Ref Range Status    SARS Antigen 01/13/2025 Not Detected  Not Detected, Presumptive Negative Final    Influenza A Antigen GINA 01/13/2025 Not Detected  Not Detected Final    Influenza B Antigen GINA 01/13/2025 Not Detected  Not Detected Final    Internal Control 01/13/2025 Passed  Passed Final    Lot Number 01/13/2025 [id number]   Final    Expiration Date 01/13/2025 102,325   Final    Respiratory Syncytial Virus 01/13/2025 NEGATIVE   Final    Internal Control 01/13/2025 Passed  Passed Final    Lot Number 01/13/2025 [id number]   Final    Expiration Date 01/13/2025 12/09/25   Final   Appointment on 01/13/2025   Component Date Value Ref Range Status    ADENOVIRUS, PCR 01/13/2025 Not Detected  Not Detected Final    Coronavirus 229E 01/13/2025 Not Detected  Not Detected Final    Coronavirus HKU1 01/13/2025 Not Detected  Not Detected Final    Coronavirus NL63 01/13/2025 Not Detected  Not Detected Final    Coronavirus OC43 01/13/2025 Not Detected  Not Detected Final    COVID19 01/13/2025 Not Detected  Not Detected - Ref. Range Final    Human Metapneumovirus 01/13/2025 Not Detected  Not Detected Final    Human Rhinovirus/Enterovirus 01/13/2025 Not Detected  Not Detected Final    Influenza A PCR 01/13/2025 Not Detected  Not Detected Final    Influenza B PCR 01/13/2025 Not Detected  Not Detected Final    Parainfluenza Virus 1 01/13/2025 Not Detected  Not Detected Final     Parainfluenza Virus 2 01/13/2025 Not Detected  Not Detected Final    Parainfluenza Virus 3 01/13/2025 Not Detected  Not Detected Final    Parainfluenza Virus 4 01/13/2025 Not Detected  Not Detected Final    RSV, PCR 01/13/2025 Not Detected  Not Detected Final    Bordetella pertussis pcr 01/13/2025 Not Detected  Not Detected Final    Bordetella parapertussis PCR 01/13/2025 Not Detected  Not Detected Final    Chlamydophila pneumoniae PCR 01/13/2025 Not Detected  Not Detected Final    Mycoplasma pneumo by PCR 01/13/2025 Not Detected  Not Detected Final         Physical Exam  Vitals and nursing note reviewed.   Constitutional:       Appearance: Normal appearance. She is normal weight.   HENT:      Head: Normocephalic and atraumatic.   Cardiovascular:      Rate and Rhythm: Normal rate and regular rhythm.      Pulses: Normal pulses.      Heart sounds: Normal heart sounds. No murmur heard.     No friction rub. No gallop.   Pulmonary:      Effort: Pulmonary effort is normal. No respiratory distress.      Breath sounds: Normal breath sounds. No stridor. No wheezing, rhonchi or rales.   Chest:      Chest wall: No tenderness.   Abdominal:      General: Abdomen is flat. Bowel sounds are normal. There is no distension.      Palpations: Abdomen is soft. There is no mass.      Tenderness: There is no abdominal tenderness. There is no right CVA tenderness, left CVA tenderness, guarding or rebound.      Hernia: No hernia is present.   Skin:     General: Skin is warm and dry.      Capillary Refill: Capillary refill takes less than 2 seconds.      Coloration: Skin is not jaundiced or pale.   Neurological:      General: No focal deficit present.      Mental Status: She is alert and oriented to person, place, and time. Mental status is at baseline.      Motor: No weakness.      Coordination: Coordination normal.      Gait: Gait normal.   Psychiatric:         Mood and Affect: Mood normal.         Behavior: Behavior normal.          Thought Content: Thought content normal.         Judgment: Judgment normal.          Physical Exam         Result Review  Data Reviewed:{ Labs  Result Review  Imaging  Med Tab  Media :23}   I have reviewed this patient's chart.  I have reviewed previous labs, previous imaging, previous medications, and previous encounters with notes that were available in this patient's chart.    Results                Assessment and Plan {CC Problem List  Visit Diagnosis  ROS  Review (Popup)  [facility]  BestPractice  Medications  SmartSets  SnapShot Encounters  Media :23}   Diagnoses and all orders for this visit:    1. Chronic low back pain, unspecified back pain laterality, unspecified whether sciatica present (Primary)  -     HYDROcodone-acetaminophen (NORCO)  MG per tablet; Take 1 tablet by mouth Every 8 (Eight) Hours As Needed for Moderate Pain.  Dispense: 90 tablet; Refill: 0    2. Hot flashes  -     cloNIDine (Catapres) 0.1 MG tablet; Take 1 tablet by mouth 2 (Two) Times a Day.  Dispense: 180 tablet; Refill: 1  -     Estrogens, Total    3. Chronic right hip pain  -     HYDROcodone-acetaminophen (NORCO)  MG per tablet; Take 1 tablet by mouth Every 8 (Eight) Hours As Needed for Moderate Pain.  Dispense: 90 tablet; Refill: 0    4. Vision changes  -     CT Head Without Contrast; Future    5. Migraine with aura and without status migrainosus, not intractable  -     CT Head Without Contrast; Future    6. Injury due to fall, initial encounter  -     CT Head Without Contrast; Future    7. Screening for endocrine, metabolic and immunity disorder  -     CBC & Differential  -     Comprehensive Metabolic Panel    8. Screening for lipid disorders  -     Lipid Panel    9. Screening for diabetes mellitus  -     Hemoglobin A1c    10. Screening for thyroid disorder  -     TSH        Assessment & Plan  1. Hair loss.  She is currently trying Primal to help balance her hormones naturally and prevent  further hair loss. It was discussed that it takes 3 to 12 months to get back healthy. If there is no improvement in the next 3 to 6 months, minoxidil may be considered as an alternative treatment option.    2. Insomnia.  Her insomnia may be attributed to Zonegran. She is advised to experiment with taking Zonegran during the day to observe any potential impact on her sleep pattern. If symptoms persist, further evaluation will be necessary.    3. Post-concussion syndrome.  She continues to experience vision changes, headaches, and memory issues following her head injury. A head CT scan has been ordered to assess the extent of the injury and guide future treatment decisions. She has an appointment with neurology on 06/03/2025.    4. Medication management.  A 90-day supply of clonidine has been prescribed. A prescription for hydrocodone has been sent to [facility].         -ER red flags discussed with patient including risk versus benefit and education provided.  -Follow-up with me in 3 months or sooner if needed.    I spent 30 minutes caring for Amelie on this date of service. This time includes time spent by me in the following activities:preparing for the visit, reviewing tests, obtaining and/or reviewing a separately obtained history, performing a medically appropriate examination and/or evaluation , counseling and educating the patient/family/caregiver, ordering medications, tests, or procedures, referring and communicating with other health care professionals , documenting information in the medical record, independently interpreting results and communicating that information with the patient/family/caregiver, and care coordination.    Follow Up {Instructions Charge Capture  Follow-up Communications :23}     Patient was given instructions and counseling regarding her condition or for health maintenance advice. Please see specific information pulled into the AVS (placed there by myself) if appropriate.    Return in  about 3 months (around 6/24/2025) for routine follow up.    BMI is >= 30 and <35. (Class 1 Obesity). The following options were offered after discussion;: exercise counseling/recommendations and nutrition counseling/recommendations         EDSON Cali, [facility]      Patient or patient representative verbalized consent for the use of Ambient Listening during the visit with  EDSON Cali for chart documentation. 4/8/2025  16:01 EDT

## 2025-03-25 LAB
ALBUMIN SERPL-MCNC: 4.6 G/DL (ref 3.5–5.2)
ALBUMIN/GLOB SERPL: 1.6 G/DL
ALP SERPL-CCNC: 151 U/L (ref 39–117)
ALT SERPL W P-5'-P-CCNC: 35 U/L (ref 1–33)
ANION GAP SERPL CALCULATED.3IONS-SCNC: 12.2 MMOL/L (ref 5–15)
AST SERPL-CCNC: 27 U/L (ref 1–32)
BILIRUB SERPL-MCNC: 0.2 MG/DL (ref 0–1.2)
BUN SERPL-MCNC: 16 MG/DL (ref 6–20)
BUN/CREAT SERPL: 18 (ref 7–25)
CALCIUM SPEC-SCNC: 9.7 MG/DL (ref 8.6–10.5)
CHLORIDE SERPL-SCNC: 101 MMOL/L (ref 98–107)
CHOLEST SERPL-MCNC: 279 MG/DL (ref 0–200)
CO2 SERPL-SCNC: 23.8 MMOL/L (ref 22–29)
CREAT SERPL-MCNC: 0.89 MG/DL (ref 0.57–1)
EGFRCR SERPLBLD CKD-EPI 2021: 84.2 ML/MIN/1.73
GLOBULIN UR ELPH-MCNC: 2.9 GM/DL
GLUCOSE SERPL-MCNC: 89 MG/DL (ref 65–99)
HDLC SERPL-MCNC: 74 MG/DL (ref 40–60)
LDLC SERPL CALC-MCNC: 187 MG/DL (ref 0–100)
LDLC/HDLC SERPL: 2.49 {RATIO}
POTASSIUM SERPL-SCNC: 4.1 MMOL/L (ref 3.5–5.2)
PROT SERPL-MCNC: 7.5 G/DL (ref 6–8.5)
SODIUM SERPL-SCNC: 137 MMOL/L (ref 136–145)
TRIGL SERPL-MCNC: 103 MG/DL (ref 0–150)
TSH SERPL DL<=0.05 MIU/L-ACNC: 1.38 UIU/ML (ref 0.27–4.2)
VLDLC SERPL-MCNC: 18 MG/DL (ref 5–40)

## 2025-03-28 LAB — ESTROGEN SERPL-MCNC: 40 PG/ML

## 2025-04-12 DIAGNOSIS — R23.2 HOT FLASHES: ICD-10-CM

## 2025-04-14 RX ORDER — CLONIDINE HYDROCHLORIDE 0.1 MG/1
0.1 TABLET ORAL 2 TIMES DAILY
Qty: 60 TABLET | Refills: 1 | Status: SHIPPED | OUTPATIENT
Start: 2025-04-14

## 2025-04-15 ENCOUNTER — CLINICAL SUPPORT (OUTPATIENT)
Dept: FAMILY MEDICINE CLINIC | Facility: CLINIC | Age: 40
End: 2025-04-15
Payer: OTHER GOVERNMENT

## 2025-04-15 DIAGNOSIS — R21 RASH AND OTHER NONSPECIFIC SKIN ERUPTION: Primary | ICD-10-CM

## 2025-04-15 PROCEDURE — 96372 THER/PROPH/DIAG INJ SC/IM: CPT | Performed by: REGISTERED NURSE

## 2025-04-15 RX ORDER — TRIAMCINOLONE ACETONIDE 40 MG/ML
40 INJECTION, SUSPENSION INTRA-ARTICULAR; INTRAMUSCULAR ONCE
Status: COMPLETED | OUTPATIENT
Start: 2025-04-15 | End: 2025-04-15

## 2025-04-15 RX ADMIN — TRIAMCINOLONE ACETONIDE 40 MG: 40 INJECTION, SUSPENSION INTRA-ARTICULAR; INTRAMUSCULAR at 09:28

## 2025-04-15 NOTE — PROGRESS NOTES
Patient presents to clinic today for nurse visit with concerns of rash on thighs and hips as well as other areas of the body.  She shares that she was exposed to cedar which she typically has a significant allergic response to.  Patient has been taken Benadryl but this is not made the rash subside.  She is requesting a steroid injection today.

## 2025-04-16 ENCOUNTER — TRANSCRIBE ORDERS (OUTPATIENT)
Dept: ADMINISTRATIVE | Facility: HOSPITAL | Age: 40
End: 2025-04-16
Payer: OTHER GOVERNMENT

## 2025-04-16 DIAGNOSIS — M54.16 LUMBAR RADICULOPATHY: Primary | ICD-10-CM

## 2025-04-16 DIAGNOSIS — M43.16 SPONDYLOLISTHESIS OF LUMBAR REGION: ICD-10-CM

## 2025-04-16 DIAGNOSIS — M53.2X6 SPINAL INSTABILITY, LUMBAR: ICD-10-CM

## 2025-04-28 ENCOUNTER — APPOINTMENT (OUTPATIENT)
Dept: GENERAL RADIOLOGY | Facility: HOSPITAL | Age: 40
End: 2025-04-28
Payer: OTHER GOVERNMENT

## 2025-04-28 ENCOUNTER — HOSPITAL ENCOUNTER (OUTPATIENT)
Dept: MRI IMAGING | Facility: HOSPITAL | Age: 40
Discharge: HOME | End: 2025-04-28
Admitting: ORTHOPAEDIC SURGERY
Payer: OTHER GOVERNMENT

## 2025-04-28 ENCOUNTER — HOSPITAL ENCOUNTER (EMERGENCY)
Facility: HOSPITAL | Age: 40
Discharge: HOME | End: 2025-04-28
Admitting: EMERGENCY MEDICINE
Payer: OTHER GOVERNMENT

## 2025-04-28 VITALS
BODY MASS INDEX: 31.97 KG/M2 | HEIGHT: 70 IN | OXYGEN SATURATION: 100 % | HEART RATE: 82 BPM | RESPIRATION RATE: 20 BRPM | TEMPERATURE: 98 F | DIASTOLIC BLOOD PRESSURE: 87 MMHG | WEIGHT: 223.33 LBS | SYSTOLIC BLOOD PRESSURE: 139 MMHG

## 2025-04-28 DIAGNOSIS — M43.16 SPONDYLOLISTHESIS OF LUMBAR REGION: ICD-10-CM

## 2025-04-28 DIAGNOSIS — M54.16 LUMBAR RADICULOPATHY: ICD-10-CM

## 2025-04-28 DIAGNOSIS — M53.2X6 SPINAL INSTABILITY, LUMBAR: ICD-10-CM

## 2025-04-28 DIAGNOSIS — S93.401A SPRAIN OF RIGHT ANKLE, UNSPECIFIED LIGAMENT, INITIAL ENCOUNTER: Primary | ICD-10-CM

## 2025-04-28 PROCEDURE — 99283 EMERGENCY DEPT VISIT LOW MDM: CPT

## 2025-04-28 PROCEDURE — 73610 X-RAY EXAM OF ANKLE: CPT

## 2025-04-28 PROCEDURE — 72148 MRI LUMBAR SPINE W/O DYE: CPT

## 2025-04-28 NOTE — ED PROVIDER NOTES
Subjective   History of Present Illness  Patient is a 40-year-old white female who presents today with complaints of an injury to her right ankle.  She tripped over her dog twisted the right ankle.  Complains of pain mostly to the medial aspect is worse with certain movements and weightbearing.  No numbness tingling or weakness.  No other injury reported at this time.      Review of Systems   Musculoskeletal:         Right ankle pain/injury       Past Medical History:   Diagnosis Date    Allergic     Anemia     Anxiety     Arthritis     B12 deficiency     Callus     CHF (congestive heart failure) 2005    COVID     x 2     Deep vein thrombosis 3/30/24    Depression     Dercum's disease     Difficulty walking     Endometriosis     Fallen arches     Fibromyalgia 02/15/2024    Gastritis     GERD (gastroesophageal reflux disease)     High arches     Hyperlipidemia     Borderline    Hypertension     Hypoglycemia     Ingrown toenail     Liver disease     Fatty liver disease    Migraines     Plantar fasciitis of left foot 2024    PTSD (post-traumatic stress disorder)     Shin splints     Stress fracture     Vitamin D deficiency        Allergies   Allergen Reactions    Parker Anaphylaxis    Codeine Hives and Itching    Influenza Virus Vaccine Other (See Comments)     Egg allergy     Latex Rash     Skin gets red and burns, skin starts peeling      Meloxicam Other (See Comments)     Gastritis      Sulfa Antibiotics Unknown - High Severity       Past Surgical History:   Procedure Laterality Date    CARPAL TUNNEL RELEASE Right      SECTION      x 2     CHOLECYSTECTOMY      HYSTERECTOMY  2023    KNEE SURGERY Right     x 3     KNEE SURGERY Left     x 1     PLANTAR FASCIA RELEASE Left 2024    Procedure: ENDOSCOPIC PLANTAR FASCIOTOMY;  Surgeon: KAY Corral DPM;  Location: [facility] MAIN OR;  Service: Podiatry;  Laterality: Left;    RECESSION GASTROCNEMIUS Left 2024    Procedure: RECESSION  GASTROCNEMIUS;  Surgeon: KAY Corral DPM;  Location: [facility] MAIN OR;  Service: Podiatry;  Laterality: Left;    SHOULDER ACROMIOPLASTY WITH ROTATOR CUFF REPAIR Left 2021    Procedure: LEFT SHOULDER ARTHROSCOPY WITH ROTATOR CUFF REPAIR AND SUBACROMIAL DECOMPRESSION- SLAP;  Surgeon: Rianna Jarvis MD;  [place] [facility] [place];  Service: Orthopedics;  Laterality: Left;    SHOULDER ARTHROSCOPY W/ ROTATOR CUFF REPAIR Left 2024    Procedure: SHOULDER ARTHROSCOPY WITH ROTATOR CUFF REPAIR, DECOMPRESSION AND PRP INJECTION;  Surgeon: Rianna Jarvis MD;  Location:  [facility];  Service: Orthopedics;  Laterality: Left;       Family History   Problem Relation Age of Onset    Thyroid disease Mother     Glaucoma Mother     Hypertension Mother     Transient ischemic attack Mother     Bell's palsy Mother     Alcohol abuse Father     ADD / ADHD Sister     Depression Son     Pyloric stenosis Son     Hypertension Maternal Grandmother     Heart failure Maternal Grandmother     Stroke Maternal Grandmother     Diabetes Paternal Grandfather     Malig Hyperthermia Neg Hx        Social History     Socioeconomic History    Marital status:    Tobacco Use    Smoking status: Former     Current packs/day: 0.00     Average packs/day: 0.5 packs/day for 17.0 years (8.5 ttl pk-yrs)     Types: Cigarettes     Start date: 1999     Quit date: 2016     Years since quittin.3     Passive exposure: Past    Smokeless tobacco: Never   Vaping Use    Vaping status: Never Used   Substance and Sexual Activity    Alcohol use: Yes     Alcohol/week: 11.0 standard drinks of alcohol     Types: 3 Glasses of wine, 8 Cans of beer per week     Comment: Don't usually drink wine and beer in the same week    Drug use: Never    Sexual activity: Yes     Partners: Male     Birth control/protection: Tubal ligation, Hysterectomy, Surgical           Objective   Physical Exam  Vital signs and triage nurse note reviewed.  Constitutional:  Awake, alert; well-developed and well-nourished. No acute distress is noted.  Cardiovascular: Regular rate and rhythm, normal S1-S2.  No murmur noted.  Pulmonary: Respiratory effort regular nonlabored, breath sounds clear to auscultation all fields.  Musculoskeletal: Independent range of motion of all extremities.  Mild tenderness throughout the right ankle diffusely.  There are couple of scattered areas of ecchymosis over the lateral aspect.  No open wounds.  There is good cap refill and sensation distally.  There is no gross deformity.  Skin: Flesh tone, warm, dry, intact; no erythematous or petechial rash or lesion.    Procedures           ED Course      Labs Reviewed - No data to display  XR Ankle 3+ View Right  Result Date: 4/28/2025  Impression: Multiple views of the right ankle were obtained. No acute fracture or dislocation is identified. Bony alignment appears intact. No osseous erosions are seen. Ankle mortise appears congruent. Soft tissues are unremarkable. Electronically Signed: Tien Jameson MD  4/28/2025 4:32 PM EDT  Workstation ID: [id number]    Medications - No data to display                                                   Medical Decision Making  Patient presents today with the above complaint.    She had above exam and evaluation.  X-rays were obtained that were independently interpreted by Dr. Crenshaw and reviewed by myself and show no fracture or dislocation, radiologist reports multiple views of the right ankle were obtained and no acute fracture or dislocation is identified.    On reexamination patient resting, in no distress.  No complaints.  She is well-appearing and neurovascularly intact.  Findings were discussed with her.    The patient had a cam walker boot applied.  Distal motor, sensory and vascular status intact after application.  Patient has her own crutches that she states she will use.    Patient will be discharged home instructed follow-up with primary care provider and/or  orthopedics if her pain does not improve.  She was given return precautions and voiced understanding.      Problems Addressed:  Sprain of right ankle, unspecified ligament, initial encounter: complicated acute illness or injury    Amount and/or Complexity of Data Reviewed  Radiology: ordered.        Final diagnoses:   Sprain of right ankle, unspecified ligament, initial encounter       ED Disposition  ED Disposition       ED Disposition   Discharge    Condition   Stable    Comment   --               Damon Valera, APRN  [address] [facility] [place] [address] [phone]          KAY Corral DPM  [facility] [place] [address] [phone]               Medication List        Changed      mupirocin 2 % cream  Commonly known as: BACTROBAN  Apply 1 application  topically to the appropriate area as directed 3 (Three) Times a Day.  What changed:   when to take this  reasons to take this                 Shelby Damon, EDSON  04/28/25 [id number]

## 2025-04-28 NOTE — DISCHARGE INSTRUCTIONS
Use crutches until pain improves.  Elevate and ice over the next couple days.  Tylenol or ibuprofen for discomfort.  Follow-up with primary care provider and/or podiatry if your pain does not improve within 1 to 2 weeks.  Return for new or worsening symptoms.

## 2025-05-01 ENCOUNTER — TRANSCRIBE ORDERS (OUTPATIENT)
Dept: ADMINISTRATIVE | Facility: HOSPITAL | Age: 40
End: 2025-05-01
Payer: OTHER GOVERNMENT

## 2025-05-01 ENCOUNTER — LAB (OUTPATIENT)
Dept: LAB | Facility: HOSPITAL | Age: 40
End: 2025-05-01
Payer: OTHER GOVERNMENT

## 2025-05-01 ENCOUNTER — HOSPITAL ENCOUNTER (OUTPATIENT)
Dept: CT IMAGING | Facility: HOSPITAL | Age: 40
Discharge: HOME | End: 2025-05-01
Payer: OTHER GOVERNMENT

## 2025-05-01 ENCOUNTER — HOSPITAL ENCOUNTER (OUTPATIENT)
Dept: CARDIOLOGY | Facility: HOSPITAL | Age: 40
Discharge: HOME | End: 2025-05-01
Payer: OTHER GOVERNMENT

## 2025-05-01 DIAGNOSIS — Z01.818 PRE-OP TESTING: ICD-10-CM

## 2025-05-01 DIAGNOSIS — Z01.818 PRE-OP TESTING: Primary | ICD-10-CM

## 2025-05-01 DIAGNOSIS — H53.9 VISION CHANGES: ICD-10-CM
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
## 2025-05-01 DIAGNOSIS — G43.109 MIGRAINE WITH AURA AND WITHOUT STATUS MIGRAINOSUS, NOT INTRACTABLE: ICD-10-CM

## 2025-05-01 LAB
ANION GAP SERPL CALCULATED.3IONS-SCNC: 10.6 MMOL/L (ref 5–15)
BUN SERPL-MCNC: 15 MG/DL (ref 6–20)
BUN/CREAT SERPL: 14.3 (ref 7–25)
CALCIUM SPEC-SCNC: 10 MG/DL (ref 8.6–10.5)
CHLORIDE SERPL-SCNC: 102 MMOL/L (ref 98–107)
CO2 SERPL-SCNC: 25.4 MMOL/L (ref 22–29)
CREAT SERPL-MCNC: 1.05 MG/DL (ref 0.57–1)
EGFRCR SERPLBLD CKD-EPI 2021: 68.6 ML/MIN/1.73
GLUCOSE SERPL-MCNC: 94 MG/DL (ref 65–99)
POTASSIUM SERPL-SCNC: 4.6 MMOL/L (ref 3.5–5.2)
QT INTERVAL: 388 MS
QTC INTERVAL: 396 MS
SODIUM SERPL-SCNC: 138 MMOL/L (ref 136–145)

## 2025-05-01 PROCEDURE — 70450 CT HEAD/BRAIN W/O DYE: CPT

## 2025-05-01 PROCEDURE — 36415 COLL VENOUS BLD VENIPUNCTURE: CPT

## 2025-05-01 PROCEDURE — 93005 ELECTROCARDIOGRAM TRACING: CPT | Performed by: ORTHOPAEDIC SURGERY

## 2025-05-01 PROCEDURE — 80048 BASIC METABOLIC PNL TOTAL CA: CPT

## 2025-05-19 DIAGNOSIS — M54.50 CHRONIC LOW BACK PAIN, UNSPECIFIED BACK PAIN LATERALITY, UNSPECIFIED WHETHER SCIATICA PRESENT: ICD-10-CM

## 2025-05-19 DIAGNOSIS — M25.551 CHRONIC RIGHT HIP PAIN: ICD-10-CM

## 2025-05-19 DIAGNOSIS — G89.29 CHRONIC RIGHT HIP PAIN: ICD-10-CM

## 2025-05-19 DIAGNOSIS — G89.29 CHRONIC LOW BACK PAIN, UNSPECIFIED BACK PAIN LATERALITY, UNSPECIFIED WHETHER SCIATICA PRESENT: ICD-10-CM

## 2025-05-19 RX ORDER — HYDROCODONE BITARTRATE AND ACETAMINOPHEN 10; 325 MG/1; MG/1
1 TABLET ORAL EVERY 8 HOURS PRN
Qty: 90 TABLET | Refills: 0 | Status: SHIPPED | OUTPATIENT
Start: 2025-05-19

## 2025-05-19 NOTE — TELEPHONE ENCOUNTER
Rx Refill Note  Requested Prescriptions     Pending Prescriptions Disp Refills    HYDROcodone-acetaminophen (NORCO)  MG per tablet 90 tablet 0     Sig: Take 1 tablet by mouth Every 8 (Eight) Hours As Needed for Moderate Pain.      Last office visit with prescribing clinician: 03/24/2025   Last telemedicine visit with prescribing clinician: Visit date not found   Next office visit with prescribing clinician: 06/10/2025       UDS 12/30/2018  CSA 05/06/2024      INSPECT - SCAN - Inspect/[facility]_PC [facility] 05/19/2025 (05/19/2025)       Evi Gates MA  05/19/25, 10:07 EDT

## 2025-05-22 ENCOUNTER — TELEPHONE (OUTPATIENT)
Dept: FAMILY MEDICINE CLINIC | Facility: CLINIC | Age: 40
End: 2025-05-22
Payer: OTHER GOVERNMENT

## 2025-05-22 NOTE — TELEPHONE ENCOUNTER
Spoke with pt's  Woody, who is on the pt's verbal, in regards to her new script from her surgeon .  The pt has a CSA agreement with her PCP, for [place] 10mg.  has prescribed the pt Oxycodene 10 mg, due to surgery on 5/21/25. It was explained  to Woody, that the pt is to continue her scripts from Dr. Alfonso. Woody was informed that the pt isn't to  any narcotics from her PCP, until she is finished with the Oxycodone 10mg. Woody voiced understanding of information given.

## 2025-06-02 NOTE — PROGRESS NOTES
Subjective: Chronic migraine, orgasmic headache, history of TBI, pineal gland cyst    Patient ID: Amelie Henderson is a 40 y.o. female.    CHIEF COMPLAINT: Chronic migraine    History of Present Illness   Ms. Henderson is a 40-year-old  female who was referred by EDSON Sands for chronic migraine.  Ms. Henderson is a very pleasant 40-year-old  female who presented with her  [place] for a new patient appointment referred by EDSON Sands for chronic refractory migraine. She presented today with a walker and large back brace and is postop from having back surgery.  She will be following up with Dr. Ricci orthopedic on Friday.     The patient has seen Dr. Delano Peralta at [facility] in headache clinic and has been on numerous medications.  She has been on Zonegran 400 mg a day and states that it really did not help decrease the frequency or duration of her migraines.  She states she would like to discontinue it and was encouraged to decrease it to 200 mg a day for a week then 100 mg a day then stop.  If her migraines worsen she should titrate back up.  She has not had a trial on Zomig, Amerge or Relpax for rescue.  She has not had a trial with Botox for chronic migraine.        Complaint: Chronic migraine  Onset: Migraines in the teens  Aura:Sparkly lights, colors streaks in vision prior  Location: behind eyes, temples and base of neck   Quality: pressure, dull pain, pulse  Severity: 9  Duration:all day   Frequency: 8 a month  Timing: random   Worsening factors:weather, bright lights,   Alleviating factors:dark room, migraine cap  Associated Signs and symptoms: Photophobia, Phonophobia, N/V  Current meds: Emgality,  Maxalt, Ubrelvy, Zofran  Past medications: Topamax, Inderal amovig, sumatriptan, Nurtec,,   Family History: mother and aunt     Orgasmic migraine  She is complaining of stabbing type headaches at orgasm.  Indocin is the best product, used 1 to 2 hours prior to sexual  intercourse.  The patient was in agreement with a trial with this medication.    History of TBI  The patient reports history of TBI without LOC.  She reports mild memory loss.     Pineal cyst  The patient reports she has a history of a pineal cyst and has not seen neurosurgery.    CT HEAD NON-CONTRAST   Clinical History: Vision changes after head injury, headache   Comparison: CT head 1/3/2025   IMPRESSION:  1.No acute intracranial findings.   Electronically Signed: Servando Polk MD    5/1/2025 3:22 PM EDT       The following portions of the patient's history were reviewed and updated as appropriate: allergies, current medications, past family history, past medical history, past social history, past surgical history and problem list.      Family History   Problem Relation Age of Onset    Migraines Mother     Thyroid disease Mother     Glaucoma Mother     Hypertension Mother     Transient ischemic attack Mother     Bell's palsy Mother     Alcohol abuse Father     ADD / ADHD Sister     Hypertension Maternal Grandmother     Heart failure Maternal Grandmother     Stroke Maternal Grandmother     Diabetes Paternal Grandfather     Depression Son     Pyloric stenosis Son     Malig Hyperthermia Neg Hx        Past Medical History:   Diagnosis Date    Allergic     Anemia     Anxiety     Arthritis     B12 deficiency     Callus     CHF (congestive heart failure) 5/18/2005    COVID     x 2     Deep vein thrombosis 3/30/24    Depression     Dercum's disease     Difficulty walking     Endometriosis     Fallen arches     Fibromyalgia 02/15/2024    Gastritis     GERD (gastroesophageal reflux disease)     High arches     Hyperlipidemia 2021    Borderline    Hypertension     Hypoglycemia     Ingrown toenail     Liver disease     Fatty liver disease    Migraines     Plantar fasciitis of left foot 06/11/2024    PTSD (post-traumatic stress disorder)     Shin splints     Stress fracture     Vitamin D deficiency 2020       Social  History     Socioeconomic History    Marital status:    Tobacco Use    Smoking status: Former     Current packs/day: 0.00     Average packs/day: 0.5 packs/day for 17.0 years (8.5 ttl pk-yrs)     Types: Cigarettes     Start date: 1999     Quit date: 2016     Years since quittin.4     Passive exposure: Past    Smokeless tobacco: Never   Vaping Use    Vaping status: Never Used   Substance and Sexual Activity    Alcohol use: Yes     Alcohol/week: 11.0 standard drinks of alcohol     Types: 3 Glasses of wine, 8 Cans of beer per week     Comment: Don't usually drink wine and beer in the same week    Drug use: Never    Sexual activity: Yes     Partners: Male     Birth control/protection: Tubal ligation, Hysterectomy, Surgical         Current Outpatient Medications:     albuterol (PROVENTIL) (2.5 MG/3ML) 0.083% nebulizer solution, Take 2.5 mg by nebulization Every 4 (Four) Hours As Needed for Wheezing or Shortness of Air., Disp: 120 mL, Rfl: 1    albuterol sulfate  (90 Base) MCG/ACT inhaler, Inhale 2 puffs Every 4 (Four) Hours As Needed for Wheezing., Disp: 18 g, Rfl: 2    ARIPiprazole (Abilify) 2 MG tablet, Take 1 tablet by mouth Daily., Disp: 90 tablet, Rfl: 1    busPIRone (BUSPAR) 10 MG tablet, Take 2 tablets by mouth 3 (Three) Times a Day As Needed (anxiety)., Disp: 540 tablet, Rfl: 1    Cholecalciferol 125 MCG (5000 UT) tablet, Take 1 tablet by mouth Daily., Disp: 90 tablet, Rfl: 1    cloNIDine (CATAPRES) 0.1 MG tablet, TAKE 1 TABLET BY MOUTH TWICE A DAY, Disp: 60 tablet, Rfl: 1    Daridorexant HCl 25 MG tablet, Take 25 mg by mouth Daily., Disp: 90 tablet, Rfl: 1    diphenhydrAMINE (BENADRYL) 25 mg capsule, Take 1 capsule by mouth Every 6 (Six) Hours As Needed for Itching., Disp: , Rfl:     EPINEPHrine (EPIPEN) 0.3 MG/0.3ML solution auto-injector injection, , Disp: , Rfl:     Fetzima 40 MG capsule sustained-release 24 hr, TAKE 1 CAPSULE DAILY, Disp: 90 capsule, Rfl: 3    fluticasone (FLONASE)  50 MCG/ACT nasal spray, SPRAY 2 SPRAYS INTO THE NOSTRIL AS DIRECTED BY PROVIDER DAILY., Disp: 16 mL, Rfl: 3    Fluticasone-Salmeterol (ADVAIR/WIXELA) 100-50 MCG/ACT DISKUS, , Disp: , Rfl:     galcanezumab-gnlm (EMGALITY) 120 MG/ML auto-injector pen, Inject  under the skin into the appropriate area as directed Every 30 (Thirty) Days., Disp: , Rfl:     HYDROcodone-acetaminophen (NORCO)  MG per tablet, Take 1 tablet by mouth Every 8 (Eight) Hours As Needed for Moderate Pain., Disp: 90 tablet, Rfl: 0    hydrOXYzine (ATARAX) 50 MG tablet, MAY TAKE 1 TABLET 2 TIMES A DAY AS NEEDED FOR ITCHING, MAY ALSO TAKE 2 TABLETS AT NIGHT AS NEEDED., Disp: 60 tablet, Rfl: 0    losartan (COZAAR) 50 MG tablet, Take 1 tablet by mouth Every Morning., Disp: 90 tablet, Rfl: 1    metoprolol succinate XL (TOPROL-XL) 25 MG 24 hr tablet, Take 1 tablet by mouth Daily., Disp: , Rfl:     mupirocin (BACTROBAN) 2 % cream, Apply 1 application  topically to the appropriate area as directed 3 (Three) Times a Day., Disp: 30 g, Rfl: 1    Myrbetriq 50 MG tablet sustained-release 24 hour 24 hr tablet, TAKE 1 TABLET DAILY, Disp: 90 tablet, Rfl: 3    omeprazole (priLOSEC) 20 MG capsule, Take 1 capsule by mouth 2 (Two) Times a Day., Disp: , Rfl:     promethazine-dextromethorphan (PROMETHAZINE-DM) 6.25-15 MG/5ML syrup, , Disp: , Rfl:     rOPINIRole (REQUIP) 0.5 MG tablet, Take 1 tablet by mouth Every Night. Take 1 hour before bedtime., Disp: 90 tablet, Rfl: 1    tiZANidine (ZANAFLEX) 4 MG tablet, Take 1 tablet by mouth Every 8 (Eight) Hours As Needed for Muscle Spasms., Disp: 270 tablet, Rfl: 1    traZODone (DESYREL) 50 MG tablet, Take 1 tablet by mouth every night at bedtime., Disp: 30 tablet, Rfl: 2    zonisamide (ZONEGRAN) 100 MG capsule, Take 4 capsules by mouth Daily., Disp: , Rfl:     ferrous gluconate (FERGON) 324 MG tablet, Take 1 tablet by mouth Daily With Breakfast. (Patient not taking: Reported on 6/3/2025), Disp: 90 tablet, Rfl: 1     indomethacin (INDOCIN) 25 MG capsule, Take 1 tab 1-2 hrs before intercourse., Disp: 15 capsule, Rfl: 5    l-methylfolate 7.5 MG tablet tablet, Take  by mouth Daily. (Patient not taking: Reported on 6/3/2025), Disp: , Rfl:     levocetirizine (XYZAL) 5 MG tablet, Take 1 tablet by mouth Every Morning. (Patient not taking: Reported on 6/3/2025), Disp: 90 tablet, Rfl: 1    ondansetron (Zofran) 4 MG tablet, Take 1 tablet by mouth Every 6 (Six) Hours As Needed for Nausea or Vomiting. (Patient not taking: Reported on 6/3/2025), Disp: 360 tablet, Rfl: 1    oxyCODONE-acetaminophen (PERCOCET)  MG per tablet, Take 1 tablet by mouth 6 (Six) Times a Day. (Patient not taking: Reported on 6/3/2025), Disp: , Rfl:     oxyCODONE-acetaminophen (PERCOCET) 7.5-325 MG per tablet, Take 1 tablet by mouth Every 6 (Six) Hours. (Patient not taking: Reported on 6/3/2025), Disp: , Rfl:     pravastatin (Pravachol) 20 MG tablet, Take 1 tablet by mouth Daily. (Patient not taking: Reported on 6/3/2025), Disp: 90 tablet, Rfl: 1    pregabalin (LYRICA) 50 MG capsule, TAKE 1 CAPSULE THREE TIMES A DAY (Patient not taking: Reported on 6/3/2025), Disp: 270 capsule, Rfl: 0    promethazine (PHENERGAN) 25 MG tablet, Take 1 tablet by mouth Every 8 (Eight) Hours As Needed for Nausea or Vomiting. (Patient not taking: Reported on 6/3/2025), Disp: 30 tablet, Rfl: 1    propranolol (INDERAL) 20 MG tablet, Take 1 tablet by mouth 2 (Two) Times a Day As Needed (ANXIETY). (Patient not taking: Reported on 6/3/2025), Disp: 180 tablet, Rfl: 01    Sucralfate (CARAFATE PO), , Disp: , Rfl:     testosterone 12.5 MG/ACT (1%) gel, Apply 4 pumps every day by transdermal route. (Patient not taking: Reported on 6/3/2025), Disp: , Rfl:     vitamin C (ASCORBIC ACID) 250 MG tablet, Take 1 tablet by mouth Daily. (Patient not taking: Reported on 6/3/2025), Disp: , Rfl:     Yuvafem 10 MCG tablet vaginal tablet, 1 (ONE) TABLET AT BEDTIME, ON SUNDAY NIGHTS (Patient not taking:  Reported on 6/3/2025), Disp: , Rfl:     ZOLMitriptan (ZOMIG) 5 MG tablet, Take 1 tab at onset of Migraine. May repeat after 2 hours.  Max 2 tab in 24 hrs or 3 days a week., Disp: 12 tablet, Rfl: 5    Review of Systems   Constitutional:  Positive for activity change, appetite change, chills and fatigue.   Musculoskeletal:  Positive for back pain.   Allergic/Immunologic: Positive for environmental allergies and immunocompromised state.   Neurological:  Positive for weakness.   Psychiatric/Behavioral:  Positive for confusion, decreased concentration and sleep disturbance. The patient is nervous/anxious.    All other systems reviewed and are negative.       I have reviewed ROS completed by medical assistant.     Objective:    Neurological Exam  Mental Status  Awake and alert. Oriented only to person, place, time and situation. Speech is normal. Language is fluent with no aphasia. Attention and concentration are normal. Fund of knowledge is appropriate for level of education.    Cranial Nerves  CN II: Right visual acuity: Normal. Left visual acuity: Normal. Right normal visual field. Left normal visual field. Right funduscopic exam: not visualized. Left funduscopic exam: not visualized.  CN III, IV, VI: Extraocular movements intact bilaterally. No nystagmus. Normal saccades. Normal smooth pursuit.   Right pupil: 2 mm. Reactive to light. Reactive to accommodation.   Left pupil: 2 mm. Reactive to light. Reactive to accommodation.  CN V:  Right: Facial sensation is normal. Jaw strength is normal.  Left: Facial sensation is normal on the left. Jaw strength is normal.  CN VII: Full and symmetric facial movement.  CN IX, X: Palate elevates symmetrically  CN XI: Shoulder shrug strength is normal.  CN XII: Tongue midline without atrophy or fasciculations.    Motor  Normal muscle bulk throughout. No fasciculations present. Normal muscle tone.                                               Right                     Left  Deltoid                                    5                          5   Biceps                                   5                          5  Brachioradialis                      5                          5   Triceps                                  5                          5   Pronator                                5                          5   Supinator                              5                           5   Wrist flexor                            5                          5   Wrist extensor                       5                          5   Finger flexor                          5                          5   Finger extensor                     5                          5   Interossei                              5                          5   Abductor pollicis brevis         5                          5   Flexor pollicis brevis             5                          5   Hamstring                             5                          5   Gastrocnemius                     5                           5   Anterior tibialis                      5                          5  Ankle dorsiflexor                   5                          5  Ankle plantar flexor              5                           5  Extensor hallucis longus      5                           5  Unable to check all muscle groups due to the patient having recent back surgery.    Sensory  Light touch is normal in upper and lower extremities. Pinprick is normal in upper and lower extremities. Temperature is normal in upper and lower extremities. Vibration is normal in upper and lower extremities.     Reflexes                                            Right                      Left  Brachioradialis                    2+                         2+  Biceps                                 2+                         2+  Triceps                                2+                         2+  Finger flex                           2+                          2+  Hamstring                            2+                         2+  Patellar                                2+                         2+  Achilles                                2+                         2+  Right Plantar: downgoing  Left Plantar: downgoing    Right pathological reflexes: Ankle clonus absent.  Left pathological reflexes: Ankle clonus absent.    Coordination  Right: Finger-to-nose normal. Rapid alternating movement normal. Unable to complete due to recent back surgery.Left: Finger-to-nose normal. Rapid alternating movement normal. Unable to complete due to recent back surgery.    Gait  Casual gait: Romberg is absent.  Ambulates with a walker and brace on back due to recent back surgery  Unable to walk toes, heels or tandem..     Assessment/Plan:  Discussion: For orgasmic headache the patient was prescribed Indocin 25 mg that she should take 1 to 2 hours before intercourse.  For migraine with aura: The patient will be trialed on Zomig 5 mg at the onset can take a second 2:00 hours if needed with a max of 2 in 24 hours.  For prevention the patient will be referred [facility] for Botox for chronic migraine.  She will wean off of sonogram as she feels it was not beneficial for migraine and will cut it in half for a week then 100 mg for a week and then stop.  If the migraine rebounds she can restart the Zonegran.  The patient does have many refills on Zofran per the PCP.  For pineal gland cyst: The patient will be sent for an MRI of the brain with and without contrast.  For history of reported TBI and mild memory loss the patient will be sent to neuropsychology.    She is to call the clinic if the Zomig is not beneficial,    Diagnoses and all orders for this visit:    1. Orgasmic headache (Primary)  -     indomethacin (INDOCIN) 25 MG capsule; Take 1 tab 1-2 hrs before intercourse.  Dispense: 15 capsule; Refill: 5    2. Migraine with aura and with status migrainosus, not  intractable  -     ZOLMitriptan (ZOMIG) 5 MG tablet; Take 1 tab at onset of Migraine. May repeat after 2 hours.  Max 2 tab in 24 hrs or 3 days a week.  Dispense: 12 tablet; Refill: 5  -     Ambulatory Referral to Orthopedic Surgery    3. Pineal gland cyst  -     MRI Brain With & Without Contrast; Future    4. Traumatic brain injury, with unknown loss of consciousness status, sequela  -     Ambulatory Referral to Neuropsychology        Return in about 6 months (around 12/3/2025) for Luciana Lisa .    I spent 60 minutes caring for Amelie on this date of service. This time includes time spent by me in the following activities: preparing for the visit, reviewing tests, obtaining and/or reviewing a separately obtained history, performing a medically appropriate examination and/or evaluation, counseling and educating the patient/family/caregiver, ordering medications, tests, or procedures, referring and communicating with other health care professionals, and documenting information in the medical record.      This document has been electronically signed by Luciana MITCHELL on Adrienne 3, 2025 14:53 EDT

## 2025-06-03 ENCOUNTER — OFFICE VISIT (OUTPATIENT)
Dept: NEUROLOGY | Facility: CLINIC | Age: 40
End: 2025-06-03
Payer: OTHER GOVERNMENT

## 2025-06-03 VITALS
WEIGHT: 224 LBS | HEIGHT: 70 IN | SYSTOLIC BLOOD PRESSURE: 122 MMHG | DIASTOLIC BLOOD PRESSURE: 80 MMHG | HEART RATE: 67 BPM | BODY MASS INDEX: 32.07 KG/M2

## 2025-06-03 DIAGNOSIS — E34.8 PINEAL GLAND CYST: ICD-10-CM

## 2025-06-03 DIAGNOSIS — G43.101 MIGRAINE WITH AURA AND WITH STATUS MIGRAINOSUS, NOT INTRACTABLE: ICD-10-CM

## 2025-06-03 DIAGNOSIS — S06.9XAS TRAUMATIC BRAIN INJURY, WITH UNKNOWN LOSS OF CONSCIOUSNESS STATUS, SEQUELA: ICD-10-CM

## 2025-06-03 DIAGNOSIS — G44.82 ORGASMIC HEADACHE: Primary | ICD-10-CM

## 2025-06-03 RX ORDER — OXYCODONE AND ACETAMINOPHEN 10; 325 MG/1; MG/1
1 TABLET ORAL
COMMUNITY
Start: 2025-05-22

## 2025-06-03 RX ORDER — DEXTROMETHORPHAN HYDROBROMIDE AND PROMETHAZINE HYDROCHLORIDE 15; 6.25 MG/5ML; MG/5ML
SYRUP ORAL
COMMUNITY

## 2025-06-03 RX ORDER — TESTOSTERONE GEL, 1% 10 MG/G
GEL TRANSDERMAL
COMMUNITY

## 2025-06-03 RX ORDER — NAPROXEN SODIUM 220 MG/1
220 TABLET, FILM COATED ORAL
COMMUNITY
End: 2025-06-03

## 2025-06-03 RX ORDER — ZOLMITRIPTAN 5 MG/1
TABLET, FILM COATED ORAL
Qty: 12 TABLET | Refills: 5 | Status: SHIPPED | OUTPATIENT
Start: 2025-06-03

## 2025-06-03 RX ORDER — OXYCODONE AND ACETAMINOPHEN 7.5; 325 MG/1; MG/1
1 TABLET ORAL EVERY 6 HOURS
COMMUNITY
Start: 2025-05-29

## 2025-06-03 RX ORDER — INDOMETHACIN 25 MG/1
CAPSULE ORAL
Qty: 15 CAPSULE | Refills: 5 | Status: SHIPPED | OUTPATIENT
Start: 2025-06-03

## 2025-06-03 RX ORDER — FLUTICASONE PROPIONATE AND SALMETEROL 100; 50 UG/1; UG/1
POWDER RESPIRATORY (INHALATION)
COMMUNITY

## 2025-06-04 ENCOUNTER — TELEPHONE (OUTPATIENT)
Dept: NEUROLOGY | Facility: CLINIC | Age: 40
End: 2025-06-04
Payer: OTHER GOVERNMENT

## 2025-06-04 NOTE — TELEPHONE ENCOUNTER
I phoned the patient askin.  Does she snore  2.  Any irregular breathing  3.  Sleepiness during the day  And notified the patient that untreated sleep apnea can make migraine very hard to control.  Patient's BMI is 32.14    I had to leave a message for the patient for her to return a call.

## 2025-06-06 ENCOUNTER — PATIENT ROUNDING (BHMG ONLY) (OUTPATIENT)
Dept: NEUROLOGY | Facility: CLINIC | Age: 40
End: 2025-06-06
Payer: OTHER GOVERNMENT

## 2025-06-10 ENCOUNTER — OFFICE VISIT (OUTPATIENT)
Dept: FAMILY MEDICINE CLINIC | Facility: CLINIC | Age: 40
End: 2025-06-10
Payer: OTHER GOVERNMENT

## 2025-06-10 VITALS
BODY MASS INDEX: 32.5 KG/M2 | WEIGHT: 227 LBS | SYSTOLIC BLOOD PRESSURE: 130 MMHG | DIASTOLIC BLOOD PRESSURE: 67 MMHG | HEIGHT: 70 IN | TEMPERATURE: 97.2 F | OXYGEN SATURATION: 99 % | RESPIRATION RATE: 18 BRPM | HEART RATE: 69 BPM

## 2025-06-10 DIAGNOSIS — Z51.81 ENCOUNTER FOR THERAPEUTIC DRUG MONITORING: ICD-10-CM

## 2025-06-10 DIAGNOSIS — G89.29 CHRONIC LOW BACK PAIN, UNSPECIFIED BACK PAIN LATERALITY, UNSPECIFIED WHETHER SCIATICA PRESENT: Primary | ICD-10-CM

## 2025-06-10 DIAGNOSIS — F41.8 MIXED ANXIETY DEPRESSIVE DISORDER: ICD-10-CM

## 2025-06-10 DIAGNOSIS — I10 PRIMARY HYPERTENSION: ICD-10-CM

## 2025-06-10 DIAGNOSIS — R23.2 HOT FLASHES: ICD-10-CM

## 2025-06-10 DIAGNOSIS — F33.2 SEVERE EPISODE OF RECURRENT MAJOR DEPRESSIVE DISORDER, WITHOUT PSYCHOTIC FEATURES: ICD-10-CM

## 2025-06-10 DIAGNOSIS — M54.50 CHRONIC LOW BACK PAIN, UNSPECIFIED BACK PAIN LATERALITY, UNSPECIFIED WHETHER SCIATICA PRESENT: Primary | ICD-10-CM

## 2025-06-10 DIAGNOSIS — Z11.59 NEED FOR HEPATITIS C SCREENING TEST: ICD-10-CM

## 2025-06-10 DIAGNOSIS — G47.10 HYPERSOMNIA: Primary | ICD-10-CM

## 2025-06-10 DIAGNOSIS — R06.83 SNORING: ICD-10-CM

## 2025-06-10 LAB
POC AMPHETAMINES: NEGATIVE
POC BARBITURATES: NEGATIVE
POC BENZODIAZEPHINES: NEGATIVE
POC BUPRENORPHINE: NEGATIVE
POC COCAINE: NEGATIVE
POC METHADONE: NEGATIVE
POC METHAMPHETAMINE SCREEN URINE: NEGATIVE
POC OPIATES: NEGATIVE
POC OXYCODONE: POSITIVE
POC PHENCYCLIDINE: NEGATIVE
POC PROPOXYPHENE: NEGATIVE
POC THC: NEGATIVE
POC TRICYCLIC ANTIDEPRESSANTS: NEGATIVE

## 2025-06-10 RX ORDER — LEVOMILNACIPRAN HYDROCHLORIDE 40 MG/1
1 CAPSULE, EXTENDED RELEASE ORAL DAILY
Qty: 90 CAPSULE | Refills: 3 | Status: SHIPPED | OUTPATIENT
Start: 2025-06-10

## 2025-06-10 RX ORDER — LEVOMILNACIPRAN HYDROCHLORIDE 20 MG/1
20 CAPSULE, EXTENDED RELEASE ORAL DAILY
Qty: 90 CAPSULE | Refills: 0 | Status: SHIPPED | OUTPATIENT
Start: 2025-06-10

## 2025-06-10 RX ORDER — LEVOMILNACIPRAN HYDROCHLORIDE 20 MG/1
20 CAPSULE, EXTENDED RELEASE ORAL DAILY
Qty: 30 CAPSULE | Refills: 0 | Status: SHIPPED | OUTPATIENT
Start: 2025-06-10 | End: 2025-06-10

## 2025-06-10 NOTE — PROGRESS NOTES
Chief Complaint  Hypertension (157/130 when checked yesterday.  Anxiety has been high) and f/u neurology    Subjective    History of Present Illness {CC  Problem List  Visit  Diagnosis   Encounters  Notes  Medications  Labs  Result Review Imaging  Media :23}     Amelie Henderson presents to [facility] for Hypertension (157/130 when checked yesterday.  Anxiety has been high) and f/u neurology.      History of Present Illness  Patient is a 40 y.o. female who presents to the clinic today for 3-month follow-up for chronic low back pain, hypertension, depression with anxiety, and neurological concerns.  Patient denies any chest pain, shortness of breath, or any fevers.  Patient denies any known exposure to COVID, flu, or any other contagious illnesses.       History of Present Illness  In regards to chronic back pain, patient is currently compliant with her narcotic agreement, having signed her controlled substance agreement Adrienne 10, 2024 and updated her urine drug screen today on Adrienne 10, 2025.  Patient takes hydrocodone to manage her chronic back pain.  Patient has endured multiple surgeries over the last year and a half which has continued to contribute to multiple areas of chronic pain, including worsening chronic back pain.  Patient has tried to use this hydrocodone as sparingly as possible and does not always refill as quickly as she can as she is trying to use this responsibly.  Patient denies any significant concerns with her hydrocodone at this time.  Thorough extensive education provided on opioid misuse and addiction.  Patient denies any concerns with this at this time.    In regards to hypertension, patient's blood pressure is within excellent control at 130/67 today.  She is currently on a regimen of metoprolol, clonidine, and losartan for blood pressure management. Despite being on three antihypertensive medications, she experiences episodes of elevated blood pressure,  which she attributes to panic attacks.  We discussed controlling her anxiety better to prevent any other hypertension changes.  Patient denies any further concerns with her hypertension or hypertension medication at this time.    In regards to depression with anxiety, she reports that her anxiety levels have increased due to her upcoming surgery. She is scheduled to meet with a new therapist today and also has a . She is currently on Abilify, BuSpar up to 3 times per day, Fetzima, and hydroxyzine as needed. She takes hydroxyzine at night to aid sleep and continues to take Fetzima and Abilify, which she finds beneficial.  Patient denies any further needs or concerns with this at this time.    In regards to neurological concerns, she has been experiencing memory issues and confusion, which she believes may be related to her medication or a previous concussion. She has been referred to a neuropsychologist for further evaluation. She has discontinued Emgality and is in the process of tapering off zonisamide she was using for migraine management. She has been referred for Botox treatment to manage her headaches. She has also undergone an MRI to monitor a cyst detected on a previous CT scan.  Patient will continue to follow-up with her neurologist and neuropsychologist to further address these needs.  She denies any concerns or issues that I can help her with in primary care at this time.    She is requesting a handicap placard as she recently had back surgery.       Review of Systems   Constitutional:  Positive for fatigue. Negative for activity change, chills and fever.   HENT: Negative.  Negative for congestion, dental problem, ear pain, hearing loss, rhinorrhea, sinus pain, sore throat, tinnitus and trouble swallowing.    Eyes:  Positive for photophobia. Negative for pain and visual disturbance.   Respiratory: Negative.  Negative for cough, chest tightness, shortness of breath and wheezing.   "  Cardiovascular: Negative.  Negative for chest pain, palpitations and leg swelling.   Gastrointestinal:  Positive for nausea. Negative for abdominal pain, diarrhea and vomiting.   Endocrine: Negative.  Negative for polydipsia, polyphagia and polyuria.   Genitourinary: Negative.  Negative for difficulty urinating, dysuria, frequency and urgency.   Musculoskeletal:  Positive for arthralgias, back pain and myalgias.   Skin: Negative.  Negative for color change, pallor, rash and wound.   Allergic/Immunologic: Negative.  Negative for environmental allergies.   Neurological: Negative.  Negative for dizziness, speech difficulty, weakness, light-headedness, numbness and headaches.   Hematological: Negative.    Psychiatric/Behavioral:  Positive for confusion, decreased concentration and sleep disturbance. Negative for self-injury and suicidal ideas. The patient is not nervous/anxious.    All other systems reviewed and are negative.       Objective     Vital Signs:   /67 (BP Location: Left arm, Patient Position: Sitting, Cuff Size: Adult)   Pulse 69   Temp 97.2 °F (36.2 °C) (Temporal)   Resp 18   Ht 177.8 cm (70\")   Wt 103 kg (227 lb)   SpO2 99%   BMI 32.57 kg/m²   Current Outpatient Medications on File Prior to Visit   Medication Sig Dispense Refill    albuterol (PROVENTIL) (2.5 MG/3ML) 0.083% nebulizer solution Take 2.5 mg by nebulization Every 4 (Four) Hours As Needed for Wheezing or Shortness of Air. 120 mL 1    albuterol sulfate  (90 Base) MCG/ACT inhaler Inhale 2 puffs Every 4 (Four) Hours As Needed for Wheezing. 18 g 2    ARIPiprazole (Abilify) 2 MG tablet Take 1 tablet by mouth Daily. 90 tablet 1    busPIRone (BUSPAR) 10 MG tablet Take 2 tablets by mouth 3 (Three) Times a Day As Needed (anxiety). 540 tablet 1    Cholecalciferol 125 MCG (5000 UT) tablet Take 1 tablet by mouth Daily. 90 tablet 1    cloNIDine (CATAPRES) 0.1 MG tablet TAKE 1 TABLET BY MOUTH TWICE A DAY 60 tablet 1    Daridorexant HCl " 25 MG tablet Take 25 mg by mouth Daily. 90 tablet 1    diphenhydrAMINE (BENADRYL) 25 mg capsule Take 1 capsule by mouth Every 6 (Six) Hours As Needed for Itching.      EPINEPHrine (EPIPEN) 0.3 MG/0.3ML solution auto-injector injection       ferrous gluconate (FERGON) 324 MG tablet Take 1 tablet by mouth Daily With Breakfast. 90 tablet 1    fluticasone (FLONASE) 50 MCG/ACT nasal spray SPRAY 2 SPRAYS INTO THE NOSTRIL AS DIRECTED BY PROVIDER DAILY. 16 mL 3    Fluticasone-Salmeterol (ADVAIR/WIXELA) 100-50 MCG/ACT DISKUS       HYDROcodone-acetaminophen (NORCO)  MG per tablet Take 1 tablet by mouth Every 8 (Eight) Hours As Needed for Moderate Pain. 90 tablet 0    hydrOXYzine (ATARAX) 50 MG tablet MAY TAKE 1 TABLET 2 TIMES A DAY AS NEEDED FOR ITCHING, MAY ALSO TAKE 2 TABLETS AT NIGHT AS NEEDED. 60 tablet 0    indomethacin (INDOCIN) 25 MG capsule Take 1 tab 1-2 hrs before intercourse. 15 capsule 5    l-methylfolate 7.5 MG tablet tablet Take  by mouth Daily.      levocetirizine (XYZAL) 5 MG tablet Take 1 tablet by mouth Every Morning. 90 tablet 1    losartan (COZAAR) 50 MG tablet Take 1 tablet by mouth Every Morning. 90 tablet 1    metoprolol succinate XL (TOPROL-XL) 25 MG 24 hr tablet Take 1 tablet by mouth Daily.      mupirocin (BACTROBAN) 2 % cream Apply 1 application  topically to the appropriate area as directed 3 (Three) Times a Day. 30 g 1    Myrbetriq 50 MG tablet sustained-release 24 hour 24 hr tablet TAKE 1 TABLET DAILY 90 tablet 3    omeprazole (priLOSEC) 20 MG capsule Take 1 capsule by mouth 2 (Two) Times a Day.      ondansetron (Zofran) 4 MG tablet Take 1 tablet by mouth Every 6 (Six) Hours As Needed for Nausea or Vomiting. 360 tablet 1    oxyCODONE-acetaminophen (PERCOCET) 7.5-325 MG per tablet Take 1 tablet by mouth Every 6 (Six) Hours.      pravastatin (Pravachol) 20 MG tablet Take 1 tablet by mouth Daily. 90 tablet 1    pregabalin (LYRICA) 50 MG capsule TAKE 1 CAPSULE THREE TIMES A  capsule 0     promethazine (PHENERGAN) 25 MG tablet Take 1 tablet by mouth Every 8 (Eight) Hours As Needed for Nausea or Vomiting. 30 tablet 1    propranolol (INDERAL) 20 MG tablet Take 1 tablet by mouth 2 (Two) Times a Day As Needed (ANXIETY). 180 tablet 01    rOPINIRole (REQUIP) 0.5 MG tablet Take 1 tablet by mouth Every Night. Take 1 hour before bedtime. 90 tablet 1    Sucralfate (CARAFATE PO)       testosterone 12.5 MG/ACT (1%) gel       tiZANidine (ZANAFLEX) 4 MG tablet Take 1 tablet by mouth Every 8 (Eight) Hours As Needed for Muscle Spasms. 270 tablet 1    traZODone (DESYREL) 50 MG tablet Take 1 tablet by mouth every night at bedtime. 30 tablet 2    vitamin C (ASCORBIC ACID) 250 MG tablet Take 1 tablet by mouth Daily.      Yuvafem 10 MCG tablet vaginal tablet       zonisamide (ZONEGRAN) 100 MG capsule Take 4 capsules by mouth Daily. (Patient taking differently: Take 4 capsules by mouth Daily. Being tapered off)       No current facility-administered medications on file prior to visit.        Past Medical History:   Diagnosis Date    Allergic     Anemia     Anxiety     Arthritis     B12 deficiency     Callus     CHF (congestive heart failure) 5/18/2005    COVID     x 2     CTS (carpal tunnel syndrome)     Deep vein thrombosis 3/30/24    Depression     Dercum's disease     Difficulty walking     Eating disorder     Endometriosis     Fallen arches     Fibromyalgia 02/15/2024    Fibromyalgia, primary 2/15/24    Gastritis     GERD (gastroesophageal reflux disease)     Head injury     Headache, tension-type     High arches     Hyperlipidemia 2021    Borderline    Hypertension     Hypoglycemia     Ingrown toenail     Injury of back     Liver disease     Fatty liver disease    Low back pain     Memory loss     Migraines     Obesity     Pancreatitis     Plantar fasciitis of left foot 06/11/2024    PTSD (post-traumatic stress disorder)     Scoliosis     Shin splints     Shingles     Shortness of breath     Stress fracture      Syncope     Tremor     Urinary tract infection     Visual impairment     Vitamin D deficiency       Past Surgical History:   Procedure Laterality Date    CARPAL TUNNEL RELEASE Right      SECTION      x 2     CHOLECYSTECTOMY      COLONOSCOPY      ENDOMETRIAL ABLATION      FOOT SURGERY  24    HYSTERECTOMY  23    KNEE SURGERY Right     x 3     KNEE SURGERY Left     x 1     PLANTAR FASCIA RELEASE Left 2024    Procedure: ENDOSCOPIC PLANTAR FASCIOTOMY;  Surgeon: KAY Corral DPM;  Location: [facility] MAIN OR;  Service: Podiatry;  Laterality: Left;    RECESSION GASTROCNEMIUS Left 2024    Procedure: RECESSION GASTROCNEMIUS;  Surgeon: KAY Corral DPM;  Location: [facility] MAIN OR;  Service: Podiatry;  Laterality: Left;    SHOULDER ACROMIOPLASTY WITH ROTATOR CUFF REPAIR Left 2021    Procedure: LEFT SHOULDER ARTHROSCOPY WITH ROTATOR CUFF REPAIR AND SUBACROMIAL DECOMPRESSION- SLAP;  Surgeon: Rianna Jarvis MD;  [place] [facility] [place];  Service: Orthopedics;  Laterality: Left;    SHOULDER ARTHROSCOPY W/ ROTATOR CUFF REPAIR Left 2024    Procedure: SHOULDER ARTHROSCOPY WITH ROTATOR CUFF REPAIR, DECOMPRESSION AND PRP INJECTION;  Surgeon: Rianna Jarvis MD;  Location: [facility];  Service: Orthopedics;  Laterality: Left;    SPINE SURGERY  25    TUBAL ABDOMINAL LIGATION        Family History   Problem Relation Age of Onset    Migraines Mother     Thyroid disease Mother     Glaucoma Mother     Hypertension Mother     Transient ischemic attack Mother     Bell's palsy Mother     Arthritis Mother     Stroke Mother     Vision loss Mother     Alcohol abuse Father     Anxiety disorder Father         PTSD - [place] vet    ADD / ADHD Sister     Hypertension Maternal Grandmother     Heart failure Maternal Grandmother     Stroke Maternal Grandmother     Arthritis Maternal Grandmother     Hyperlipidemia Maternal Grandmother     Diabetes Paternal Grandfather     Depression Son      Pyloric stenosis Son     Malig Hyperthermia Neg Hx       Social History     Socioeconomic History    Marital status:    Tobacco Use    Smoking status: Former     Current packs/day: 0.00     Average packs/day: 0.5 packs/day for 17.0 years (8.5 ttl pk-yrs)     Types: Cigarettes     Start date: 1999     Quit date: 2016     Years since quittin.5     Passive exposure: Past    Smokeless tobacco: Never   Vaping Use    Vaping status: Never Used   Substance and Sexual Activity    Alcohol use: Yes     Alcohol/week: 11.0 standard drinks of alcohol     Types: 3 Glasses of wine, 8 Cans of beer per week     Comment: Don't usually drink wine and beer in the same week    Drug use: Never    Sexual activity: Yes     Partners: Male     Birth control/protection: Tubal ligation, Hysterectomy, Surgical         Office Visit on 06/10/2025   Component Date Value Ref Range Status    Methamphetaine Screen, Urine 06/10/2025 Negative  Negative Final    POC Amphetamines 06/10/2025 Negative  Negative Final    Barbiturates Screen 06/10/2025 Negative  Negative Final    Benzodiazepine Screen 06/10/2025 Negative  Negative Final    Cocaine Screen 06/10/2025 Negative  Negative Final    Methadone Screen 06/10/2025 Negative  Negative Final    Opiate Screen 06/10/2025 Negative  Negative Final    Oxycodone, Screen 06/10/2025 Positive (A)  Negative Final    Phencyclidine (PCP) Screen 06/10/2025 Negative  Negative Final    Propoxyphene Screen 06/10/2025 Negative  Negative Final    THC, Screen 06/10/2025 Negative  Negative Final    Tricyclic Antidepressants Screen 06/10/2025 Negative  Negative Final    Buprenorphine Screen 06/10/2025 Negative   Final   Hospital Outpatient Visit on 2025   Component Date Value Ref Range Status    QT Interval 2025 388  ms Final    QTC Interval 2025 396  ms Final   Lab on 2025   Component Date Value Ref Range Status    Glucose 2025 94  65 - 99 mg/dL Final    BUN 2025 15  6  - 20 mg/dL Final    Creatinine 05/01/2025 1.05 (H)  0.57 - 1.00 mg/dL Final    Sodium 05/01/2025 138  136 - 145 mmol/L Final    Potassium 05/01/2025 4.6  3.5 - 5.2 mmol/L Final    Chloride 05/01/2025 102  98 - 107 mmol/L Final    CO2 05/01/2025 25.4  22.0 - 29.0 mmol/L Final    Calcium 05/01/2025 10.0  8.6 - 10.5 mg/dL Final    BUN/Creatinine Ratio 05/01/2025 14.3  7.0 - 25.0 Final    Anion Gap 05/01/2025 10.6  5.0 - 15.0 mmol/L Final    eGFR 05/01/2025 68.6  >60.0 mL/min/1.73 Final   Office Visit on 03/24/2025   Component Date Value Ref Range Status    Glucose 03/24/2025 89  65 - 99 mg/dL Final    BUN 03/24/2025 16  6 - 20 mg/dL Final    Creatinine 03/24/2025 0.89  0.57 - 1.00 mg/dL Final    Sodium 03/24/2025 137  136 - 145 mmol/L Final    Potassium 03/24/2025 4.1  3.5 - 5.2 mmol/L Final    Chloride 03/24/2025 101  98 - 107 mmol/L Final    CO2 03/24/2025 23.8  22.0 - 29.0 mmol/L Final    Calcium 03/24/2025 9.7  8.6 - 10.5 mg/dL Final    Total Protein 03/24/2025 7.5  6.0 - 8.5 g/dL Final    Albumin 03/24/2025 4.6  3.5 - 5.2 g/dL Final    ALT (SGPT) 03/24/2025 35 (H)  1 - 33 U/L Final    AST (SGOT) 03/24/2025 27  1 - 32 U/L Final    Alkaline Phosphatase 03/24/2025 151 (H)  39 - 117 U/L Final    Total Bilirubin 03/24/2025 0.2  0.0 - 1.2 mg/dL Final    Globulin 03/24/2025 2.9  gm/dL Final    A/G Ratio 03/24/2025 1.6  g/dL Final    BUN/Creatinine Ratio 03/24/2025 18.0  7.0 - 25.0 Final    Anion Gap 03/24/2025 12.2  5.0 - 15.0 mmol/L Final    eGFR 03/24/2025 84.2  >60.0 mL/min/1.73 Final    Total Cholesterol 03/24/2025 279 (H)  0 - 200 mg/dL Final    Triglycerides 03/24/2025 103  0 - 150 mg/dL Final    HDL Cholesterol 03/24/2025 74 (H)  40 - 60 mg/dL Final    LDL Cholesterol  03/24/2025 187 (H)  0 - 100 mg/dL Final    VLDL Cholesterol 03/24/2025 18  5 - 40 mg/dL Final    LDL/HDL Ratio 03/24/2025 2.49   Final    Hemoglobin A1C 03/24/2025 5.40  4.80 - 5.60 % Final    TSH 03/24/2025 1.380  0.270 - 4.200 uIU/mL Final     Estrogen 03/24/2025 40  pg/mL Final                             Prepubertal             < 40                           Female Cycle:                             1-10 Days         16 - 328                             11-20 Days        34 - 501                             21-30 Days        48 - 350                             Post-Menopausal   40 - 244    WBC 03/24/2025 8.35  3.40 - 10.80 10*3/mm3 Final    RBC 03/24/2025 5.05  3.77 - 5.28 10*6/mm3 Final    Hemoglobin 03/24/2025 14.0  12.0 - 15.9 g/dL Final    Hematocrit 03/24/2025 42.7  34.0 - 46.6 % Final    MCV 03/24/2025 84.6  79.0 - 97.0 fL Final    MCH 03/24/2025 27.7  26.6 - 33.0 pg Final    MCHC 03/24/2025 32.8  31.5 - 35.7 g/dL Final    RDW 03/24/2025 13.9  12.3 - 15.4 % Final    RDW-SD 03/24/2025 42.0  37.0 - 54.0 fl Final    MPV 03/24/2025 9.8  6.0 - 12.0 fL Final    Platelets 03/24/2025 303  140 - 450 10*3/mm3 Final    Neutrophil % 03/24/2025 68.0  42.7 - 76.0 % Final    Lymphocyte % 03/24/2025 24.9  19.6 - 45.3 % Final    Monocyte % 03/24/2025 4.9 (L)  5.0 - 12.0 % Final    Eosinophil % 03/24/2025 1.3  0.3 - 6.2 % Final    Basophil % 03/24/2025 0.5  0.0 - 1.5 % Final    Immature Grans % 03/24/2025 0.4  0.0 - 0.5 % Final    Neutrophils, Absolute 03/24/2025 5.68  1.70 - 7.00 10*3/mm3 Final    Lymphocytes, Absolute 03/24/2025 2.08  0.70 - 3.10 10*3/mm3 Final    Monocytes, Absolute 03/24/2025 0.41  0.10 - 0.90 10*3/mm3 Final    Eosinophils, Absolute 03/24/2025 0.11  0.00 - 0.40 10*3/mm3 Final    Basophils, Absolute 03/24/2025 0.04  0.00 - 0.20 10*3/mm3 Final    Immature Grans, Absolute 03/24/2025 0.03  0.00 - 0.05 10*3/mm3 Final    nRBC 03/24/2025 0.0  0.0 - 0.2 /100 WBC Final         Physical Exam  Vitals and nursing note reviewed.   Constitutional:       Appearance: Normal appearance. She is normal weight.   HENT:      Head: Normocephalic and atraumatic.   Cardiovascular:      Rate and Rhythm: Normal rate and regular rhythm.      Pulses: Normal pulses.       Heart sounds: Normal heart sounds. No murmur heard.     No friction rub. No gallop.   Pulmonary:      Effort: Pulmonary effort is normal. No respiratory distress.      Breath sounds: Normal breath sounds. No stridor. No wheezing, rhonchi or rales.   Chest:      Chest wall: No tenderness.   Abdominal:      General: Abdomen is flat. Bowel sounds are normal. There is no distension.      Palpations: Abdomen is soft. There is no mass.      Tenderness: There is no abdominal tenderness. There is no right CVA tenderness, left CVA tenderness, guarding or rebound.      Hernia: No hernia is present.   Musculoskeletal:      Lumbar back: Decreased range of motion.   Skin:     General: Skin is warm and dry.      Capillary Refill: Capillary refill takes less than 2 seconds.      Coloration: Skin is not jaundiced or pale.   Neurological:      General: No focal deficit present.      Mental Status: She is alert and oriented to person, place, and time. Mental status is at baseline.      Motor: No weakness.      Coordination: Coordination normal.      Gait: Gait normal.   Psychiatric:         Mood and Affect: Mood normal.         Behavior: Behavior normal.         Thought Content: Thought content normal.         Judgment: Judgment normal.          Physical Exam  Skin: Surgical site healing well, no signs of infection       Result Review  Data Reviewed:{ Labs  Result Review  Imaging  Med Tab  Media :23}   I have reviewed this patient's chart.  I have reviewed previous labs, previous imaging, previous medications, and previous encounters with notes that were available in this patient's chart.    Results                Assessment and Plan {CC Problem List  Visit Diagnosis  ROS  Review (Popup)  [facility]  BestPractice  Medications  SmartSets  SnapShot Encounters  Media :23}   Diagnoses and all orders for this visit:    1. Chronic low back pain, unspecified back pain laterality, unspecified whether sciatica  present (Primary)    2. Need for hepatitis C screening test    3. Hot flashes    4. Primary hypertension    5. Mixed anxiety depressive disorder  -     Discontinue: Levomilnacipran HCl ER (Fetzima) 20 MG capsule sustained-release 24 hr; Take 20 mg by mouth Daily. Take with 40mg of fetzima for a total of 60mg of fetzima.  Dispense: 30 capsule; Refill: 0  -     Levomilnacipran HCl ER (Fetzima) 20 MG capsule sustained-release 24 hr; Take 20 mg by mouth Daily. Take with 40mg of fetzima for a total of 60mg of fetzima.  Dispense: 90 capsule; Refill: 0  -     Levomilnacipran HCl ER (Fetzima) 40 MG capsule sustained-release 24 hr; Take 40 mg by mouth Daily. Take with 20mg of Fetzima for a total of 60mg of Fetzima.  Dispense: 90 capsule; Refill: 3    6. Severe episode of recurrent major depressive disorder, without psychotic features  -     Discontinue: Levomilnacipran HCl ER (Fetzima) 20 MG capsule sustained-release 24 hr; Take 20 mg by mouth Daily. Take with 40mg of fetzima for a total of 60mg of fetzima.  Dispense: 30 capsule; Refill: 0  -     Levomilnacipran HCl ER (Fetzima) 20 MG capsule sustained-release 24 hr; Take 20 mg by mouth Daily. Take with 40mg of fetzima for a total of 60mg of fetzima.  Dispense: 90 capsule; Refill: 0    7. Encounter for therapeutic drug monitoring  -     POC Urine Drug Screen, Triage        Assessment & Plan  1. Anxiety.  - Anxiety is currently managed with Abilify, BuSpar up to 3 times per day, Fetzima, and hydroxyzine as needed.  - Potential to increase the dosage of Fetzima was discussed.  - Current regimen of Abilify and BuSpar up to 3 times per day will continue.  - Dosage of Fetzima will be increased by an additional 20 mg to a total of 60 mg at patient's request. Prescription for this adjustment will be sent to local pharmacy for the first 30 days and then to [facility] for a 90-day supply. Urine drug screen will be conducted today.    2. Depression.  - Depression is currently  managed with Abilify and Fetzima.  - Potential to increase the dosage of Fetzima was discussed.  - Current regimen of Abilify will continue.  - Dosage of Fetzima will be increased by an additional 20 mg to a total of 60 mg. Prescription for this adjustment will be sent to local pharmacy for the first 30 days and then to [facility] for a 90-day supply.    3. Blood pressure management.  - Currently on metoprolol, clonidine, and losartan.  - Clarified that metoprolol should be taken once per day, clonidine twice per day, and losartan once per day.  - Advised to check the medication bottle to ensure the correct dosage and frequency.  - Last prescription for metoprolol was sent in 11/2024.    4. Memory issues.  - Referred to neuropsychologist for further evaluation of memory issues and confusion, which may be related to medication or previous concussion.  - MRI conducted to monitor a cyst detected on previous CT scan.  - Neurologist has adjusted medications, including discontinuation of rizatriptan and initiation of Zomig and indomethacin. Referral for Botox to replace Emgality and tapering off zonisamide.    5. Handicap placard.  - Requesting a handicap placard due to recent back surgery.  - Advised to use the placard only when necessary and not to take away from others who need it.       -  -ER red flags discussed with patient including risk versus benefit and education provided.  -Follow-up with me in 4 to 6 weeks or sooner if needed.    I spent 40 minutes caring for Amelie on this date of service. This time includes time spent by me in the following activities:preparing for the visit, reviewing tests, obtaining and/or reviewing a separately obtained history, performing a medically appropriate examination and/or evaluation , counseling and educating the patient/family/caregiver, ordering medications, tests, or procedures, referring and communicating with other health care professionals , documenting information in  the medical record, independently interpreting results and communicating that information with the patient/family/caregiver, and care coordination.    Follow Up {Instructions Charge Capture  Follow-up Communications :23}     Patient was given instructions and counseling regarding her condition or for health maintenance advice. Please see specific information pulled into the AVS (placed there by myself) if appropriate.    Return in about 6 weeks (around 7/22/2025) for Recheck.    BMI is >= 30 and <35. (Class 1 Obesity). The following options were offered after discussion;: exercise counseling/recommendations and nutrition counseling/recommendations         [facility] [place], [facility]      Patient or patient representative verbalized consent for the use of Ambient Listening during the visit with  EDSON Cali for chart documentation. 6/30/2025  21:22 EDT

## 2025-06-17 ENCOUNTER — HOSPITAL ENCOUNTER (EMERGENCY)
Facility: HOSPITAL | Age: 40
Discharge: HOME | End: 2025-06-17
Attending: EMERGENCY MEDICINE | Admitting: EMERGENCY MEDICINE
Payer: OTHER GOVERNMENT

## 2025-06-17 ENCOUNTER — SPECIALTY PHARMACY (OUTPATIENT)
Dept: INFUSION THERAPY | Facility: HOSPITAL | Age: 40
End: 2025-06-17
Payer: OTHER GOVERNMENT

## 2025-06-17 VITALS
OXYGEN SATURATION: 100 % | BODY MASS INDEX: 32.5 KG/M2 | RESPIRATION RATE: 20 BRPM | DIASTOLIC BLOOD PRESSURE: 63 MMHG | HEART RATE: 60 BPM | HEIGHT: 70 IN | SYSTOLIC BLOOD PRESSURE: 124 MMHG | TEMPERATURE: 98.8 F | WEIGHT: 227 LBS

## 2025-06-17 DIAGNOSIS — G43.109 MIGRAINE WITH AURA AND WITHOUT STATUS MIGRAINOSUS, NOT INTRACTABLE: Primary | ICD-10-CM

## 2025-06-17 DIAGNOSIS — G43.101 MIGRAINE WITH AURA AND WITH STATUS MIGRAINOSUS, NOT INTRACTABLE: Primary | ICD-10-CM

## 2025-06-17 PROCEDURE — 96374 THER/PROPH/DIAG INJ IV PUSH: CPT

## 2025-06-17 PROCEDURE — 25010000002 METOCLOPRAMIDE PER 10 MG: Performed by: EMERGENCY MEDICINE

## 2025-06-17 PROCEDURE — 99284 EMERGENCY DEPT VISIT MOD MDM: CPT | Performed by: EMERGENCY MEDICINE

## 2025-06-17 PROCEDURE — 96375 TX/PRO/DX INJ NEW DRUG ADDON: CPT

## 2025-06-17 PROCEDURE — 99283 EMERGENCY DEPT VISIT LOW MDM: CPT

## 2025-06-17 PROCEDURE — 25010000002 DEXAMETHASONE PER 1 MG: Performed by: EMERGENCY MEDICINE

## 2025-06-17 PROCEDURE — 25010000002 DIPHENHYDRAMINE PER 50 MG: Performed by: EMERGENCY MEDICINE

## 2025-06-17 PROCEDURE — 25810000003 SODIUM CHLORIDE 0.9 % SOLUTION: Performed by: EMERGENCY MEDICINE

## 2025-06-17 PROCEDURE — 25010000002 KETOROLAC TROMETHAMINE PER 15 MG: Performed by: EMERGENCY MEDICINE

## 2025-06-17 RX ORDER — DIPHENHYDRAMINE HYDROCHLORIDE 50 MG/ML
50 INJECTION, SOLUTION INTRAMUSCULAR; INTRAVENOUS ONCE
Status: COMPLETED | OUTPATIENT
Start: 2025-06-17 | End: 2025-06-17

## 2025-06-17 RX ORDER — KETOROLAC TROMETHAMINE 30 MG/ML
30 INJECTION, SOLUTION INTRAMUSCULAR; INTRAVENOUS ONCE
Status: COMPLETED | OUTPATIENT
Start: 2025-06-17 | End: 2025-06-17

## 2025-06-17 RX ORDER — METOCLOPRAMIDE HYDROCHLORIDE 5 MG/ML
10 INJECTION INTRAMUSCULAR; INTRAVENOUS ONCE
Status: COMPLETED | OUTPATIENT
Start: 2025-06-17 | End: 2025-06-17

## 2025-06-17 RX ORDER — SODIUM CHLORIDE 0.9 % (FLUSH) 0.9 %
10 SYRINGE (ML) INJECTION AS NEEDED
Status: DISCONTINUED | OUTPATIENT
Start: 2025-06-17 | End: 2025-06-17 | Stop reason: HOSPADM

## 2025-06-17 RX ORDER — DEXAMETHASONE SODIUM PHOSPHATE 4 MG/ML
6 INJECTION, SOLUTION INTRA-ARTICULAR; INTRALESIONAL; INTRAMUSCULAR; INTRAVENOUS; SOFT TISSUE ONCE
Status: COMPLETED | OUTPATIENT
Start: 2025-06-17 | End: 2025-06-17

## 2025-06-17 RX ADMIN — SODIUM CHLORIDE 500 ML: 9 INJECTION, SOLUTION INTRAVENOUS at 18:36

## 2025-06-17 RX ADMIN — DEXAMETHASONE SODIUM PHOSPHATE 6 MG: 4 INJECTION, SOLUTION INTRAMUSCULAR; INTRAVENOUS at 18:42

## 2025-06-17 RX ADMIN — DIPHENHYDRAMINE HYDROCHLORIDE 50 MG: 50 INJECTION, SOLUTION INTRAMUSCULAR; INTRAVENOUS at 18:37

## 2025-06-17 RX ADMIN — METOCLOPRAMIDE 10 MG: 5 INJECTION, SOLUTION INTRAMUSCULAR; INTRAVENOUS at 18:43

## 2025-06-17 RX ADMIN — KETOROLAC TROMETHAMINE 30 MG: 30 INJECTION INTRAMUSCULAR; INTRAVENOUS at 18:40

## 2025-06-17 NOTE — DISCHARGE INSTRUCTIONS
You have an appointment tomorrow for infusion of rimegepant.  Follow-up with your neurologist afterwards.

## 2025-06-17 NOTE — PROGRESS NOTES
Specialty Pharmacy Patient Management Program  Refill Outreach     Nurte PA approved until 12/14/25 [id number]  Benefit Investigation: $0 to fill with Jona Herman, Pharmacy Technician  6/17/2025  12:19 EDT

## 2025-06-17 NOTE — FSED PROVIDER NOTE
Subjective   History of Present Illness  40-year-old female with past medical history of migraine headaches presents with her typical migraine headache started 3 to 4 days ago.  Patient states unable to break the headache with her normal meds and her neurologist just started her on Zomig.  States nauseous with photophobia and some vertigo intermittently.  States taking sublingual Zofran without relief of nausea.  No focal weakness, no other acute somatic complaints at this time.    History provided by:  Patient      Review of Systems   All other systems reviewed and are negative.      Past Medical History:   Diagnosis Date    Allergic     Anemia     Anxiety     Arthritis     B12 deficiency     Callus     CHF (congestive heart failure) 5/18/2005    COVID     x 2     CTS (carpal tunnel syndrome)     Deep vein thrombosis 3/30/24    Depression     Dercum's disease     Difficulty walking     Eating disorder     Endometriosis     Fallen arches     Fibromyalgia 02/15/2024    Fibromyalgia, primary 2/15/24    Gastritis     GERD (gastroesophageal reflux disease)     Head injury     Headache, tension-type     High arches     Hyperlipidemia 2021    Borderline    Hypertension     Hypoglycemia     Ingrown toenail     Injury of back     Liver disease     Fatty liver disease    Low back pain     Memory loss     Migraines     Obesity     Pancreatitis     Plantar fasciitis of left foot 06/11/2024    PTSD (post-traumatic stress disorder)     Scoliosis     Shin splints     Shingles     Shortness of breath     Stress fracture     Syncope     Tremor     Urinary tract infection     Visual impairment     Vitamin D deficiency 2020       Allergies   Allergen Reactions    Porter Anaphylaxis    Codeine Hives and Itching    Influenza Virus Vaccine Other (See Comments)     Egg allergy     Latex Rash     Skin gets red and burns, skin starts peeling      Meloxicam Other (See Comments)     Gastritis      Sulfa Antibiotics Unknown - High Severity        Past Surgical History:   Procedure Laterality Date    CARPAL TUNNEL RELEASE Right      SECTION      x 2     CHOLECYSTECTOMY      COLONOSCOPY      ENDOMETRIAL ABLATION      FOOT SURGERY  24    HYSTERECTOMY  23    KNEE SURGERY Right     x 3     KNEE SURGERY Left     x 1     PLANTAR FASCIA RELEASE Left 2024    Procedure: ENDOSCOPIC PLANTAR FASCIOTOMY;  Surgeon: KAY Corral DPM;  Location: [facility] MAIN OR;  Service: Podiatry;  Laterality: Left;    RECESSION GASTROCNEMIUS Left 2024    Procedure: RECESSION GASTROCNEMIUS;  Surgeon: KAY Corral DPM;  Location: [facility] MAIN OR;  Service: Podiatry;  Laterality: Left;    SHOULDER ACROMIOPLASTY WITH ROTATOR CUFF REPAIR Left 2021    Procedure: LEFT SHOULDER ARTHROSCOPY WITH ROTATOR CUFF REPAIR AND SUBACROMIAL DECOMPRESSION- SLAP;  Surgeon: Rianna Jarvis MD;  Location: [facility] MAIN OR;  Service: Orthopedics;  Laterality: Left;    SHOULDER ARTHROSCOPY W/ ROTATOR CUFF REPAIR Left 2024    Procedure: SHOULDER ARTHROSCOPY WITH ROTATOR CUFF REPAIR, DECOMPRESSION AND PRP INJECTION;  Surgeon: Rianna Jarvis MD;  Location: [facility];  Service: Orthopedics;  Laterality: Left;    SPINE SURGERY  25    TUBAL ABDOMINAL LIGATION         Family History   Problem Relation Age of Onset    Migraines Mother     Thyroid disease Mother     Glaucoma Mother     Hypertension Mother     Transient ischemic attack Mother     Bell's palsy Mother     Arthritis Mother     Stroke Mother     Vision loss Mother     Alcohol abuse Father     Anxiety disorder Father         PTSD - [place] vet    ADD / ADHD Sister     Hypertension Maternal Grandmother     Heart failure Maternal Grandmother     Stroke Maternal Grandmother     Arthritis Maternal Grandmother     Hyperlipidemia Maternal Grandmother     Diabetes Paternal Grandfather     Depression Son     Pyloric stenosis Son     Malig Hyperthermia Neg Hx        Social History     Socioeconomic  History    Marital status:    Tobacco Use    Smoking status: Former     Current packs/day: 0.00     Average packs/day: 0.5 packs/day for 17.0 years (8.5 ttl pk-yrs)     Types: Cigarettes     Start date: 1999     Quit date: 2016     Years since quittin.4     Passive exposure: Past    Smokeless tobacco: Never   Vaping Use    Vaping status: Never Used   Substance and Sexual Activity    Alcohol use: Yes     Alcohol/week: 11.0 standard drinks of alcohol     Types: 3 Glasses of wine, 8 Cans of beer per week     Comment: Don't usually drink wine and beer in the same week    Drug use: Never    Sexual activity: Yes     Partners: Male     Birth control/protection: Tubal ligation, Hysterectomy, Surgical           Objective   Physical Exam  Vitals and nursing note reviewed.   Constitutional:       Appearance: Normal appearance.   HENT:      Head: Normocephalic and atraumatic.      Nose: Nose normal.      Mouth/Throat:      Mouth: Mucous membranes are moist.      Pharynx: Oropharynx is clear.   Eyes:      Comments: Patient has sunglasses on secondary to photophobia   Cardiovascular:      Rate and Rhythm: Normal rate and regular rhythm.      Heart sounds: Normal heart sounds.   Pulmonary:      Effort: Pulmonary effort is normal.      Breath sounds: Normal breath sounds.   Abdominal:      General: Bowel sounds are normal.      Palpations: Abdomen is soft.      Tenderness: There is no abdominal tenderness.   Musculoskeletal:         General: Normal range of motion.      Cervical back: Normal range of motion and neck supple.   Skin:     General: Skin is warm and dry.   Neurological:      General: No focal deficit present.      Mental Status: She is alert and oriented to person, place, and time.   Psychiatric:         Mood and Affect: Mood normal.         Behavior: Behavior normal.         Procedures           ED Course  Medications   metoclopramide (REGLAN) injection 10 mg (10 mg Intravenous Given 25 4153)    diphenhydrAMINE (BENADRYL) injection 50 mg (50 mg Intravenous Given 6/17/25 1837)   sodium chloride 0.9 % bolus 500 mL (0 mL Intravenous Stopped 6/17/25 1957)   dexAMETHasone (DECADRON) injection 6 mg (6 mg Intravenous Given 6/17/25 1842)   ketorolac (TORADOL) injection 30 mg (30 mg Intravenous Given 6/17/25 1840)        Medical Decision Making  40-year-old female presents with exacerbation of her typical migraine unrelieved by at home medications prescribed by her neurologist.  Multiple differential diagnoses were considered but not limited to exacerbation of typical migraine headache, tension headache, I doubt ICH, CVA, sinusitis, intracranial mass, or other acute life-threatening illnesses.    Problems Addressed:  Migraine with aura and without status migrainosus, not intractable: complicated acute illness or injury    Risk  Prescription drug management.        Final diagnoses:   Migraine with aura and without status migrainosus, not intractable       ED Disposition  ED Disposition       ED Disposition   Discharge    Condition   Stable    Comment   --               Damon Valrea, APRN  [address] [facility] [place] [address] [phone]    Go to   Your appointment tomorrow for your infusion of rimegepant         Medication List        Changed      zonisamide 100 MG capsule  Commonly known as: ZONEGRAN  What changed: additional instructions

## 2025-06-18 ENCOUNTER — HOSPITAL ENCOUNTER (OUTPATIENT)
Dept: INFUSION THERAPY | Facility: HOSPITAL | Age: 40
Discharge: HOME | End: 2025-06-18
Payer: OTHER GOVERNMENT

## 2025-06-18 ENCOUNTER — SPECIALTY PHARMACY (OUTPATIENT)
Dept: INFUSION THERAPY | Facility: HOSPITAL | Age: 40
End: 2025-06-18
Payer: OTHER GOVERNMENT

## 2025-06-18 DIAGNOSIS — G43.101 MIGRAINE WITH AURA AND WITH STATUS MIGRAINOSUS, NOT INTRACTABLE: ICD-10-CM

## 2025-06-18 DIAGNOSIS — G43.101 MIGRAINE WITH AURA AND WITH STATUS MIGRAINOSUS, NOT INTRACTABLE: Primary | ICD-10-CM

## 2025-06-18 RX ORDER — FREMANEZUMAB-VFRM 225 MG/1.5ML
225 INJECTION SUBCUTANEOUS
Qty: 1.5 ML | Refills: 2 | Status: SHIPPED | OUTPATIENT
Start: 2025-06-18

## 2025-06-18 NOTE — PROGRESS NOTES
Specialty Pharmacy Patient Management Program  Initial Fill Outreach      Amelie was contacted today regarding initial fill of her medication(s).    Specialty medication(s) and dose(s) confirmed: Ajovy 225 mg     Delivery Questions      Flowsheet Row Most Recent Value   Delivery method UPS   Delivery address verified with patient/caregiver? Yes   Delivery address Home   Number of medications in delivery 1   Medication(s) being filled and delivered Fremanezumab-vfrm (Ajovy)   Doses left of specialty medications New Start - Emgality injections where on 10th of month   Copay verified? Yes   Copay amount $0   Copay form of payment No copayment ($0)   Delivery Date Selection 06/20/25   Signature Required No   Do you consent to receive electronic handouts?  --  [This was not discussed]            Follow-Up: 25 days   Samantha Rinaldi RPH  6/18/2025  14:52 EDT

## 2025-06-18 NOTE — TELEPHONE ENCOUNTER
Specialty Pharmacy Patient Management Program  Per Protocol Prescription Order or Refill     Patient will be filling or currently fills medications at [facility] and is enrolled in the Patient Management Program.    Requested Prescriptions     Pending Prescriptions Disp Refills    rimegepant sulfate ODT (NURTEC-ODT) 75 MG disintegrating tablet 8 tablet 6     Sig: Take 1 at the onset of migraine, max dose 1 in 24 hours or 3 days a week.     Prescription orders above were sent to the pharmacy per Collaborative Care Agreement Protocol.     Patient is enrolled in the Specialty Pharmacy Program. This allows for 340B pricing and has to meet specific requirements to qualify for 340B. The prescription sent 6/17/25 does not meet 340B requirements. I have updated the originating department for the patient's original prescription to meet 340B compliance. I have sent the remaining quantity/refills from that original prescription only.     Last Office Visit: 6/3/25  Next Office Visit: 12/8/25    Samantha Rinaldi, PharmD, [facility]  Specialty Clinical Pharmacist  06/18/25  11:26 EDT

## 2025-06-18 NOTE — PROGRESS NOTES
Specialty Pharmacy Patient Management Program  Initial Assessment     Amelie Henderson is a 40 y.o. female with migraines and enrolled in the Patient Management program offered by [facility]. An initial outreach was conducted, including assessment of therapy appropriateness and specialty medication education for Nurtec 75 mg ODT tablet. The patient was introduced to services offered by [facility], including: regular assessments, refill coordination, curbside pick-up or mail order delivery options, prior authorization maintenance, and financial assistance programs as applicable. The patient was also provided with contact information for the pharmacy team.     Insurance Coverage & Financial Support  RX [employer] - mail delivery plan     Relevant Past Medical History and Comorbidities  Relevant medical history and concomitant health conditions were discussed with the patient. The patient's chart has been reviewed for relevant past medical history and comorbid health conditions and updated as necessary.   Past Medical History:   Diagnosis Date    Allergic     Anemia     Anxiety     Arthritis     B12 deficiency     Callus     CHF (congestive heart failure) 5/18/2005    COVID     x 2     CTS (carpal tunnel syndrome)     Deep vein thrombosis 3/30/24    Depression     Dercum's disease     Difficulty walking     Eating disorder     Endometriosis     Fallen arches     Fibromyalgia 02/15/2024    Fibromyalgia, primary 2/15/24    Gastritis     GERD (gastroesophageal reflux disease)     Head injury     Headache, tension-type     High arches     Hyperlipidemia 2021    Borderline    Hypertension     Hypoglycemia     Ingrown toenail     Injury of back     Liver disease     Fatty liver disease    Low back pain     Memory loss     Migraines     Obesity     Pancreatitis     Plantar fasciitis of left foot 06/11/2024    PTSD (post-traumatic stress disorder)     Scoliosis     Shin splints      Shingles     Shortness of breath     Stress fracture     Syncope     Tremor     Urinary tract infection     Visual impairment     Vitamin D deficiency 2020     Social History     Socioeconomic History    Marital status:    Tobacco Use    Smoking status: Former     Current packs/day: 0.00     Average packs/day: 0.5 packs/day for 17.0 years (8.5 ttl pk-yrs)     Types: Cigarettes     Start date: 1999     Quit date: 2016     Years since quittin.4     Passive exposure: Past    Smokeless tobacco: Never   Vaping Use    Vaping status: Never Used   Substance and Sexual Activity    Alcohol use: Yes     Alcohol/week: 11.0 standard drinks of alcohol     Types: 3 Glasses of wine, 8 Cans of beer per week     Comment: Don't usually drink wine and beer in the same week    Drug use: Never    Sexual activity: Yes     Partners: Male     Birth control/protection: Tubal ligation, Hysterectomy, Surgical     Problem list reviewed by Samantha Rinaldi RPH on 2025 at 11:12 AM    Allergies  Known allergies and reactions were discussed with the patient. The patient's chart has been reviewed for allergy information and updated as necessary.   Cedar, Codeine, Influenza virus vaccine, Latex, Meloxicam, and Sulfa antibiotics  Allergies reviewed by Samantha Rinaldi RPH on 2025 at 11:12 AM    Current Medication List  This medication list has been reviewed with the patient and evaluated for any interactions or necessary modifications/recommendations, and updated to include all prescription medications, OTC medications, and supplements the patient is currently taking. This list reflects what is contained in the patient's profile, which has also been marked as reviewed to communicate to other providers it is the most up to date version of the patient's current medication therapy.     Current Outpatient Medications:     albuterol (PROVENTIL) (2.5 MG/3ML) 0.083% nebulizer solution, Take 2.5 mg by nebulization Every 4  (Four) Hours As Needed for Wheezing or Shortness of Air., Disp: 120 mL, Rfl: 1    albuterol sulfate  (90 Base) MCG/ACT inhaler, Inhale 2 puffs Every 4 (Four) Hours As Needed for Wheezing., Disp: 18 g, Rfl: 2    ARIPiprazole (Abilify) 2 MG tablet, Take 1 tablet by mouth Daily., Disp: 90 tablet, Rfl: 1    busPIRone (BUSPAR) 10 MG tablet, Take 2 tablets by mouth 3 (Three) Times a Day As Needed (anxiety)., Disp: 540 tablet, Rfl: 1    Cholecalciferol 125 MCG (5000 UT) tablet, Take 1 tablet by mouth Daily., Disp: 90 tablet, Rfl: 1    cloNIDine (CATAPRES) 0.1 MG tablet, TAKE 1 TABLET BY MOUTH TWICE A DAY, Disp: 60 tablet, Rfl: 1    Daridorexant HCl 25 MG tablet, Take 25 mg by mouth Daily., Disp: 90 tablet, Rfl: 1    diphenhydrAMINE (BENADRYL) 25 mg capsule, Take 1 capsule by mouth Every 6 (Six) Hours As Needed for Itching., Disp: , Rfl:     EPINEPHrine (EPIPEN) 0.3 MG/0.3ML solution auto-injector injection, , Disp: , Rfl:     ferrous gluconate (FERGON) 324 MG tablet, Take 1 tablet by mouth Daily With Breakfast., Disp: 90 tablet, Rfl: 1    fluticasone (FLONASE) 50 MCG/ACT nasal spray, SPRAY 2 SPRAYS INTO THE NOSTRIL AS DIRECTED BY PROVIDER DAILY., Disp: 16 mL, Rfl: 3    Fluticasone-Salmeterol (ADVAIR/WIXELA) 100-50 MCG/ACT DISKUS, , Disp: , Rfl:     HYDROcodone-acetaminophen (NORCO)  MG per tablet, Take 1 tablet by mouth Every 8 (Eight) Hours As Needed for Moderate Pain., Disp: 90 tablet, Rfl: 0    hydrOXYzine (ATARAX) 50 MG tablet, MAY TAKE 1 TABLET 2 TIMES A DAY AS NEEDED FOR ITCHING, MAY ALSO TAKE 2 TABLETS AT NIGHT AS NEEDED., Disp: 60 tablet, Rfl: 0    indomethacin (INDOCIN) 25 MG capsule, Take 1 tab 1-2 hrs before intercourse., Disp: 15 capsule, Rfl: 5    l-methylfolate 7.5 MG tablet tablet, Take  by mouth Daily., Disp: , Rfl:     levocetirizine (XYZAL) 5 MG tablet, Take 1 tablet by mouth Every Morning., Disp: 90 tablet, Rfl: 1    Levomilnacipran HCl ER (Fetzima) 20 MG capsule sustained-release 24 hr,  Take 20 mg by mouth Daily. Take with 40mg of fetzima for a total of 60mg of fetzima., Disp: 90 capsule, Rfl: 0    Levomilnacipran HCl ER (Fetzima) 40 MG capsule sustained-release 24 hr, Take 40 mg by mouth Daily. Take with 20mg of Fetzima for a total of 60mg of Fetzima., Disp: 90 capsule, Rfl: 3    losartan (COZAAR) 50 MG tablet, Take 1 tablet by mouth Every Morning., Disp: 90 tablet, Rfl: 1    metoprolol succinate XL (TOPROL-XL) 25 MG 24 hr tablet, Take 1 tablet by mouth Daily., Disp: , Rfl:     mupirocin (BACTROBAN) 2 % cream, Apply 1 application  topically to the appropriate area as directed 3 (Three) Times a Day., Disp: 30 g, Rfl: 1    Myrbetriq 50 MG tablet sustained-release 24 hour 24 hr tablet, TAKE 1 TABLET DAILY, Disp: 90 tablet, Rfl: 3    omeprazole (priLOSEC) 20 MG capsule, Take 1 capsule by mouth 2 (Two) Times a Day., Disp: , Rfl:     ondansetron (Zofran) 4 MG tablet, Take 1 tablet by mouth Every 6 (Six) Hours As Needed for Nausea or Vomiting., Disp: 360 tablet, Rfl: 1    oxyCODONE-acetaminophen (PERCOCET) 7.5-325 MG per tablet, Take 1 tablet by mouth Every 6 (Six) Hours., Disp: , Rfl:     pravastatin (Pravachol) 20 MG tablet, Take 1 tablet by mouth Daily., Disp: 90 tablet, Rfl: 1    pregabalin (LYRICA) 50 MG capsule, TAKE 1 CAPSULE THREE TIMES A DAY, Disp: 270 capsule, Rfl: 0    promethazine (PHENERGAN) 25 MG tablet, Take 1 tablet by mouth Every 8 (Eight) Hours As Needed for Nausea or Vomiting., Disp: 30 tablet, Rfl: 1    propranolol (INDERAL) 20 MG tablet, Take 1 tablet by mouth 2 (Two) Times a Day As Needed (ANXIETY)., Disp: 180 tablet, Rfl: 01    rimegepant sulfate ODT (NURTEC-ODT) 75 MG disintegrating tablet, Take 1 at the onset of migraine, max dose 1 in 24 hours or 3 days a week., Disp: 9 tablet, Rfl: 6    rOPINIRole (REQUIP) 0.5 MG tablet, Take 1 tablet by mouth Every Night. Take 1 hour before bedtime., Disp: 90 tablet, Rfl: 1    Sucralfate (CARAFATE PO), , Disp: , Rfl:     testosterone 12.5  MG/ACT (1%) gel, , Disp: , Rfl:     tiZANidine (ZANAFLEX) 4 MG tablet, Take 1 tablet by mouth Every 8 (Eight) Hours As Needed for Muscle Spasms., Disp: 270 tablet, Rfl: 1    traZODone (DESYREL) 50 MG tablet, Take 1 tablet by mouth every night at bedtime., Disp: 30 tablet, Rfl: 2    vitamin C (ASCORBIC ACID) 250 MG tablet, Take 1 tablet by mouth Daily., Disp: , Rfl:     Yuvafem 10 MCG tablet vaginal tablet, , Disp: , Rfl:     zonisamide (ZONEGRAN) 100 MG capsule, Take 4 capsules by mouth Daily. (Patient taking differently: Take 4 capsules by mouth Daily. Being tapered off), Disp: , Rfl:   No current facility-administered medications for this visit.  Medicines reviewed by Samantha Rinaldi [facility] on 6/18/2025 at 11:12 AM    Drug Interactions  No clinically relevant medication interactions with [facility]     Relevant Laboratory Values  Lab Results   Component Value Date    GLUCOSE 94 05/01/2025    CALCIUM 10.0 05/01/2025     05/01/2025    K 4.6 05/01/2025    CO2 25.4 05/01/2025     05/01/2025    BUN 15 05/01/2025    CREATININE 1.05 (H) 05/01/2025    BCR 14.3 05/01/2025    ANIONGAP 10.6 05/01/2025     Lab Results   Component Value Date    WBC 8.35 03/24/2025    HGB 14.0 03/24/2025    HCT 42.7 03/24/2025    MCV 84.6 03/24/2025     03/24/2025    INR <0.93 (L) 01/06/2022     Lab Value Review  The above lab values have been reviewed; the following specialty medication(s) dose adjustment(s) are recommended: none.    Initial Education Provided for Specialty Medication  The patient has been provided with the following education and any applicable administration techniques (i.e. self-injection) have been demonstrated for the therapies indicated. All questions and concerns have been addressed prior to the patient receiving the medication, and the patient has verbalized understanding of the education and any materials provided. Additional patient education shall be provided and documented upon request by the  patient, provider or payer.      Nurtec (rimegepant)  Medication Expectations   Why am I taking this medication? You are taking this medication for migraine prophylaxis or to treat an acute migraine.   What should I expect while on this medication? You should expect to see a decrease in the frequency and severity of your migraines.   How does the medication work? Nurtec is a monoclonal antibody that binds to calcitonin gene-related peptide (CGRP) and blocks its binding to the receptor decreasing the severity of migraines.   How long will I be on this medication for? The amount of time you will be on this medication will be determined by your doctor and your response to the medication.    How do I take this medication? Take as directed on your prescription label.   What are some possible side effects? Potential side effects including, but not limited to nausea. Pt verbalized understanding.   What happens if I miss a dose? Take the missed dose as soon as possible, and resume the every other day timed from the last dose..     Medication Safety   What are things I should warn my doctor immediately about? Hypersensitivity reactions - trouble breathing or swallowing.   What are things that I should be cautious of? Hypersensitivity reactions (eg, dyspnea, rash), including delayed serious reactions, have occurred; discontinue use if suspected    What are some medications that can interact with this one? Avoid concomitant administration of Nurtec ODT with strong inhibitors of CY, strong or moderate inducers of CYP3A or inhibitors of P-gp or BCRP. Avoid another dose of Nurtec ODT within 48 hours when it is administered with moderate inhibitors of CY.  Ask your pharmacist or health care provider before starting new medications     Medication Storage/Handling   How should I handle this medication? Keep this medication out of reach of pets/children in original container. Ensure hands are dry before opening blister pack.    How does this medication need to be stored? Store at room temperature away from heat/cold, sunlight or moisture   How should I dispose of this medication? There should not be a need to dispose of this medication unless your provider decides to change the dose or therapy. If that is the case, take to your local police station for proper disposal. Some pharmacies also have take-back bins for medication drop-off.      Resources/Support   How can I remind myself to take this medication? You can download reminder apps to help you manage your refills. You may also set an alarm on your phone to remind you. The pharmacy carries pill boxes that you can place next to an area you pass everyday (such as where you place your car keys or where you charge your phone)   Is financial support available?  Yes, "[employer] can provide co-pay cards if you have commercial insurance or patient assistance if you have Medicare or no insurance.    Which vaccines are recommended for me? Talk to your doctor about these vaccines: Flu, Coronavirus (COVID-19), Pneumococcal (pneumonia), Tdap, Hepatitis B, Zoster (shingles)         Adherence, Self-Administration, and Current Therapy Problems  Adherence related to the patient's specialty therapy was discussed with the patient. The Adherence segment of this outreach has been reviewed and updated.          Additional Barriers to Patient Self-Administration: None   Methods for Supporting Patient Self-Administration: N/A    Open Medication Therapy Problems  No medication therapy recommendations to display    Goals of Therapy   Goals Addressed Today        Specialty Pharmacy General Goal      To reduce migraine severity, frequency and duration by 50%    6/18/25 - Baseline - Patient is on propranolol (uses for anxiety). Last dose of Emgality ~ 5/10/25 (feels that it stopped working). Has tried Aimovig in past - also stopped working. Patient has used topiramate but it caused taste  disturbances. Patient shows metoprolol on her chart. She had back surgery in May - which could be contributing to the increased migraines. Before medications and currently, experiencing up to 15 headache days per month and 8 super intense migraines monthly. Last night she had to go to the ER for a migraine cocktail and still feels that the pain is 5/10.                Reassessment Plan & Follow-Up  Medication Therapy Changes: New start Jewish - Using as needed at this time for migraine treatment  Additional Plans, Therapy Recommendations, or Therapy Problems to Be Addressed: Looking into Ajovy coverage. Luciana is setting her up appointment with Botox referral per patient.    Pharmacist to perform regular reassessments no more than (6) months from the previous assessment.  Welcome information and patient satisfaction survey to be sent by retail team with patient's initial fill.  Care Coordinator to set up future refill outreaches, coordinate prescription delivery, and escalate clinical questions to pharmacist.     Attestation  I attest the patient was actively involved in and has agreed to the above plan of care. I attest that the initiated specialty medication(s) are appropriate for the patient based on my assessment. If the prescribed therapy is at any point deemed not appropriate based on the current or future assessments, a consultation will be initiated with the patient's specialty care provider to determine the best course of action. The revised plan of therapy will be documented along with any reassessments and/or additional patient education provided.     Electronically signed by Samantha Rinaldi RP, 06/18/25, 11:16 AM EDT.

## 2025-06-18 NOTE — PROGRESS NOTES
Specialty Pharmacy Patient Management Program  Initial Fill Outreach      Amelie was contacted today regarding initial fill of her medication(s).    Specialty medication(s) and dose(s) confirmed: Nurtec 75 mg ODT     Delivery Questions      Flowsheet Row Most Recent Value   Delivery method UPS   Delivery address verified with patient/caregiver? Yes   Delivery address Home   Number of medications in delivery 1   Medication(s) being filled and delivered Rimegepant Sulfate (NURTEC-ODT)   Doses left of specialty medications New start   Copay verified? Yes   Copay amount $0   Copay form of payment No copayment ($0)   Delivery Date Selection 06/19/25   Signature Required No   Do you consent to receive electronic handouts?  --  [This was not discussed]            Follow-Up: 25 days    Samantha Rinaldi, PharmD, BCPS  Specialty Clinical Pharmacist  06/18/25  12:40 EDT

## 2025-06-18 NOTE — PROGRESS NOTES
Specialty Pharmacy Patient Management Program  Prior Authorization Approval     Prior Authorization for [employer] was Approved    Approval Start Date: 5/19/25  Approval End Date: 12/15/25  Authorization / Reference / Case Number: [id number]    [facility] Key: [id number]    Scheduled new prior authorization renewal outreach task to be completed on 12/12/25

## 2025-06-18 NOTE — PROGRESS NOTES
Specialty Pharmacy Patient Management Program  Initial Assessment and Reassessment     Amelie Henderson is a 40 y.o. female with migraine and enrolled in the Chronic Migraine Patient Management program offered by [facility]. An initial outreach was conducted, including assessment of therapy appropriateness and specialty medication education for Ajovy (fremanezumab) 225 mg injection. The patient is also enrolled with [facility] and a follow-up outreach was conducted, including assessment of continued therapy appropriateness, medication adherence, and side effect incidence and management.     Insurance Coverage & Financial Support, including any changes       Relevant Past Medical History and Comorbidities  Relevant medical history and concomitant health conditions were discussed with the patient. The patient's chart has been reviewed for relevant past medical history and comorbid health conditions and updated as necessary.   Past Medical History:   Diagnosis Date    Allergic     Anemia     Anxiety     Arthritis     B12 deficiency     Callus     CHF (congestive heart failure) 5/18/2005    COVID     x 2     CTS (carpal tunnel syndrome)     Deep vein thrombosis 3/30/24    Depression     Dercum's disease     Difficulty walking     Eating disorder     Endometriosis     Fallen arches     Fibromyalgia 02/15/2024    Fibromyalgia, primary 2/15/24    Gastritis     GERD (gastroesophageal reflux disease)     Head injury     Headache, tension-type     High arches     Hyperlipidemia 2021    Borderline    Hypertension     Hypoglycemia     Ingrown toenail     Injury of back     Liver disease     Fatty liver disease    Low back pain     Memory loss     Migraines     Obesity     Pancreatitis     Plantar fasciitis of left foot 06/11/2024    PTSD (post-traumatic stress disorder)     Scoliosis     Shin splints     Shingles     Shortness of breath     Stress fracture     Syncope     Tremor     Urinary tract  infection     Visual impairment     Vitamin D deficiency 2020     Social History     Socioeconomic History    Marital status:    Tobacco Use    Smoking status: Former     Current packs/day: 0.00     Average packs/day: 0.5 packs/day for 17.0 years (8.5 ttl pk-yrs)     Types: Cigarettes     Start date: 1999     Quit date: 2016     Years since quittin.4     Passive exposure: Past    Smokeless tobacco: Never   Vaping Use    Vaping status: Never Used   Substance and Sexual Activity    Alcohol use: Yes     Alcohol/week: 11.0 standard drinks of alcohol     Types: 3 Glasses of wine, 8 Cans of beer per week     Comment: Don't usually drink wine and beer in the same week    Drug use: Never    Sexual activity: Yes     Partners: Male     Birth control/protection: Tubal ligation, Hysterectomy, Surgical     Problem list reviewed by Samanhta Rinaldi RPH on 2025 at  2:48 PM    Allergies  Known allergies and reactions were discussed with the patient. The patient's chart has been reviewed for allergy information and updated as necessary.   Allergies   Allergen Reactions    [place] Anaphylaxis    Codeine Hives and Itching    Influenza Virus Vaccine Other (See Comments)     Egg allergy     Latex Rash     Skin gets red and burns, skin starts peeling      Meloxicam Other (See Comments)     Gastritis      Sulfa Antibiotics Unknown - High Severity     Allergies list reviewed by Samantha Rinaldi RPH on 2025 at  2:48 PM    Current Medication List  This medication list has been reviewed with the patient and evaluated for any interactions or necessary modifications/recommendations, and updated to include all prescription medications, OTC medications, and supplements the patient is currently taking. This list reflects what is contained in the patient's profile, which has also been marked as reviewed to communicate to other providers it is the most up to date version of the patient's current medication therapy.      Current Outpatient Medications:     albuterol (PROVENTIL) (2.5 MG/3ML) 0.083% nebulizer solution, Take 2.5 mg by nebulization Every 4 (Four) Hours As Needed for Wheezing or Shortness of Air., Disp: 120 mL, Rfl: 1    albuterol sulfate  (90 Base) MCG/ACT inhaler, Inhale 2 puffs Every 4 (Four) Hours As Needed for Wheezing., Disp: 18 g, Rfl: 2    ARIPiprazole (Abilify) 2 MG tablet, Take 1 tablet by mouth Daily., Disp: 90 tablet, Rfl: 1    busPIRone (BUSPAR) 10 MG tablet, Take 2 tablets by mouth 3 (Three) Times a Day As Needed (anxiety)., Disp: 540 tablet, Rfl: 1    Cholecalciferol 125 MCG (5000 UT) tablet, Take 1 tablet by mouth Daily., Disp: 90 tablet, Rfl: 1    cloNIDine (CATAPRES) 0.1 MG tablet, TAKE 1 TABLET BY MOUTH TWICE A DAY, Disp: 60 tablet, Rfl: 1    Daridorexant HCl 25 MG tablet, Take 25 mg by mouth Daily., Disp: 90 tablet, Rfl: 1    diphenhydrAMINE (BENADRYL) 25 mg capsule, Take 1 capsule by mouth Every 6 (Six) Hours As Needed for Itching., Disp: , Rfl:     EPINEPHrine (EPIPEN) 0.3 MG/0.3ML solution auto-injector injection, , Disp: , Rfl:     ferrous gluconate (FERGON) 324 MG tablet, Take 1 tablet by mouth Daily With Breakfast., Disp: 90 tablet, Rfl: 1    fluticasone (FLONASE) 50 MCG/ACT nasal spray, SPRAY 2 SPRAYS INTO THE NOSTRIL AS DIRECTED BY PROVIDER DAILY., Disp: 16 mL, Rfl: 3    Fluticasone-Salmeterol (ADVAIR/WIXELA) 100-50 MCG/ACT DISKUS, , Disp: , Rfl:     Fremanezumab-vfrm (Ajovy) 225 MG/1.5ML solution auto-injector, Inject 225 mg under the skin into the appropriate area as directed Every 30 (Thirty) Days., Disp: 1.5 mL, Rfl: 2    HYDROcodone-acetaminophen (NORCO)  MG per tablet, Take 1 tablet by mouth Every 8 (Eight) Hours As Needed for Moderate Pain., Disp: 90 tablet, Rfl: 0    hydrOXYzine (ATARAX) 50 MG tablet, MAY TAKE 1 TABLET 2 TIMES A DAY AS NEEDED FOR ITCHING, MAY ALSO TAKE 2 TABLETS AT NIGHT AS NEEDED., Disp: 60 tablet, Rfl: 0    indomethacin (INDOCIN) 25 MG capsule, Take  1 tab 1-2 hrs before intercourse., Disp: 15 capsule, Rfl: 5    l-methylfolate 7.5 MG tablet tablet, Take  by mouth Daily., Disp: , Rfl:     levocetirizine (XYZAL) 5 MG tablet, Take 1 tablet by mouth Every Morning., Disp: 90 tablet, Rfl: 1    Levomilnacipran HCl ER (Fetzima) 20 MG capsule sustained-release 24 hr, Take 20 mg by mouth Daily. Take with 40mg of fetzima for a total of 60mg of fetzima., Disp: 90 capsule, Rfl: 0    Levomilnacipran HCl ER (Fetzima) 40 MG capsule sustained-release 24 hr, Take 40 mg by mouth Daily. Take with 20mg of Fetzima for a total of 60mg of Fetzima., Disp: 90 capsule, Rfl: 3    losartan (COZAAR) 50 MG tablet, Take 1 tablet by mouth Every Morning., Disp: 90 tablet, Rfl: 1    metoprolol succinate XL (TOPROL-XL) 25 MG 24 hr tablet, Take 1 tablet by mouth Daily., Disp: , Rfl:     mupirocin (BACTROBAN) 2 % cream, Apply 1 application  topically to the appropriate area as directed 3 (Three) Times a Day., Disp: 30 g, Rfl: 1    Myrbetriq 50 MG tablet sustained-release 24 hour 24 hr tablet, TAKE 1 TABLET DAILY, Disp: 90 tablet, Rfl: 3    omeprazole (priLOSEC) 20 MG capsule, Take 1 capsule by mouth 2 (Two) Times a Day., Disp: , Rfl:     ondansetron (Zofran) 4 MG tablet, Take 1 tablet by mouth Every 6 (Six) Hours As Needed for Nausea or Vomiting., Disp: 360 tablet, Rfl: 1    oxyCODONE-acetaminophen (PERCOCET) 7.5-325 MG per tablet, Take 1 tablet by mouth Every 6 (Six) Hours., Disp: , Rfl:     pravastatin (Pravachol) 20 MG tablet, Take 1 tablet by mouth Daily., Disp: 90 tablet, Rfl: 1    pregabalin (LYRICA) 50 MG capsule, TAKE 1 CAPSULE THREE TIMES A DAY, Disp: 270 capsule, Rfl: 0    promethazine (PHENERGAN) 25 MG tablet, Take 1 tablet by mouth Every 8 (Eight) Hours As Needed for Nausea or Vomiting., Disp: 30 tablet, Rfl: 1    propranolol (INDERAL) 20 MG tablet, Take 1 tablet by mouth 2 (Two) Times a Day As Needed (ANXIETY)., Disp: 180 tablet, Rfl: 01    rimegepant sulfate ODT (NURTEC-ODT) 75 MG  disintegrating tablet, Take 1 at the onset of migraine, max dose 1 in 24 hours or 3 days a week., Disp: 8 tablet, Rfl: 6    rOPINIRole (REQUIP) 0.5 MG tablet, Take 1 tablet by mouth Every Night. Take 1 hour before bedtime., Disp: 90 tablet, Rfl: 1    Sucralfate (CARAFATE PO), , Disp: , Rfl:     testosterone 12.5 MG/ACT (1%) gel, , Disp: , Rfl:     tiZANidine (ZANAFLEX) 4 MG tablet, Take 1 tablet by mouth Every 8 (Eight) Hours As Needed for Muscle Spasms., Disp: 270 tablet, Rfl: 1    traZODone (DESYREL) 50 MG tablet, Take 1 tablet by mouth every night at bedtime., Disp: 30 tablet, Rfl: 2    vitamin C (ASCORBIC ACID) 250 MG tablet, Take 1 tablet by mouth Daily., Disp: , Rfl:     Yuvafem 10 MCG tablet vaginal tablet, , Disp: , Rfl:     zonisamide (ZONEGRAN) 100 MG capsule, Take 4 capsules by mouth Daily. (Patient taking differently: Take 4 capsules by mouth Daily. Being tapered off), Disp: , Rfl:   No current facility-administered medications for this visit.  Medicines reviewed by Samantha Rinaldi [facility] on 6/18/2025 at  2:48 PM    Drug Interactions  No clinically relevant drug interactions noted.     Relevant Laboratory Values  Lab Results   Component Value Date    GLUCOSE 94 05/01/2025    CALCIUM 10.0 05/01/2025     05/01/2025    K 4.6 05/01/2025    CO2 25.4 05/01/2025     05/01/2025    BUN 15 05/01/2025    CREATININE 1.05 (H) 05/01/2025    BCR 14.3 05/01/2025    ANIONGAP 10.6 05/01/2025     Lab Results   Component Value Date    WBC 8.35 03/24/2025    HGB 14.0 03/24/2025    HCT 42.7 03/24/2025    MCV 84.6 03/24/2025     03/24/2025    INR <0.93 (L) 01/06/2022     Lab Value Review  The above lab values have been reviewed; the following specialty medication(s) dose adjustment(s) are recommended: none.    Initial Education Provided for new Specialty Medication  The patient has been provided with the following education and any applicable administration techniques (i.e. self-injection) have been  demonstrated for the therapies indicated. All questions and concerns have been addressed prior to the patient receiving the medication, and the patient has verbalized understanding of the education and any materials provided. Additional patient education shall be provided and documented upon request by the patient, provider or payer.             Ajovy (fremanezumab-vfrm) 225 mg subQ every 28 days        Medication Expectations   Why am I taking this medication? You are taking this medication for migraine prophylaxis.   What should I expect while on this medication? You should expect to a decrease in the frequency and severity of your migraines.   How does the medication work? Ajovy is a monoclonal antibody that binds to calcitonin gene-related peptide (CGRP) and blocks its binding to the receptor decreasing the severity of migraines.   How long will I be on this medication for? The amount of time you will be on this medication will be determined by your doctor and your response to the medication.    How do I take this medication? Take as directed on your prescription label. This medication is a self-injection given every 28 days.    What are some possible side effects? Injection site reactions and hypersensitivity reactions.   What happens if I miss a dose? If you miss a dose, take it as soon as you remember, and time next dose 28 days from last dose.                  Medication Safety   What are things I should warn my doctor immediately about? Cases of anaphylaxis and angioedema have been reported in the postmarketing setting. If a reaction occurs, notify your doctor immediately.   What are things that I should be cautious of? Injection site reaction and hypersensitivity reactions, including rash, pruritus, drug hypersensitivity, and urticaria   What are some medications that can interact with this one? No drug interactions identified.            Medication Storage/Handling   How should I handle this medication?  Keep this medication our of reach of pets/children in original container.  On the day your Ajovy is due let it set at room temperature for 30 minutes prior to injection. (do NOT warm using a heat source such as hot water or a microwave).  Administer in the abdomen, thigh, back of the upper arm, or buttocks.  Do not inject where the skin is tender, bruised, red or hard.  Rotate injection sites.   How does this medication need to be stored? Store in refrigerator and keep dry.   How should I dispose of this medication? You can dispose of the empty syringe in a sharps container, and if this is not available you may use an empty hard plastic container such as a milk jug or tide container.            Resources/Support   How can I remind myself to take this medication? You can download a reminder chema on your phone or use a calandar  to help with your monthly injection.   Is financial support available?  Yes, [employer] can provide co-pay cards if you have commercial insurance or patient assistance if you have Medicare or no insurance.    Which vaccines are recommended for me? Talk to your doctor about these vaccines: Flu, Coronavirus (COVID-19), Pneumococcal (pneumonia), Tdap, Hepatitis B, Zoster (shingles)                   Adherence, Self-Administration, and Current Therapy Problems  Adherence related to the patient's specialty therapy was discussed with the patient. The Adherence segment of this outreach has been reviewed and updated.     Adherence Questions  Linked Medication(s) Assessed: Rimegepant Sulfate (NURTEC-ODT) (PRN - also new start 6/18/25 but at different time than Ajovy was added)  On average, how many doses/injections does the patient miss per month?: 0  What are the identified reasons for non-adherence or missed doses? : no problems identified  What is the estimated medication adherence level?: %  Based on the patient/caregiver response and refill history, does this patient require an MTP  to track adherence improvements?: no    Additional Barriers to Patient Self-Administration: None   Methods for Supporting Patient Self-Administration: N/A    Open Medication Therapy Problems  No medication therapy recommendations to display    Adverse Drug Reactions  Medication tolerability: Tolerating with no to minimal ADRs  Medication plan: Continue therapy with normal follow-up  Plan for ADR Management: None reported - new starts    Adherence and Self-Administration (currently medication)  Approximate Number of Doses Missed Since Last Assessment: none  Ongoing or New Barriers to Patient Adherence and/or Self-Administration: none   Methods for Supporting Patient Adherence and/or Self-Administration: N/A     Goals of Therapy  Goals related to the patient's specialty therapy were discussed with the patient. The Patient Goals segment of this outreach has been reviewed and updated.   Goals Addressed Today        Specialty Pharmacy General Goal      To reduce migraine severity, frequency and duration by 50%    6/18/25 - Baseline - Patient is on propranolol (uses for anxiety). Last dose of Emgality ~ 5/10/25 (feels that it stopped working). Has tried Aimovig in past - also stopped working. Patient has used topiramate but it caused taste disturbances. Patient shows metoprolol on her chart. She had back surgery in May - which could be contributing to the increased migraines. Before medications and currently, experiencing up to 15 headache days per month and 8 super intense migraines monthly. Last night she had to go to the ER for a migraine cocktail and still feels that the pain is 5/10.    6/18/25 - Patient reported Emgality was not working well for her so Ajovy was prescribed.               Progress Toward Meeting Patient-Identified Goals of Therapy: On Track  New Patient-Identified Goals, If Applicable:     Progress Toward Meeting Clinical Goals or Therapeutic Targets: On Track  New Clinical Goals or Therapeutic  Targets, If Applicable:     Hospitalizations and Urgent Care Since Last Assessment  Hospitalizations or Admissions: none  ED Visits: none; none since last assessment but patient was seen in ER last night for migraine  Urgent Office Visits: none     Quality of Life Assessment   Quality of Life related to the patient's enrollment in the patient management program and services provided was discussed with the patient. The QOL segment of this outreach has been reviewed and updated.  Quality of Life Improvement Scale: 8-Moderately better    Reassessment Plan & Follow-Up  Medication Therapy Changes: Changing Emgality to Ajovy   Additional Plans, Therapy Recommendations, or Therapy Problems to Be Addressed: none   Pharmacist to perform regular reassessments no more than (6) months from the previous assessment.  Welcome information and patient satisfaction survey to be sent by retail team with patient's initial fill.  Care Coordinator to set up future refill outreaches, coordinate prescription delivery, and escalate clinical questions to pharmacist.     Attestation  I attest the patient was actively involved in and has agreed to the above plan of care. I attest that the initiated specialty medication(s) are appropriate for the patient based on my assessment. If the prescribed therapy is at any point deemed not appropriate based on the current or future assessments, a consultation will be initiated with the patient's specialty care provider to determine the best course of action. The revised plan of therapy will be documented along with any required assessments and/or additional patient education provided.     Electronically signed by Samantha Rinaldi RPH, 06/18/25, 2:43 PM EDT.

## 2025-06-18 NOTE — TELEPHONE ENCOUNTER
Specialty Pharmacy Patient Management Program  Per Protocol Prescription Order or Refill     Patient will be filling or currently fills medications at [facility] and is enrolled in the Patient Management Program.    Requested Prescriptions     Pending Prescriptions Disp Refills    Fremanezumab-vfrm (Ajovy) 225 MG/1.5ML solution auto-injector 1.5 mL 2     Sig: Inject 225 mg under the skin into the appropriate area as directed Every 30 (Thirty) Days.     Prescription orders above were sent to the pharmacy per Collaborative Care Agreement Protocol.     Emgality not working well for patient. After discussing with EDSON Sheridan, sending in Ajovy for maintenance.     Last Office Visit: 6/3/25  Next Office Visit: 12/8/25    Samantha Rinaldi, PharmD, [facility]  Specialty Clinical Pharmacist  06/18/25  13:22 EDT

## 2025-06-28 ENCOUNTER — HOSPITAL ENCOUNTER (OUTPATIENT)
Dept: MRI IMAGING | Facility: HOSPITAL | Age: 40
Discharge: HOME | End: 2025-06-28
Payer: OTHER GOVERNMENT

## 2025-06-28 DIAGNOSIS — E34.8 PINEAL GLAND CYST: ICD-10-CM

## 2025-06-28 PROCEDURE — A9579 GAD-BASE MR CONTRAST NOS,1ML: HCPCS | Performed by: NURSE PRACTITIONER

## 2025-06-28 PROCEDURE — 25010000002 GADOTERIDOL PER 1 ML: Performed by: NURSE PRACTITIONER

## 2025-06-28 PROCEDURE — 70553 MRI BRAIN STEM W/O & W/DYE: CPT

## 2025-06-28 RX ORDER — GADOTERIDOL 279.3 MG/ML
20 INJECTION INTRAVENOUS
Status: COMPLETED | OUTPATIENT
Start: 2025-06-28 | End: 2025-06-28

## 2025-06-28 RX ADMIN — GADOTERIDOL 20 ML: 279.3 INJECTION, SOLUTION INTRAVENOUS at 12:43

## 2025-06-30 DIAGNOSIS — E34.8 PINEAL GLAND CYST: Primary | ICD-10-CM

## 2025-07-13 DIAGNOSIS — F41.8 MIXED ANXIETY DEPRESSIVE DISORDER: ICD-10-CM

## 2025-07-14 RX ORDER — ARIPIPRAZOLE 2 MG/1
2 TABLET ORAL DAILY
Qty: 90 TABLET | Refills: 1 | Status: SHIPPED | OUTPATIENT
Start: 2025-07-14

## 2025-07-14 NOTE — TELEPHONE ENCOUNTER
Rx Refill Note  Requested Prescriptions     Pending Prescriptions Disp Refills    ARIPiprazole (Abilify) 2 MG tablet 90 tablet 1     Sig: Take 1 tablet by mouth Daily.      Last office visit with prescribing clinician: 6/10/2025   Last telemedicine visit with prescribing clinician: Visit date not found   Next office visit with prescribing clinician: 7/23/2025                         Would you like a call back once the refill request has been completed: [] Yes [] No    If the office needs to give you a call back, can they leave a voicemail: [] Yes [] No    Cyndy Pond MA  07/14/25, 09:34 EDT

## 2025-07-17 ENCOUNTER — SPECIALTY PHARMACY (OUTPATIENT)
Dept: INFUSION THERAPY | Facility: HOSPITAL | Age: 40
End: 2025-07-17
Payer: OTHER GOVERNMENT

## 2025-07-17 NOTE — PROGRESS NOTES
Specialty Pharmacy Patient Management Program  Refill Outreach     Amelie was contacted today regarding refills of their medication(s).    Refill Questions      Flowsheet Row Most Recent Value   Changes to allergies? No   Changes to medications? No   New conditions or infections since last clinic visit No   Unplanned office visit, urgent care, ED, or hospital admission in the last 4 weeks  Yes  [Patient said [facility] found a brain tumor - waiting on a call from the neurosurgeon]   How does patient/caregiver feel medication is working? Very good   Financial problems or insurance changes  No   Since the previous refill, were any specialty medication doses or scheduled injections missed or delayed?  No   Does this patient require a clinical escalation to a pharmacist? Yes  [Patient said [facility] found a brain tumor - waiting on a call from the neurosurgeon]            Delivery Questions      Flowsheet Row Most Recent Value   Delivery method UPS   Delivery address verified with patient/caregiver? Yes   Delivery address Home   Other address preferred n/a   Number of medications in delivery 1   Medication(s) being filled and delivered Fremanezumab-vfrm (Ajovy)   Doses left of specialty medications emgality 0   Copay verified? Yes   Copay amount $0   Copay form of payment No copayment ($0)   Delivery Date Selection 07/18/25   Signature Required No   Do you consent to receive electronic handouts?  Yes                 Follow-up: 25 day(s)     Erica Herman, Pharmacy Technician  7/17/2025  10:01 EDT

## 2025-08-03 ENCOUNTER — PATIENT MESSAGE (OUTPATIENT)
Dept: FAMILY MEDICINE CLINIC | Facility: CLINIC | Age: 40
End: 2025-08-03
Payer: OTHER GOVERNMENT

## 2025-08-03 DIAGNOSIS — M25.512 ACUTE PAIN OF LEFT SHOULDER: Primary | ICD-10-CM

## 2025-08-12 ENCOUNTER — TELEPHONE (OUTPATIENT)
Dept: NEUROLOGY | Facility: CLINIC | Age: 40
End: 2025-08-12
Payer: OTHER GOVERNMENT

## 2025-08-13 ENCOUNTER — SPECIALTY PHARMACY (OUTPATIENT)
Dept: INFUSION THERAPY | Facility: HOSPITAL | Age: 40
End: 2025-08-13
Payer: OTHER GOVERNMENT

## 2025-08-15 PROCEDURE — 84443 ASSAY THYROID STIM HORMONE: CPT | Performed by: REGISTERED NURSE

## 2025-08-15 PROCEDURE — 83036 HEMOGLOBIN GLYCOSYLATED A1C: CPT | Performed by: REGISTERED NURSE

## 2025-08-15 PROCEDURE — 80061 LIPID PANEL: CPT | Performed by: REGISTERED NURSE

## 2025-08-15 PROCEDURE — 85025 COMPLETE CBC W/AUTO DIFF WBC: CPT | Performed by: REGISTERED NURSE

## 2025-08-15 PROCEDURE — 84481 FREE ASSAY (FT-3): CPT | Performed by: REGISTERED NURSE

## 2025-08-15 PROCEDURE — 86664 EPSTEIN-BARR NUCLEAR ANTIGEN: CPT | Performed by: REGISTERED NURSE

## 2025-08-15 PROCEDURE — 83970 ASSAY OF PARATHORMONE: CPT | Performed by: REGISTERED NURSE

## 2025-08-15 PROCEDURE — 86308 HETEROPHILE ANTIBODY SCREEN: CPT | Performed by: REGISTERED NURSE

## 2025-08-15 PROCEDURE — 84439 ASSAY OF FREE THYROXINE: CPT | Performed by: REGISTERED NURSE

## 2025-08-15 PROCEDURE — 86803 HEPATITIS C AB TEST: CPT | Performed by: REGISTERED NURSE

## 2025-08-15 PROCEDURE — 80053 COMPREHEN METABOLIC PANEL: CPT | Performed by: REGISTERED NURSE

## 2025-08-15 PROCEDURE — 86665 EPSTEIN-BARR CAPSID VCA: CPT | Performed by: REGISTERED NURSE

## 2025-08-28 DIAGNOSIS — F51.01 PRIMARY INSOMNIA: ICD-10-CM

## (undated) DEVICE — DECANTER: Brand: UNBRANDED

## (undated) DEVICE — STAPLER, SKIN, 35W, A: Brand: MEDLINE INDUSTRIES, INC.

## (undated) DEVICE — EMERALD EXTREMITY SHEET WITH ARMHOLD COVERS: Brand: CONVERTORS

## (undated) DEVICE — SUT ETHLN 3/0 FS1 30IN 669H

## (undated) DEVICE — ABL ASP APOLLO RF XL 90D

## (undated) DEVICE — SUT ETHLN FS1 4/0 18IN 1629H BX/36

## (undated) DEVICE — TBG SXN UNIV CONN/SCALLOP/FEM 1/4IN 20FT STRL

## (undated) DEVICE — GOWN,REINFORCE,POLY,SIRUS,BREATH SLV,XLG: Brand: MEDLINE

## (undated) DEVICE — GAUZE,SPONGE,FLUFF,6"X6.75",STRL,5/TRAY: Brand: MEDLINE

## (undated) DEVICE — Device

## (undated) DEVICE — EPF KIT: Brand: ENDOTRAC

## (undated) DEVICE — CANN S/RET GEMINI SR8 STRL

## (undated) DEVICE — PAD,ABDOMINAL,8"X10",ST,LF: Brand: MEDLINE

## (undated) DEVICE — NDL HYPO PRECISIONGLIDE/REG 18G 1IN PNK

## (undated) DEVICE — GOWN,AURORA,NONREINFORCED,LARGE: Brand: MEDLINE

## (undated) DEVICE — SYR LUERLOK 5CC

## (undated) DEVICE — FMS FLUID MANAGEMENT SYSTEM INFLOW TUBING (FMS VUE): Brand: FMS

## (undated) DEVICE — 3M™ MEDIPORE™ H SOFT CLOTH SURGICAL TAPE 2864, 4 INCH X 10 YARD (10CM X 9,14M), 12 ROLLS/CASE: Brand: 3M™ MEDIPORE™

## (undated) DEVICE — GLV SURG BIOGEL M LTX PF 7 1/2

## (undated) DEVICE — DRSNG GZ CURAD XEROFORM PETROLTM OVERWRP 1X8IN STRL

## (undated) DEVICE — SUT PROLN 3/0 FS2 18IN 8665G

## (undated) DEVICE — KT SURG TURNOVER 050

## (undated) DEVICE — BLD SHVE ULTR/AGGR KNEE/SHLD CRV 4.2MM STRL DISP

## (undated) DEVICE — PK EXTREM 50

## (undated) DEVICE — BLANKT WARM LOWR/BDY 100X120CM

## (undated) DEVICE — SOLUTION,WATER,IRRIGATION,1000ML,STERILE: Brand: MEDLINE

## (undated) DEVICE — MAT FLR ABSORBENT LG 4FT 10 2.5FT

## (undated) DEVICE — FIRSTPASS MINI STRAIGHT SUTURE PASSER: Brand: FIRSTPASS

## (undated) DEVICE — SUT PROLN 3/0 PS2 18IN 8687H

## (undated) DEVICE — PK ARTHSCP SHLDR TOWER 40

## (undated) DEVICE — PENCL HND ROCKRSWTCH HOLSTR EZ CLEAN TP CRD 10FT

## (undated) DEVICE — SYS PERFUS SEP PLATLT W TIPS CUST

## (undated) DEVICE — SOL IRRIG NACL 1000ML

## (undated) DEVICE — VAPR COOLPULSE 90 ELECTRODE WITH HAND CONTROLS 90 DEGREES SUCTION WITH INTEGRATED HANDPIECE: Brand: VAPR COOLPULSE

## (undated) DEVICE — FMS OMNICUT RESECTION BLADE 4.2MM SOFT TISSUE AND BONE SHAVER BLADE: Brand: FMS OMNICUT

## (undated) DEVICE — SPECIMEN COLLECTION 4-PORT MANIFOLD: Brand: NEPTUNE 2

## (undated) DEVICE — FMS BARREL TORNADO BURR PLUS 4.0MM: Brand: FMS

## (undated) DEVICE — GLV SURG SENSICARE ORTHO PF LF 6.5 STRL

## (undated) DEVICE — CUFF TOURNI 1BLADDER 1PRT 34IN STRL

## (undated) DEVICE — UNDERGLV SURG BIOGEL INDICAT PI SZ8 BLU

## (undated) DEVICE — BNDG,ELSTC,MATRIX,STRL,4"X5YD,LF,HOOK&LP: Brand: MEDLINE

## (undated) DEVICE — POSTN ARMSLV LAT/TRACTION DISP

## (undated) DEVICE — SUT ETHILON 3/0 30IN BLK

## (undated) DEVICE — PK ARTHSCP SHLDR 46

## (undated) DEVICE — APPL CHLORAPREP HI/LITE 26ML ORNG

## (undated) DEVICE — 3M™ STERI-DRAPE™ U-DRAPE 1015: Brand: STERI-DRAPE™

## (undated) DEVICE — DRAPE,U/ SHT,SPLIT,PLAS,STERIL: Brand: MEDLINE

## (undated) DEVICE — EGR KIT: Brand: ENDOTRAC

## (undated) DEVICE — SPNG GZ WOVN 4X4IN 12PLY 10/BX STRL

## (undated) DEVICE — GLV SURG PREMIERPRO ORTHO LTX PF SZ6.5 BRN

## (undated) DEVICE — APPL CHLORAPREP HI/LITE TINTED 10.5ML ORNG

## (undated) DEVICE — ANTIBACTERIAL UNDYED BRAIDED (POLYGLACTIN 910), SYNTHETIC ABSORBABLE SUTURE: Brand: COATED VICRYL

## (undated) DEVICE — DRP SURG U/DRP IMPERV 54X76IN STRL

## (undated) DEVICE — DRAPE,REIN 53X77,STERILE: Brand: MEDLINE

## (undated) DEVICE — TBG INTERMEDIARY IRR VALVE ST

## (undated) DEVICE — BLD DISSCT COOL CUT SJ CRVD 4MM 13CM

## (undated) DEVICE — 3M™ STERI-DRAPE™ U-DRAPE 1067 1067 5/BX 4BX/CS/CTN&#X20;: Brand: STERI-DRAPE™

## (undated) DEVICE — HDRST INTUB GENTLETOUCH SLOT 7IN RT

## (undated) DEVICE — 3M™ IOBAN™ 2 ANTIMICROBIAL INCISE DRAPE 6650EZ: Brand: IOBAN™ 2

## (undated) DEVICE — BUR SHAVER FLUSHCUT 8FLUT 5MM 13CM

## (undated) DEVICE — INTENDED FOR TISSUE SEPARATION, AND OTHER PROCEDURES THAT REQUIRE A SHARP SURGICAL BLADE TO PUNCTURE OR CUT.: Brand: BARD-PARKER ® CARBON RIB-BACK BLADES

## (undated) DEVICE — CANN 5.5 WO/HOLES LTX FREE ORANGE